# Patient Record
Sex: FEMALE | Race: BLACK OR AFRICAN AMERICAN | NOT HISPANIC OR LATINO | Employment: UNEMPLOYED | URBAN - METROPOLITAN AREA
[De-identification: names, ages, dates, MRNs, and addresses within clinical notes are randomized per-mention and may not be internally consistent; named-entity substitution may affect disease eponyms.]

---

## 2017-03-16 ENCOUNTER — PATIENT MESSAGE (OUTPATIENT)
Dept: OBSTETRICS AND GYNECOLOGY | Facility: CLINIC | Age: 37
End: 2017-03-16

## 2017-04-22 ENCOUNTER — HOSPITAL ENCOUNTER (EMERGENCY)
Facility: HOSPITAL | Age: 37
Discharge: HOME | End: 2017-04-22
Attending: EMERGENCY MEDICINE
Payer: MEDICAID

## 2017-04-22 VITALS
BODY MASS INDEX: 25.66 KG/M2 | RESPIRATION RATE: 20 BRPM | OXYGEN SATURATION: 100 % | TEMPERATURE: 98 F | HEART RATE: 80 BPM | HEIGHT: 65 IN | DIASTOLIC BLOOD PRESSURE: 55 MMHG | WEIGHT: 154 LBS | SYSTOLIC BLOOD PRESSURE: 101 MMHG
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
## 2017-04-22 DIAGNOSIS — S39.012A BACK STRAIN, INITIAL ENCOUNTER: Primary | ICD-10-CM

## 2017-04-22 DIAGNOSIS — S30.0XXA SACRAL CONTUSION, INITIAL ENCOUNTER: ICD-10-CM

## 2017-04-22 LAB
B-HCG UR QL: NEGATIVE
CTP QC/QA: YES

## 2017-04-22 PROCEDURE — 25000003 PHARM REV CODE 250: Performed by: EMERGENCY MEDICINE

## 2017-04-22 PROCEDURE — 99284 EMERGENCY DEPT VISIT MOD MDM: CPT | Mod: 25

## 2017-04-22 PROCEDURE — 81025 URINE PREGNANCY TEST: CPT | Performed by: EMERGENCY MEDICINE

## 2017-04-22 RX ORDER — METHOCARBAMOL 500 MG/1
1000 TABLET, FILM COATED ORAL 4 TIMES DAILY
Status: DISCONTINUED | OUTPATIENT
Start: 2017-04-22 | End: 2017-04-22

## 2017-04-22 RX ORDER — MELOXICAM 7.5 MG/1
7.5 TABLET ORAL DAILY
Qty: 20 TABLET | Refills: 0 | Status: SHIPPED | OUTPATIENT
Start: 2017-04-22 | End: 2018-11-18

## 2017-04-22 RX ORDER — METHOCARBAMOL 500 MG/1
1000 TABLET, FILM COATED ORAL 3 TIMES DAILY
Qty: 30 TABLET | Refills: 0 | Status: SHIPPED | OUTPATIENT
Start: 2017-04-22 | End: 2017-04-27

## 2017-04-22 RX ORDER — METHOCARBAMOL 500 MG/1
1000 TABLET, FILM COATED ORAL
Status: COMPLETED | OUTPATIENT
Start: 2017-04-22 | End: 2017-04-22

## 2017-04-22 RX ADMIN — METHOCARBAMOL 1000 MG: 500 TABLET ORAL at 01:04

## 2017-04-22 NOTE — ED NOTES
Pt. Informed that she would need to provide urine sample and shown the location of the nearest bathroom. Pt. States she does not have to use bathroom at this time.

## 2017-04-22 NOTE — ED TRIAGE NOTES
Pt. Presents with back pain after falling down a flight of stairs this AM. Pt. Reports she is having lumbar back pain. Exacerbated by walking or bending. Pt. Ambulatory to room in hunched over manner. Denies trauma to the head or neck.

## 2017-04-22 NOTE — ED PROVIDER NOTES
Encounter Date: 4/22/2017    SCRIBE #1 NOTE: I, Quang Man MD, am scribing for, and in the presence of,  WaltHandyJonna Novak . I have scribed the following portions of the note - Other sections scribed: HPI/ROS .       History     Chief Complaint   Patient presents with    Back Pain     States she fell down a set of stairs hurting her lower back      Review of patient's allergies indicates:  No Known Allergies  HPI Comments: CC: Back Pain     HPI: This 37 y.o. female presents to the ED c/o acute-onset, constant, severe (10/10) back pain secondary to a mechanical trip and fall down a flight of stairs at home just PTA. No reported head trauma or LOC. No prior attempted pain tx. Pt denies urinary/bowel incontinence, neck pain, abdominal pain, CP, N/V, numbness, weakness, and HA.       The history is provided by the patient. No  was used.     History reviewed. No pertinent past medical history.  History reviewed. No pertinent surgical history.  History reviewed. No pertinent family history.  Social History   Substance Use Topics    Smoking status: Never Smoker    Smokeless tobacco: Never Used    Alcohol use No     Review of Systems   Constitutional: Negative for chills and fever.   HENT: Negative for congestion.    Eyes: Negative for visual disturbance.   Respiratory: Negative for cough and shortness of breath.    Cardiovascular: Negative for chest pain.   Gastrointestinal: Negative for abdominal pain, diarrhea, nausea and vomiting.        (-) bowel incontinence    Genitourinary: Negative for dysuria.        (-) urinary incontinence    Musculoskeletal: Positive for back pain (lower ). Negative for neck pain.   Skin: Negative for rash and wound.   Neurological: Negative for seizures, syncope, weakness, numbness and headaches.       Physical Exam   Initial Vitals   BP Pulse Resp Temp SpO2   04/22/17 1108 04/22/17 1108 04/22/17 1108 04/22/17 1108 04/22/17 1108   102/59 84 18 98 °F (36.7 °C) 98 %      Physical Exam    Nursing note and vitals reviewed.  HENT:   Head: Atraumatic.   Eyes: EOM are normal.   Neck: Normal range of motion.   Cardiovascular: Exam reveals no gallop and no friction rub.    No murmur heard.  Pulmonary/Chest: Breath sounds normal. No respiratory distress. She has no wheezes. She has no rhonchi. She has no rales.   Abdominal: Soft. Bowel sounds are normal. She exhibits no distension. There is no tenderness.   Musculoskeletal: Normal range of motion.   Tenderness to lumbar spine and sacrum w/o deformity   Neurological: She is alert and oriented to person, place, and time.   Psychiatric: She has a normal mood and affect.         ED Course   Procedures  Labs Reviewed   POCT URINE PREGNANCY             Medical Decision Making:   Initial Assessment:   37-year-old female status post fall complaining of low back and sacral pain.  Tenderness noted.  No deformities.  Neuro intact.  X-rays reviewed and interpreted myself showed no fracture.  Plan for symptomatic treatment.  Primary care follow-up.  Return instructions given.  Patient verbalized understanding and agreement with the plan.            Scribe Attestation:   Scribe #1: I performed the above scribed service and the documentation accurately describes the services I performed. I attest to the accuracy of the note.    Attending Attestation:           Physician Attestation for Scribe:  Physician Attestation Statement for Scribe #1: I, Quang Man MD , reviewed documentation, as scribed by Tamara Novak  in my presence, and it is both accurate and complete.                 ED Course     Clinical Impression:   The primary encounter diagnosis was Back strain, initial encounter. Diagnoses of Fall and Sacral contusion, initial encounter were also pertinent to this visit.          Quang Man MD  04/22/17 8165

## 2017-07-15 ENCOUNTER — HOSPITAL ENCOUNTER (EMERGENCY)
Facility: OTHER | Age: 37
Discharge: HOME | End: 2017-07-15
Attending: INTERNAL MEDICINE
Payer: MEDICAID

## 2017-07-15 VITALS
RESPIRATION RATE: 18 BRPM | HEIGHT: 65 IN | TEMPERATURE: 99 F | HEART RATE: 93 BPM | OXYGEN SATURATION: 100 % | BODY MASS INDEX: 25.66 KG/M2 | WEIGHT: 154 LBS | DIASTOLIC BLOOD PRESSURE: 70 MMHG | SYSTOLIC BLOOD PRESSURE: 121 MMHG

## 2017-07-15 DIAGNOSIS — R51.9 ACUTE NONINTRACTABLE HEADACHE, UNSPECIFIED HEADACHE TYPE: Primary | ICD-10-CM

## 2017-07-15 DIAGNOSIS — R42 DIZZINESS: ICD-10-CM

## 2017-07-15 LAB
B-HCG UR QL: NEGATIVE
CTP QC/QA: YES

## 2017-07-15 PROCEDURE — 81025 URINE PREGNANCY TEST: CPT | Performed by: INTERNAL MEDICINE

## 2017-07-15 PROCEDURE — 93010 ELECTROCARDIOGRAM REPORT: CPT | Mod: ,,, | Performed by: INTERNAL MEDICINE

## 2017-07-15 PROCEDURE — 99284 EMERGENCY DEPT VISIT MOD MDM: CPT | Mod: 25

## 2017-07-15 PROCEDURE — 93005 ELECTROCARDIOGRAM TRACING: CPT

## 2017-07-15 RX ORDER — IBUPROFEN 800 MG/1
800 TABLET ORAL EVERY 8 HOURS PRN
Qty: 20 TABLET | Refills: 0 | Status: SHIPPED | OUTPATIENT
Start: 2017-07-15 | End: 2018-11-18

## 2017-07-16 NOTE — ED TRIAGE NOTES
Pt presents to ER with c/o dizziness and frontal headache for past 2 days.  Denies any fever or other symptoms.

## 2017-07-16 NOTE — ED PROVIDER NOTES
Encounter Date: 7/15/2017       History     Chief Complaint   Patient presents with    Headache     onset two days     Dizziness     37-year-old female presents to the emergency department complaining of headache ×2 days and dizziness yesterday.  She states dizziness has resolved but headache remains.  She has taken Tylenol without improvement of headache.  She denies fever/chills, nausea/vomiting      The history is provided by the patient. No  was used.   Headache    This is a new problem. The current episode started yesterday. The problem occurs constantly. The problem has been unchanged. The pain is located in the bilateral region. The pain does not radiate. The pain quality is similar to prior headaches. The quality of the pain is described as aching. The pain is at a severity of 4/10. Pertinent negatives include no abdominal pain, anorexia, back pain, dizziness, fever, hearing loss, loss of balance, nausea, neck pain, numbness, photophobia, seizures, sore throat, vomiting or weakness. Nothing aggravates the symptoms. She has tried acetaminophen for the symptoms. The treatment provided no relief.     Review of patient's allergies indicates:  No Known Allergies  History reviewed. No pertinent past medical history.  History reviewed. No pertinent surgical history.  History reviewed. No pertinent family history.  Social History   Substance Use Topics    Smoking status: Never Smoker    Smokeless tobacco: Never Used    Alcohol use No     Review of Systems   Constitutional: Negative for fever.   HENT: Negative for hearing loss and sore throat.    Eyes: Negative for photophobia.   Gastrointestinal: Negative for abdominal pain, anorexia, nausea and vomiting.   Musculoskeletal: Negative for back pain and neck pain.   Neurological: Positive for headaches. Negative for dizziness, seizures, facial asymmetry, speech difficulty, weakness, light-headedness, numbness and loss of balance.   All other  systems reviewed and are negative.      Physical Exam     Initial Vitals [07/15/17 1837]   BP Pulse Resp Temp SpO2   (!) 101/51 85 18 98.7 °F (37.1 °C) 100 %      MAP       67.67         Physical Exam    Nursing note and vitals reviewed.  Constitutional: She appears well-developed and well-nourished.   HENT:   Head: Normocephalic and atraumatic.   Right Ear: External ear normal.   Left Ear: External ear normal.   Nose: Nose normal.   Mouth/Throat: Oropharynx is clear and moist.   Eyes: Conjunctivae and EOM are normal. Pupils are equal, round, and reactive to light.   Neck: Normal range of motion. Neck supple.   Cardiovascular: Normal rate and regular rhythm.   Pulmonary/Chest: Breath sounds normal. No respiratory distress.   Abdominal: She exhibits no distension.   Musculoskeletal: Normal range of motion.   Neurological: She is alert and oriented to person, place, and time. She has normal strength. No cranial nerve deficit.   Skin: Skin is warm and dry.   Psychiatric: She has a normal mood and affect.         ED Course   Procedures  Labs Reviewed   POCT URINE PREGNANCY     EKG Readings: (Independently Interpreted)   Initial Reading: No STEMI. Rhythm: Normal Sinus Rhythm. Ectopy: No Ectopy. Conduction: Normal. ST Segments: Normal ST Segments. T Waves: Normal. Clinical Impression: Normal Sinus Rhythm          Medical Decision Making:   Initial Assessment:   37-year-old female presents to the emergency department complaining of headache ×2 days and dizziness yesterday.  She states dizziness has resolved but headache remains.  She has taken Tylenol without improvement of headache.  She denies fever/chills, nausea/vomiting    Differential Diagnosis:   Cluster headache  Migraine headache  Tension headache  Clinical Tests:   Lab Tests: Ordered and Reviewed       <> Summary of Lab: UPT was negative  Medical Tests: Ordered and Reviewed  ED Management:  Patient was given instructions for tension headache and a prescription  for ibuprofen.  EKG was normal.  Patient was advised to follow with her primary care physician in 2 days for reevaluation                   ED Course     Clinical Impression:   The primary diagnosis tension headache  Disposition:   Disposition: Discharged  Condition: Stable                        Anil Angela MD  07/15/17 4171

## 2018-11-18 ENCOUNTER — HOSPITAL ENCOUNTER (EMERGENCY)
Facility: HOSPITAL | Age: 38
Discharge: HOME | End: 2018-11-18
Attending: EMERGENCY MEDICINE
Payer: MEDICAID

## 2018-11-18 VITALS
HEIGHT: 63 IN | RESPIRATION RATE: 16 BRPM | HEART RATE: 79 BPM | WEIGHT: 150 LBS | TEMPERATURE: 98 F | OXYGEN SATURATION: 99 % | SYSTOLIC BLOOD PRESSURE: 110 MMHG | BODY MASS INDEX: 26.58 KG/M2 | DIASTOLIC BLOOD PRESSURE: 52 MMHG

## 2018-11-18 DIAGNOSIS — Z34.91 FIRST TRIMESTER PREGNANCY: ICD-10-CM

## 2018-11-18 DIAGNOSIS — O21.9 NAUSEA AND VOMITING IN PREGNANCY: Primary | ICD-10-CM

## 2018-11-18 LAB
ABO + RH BLD: NORMAL
ALBUMIN SERPL BCP-MCNC: 3.8 G/DL
ALP SERPL-CCNC: 73 U/L
ALT SERPL W/O P-5'-P-CCNC: 14 U/L
ANION GAP SERPL CALC-SCNC: 9 MMOL/L
AST SERPL-CCNC: 14 U/L
B-HCG UR QL: POSITIVE
BACTERIA #/AREA URNS HPF: ABNORMAL /HPF
BASOPHILS # BLD AUTO: 0.03 K/UL
BASOPHILS NFR BLD: 0.5 %
BILIRUB SERPL-MCNC: 0.6 MG/DL
BILIRUB UR QL STRIP: NEGATIVE
BUN SERPL-MCNC: 9 MG/DL
CALCIUM SERPL-MCNC: 9.6 MG/DL
CHLORIDE SERPL-SCNC: 105 MMOL/L
CLARITY UR: ABNORMAL
CO2 SERPL-SCNC: 20 MMOL/L
COLOR UR: YELLOW
CREAT SERPL-MCNC: 0.7 MG/DL
CTP QC/QA: YES
DIFFERENTIAL METHOD: ABNORMAL
EOSINOPHIL # BLD AUTO: 0 K/UL
EOSINOPHIL NFR BLD: 0.5 %
ERYTHROCYTE [DISTWIDTH] IN BLOOD BY AUTOMATED COUNT: 12.9 %
EST. GFR  (AFRICAN AMERICAN): >60 ML/MIN/1.73 M^2
EST. GFR  (NON AFRICAN AMERICAN): >60 ML/MIN/1.73 M^2
GLUCOSE SERPL-MCNC: 85 MG/DL
GLUCOSE UR QL STRIP: NEGATIVE
HCG INTACT+B SERPL-ACNC: NORMAL MIU/ML
HCT VFR BLD AUTO: 36.4 %
HGB BLD-MCNC: 12.3 G/DL
HGB UR QL STRIP: NEGATIVE
HYALINE CASTS #/AREA URNS LPF: 0 /LPF
KETONES UR QL STRIP: ABNORMAL
LEUKOCYTE ESTERASE UR QL STRIP: ABNORMAL
LYMPHOCYTES # BLD AUTO: 1.6 K/UL
LYMPHOCYTES NFR BLD: 26.8 %
MCH RBC QN AUTO: 30.4 PG
MCHC RBC AUTO-ENTMCNC: 33.8 G/DL
MCV RBC AUTO: 90 FL
MICROSCOPIC COMMENT: ABNORMAL
MONOCYTES # BLD AUTO: 0.5 K/UL
MONOCYTES NFR BLD: 8.2 %
NEUTROPHILS # BLD AUTO: 3.8 K/UL
NEUTROPHILS NFR BLD: 64 %
NITRITE UR QL STRIP: NEGATIVE
PH UR STRIP: 7 [PH] (ref 5–8)
PLATELET # BLD AUTO: 306 K/UL
PMV BLD AUTO: 10.4 FL
POTASSIUM SERPL-SCNC: 3.5 MMOL/L
PROT SERPL-MCNC: 8.3 G/DL
PROT UR QL STRIP: ABNORMAL
RBC # BLD AUTO: 4.04 M/UL
RBC #/AREA URNS HPF: 5 /HPF (ref 0–4)
SODIUM SERPL-SCNC: 134 MMOL/L
SP GR UR STRIP: 1.02 (ref 1–1.03)
SQUAMOUS #/AREA URNS HPF: 5 /HPF
URN SPEC COLLECT METH UR: ABNORMAL
UROBILINOGEN UR STRIP-ACNC: ABNORMAL EU/DL
WBC # BLD AUTO: 5.94 K/UL
WBC #/AREA URNS HPF: 2 /HPF (ref 0–5)

## 2018-11-18 PROCEDURE — 80053 COMPREHEN METABOLIC PANEL: CPT

## 2018-11-18 PROCEDURE — 85025 COMPLETE CBC W/AUTO DIFF WBC: CPT

## 2018-11-18 PROCEDURE — 63600175 PHARM REV CODE 636 W HCPCS: Performed by: NURSE PRACTITIONER

## 2018-11-18 PROCEDURE — 81000 URINALYSIS NONAUTO W/SCOPE: CPT

## 2018-11-18 PROCEDURE — 96361 HYDRATE IV INFUSION ADD-ON: CPT

## 2018-11-18 PROCEDURE — 96374 THER/PROPH/DIAG INJ IV PUSH: CPT

## 2018-11-18 PROCEDURE — 99284 EMERGENCY DEPT VISIT MOD MDM: CPT | Mod: 25

## 2018-11-18 PROCEDURE — 84702 CHORIONIC GONADOTROPIN TEST: CPT

## 2018-11-18 PROCEDURE — 81025 URINE PREGNANCY TEST: CPT | Performed by: NURSE PRACTITIONER

## 2018-11-18 PROCEDURE — 25000003 PHARM REV CODE 250: Performed by: NURSE PRACTITIONER

## 2018-11-18 PROCEDURE — 86901 BLOOD TYPING SEROLOGIC RH(D): CPT

## 2018-11-18 RX ORDER — ONDANSETRON 2 MG/ML
8 INJECTION INTRAMUSCULAR; INTRAVENOUS
Status: COMPLETED | OUTPATIENT
Start: 2018-11-18 | End: 2018-11-18

## 2018-11-18 RX ORDER — SODIUM CHLORIDE 9 MG/ML
1000 INJECTION, SOLUTION INTRAVENOUS
Status: COMPLETED | OUTPATIENT
Start: 2018-11-18 | End: 2018-11-18

## 2018-11-18 RX ORDER — ONDANSETRON 4 MG/1
4 TABLET, FILM COATED ORAL EVERY 6 HOURS PRN
Qty: 12 TABLET | Refills: 0 | Status: SHIPPED | OUTPATIENT
Start: 2018-11-18 | End: 2019-01-09

## 2018-11-18 RX ORDER — ONDANSETRON 4 MG/1
4 TABLET, FILM COATED ORAL EVERY 6 HOURS PRN
Qty: 12 TABLET | Refills: 0 | Status: SHIPPED | OUTPATIENT
Start: 2018-11-18 | End: 2018-11-18 | Stop reason: SDUPTHER

## 2018-11-18 RX ADMIN — SODIUM CHLORIDE 1000 ML: 0.9 INJECTION, SOLUTION INTRAVENOUS at 11:11

## 2018-11-18 RX ADMIN — ONDANSETRON HYDROCHLORIDE 8 MG: 2 INJECTION INTRAMUSCULAR; INTRAVENOUS at 11:11

## 2018-11-18 NOTE — ED TRIAGE NOTES
Pt. Reports emesis for the last 2 days. Pt.reports she has been up all night vomiting. Pt. Reports emesis is yellowish/greeen. Pt. Denies any abd pain, loose stool or body aches. Pt. States she cannot keep anything down.

## 2018-11-18 NOTE — DISCHARGE INSTRUCTIONS
Please return to the Emergency Department for any new or worsening symptoms including: abdominal pain/cramping, dark\black\bloody bowel movements, vomiting blood, vaginal bleeding, fever, chest pain, shortness of breath, loss of consciousness or any other concerns.     Please follow up with your OBGYN within in the week. If you do not have a OBGYN, you may contact the one listed on your discharge paperwork or you may also call the [facility] Appointment Desk at [phone] to schedule an appointment with a OBGYN.     May take over the counter Prenatal Vitamins    Transvaginal Ultrasound showed Single living intrauterine gestation, crown-rump length correlates to an estimated gestational age of 9 weeks 3 days, cardiac activity documented at 165 beats per minute.  2. Small uterine fibroid.  Estimated due date of 06/20/2019

## 2018-11-18 NOTE — ED PROVIDER NOTES
Encounter Date: 11/18/2018  This is a SORT/MSE of a 38 y.o. female presenting to the ED with c/o N/V x 2 days. She denies any pain, fever, chills, diarrhea. Care will be transferred to an alternate provider when patient is roomed for a full evaluation and final disposition. Patient is aware that he/she is awaiting a room in the emergency department, where another provider will review results, evaluate and treat as needed. FUNMILAYO Boyd DNP     History   No chief complaint on file.    HPI  Review of patient's allergies indicates:  No Known Allergies  History reviewed. No pertinent past medical history.  History reviewed. No pertinent surgical history.  History reviewed. No pertinent family history.  Social History     Tobacco Use    Smoking status: Never Smoker    Smokeless tobacco: Never Used   Substance Use Topics    Alcohol use: No    Drug use: No     Review of Systems   Constitutional: Negative for chills, diaphoresis, fatigue and fever.   Eyes: Negative for pain and redness.   Respiratory: Negative for cough, chest tightness and shortness of breath.    Cardiovascular: Negative for chest pain.   Gastrointestinal: Positive for nausea and vomiting. Negative for abdominal pain, blood in stool, constipation and diarrhea.   Genitourinary: Negative for decreased urine volume, dysuria, flank pain, frequency, hematuria, pelvic pain, urgency, vaginal bleeding, vaginal discharge and vaginal pain.   Musculoskeletal: Negative for arthralgias, back pain, gait problem, joint swelling, myalgias, neck pain and neck stiffness.   Skin: Negative for rash.   Neurological: Negative for dizziness, seizures, syncope, speech difficulty, weakness, light-headedness, numbness and headaches.   Hematological: Does not bruise/bleed easily.   Psychiatric/Behavioral: Negative for agitation, behavioral problems, confusion and decreased concentration.       Physical Exam     Initial Vitals   BP Pulse Resp Temp SpO2   -- -- -- -- --      MAP        --         Physical Exam    Nursing note and vitals reviewed.  Constitutional: She appears well-developed and well-nourished. She is not diaphoretic. She is active and cooperative.  Non-toxic appearance. She does not have a sickly appearance. She does not appear ill. No distress.   HENT:   Head: Normocephalic and atraumatic.   Right Ear: Hearing normal.   Left Ear: Hearing normal.   Nose: Nose normal.   Mouth/Throat: Oropharynx is clear and moist.   Eyes: Conjunctivae and EOM are normal. Pupils are equal, round, and reactive to light.   Neck: Trachea normal, normal range of motion and full passive range of motion without pain. Neck supple. No JVD present.   Cardiovascular: Normal rate, regular rhythm, normal heart sounds and intact distal pulses. Exam reveals no decreased pulses.    Pulses:       Carotid pulses are 2+ on the right side, and 2+ on the left side.       Radial pulses are 2+ on the right side, and 2+ on the left side.        Dorsalis pedis pulses are 2+ on the right side, and 2+ on the left side.        Posterior tibial pulses are 2+ on the right side, and 2+ on the left side.   Pulmonary/Chest: Effort normal and breath sounds normal. She has no decreased breath sounds. She has no wheezes. She has no rhonchi. She has no rales.   Abdominal: Soft. Normal appearance and bowel sounds are normal. There is no tenderness. There is no rigidity, no rebound, no guarding, no CVA tenderness, no tenderness at McBurney's point and negative Luther's sign.   Musculoskeletal: Normal range of motion.   Neurological: She is alert and oriented to person, place, and time. She has normal strength. Gait normal.   Skin: Skin is warm. Capillary refill takes less than 2 seconds. No rash noted.   Psychiatric: She has a normal mood and affect. Her speech is normal and behavior is normal. Judgment and thought content normal. Cognition and memory are normal.         ED Course   Procedures  Labs Reviewed   POCT URINE PREGNANCY           Imaging Results    None                APC / Resident Notes:   This is an emergent evaluation of a 38 y.o. female presenting to the ED for nausea and vomiting x 1 day. Denies any abdominal pain, dysuria, vaginal bleeding or discharge. Vitals the reassuring. Patient is non-toxic appearing and in no acute distress. UPT positive. Patient was unaware of pregnancy.    Transvaginal ultrasound showed Single living intrauterine gestation, crown-rump length correlates to an estimated gestational age of 9 weeks 3 days, cardiac activity documented at 165 beats per minute.Small uterine fibroid. NOLAN: 06/20/19.    HCG [id number]  Rh A POS  CBC unremarkable  CMP showed no significant electrolyte imbalance  UA showed no UTI    DDX: ectopic pregnancy, pregnancy, miscarriage, hyperemesis gravidarum, amongst others    Patient given NS bolus and Zofran. Symptoms completely resolved. Discharged home with Zofran. Instructed to follow up with OBGYN for reevaluation and management of symptoms. Advised to start prenatal vitamins.     I discussed with the patient the diagnosis, treatment plan, indications for return to the emergency department, and for expected follow-up. The patient verbalized an understanding. The patient is asked if there are any questions or concerns. We discuss the case, until all issues are addressed to the patients satisfaction. Patient understands and is agreeable to the plan.     I discussed this patient with  who is in agreement with my assessment and plan.         Marcie Cadena NP                    Clinical Impression:   The primary encounter diagnosis was Nausea and vomiting in pregnancy. A diagnosis of First trimester pregnancy was also pertinent to this visit.      Disposition:   Disposition: Discharged  Condition: Stable                        Marcie Cadena NP  11/18/18 5605

## 2019-01-09 ENCOUNTER — INITIAL PRENATAL (OUTPATIENT)
Dept: OBSTETRICS AND GYNECOLOGY | Facility: CLINIC | Age: 39
End: 2019-01-09
Payer: MEDICAID

## 2019-01-09 ENCOUNTER — LAB VISIT (OUTPATIENT)
Dept: LAB | Facility: HOSPITAL | Age: 39
End: 2019-01-09
Attending: OBSTETRICS & GYNECOLOGY
Payer: MEDICAID

## 2019-01-09 VITALS — DIASTOLIC BLOOD PRESSURE: 68 MMHG | BODY MASS INDEX: 33.74 KG/M2 | SYSTOLIC BLOOD PRESSURE: 122 MMHG | WEIGHT: 190.5 LBS

## 2019-01-09 DIAGNOSIS — Z34.92 SECOND TRIMESTER PREGNANCY: ICD-10-CM

## 2019-01-09 DIAGNOSIS — O09.892 HISTORY OF PRETERM DELIVERY, CURRENTLY PREGNANT IN SECOND TRIMESTER: ICD-10-CM

## 2019-01-09 DIAGNOSIS — Z3A.16 16 WEEKS GESTATION OF PREGNANCY: ICD-10-CM

## 2019-01-09 DIAGNOSIS — O09.521 ADVANCED MATERNAL AGE IN MULTIGRAVIDA, FIRST TRIMESTER: ICD-10-CM

## 2019-01-09 DIAGNOSIS — Z3A.16 16 WEEKS GESTATION OF PREGNANCY: Primary | ICD-10-CM

## 2019-01-09 LAB
ABO + RH BLD: NORMAL
BASOPHILS # BLD AUTO: 0.03 K/UL
BASOPHILS NFR BLD: 0.4 %
BLD GP AB SCN CELLS X3 SERPL QL: NORMAL
DIFFERENTIAL METHOD: ABNORMAL
EOSINOPHIL # BLD AUTO: 0.1 K/UL
EOSINOPHIL NFR BLD: 1.3 %
ERYTHROCYTE [DISTWIDTH] IN BLOOD BY AUTOMATED COUNT: 13.7 %
HCT VFR BLD AUTO: 34.6 %
HGB BLD-MCNC: 11.7 G/DL
LYMPHOCYTES # BLD AUTO: 1.5 K/UL
LYMPHOCYTES NFR BLD: 21.5 %
MCH RBC QN AUTO: 31 PG
MCHC RBC AUTO-ENTMCNC: 33.8 G/DL
MCV RBC AUTO: 92 FL
MONOCYTES # BLD AUTO: 0.5 K/UL
MONOCYTES NFR BLD: 7.5 %
NEUTROPHILS # BLD AUTO: 5 K/UL
NEUTROPHILS NFR BLD: 69.3 %
PLATELET # BLD AUTO: 256 K/UL
PMV BLD AUTO: 10.2 FL
RBC # BLD AUTO: 3.77 M/UL
WBC # BLD AUTO: 7.16 K/UL

## 2019-01-09 PROCEDURE — 85025 COMPLETE CBC W/AUTO DIFF WBC: CPT

## 2019-01-09 PROCEDURE — 81511 FTL CGEN ABNOR FOUR ANAL: CPT

## 2019-01-09 PROCEDURE — 99999 PR PBB SHADOW E&M-EST. PATIENT-LVL III: CPT | Mod: PBBFAC,,, | Performed by: OBSTETRICS & GYNECOLOGY

## 2019-01-09 PROCEDURE — 87086 URINE CULTURE/COLONY COUNT: CPT

## 2019-01-09 PROCEDURE — 99204 PR OFFICE/OUTPT VISIT, NEW, LEVL IV, 45-59 MIN: ICD-10-PCS | Mod: TH,S$PBB,, | Performed by: OBSTETRICS & GYNECOLOGY

## 2019-01-09 PROCEDURE — 86803 HEPATITIS C AB TEST: CPT

## 2019-01-09 PROCEDURE — 87491 CHLMYD TRACH DNA AMP PROBE: CPT

## 2019-01-09 PROCEDURE — 99999 PR PBB SHADOW E&M-EST. PATIENT-LVL III: ICD-10-PCS | Mod: PBBFAC,,, | Performed by: OBSTETRICS & GYNECOLOGY

## 2019-01-09 PROCEDURE — 86592 SYPHILIS TEST NON-TREP QUAL: CPT

## 2019-01-09 PROCEDURE — 83020 HEMOGLOBIN ELECTROPHORESIS: CPT

## 2019-01-09 PROCEDURE — 36415 COLL VENOUS BLD VENIPUNCTURE: CPT

## 2019-01-09 PROCEDURE — 83036 HEMOGLOBIN GLYCOSYLATED A1C: CPT

## 2019-01-09 PROCEDURE — 99204 OFFICE O/P NEW MOD 45 MIN: CPT | Mod: TH,S$PBB,, | Performed by: OBSTETRICS & GYNECOLOGY

## 2019-01-09 PROCEDURE — 86703 HIV-1/HIV-2 1 RESULT ANTBDY: CPT

## 2019-01-09 PROCEDURE — 86901 BLOOD TYPING SEROLOGIC RH(D): CPT

## 2019-01-09 PROCEDURE — 99213 OFFICE O/P EST LOW 20 MIN: CPT | Mod: PBBFAC,TH,25 | Performed by: OBSTETRICS & GYNECOLOGY

## 2019-01-09 PROCEDURE — 87340 HEPATITIS B SURFACE AG IA: CPT

## 2019-01-09 PROCEDURE — 86762 RUBELLA ANTIBODY: CPT

## 2019-01-09 RX ORDER — PROMETHAZINE HYDROCHLORIDE 25 MG/1
25 SUPPOSITORY RECTAL EVERY 6 HOURS PRN
Qty: 12 SUPPOSITORY | Refills: 1 | Status: SHIPPED | OUTPATIENT
Start: 2019-01-09 | End: 2019-02-15

## 2019-01-09 RX ORDER — PANTOPRAZOLE SODIUM 20 MG/1
20 TABLET, DELAYED RELEASE ORAL DAILY
Qty: 30 TABLET | Refills: 1 | Status: SHIPPED | OUTPATIENT
Start: 2019-01-09 | End: 2019-03-05 | Stop reason: SDUPTHER

## 2019-01-09 NOTE — PROGRESS NOTES
NOB was seen in ER on 11/18/18.  An ultrasound was performed on 11/18/18 given an NOLAN: 6/20/19 and EGA: 9w 3d.  Pt was not sure if she would like to keep baby that's why it took her so long to come in for prenatal care.  Pt is concerned about the fibroid that was noted from her last ER visit. laureen

## 2019-01-09 NOTE — PROGRESS NOTES
"Patient comes in today to begin prenatal care  No complaint.  Would like Phenergan for nausea and "something for heartburn".    History reviewed. No pertinent past medical history.    History reviewed. No pertinent surgical history.    History reviewed. No pertinent family history.    Social History     Socioeconomic History    Marital status: Single     Spouse name: None    Number of children: None    Years of education: None    Highest education level: None   Social Needs    Financial resource strain: None    Food insecurity - worry: None    Food insecurity - inability: None    Transportation needs - medical: None    Transportation needs - non-medical: None   Occupational History    None   Tobacco Use    Smoking status: Never Smoker    Smokeless tobacco: Never Used   Substance and Sexual Activity    Alcohol use: No    Drug use: No    Sexual activity: Yes     Partners: Male   Other Topics Concern    None   Social History Narrative    Together for a long time    Getting  8/5/2016    He owns his own business    She is doing home health       No current outpatient medications on file.     No current facility-administered medications for this visit.        Review of patient's allergies indicates:  No Known Allergies    Review of Systems   Constitutional: Positive for fatigue. Negative for fever, chills, appetite change and unexpected weight change.   HENT: Positive for congestion. Negative for hearing loss, ear pain, sore throat, rhinorrhea, sneezing, mouth sores, neck pain, neck stiffness, voice change and postnasal drip.   Eyes: Negative for photophobia, itching and visual disturbance.   Respiratory: Negative for cough, chest tightness, shortness of breath and wheezing.   Cardiovascular: Negative for chest pain, palpitations and leg swelling.   Gastrointestinal: Positive for nausea, vomiting, abdominal pain and constipation. Negative for diarrhea, blood in stool and abdominal distention. "   Genitourinary: Positive for vaginal discharge and pelvic pain. Negative for dysuria, urgency, frequency, hematuria, flank pain, decreased urine volume, vaginal bleeding, difficulty urinating, genital sores, vaginal pain, menstrual problem and dyspareunia.   Musculoskeletal: Positive for back pain. Negative for myalgias and joint swelling.   Skin: Negative for rash and wound.   Neurological: Positive for headaches. Negative for dizziness, tremors, syncope, speech difficulty, weakness, light-headedness and numbness.   Hematological: Negative for adenopathy. Does not bruise/bleed easily.   Psychiatric/Behavioral: Negative for suicidal ideas, hallucinations, confusion, sleep disturbance, dysphoric mood, decreased concentration and agitation. The patient is not nervous/anxious and is not hyperactive.     Exam normal    1.  IUP at 16w6d.  Prenatal profile.  GC/Chlam.  Prenatal vitamins. Quad screen.  2.  Advanced maternal age.  Would need [facility]'s referral.  Does not want amniocentesis.  Needs a baby aspirin now.  3.  History of  birth.  Needs Rosette weekly  4.  Nausea in pregnancy.  Phenergan per request  5.  GERD.  Protonix    Back in 4 weeks

## 2019-01-10 LAB
C TRACH DNA SPEC QL NAA+PROBE: DETECTED
ESTIMATED AVG GLUCOSE: 103 MG/DL
HBA1C MFR BLD HPLC: 5.2 %
HBV SURFACE AG SERPL QL IA: NEGATIVE
HCV AB SERPL QL IA: NEGATIVE
HIV 1+2 AB+HIV1 P24 AG SERPL QL IA: NEGATIVE
N GONORRHOEA DNA SPEC QL NAA+PROBE: NOT DETECTED

## 2019-01-11 ENCOUNTER — PATIENT MESSAGE (OUTPATIENT)
Dept: OBSTETRICS AND GYNECOLOGY | Facility: CLINIC | Age: 39
End: 2019-01-11

## 2019-01-11 ENCOUNTER — TELEPHONE (OUTPATIENT)
Dept: OBSTETRICS AND GYNECOLOGY | Facility: CLINIC | Age: 39
End: 2019-01-11

## 2019-01-11 DIAGNOSIS — A56.09 CHLAMYDIAL CERVICITIS: Primary | ICD-10-CM

## 2019-01-11 LAB
# FETUSES US: NORMAL
2ND TRIMESTER 4 SCREEN PNL SERPL: NEGATIVE
2ND TRIMESTER 4 SCREEN SERPL-IMP: NORMAL
AFP MOM SERPL: 1.16
AFP SERPL-MCNC: 41.9 NG/ML
AGE AT DELIVERY: 39
B-HCG MOM SERPL: 0.72
B-HCG SERPL-ACNC: 20.5 IU/ML
BACTERIA UR CULT: NORMAL
FET TS 21 RISK FROM MAT AGE: NORMAL
GA (DAYS): 6 D
GA (WEEKS): 16 WK
GA METHOD: NORMAL
HGB A MFR BLD ELPH: 95 % (ref 95.8–98)
HGB A2 MFR BLD: 2.9 % (ref 2–3.3)
HGB F MFR BLD: 2.1 % (ref 0–0.9)
HGB FRACT BLD ELPH-IMP: ABNORMAL
HGB OTHER MFR BLD ELPH: 0 %
IDDM PATIENT QL: NORMAL
INHIBIN A MOM SERPL: 0.43
INHIBIN A SERPL-MCNC: 62.6 PG/ML
RPR SER QL: NORMAL
RUBV IGG SER-ACNC: 183 IU/ML
RUBV IGG SER-IMP: REACTIVE
SMOKING STATUS FTND: NO
THEVP VARIANT 2: ABNORMAL
THEVP VARIANT 3: ABNORMAL
TS 18 RISK FETUS: NORMAL
TS 21 RISK FETUS: NORMAL
U ESTRIOL MOM SERPL: 0.75
U ESTRIOL SERPL-MCNC: 0.7 NG/ML

## 2019-01-11 RX ORDER — AZITHROMYCIN 500 MG/1
1000 TABLET, FILM COATED ORAL ONCE
Qty: 2 TABLET | Refills: 0 | Status: SHIPPED | OUTPATIENT
Start: 2019-01-11 | End: 2019-01-11

## 2019-01-12 ENCOUNTER — PATIENT MESSAGE (OUTPATIENT)
Dept: OBSTETRICS AND GYNECOLOGY | Facility: CLINIC | Age: 39
End: 2019-01-12

## 2019-01-15 DIAGNOSIS — Z3A.17 17 WEEKS GESTATION OF PREGNANCY: Primary | ICD-10-CM

## 2019-01-18 ENCOUNTER — INITIAL CONSULT (OUTPATIENT)
Dept: MATERNAL FETAL MEDICINE | Facility: CLINIC | Age: 39
End: 2019-01-18
Attending: OBSTETRICS & GYNECOLOGY
Payer: MEDICAID

## 2019-01-18 ENCOUNTER — TELEPHONE (OUTPATIENT)
Dept: OBSTETRICS AND GYNECOLOGY | Facility: CLINIC | Age: 39
End: 2019-01-18

## 2019-01-18 ENCOUNTER — PROCEDURE VISIT (OUTPATIENT)
Dept: MATERNAL FETAL MEDICINE | Facility: CLINIC | Age: 39
End: 2019-01-18
Payer: MEDICAID

## 2019-01-18 VITALS — BODY MASS INDEX: 34.37 KG/M2 | DIASTOLIC BLOOD PRESSURE: 82 MMHG | SYSTOLIC BLOOD PRESSURE: 120 MMHG | WEIGHT: 194 LBS

## 2019-01-18 DIAGNOSIS — O09.522 ELDERLY MULTIGRAVIDA IN SECOND TRIMESTER: ICD-10-CM

## 2019-01-18 DIAGNOSIS — Z3A.16 16 WEEKS GESTATION OF PREGNANCY: ICD-10-CM

## 2019-01-18 DIAGNOSIS — O09.521 ADVANCED MATERNAL AGE IN MULTIGRAVIDA, FIRST TRIMESTER: ICD-10-CM

## 2019-01-18 DIAGNOSIS — Z87.51 HISTORY OF PRETERM DELIVERY: ICD-10-CM

## 2019-01-18 DIAGNOSIS — O09.892 HISTORY OF PRETERM DELIVERY, CURRENTLY PREGNANT IN SECOND TRIMESTER: ICD-10-CM

## 2019-01-18 PROCEDURE — 76811 OB US DETAILED SNGL FETUS: CPT | Mod: 26,S$PBB,, | Performed by: OBSTETRICS & GYNECOLOGY

## 2019-01-18 PROCEDURE — 99203 OFFICE O/P NEW LOW 30 MIN: CPT | Mod: S$PBB,TH,25, | Performed by: OBSTETRICS & GYNECOLOGY

## 2019-01-18 PROCEDURE — 99999 PR PBB SHADOW E&M-EST. PATIENT-LVL III: CPT | Mod: PBBFAC,,, | Performed by: OBSTETRICS & GYNECOLOGY

## 2019-01-18 PROCEDURE — 99203 PR OFFICE/OUTPT VISIT, NEW, LEVL III, 30-44 MIN: ICD-10-PCS | Mod: S$PBB,TH,25, | Performed by: OBSTETRICS & GYNECOLOGY

## 2019-01-18 PROCEDURE — 99213 OFFICE O/P EST LOW 20 MIN: CPT | Mod: PBBFAC,25 | Performed by: OBSTETRICS & GYNECOLOGY

## 2019-01-18 PROCEDURE — 76811 OB US DETAILED SNGL FETUS: CPT | Mod: PBBFAC | Performed by: OBSTETRICS & GYNECOLOGY

## 2019-01-18 PROCEDURE — 76811 PR US, OB FETAL EVAL & EXAM, TRANSABDOM,FIRST GESTATION: ICD-10-PCS | Mod: 26,S$PBB,, | Performed by: OBSTETRICS & GYNECOLOGY

## 2019-01-18 PROCEDURE — 99999 PR PBB SHADOW E&M-EST. PATIENT-LVL III: ICD-10-PCS | Mod: PBBFAC,,, | Performed by: OBSTETRICS & GYNECOLOGY

## 2019-01-18 NOTE — PROGRESS NOTES
Chief complaint: AMA, PTL    Provider requesting consultation: Dr. Henderson    38 y.o. [id number] 18w1d EGA     PMH:History reviewed. No pertinent past medical history.    PObHx:  OB History    Para Term  AB Living   6 5 4 1   5   SAB TAB Ectopic Multiple Live Births           5      # Outcome Date GA Lbr Zeus/2nd Weight Sex Delivery Anes PTL Lv   6 Current            5 Term 11/24/10   3.24 kg (7 lb 2.3 oz) F   N CHRIS   4  07 33w0d  2.807 kg (6 lb 3 oz) F Vag-Spont  Y CHRIS   3 Term 05   3.997 kg (8 lb 13 oz) M Vag-Spont  N CHRIS   2 Term 01   4.99 kg (11 lb) M Vag-Spont  N CHRIS   1 Term 99   3.459 kg (7 lb 10 oz) M Vag-Spont  N CHRIS          PSH:History reviewed. No pertinent surgical history.    Family history:family history is not on file.    Social history: reports that  has never smoked. she has never used smokeless tobacco. She reports that she does not drink alcohol or use drugs.    A detailed fetal anatomical ultrasound was completed today.  See details in imaging section of EPIC.    Age based risk for Down syndrome at this gestational age is approximately 1 in 110  Aneuploidy screening this pregnancy estimates the risk of Down syndrome to be 1 in 8600        Today the patient was counseled on the relationship between maternal age and genetic aneuploidy.  The patient was counseled on the risks and benefits of screening tests versus definitive genetic testing (amniocentesis). Patient was counseled about her specific age related risk of Down Syndrome. We discussed the limitations of ultrasound in the definitive diagnosis of Down Syndrome and we discussed amniocentesis as providing definitive diagnosis.  I quoted the patient a 1 in 1000 procedure related risk of fetal loss with genetic amniocentesis. After today's consultation she  did not want to pursue genetic amniocentesis.    -We also reviewed that AMA is associated with an increased risk of adverse pregnancy outcomes such  as miscarriage, ectopic pregnancy, diabetes, hypertension, and other adverse outcomes. There is also an increased risk of stillibirth, particularly in women age 40 and above.    -MFM at [facility] recommends the following for women 35 and older at their NOLAN:   -Detailed fetal anatomic survey at 19 to 20 weeks gestation   -Ultrasound for fetal growth at 32 to 34 weeks gestation     Prior spontaneous  birth counseling and recommendations:    Today I discussed with the patient her prior history of  delivery.  I reviewed with her the increased risk of recurrence of  delivery in women who have a history of a prior spontaneous  birth.  I discussed current ACOG guidelines that recommend in patients with a prior history of spontaneous  birth they be offered progesterone therapy for the prevention of recurrent  birth.  This is usually accomplished via weekly 250mg intramuscular injections of 17-alpha-hydroxyprogesterone caproate beginning at 16-20 weeks estimated gestational age and continuing until 37 weeks.  (I anticipate this can be arranged by her primary obstetrical provider.)      I also discussed with the patient that in women at risk for recurrent  birth, surveillance for cervical length shortening with periodic cervical length measurements may help identify those in need for tocolytic therapy and administration of corticosteroids.  Here at [facility] our M group recommends to measure the cervix every 2-3 weeks transvaginally starting at 16-18 weeks gestation up until 28 weeks gestation.     Recommendations from our M group at [facility]:    -Weekly progesterone injection therapy be initiated by her primary obstetrician at 16-20 weeks gestation until 37 weeks gestation    -Cervical length surveillance every 2-3 weeks starting at 16-18 weeks gestation up until 28 weeks gestation    With your permission we have taken the liberty today to schedule a follow up appointment for  cervical length surveillance.       The patient was given an opportunity to ask questions about management and the diease process.  She expressed an understanding of and agreement to the above impression and plan. All questions were answered to her satisfaction.  She was given contact information to the [facility] clinic to address further concerns.      The approximate physician face-to-face time was 30 minutes. The majority of the time (>50%) was spent on counseling of the patient or coordination of care.

## 2019-01-18 NOTE — TELEPHONE ENCOUNTER
1/18/19 @ 1249 ([facility])  CALL ATTEMPT TO PT UNSUCCESSFUL , MESSAGE LEFT FOR PT TO RETURN CALL TO OFFICE CONCERNING RESULTS FOR STD, UNABLE TO RELAY DR SEO MESSAGE TO HER :  MD Santiago Cowan Staff 7 days ago      Zithromax 1 gm orally once given for Chlamydia cervicitis.  Her partner would also need to be treated.  Patient should know that this is a sexually-transmitted disease.    Routing Comment

## 2019-01-18 NOTE — LETTER
January 18, 2019      Abimael Henderson MD  [address] [facility] [address]  [address]  [place] [address]           [facility] - Maternal Fetal Med  [address] [facility] [address]  Phone: [phone]          Patient: Gilberto Bueno   MR Number: [id number]   YOB: 1980   Date of Visit: 1/18/2019       Dear Dr. Abimael Henderson:    Thank you for referring Gilberto Bueno to me for evaluation. Attached you will find relevant portions of my assessment and plan of care.    If you have questions, please do not hesitate to call me. I look forward to following Gilberto Bueno along with you.    Sincerely,    Jim Sinclair MD    Enclosure  CC:  No Recipients    If you would like to receive this communication electronically, please contact [email or web address] or [phone] to request more information on [employer] access.    For providers and/or their staff who would like to refer a patient to [facility], please contact us through our one-stop-shop provider referral line, [facility], at [phone].    If you feel you have received this communication in error or would no longer like to receive these types of communications, please e-mail [email or web address]

## 2019-01-23 ENCOUNTER — PATIENT MESSAGE (OUTPATIENT)
Dept: OBSTETRICS AND GYNECOLOGY | Facility: CLINIC | Age: 39
End: 2019-01-23

## 2019-01-24 ENCOUNTER — PATIENT MESSAGE (OUTPATIENT)
Dept: OBSTETRICS AND GYNECOLOGY | Facility: CLINIC | Age: 39
End: 2019-01-24

## 2019-02-08 ENCOUNTER — CLINICAL SUPPORT (OUTPATIENT)
Dept: OBSTETRICS AND GYNECOLOGY | Facility: CLINIC | Age: 39
End: 2019-02-08
Payer: MEDICAID

## 2019-02-08 ENCOUNTER — ROUTINE PRENATAL (OUTPATIENT)
Dept: OBSTETRICS AND GYNECOLOGY | Facility: CLINIC | Age: 39
End: 2019-02-08
Payer: MEDICAID

## 2019-02-08 VITALS
DIASTOLIC BLOOD PRESSURE: 68 MMHG | BODY MASS INDEX: 35.42 KG/M2 | SYSTOLIC BLOOD PRESSURE: 116 MMHG | WEIGHT: 199.94 LBS

## 2019-02-08 VITALS
WEIGHT: 199.94 LBS | DIASTOLIC BLOOD PRESSURE: 68 MMHG | BODY MASS INDEX: 35.42 KG/M2 | SYSTOLIC BLOOD PRESSURE: 116 MMHG

## 2019-02-08 DIAGNOSIS — O09.521 ADVANCED MATERNAL AGE IN MULTIGRAVIDA, FIRST TRIMESTER: ICD-10-CM

## 2019-02-08 DIAGNOSIS — Z3A.21 21 WEEKS GESTATION OF PREGNANCY: Primary | ICD-10-CM

## 2019-02-08 DIAGNOSIS — O09.892 HISTORY OF PRETERM DELIVERY, CURRENTLY PREGNANT IN SECOND TRIMESTER: ICD-10-CM

## 2019-02-08 DIAGNOSIS — Z87.51 HX OF PRE-TERM LABOR: Primary | ICD-10-CM

## 2019-02-08 PROCEDURE — 99213 OFFICE O/P EST LOW 20 MIN: CPT | Mod: PBBFAC,27,25

## 2019-02-08 PROCEDURE — 96372 THER/PROPH/DIAG INJ SC/IM: CPT | Mod: PBBFAC

## 2019-02-08 PROCEDURE — 99212 OFFICE O/P EST SF 10 MIN: CPT | Mod: PBBFAC,TH | Performed by: OBSTETRICS & GYNECOLOGY

## 2019-02-08 PROCEDURE — 99213 PR OFFICE/OUTPT VISIT, EST, LEVL III, 20-29 MIN: ICD-10-PCS | Mod: TH,S$PBB,, | Performed by: OBSTETRICS & GYNECOLOGY

## 2019-02-08 PROCEDURE — 99999 PR PBB SHADOW E&M-EST. PATIENT-LVL III: CPT | Mod: PBBFAC,,,

## 2019-02-08 PROCEDURE — 99999 PR PBB SHADOW E&M-EST. PATIENT-LVL III: ICD-10-PCS | Mod: PBBFAC,,,

## 2019-02-08 PROCEDURE — 99999 PR PBB SHADOW E&M-EST. PATIENT-LVL II: CPT | Mod: PBBFAC,,, | Performed by: OBSTETRICS & GYNECOLOGY

## 2019-02-08 PROCEDURE — 99999 PR PBB SHADOW E&M-EST. PATIENT-LVL II: ICD-10-PCS | Mod: PBBFAC,,, | Performed by: OBSTETRICS & GYNECOLOGY

## 2019-02-08 PROCEDURE — 99213 OFFICE O/P EST LOW 20 MIN: CPT | Mod: TH,S$PBB,, | Performed by: OBSTETRICS & GYNECOLOGY

## 2019-02-08 RX ORDER — B-COMPLEX WITH VITAMIN C
TABLET ORAL
Refills: 6 | COMMUNITY
Start: 2019-01-11 | End: 2019-02-15 | Stop reason: CLARIF

## 2019-02-08 RX ORDER — HYDROXYPROGESTERONE CAPROATE 250 MG/ML
250 INJECTION INTRAMUSCULAR
Status: DISCONTINUED | OUTPATIENT
Start: 2019-02-08 | End: 2019-02-08 | Stop reason: CLARIF

## 2019-02-08 RX ORDER — HYDROXYPROGESTERONE CAPROATE 1250 MG/5ML
250 INJECTION INTRAMUSCULAR
Qty: 5 ML | Refills: 5 | Status: SHIPPED | OUTPATIENT
Start: 2019-02-08 | End: 2019-02-08 | Stop reason: CLARIF

## 2019-02-08 RX ORDER — HYDROXYPROGESTERONE CAPROATE 250 MG/ML
250 INJECTION INTRAMUSCULAR
Status: DISCONTINUED | OUTPATIENT
Start: 2019-02-08 | End: 2019-02-22 | Stop reason: CLARIF

## 2019-02-08 RX ADMIN — HYDROXYPROGESTERONE CAPROATE 250 MG: 250 INJECTION INTRAMUSCULAR at 11:02

## 2019-02-08 NOTE — PROGRESS NOTES
PT ARRIVED @  2125  , PT'S ORIGINAL APPT WAS @   0609 , PT WAS ROOMED @ 7789  (PT IS AN ADD ON)    PT ARRIVED FOR HER ARIAN INJECTION,  INFORMATION HANDOUT GIVEN TO PT ABOUT PRE TERM LABOR & ARIAN INJECTION , INJECTION GIVEN VIA    L GLUTEAL  W/O DIFFICULTY, NO S/S OF ADVERSE REACTION

## 2019-02-08 NOTE — PROGRESS NOTES
No new complaint  Still has troubles getting Rosette  Seen by MFM.    Taking baby aspirin and prenatal vitamins  Requesting induction at 39 weeks, 6/13/2019.  Needs to take son to college the following week.    Back next week for Rosette.

## 2019-02-14 ENCOUNTER — CLINICAL SUPPORT (OUTPATIENT)
Dept: OBSTETRICS AND GYNECOLOGY | Facility: CLINIC | Age: 39
End: 2019-02-14
Payer: MEDICAID

## 2019-02-14 ENCOUNTER — PATIENT MESSAGE (OUTPATIENT)
Dept: OBSTETRICS AND GYNECOLOGY | Facility: CLINIC | Age: 39
End: 2019-02-14

## 2019-02-14 VITALS — BODY MASS INDEX: 35.43 KG/M2 | WEIGHT: 200 LBS

## 2019-02-14 DIAGNOSIS — O09.892 HISTORY OF PRETERM DELIVERY, CURRENTLY PREGNANT IN SECOND TRIMESTER: Primary | ICD-10-CM

## 2019-02-14 PROCEDURE — 99212 OFFICE O/P EST SF 10 MIN: CPT | Mod: PBBFAC,TH,25

## 2019-02-14 PROCEDURE — 99999 PR PBB SHADOW E&M-EST. PATIENT-LVL II: CPT | Mod: PBBFAC,,,

## 2019-02-14 PROCEDURE — 99999 PR PBB SHADOW E&M-EST. PATIENT-LVL II: ICD-10-PCS | Mod: PBBFAC,,,

## 2019-02-14 PROCEDURE — 96372 THER/PROPH/DIAG INJ SC/IM: CPT | Mod: PBBFAC

## 2019-02-14 RX ADMIN — HYDROXYPROGESTERONE CAPROATE 250 MG: 250 INJECTION INTRAMUSCULAR at 02:02

## 2019-02-14 NOTE — PROGRESS NOTES
PT ARRIVED @  1413  , PT'S ORIGINAL APPT WAS @ 1500, PT WAS ROOMED @  1422    PT ARRIVED FOR HER ARIAN INJECTION,  INFORMATION HANDOUT GIVEN TO PT ABOUT PRE TERM LABOR & ARIAN INJECTION , INJECTION GIVEN VIA  R GLUTEAL  W/O DIFFICULTY, NO S/S OF ADVERSE REACTION

## 2019-02-15 ENCOUNTER — ROUTINE PRENATAL (OUTPATIENT)
Dept: OBSTETRICS AND GYNECOLOGY | Facility: CLINIC | Age: 39
End: 2019-02-15
Payer: MEDICAID

## 2019-02-15 ENCOUNTER — PROCEDURE VISIT (OUTPATIENT)
Dept: MATERNAL FETAL MEDICINE | Facility: CLINIC | Age: 39
End: 2019-02-15
Payer: MEDICAID

## 2019-02-15 VITALS — SYSTOLIC BLOOD PRESSURE: 124 MMHG | BODY MASS INDEX: 35.5 KG/M2 | DIASTOLIC BLOOD PRESSURE: 68 MMHG | WEIGHT: 200.38 LBS

## 2019-02-15 DIAGNOSIS — O21.9 NAUSEA AND VOMITING IN PREGNANCY: Primary | ICD-10-CM

## 2019-02-15 DIAGNOSIS — Z36.89 ENCOUNTER FOR ULTRASOUND TO ASSESS FETAL GROWTH: ICD-10-CM

## 2019-02-15 DIAGNOSIS — J30.89 ENVIRONMENTAL AND SEASONAL ALLERGIES: ICD-10-CM

## 2019-02-15 PROCEDURE — 99499 UNLISTED E&M SERVICE: CPT | Mod: S$PBB,,, | Performed by: OBSTETRICS & GYNECOLOGY

## 2019-02-15 PROCEDURE — 99213 OFFICE O/P EST LOW 20 MIN: CPT | Mod: TH,S$PBB,, | Performed by: OBSTETRICS & GYNECOLOGY

## 2019-02-15 PROCEDURE — 99213 PR OFFICE/OUTPT VISIT, EST, LEVL III, 20-29 MIN: ICD-10-PCS | Mod: TH,S$PBB,, | Performed by: OBSTETRICS & GYNECOLOGY

## 2019-02-15 PROCEDURE — 99212 OFFICE O/P EST SF 10 MIN: CPT | Mod: PBBFAC,TH,25 | Performed by: OBSTETRICS & GYNECOLOGY

## 2019-02-15 PROCEDURE — 76816 OB US FOLLOW-UP PER FETUS: CPT | Mod: PBBFAC | Performed by: OBSTETRICS & GYNECOLOGY

## 2019-02-15 PROCEDURE — 99999 PR PBB SHADOW E&M-EST. PATIENT-LVL II: ICD-10-PCS | Mod: PBBFAC,,, | Performed by: OBSTETRICS & GYNECOLOGY

## 2019-02-15 PROCEDURE — 76816 OB US FOLLOW-UP PER FETUS: CPT | Mod: 26,S$PBB,, | Performed by: OBSTETRICS & GYNECOLOGY

## 2019-02-15 PROCEDURE — 99499 NO LOS: ICD-10-PCS | Mod: S$PBB,,, | Performed by: OBSTETRICS & GYNECOLOGY

## 2019-02-15 PROCEDURE — 99999 PR PBB SHADOW E&M-EST. PATIENT-LVL II: CPT | Mod: PBBFAC,,, | Performed by: OBSTETRICS & GYNECOLOGY

## 2019-02-15 PROCEDURE — 76816 PR  US,PREGNANT UTERUS,F/U,TRANSABD APP: ICD-10-PCS | Mod: 26,S$PBB,, | Performed by: OBSTETRICS & GYNECOLOGY

## 2019-02-15 RX ORDER — PROMETHAZINE HYDROCHLORIDE 25 MG/1
25 TABLET ORAL EVERY 6 HOURS PRN
Qty: 30 TABLET | Refills: 1 | Status: SHIPPED | OUTPATIENT
Start: 2019-02-15 | End: 2019-06-03 | Stop reason: SDUPTHER

## 2019-02-15 RX ORDER — PNV NO.95/FERROUS FUM/FOLIC AC 28MG-0.8MG
TABLET ORAL
Refills: 2 | COMMUNITY
Start: 2019-02-08 | End: 2023-06-26

## 2019-02-15 RX ORDER — LORATADINE 10 MG/1
10 TABLET ORAL DAILY
Qty: 30 TABLET | Refills: 2 | Status: SHIPPED | OUTPATIENT
Start: 2019-02-15 | End: 2019-02-22 | Stop reason: SDUPTHER

## 2019-02-15 NOTE — PROGRESS NOTES
Having frontal headache.  No scotomata.   ?runny nose    Will try loratadine  Refills for phenergan oral    Back as previously scheduled.

## 2019-02-21 ENCOUNTER — TELEPHONE (OUTPATIENT)
Dept: OBSTETRICS AND GYNECOLOGY | Facility: CLINIC | Age: 39
End: 2019-02-21

## 2019-02-21 ENCOUNTER — PATIENT MESSAGE (OUTPATIENT)
Dept: OBSTETRICS AND GYNECOLOGY | Facility: CLINIC | Age: 39
End: 2019-02-21

## 2019-02-21 NOTE — TELEPHONE ENCOUNTER
2/21/19 @ 1334  CALL ATTEMPT TO PT UNSUCCESSFUL , MESSAGE LEFT FOR PT TO RETURN CALL TO OFFICE       ----- Message from Rubi Valerio sent at 2/21/2019  1:12 PM CST -----  Contact: Gilberto [phone]  The patient is requesting a call back from the staff. She reports that it is very urgent. Please call at your earliest convenience.

## 2019-02-22 ENCOUNTER — CLINICAL SUPPORT (OUTPATIENT)
Dept: OBSTETRICS AND GYNECOLOGY | Facility: CLINIC | Age: 39
End: 2019-02-22
Payer: MEDICAID

## 2019-02-22 VITALS — BODY MASS INDEX: 35.38 KG/M2 | WEIGHT: 199.75 LBS

## 2019-02-22 DIAGNOSIS — Z87.51 HISTORY OF PRE-TERM LABOR: Primary | ICD-10-CM

## 2019-02-22 PROCEDURE — 99212 OFFICE O/P EST SF 10 MIN: CPT | Mod: PBBFAC

## 2019-02-22 PROCEDURE — 99999 PR PBB SHADOW E&M-EST. PATIENT-LVL II: ICD-10-PCS | Mod: PBBFAC,,,

## 2019-02-22 PROCEDURE — 96372 THER/PROPH/DIAG INJ SC/IM: CPT | Mod: PBBFAC

## 2019-02-22 PROCEDURE — 99999 PR PBB SHADOW E&M-EST. PATIENT-LVL II: CPT | Mod: PBBFAC,,,

## 2019-02-22 RX ORDER — HYDROXYPROGESTERONE CAPROATE 250 MG/ML
1 INJECTION INTRAMUSCULAR WEEKLY
Status: DISCONTINUED | OUTPATIENT
Start: 2019-02-22 | End: 2019-02-28 | Stop reason: CLARIF

## 2019-02-22 RX ORDER — HYDROXYPROGESTERONE CAPROATE 250 MG/ML
250 INJECTION INTRAMUSCULAR
Status: DISCONTINUED | OUTPATIENT
Start: 2019-02-22 | End: 2019-02-22 | Stop reason: CLARIF

## 2019-02-22 RX ORDER — HYDROXYPROGESTERONE CAPROATE 250 MG/ML
250 INJECTION INTRAMUSCULAR
Status: DISCONTINUED | OUTPATIENT
Start: 2019-02-23 | End: 2019-02-22 | Stop reason: CLARIF

## 2019-02-22 RX ADMIN — HYDROXYPROGESTERONE CAPROATE 250 MG: 250 INJECTION INTRAMUSCULAR at 03:02

## 2019-02-22 NOTE — PROGRESS NOTES
PT ARRIVED @ 1406   , PT'S ORIGINAL APPT WAS @ 1420  , PT WAS ROOMED @  1420    PT ARRIVED FOR HER ARIAN INJECTION, INJECTION GIVEN VIA L GLUTEAL  W/O DIFFICULTY, NO S/S OF ADVERSE REACTION

## 2019-02-28 ENCOUNTER — CLINICAL SUPPORT (OUTPATIENT)
Dept: OBSTETRICS AND GYNECOLOGY | Facility: CLINIC | Age: 39
End: 2019-02-28
Payer: MEDICAID

## 2019-02-28 VITALS — WEIGHT: 199.75 LBS | BODY MASS INDEX: 35.38 KG/M2

## 2019-02-28 DIAGNOSIS — Z87.51 HISTORY OF PRE-TERM LABOR: Primary | ICD-10-CM

## 2019-02-28 PROCEDURE — 99999 PR PBB SHADOW E&M-EST. PATIENT-LVL II: CPT | Mod: PBBFAC,,,

## 2019-02-28 PROCEDURE — 99999 PR PBB SHADOW E&M-EST. PATIENT-LVL II: ICD-10-PCS | Mod: PBBFAC,,,

## 2019-02-28 PROCEDURE — 96372 THER/PROPH/DIAG INJ SC/IM: CPT | Mod: PBBFAC

## 2019-02-28 PROCEDURE — 99212 OFFICE O/P EST SF 10 MIN: CPT | Mod: PBBFAC,25

## 2019-02-28 RX ORDER — HYDROXYPROGESTERONE CAPROATE 250 MG/ML
250 INJECTION INTRAMUSCULAR
Status: SHIPPED | OUTPATIENT
Start: 2019-03-01 | End: 2019-05-10

## 2019-02-28 RX ORDER — HYDROXYPROGESTERONE CAPROATE 250 MG/ML
250 INJECTION INTRAMUSCULAR
Status: DISCONTINUED | OUTPATIENT
Start: 2019-03-01 | End: 2019-02-28 | Stop reason: CLARIF

## 2019-02-28 RX ORDER — HYDROXYPROGESTERONE CAPROATE 250 MG/ML
INJECTION INTRAMUSCULAR
Status: DISCONTINUED | OUTPATIENT
Start: 2019-03-01 | End: 2019-02-28 | Stop reason: CLARIF

## 2019-02-28 RX ADMIN — HYDROXYPROGESTERONE CAPROATE 250 MG: 250 INJECTION INTRAMUSCULAR at 02:02

## 2019-02-28 NOTE — PROGRESS NOTES
PT ARRIVED @  1401  , PT'S ORIGINAL APPT WAS @ 1420  , PT WAS ROOMED @  1422    PT ARRIVED FOR HER ARIAN INJECTION,  INFORMATION HANDOUT GIVEN TO PT ABOUT PRE TERM LABOR & ARIAN INJECTION , INJECTION GIVEN VIA   R GLUTEAL  W/O DIFFICULTY, NO S/S OF ADVERSE REACTION

## 2019-03-05 DIAGNOSIS — Z3A.16 16 WEEKS GESTATION OF PREGNANCY: ICD-10-CM

## 2019-03-05 DIAGNOSIS — O09.521 ADVANCED MATERNAL AGE IN MULTIGRAVIDA, FIRST TRIMESTER: ICD-10-CM

## 2019-03-05 DIAGNOSIS — O09.892 HISTORY OF PRETERM DELIVERY, CURRENTLY PREGNANT IN SECOND TRIMESTER: ICD-10-CM

## 2019-03-06 RX ORDER — PANTOPRAZOLE SODIUM 20 MG/1
TABLET, DELAYED RELEASE ORAL
Qty: 30 TABLET | Refills: 2 | Status: SHIPPED | OUTPATIENT
Start: 2019-03-06 | End: 2023-06-26

## 2019-03-08 ENCOUNTER — ROUTINE PRENATAL (OUTPATIENT)
Dept: OBSTETRICS AND GYNECOLOGY | Facility: CLINIC | Age: 39
End: 2019-03-08
Payer: MEDICAID

## 2019-03-08 ENCOUNTER — CLINICAL SUPPORT (OUTPATIENT)
Dept: OBSTETRICS AND GYNECOLOGY | Facility: CLINIC | Age: 39
End: 2019-03-08
Payer: MEDICAID

## 2019-03-08 VITALS
WEIGHT: 206.56 LBS | SYSTOLIC BLOOD PRESSURE: 120 MMHG | BODY MASS INDEX: 36.59 KG/M2 | DIASTOLIC BLOOD PRESSURE: 68 MMHG

## 2019-03-08 DIAGNOSIS — Z87.51 HISTORY OF PRE-TERM LABOR: Primary | ICD-10-CM

## 2019-03-08 DIAGNOSIS — Z3A.25 25 WEEKS GESTATION OF PREGNANCY: Primary | ICD-10-CM

## 2019-03-08 DIAGNOSIS — O09.892 HISTORY OF PRETERM DELIVERY, CURRENTLY PREGNANT IN SECOND TRIMESTER: ICD-10-CM

## 2019-03-08 DIAGNOSIS — O09.522 ADVANCED MATERNAL AGE IN MULTIGRAVIDA, SECOND TRIMESTER: ICD-10-CM

## 2019-03-08 PROCEDURE — 99999 PR PBB SHADOW E&M-EST. PATIENT-LVL II: CPT | Mod: PBBFAC,,,

## 2019-03-08 PROCEDURE — 99999 PR PBB SHADOW E&M-EST. PATIENT-LVL II: CPT | Mod: PBBFAC,,, | Performed by: OBSTETRICS & GYNECOLOGY

## 2019-03-08 PROCEDURE — 99213 PR OFFICE/OUTPT VISIT, EST, LEVL III, 20-29 MIN: ICD-10-PCS | Mod: TH,S$PBB,, | Performed by: OBSTETRICS & GYNECOLOGY

## 2019-03-08 PROCEDURE — 99999 PR PBB SHADOW E&M-EST. PATIENT-LVL II: ICD-10-PCS | Mod: PBBFAC,,,

## 2019-03-08 PROCEDURE — 99212 OFFICE O/P EST SF 10 MIN: CPT | Mod: PBBFAC,25

## 2019-03-08 PROCEDURE — 99999 PR PBB SHADOW E&M-EST. PATIENT-LVL II: ICD-10-PCS | Mod: PBBFAC,,, | Performed by: OBSTETRICS & GYNECOLOGY

## 2019-03-08 PROCEDURE — 99212 OFFICE O/P EST SF 10 MIN: CPT | Mod: PBBFAC,27,TH,25 | Performed by: OBSTETRICS & GYNECOLOGY

## 2019-03-08 PROCEDURE — 99213 OFFICE O/P EST LOW 20 MIN: CPT | Mod: TH,S$PBB,, | Performed by: OBSTETRICS & GYNECOLOGY

## 2019-03-08 PROCEDURE — 96372 THER/PROPH/DIAG INJ SC/IM: CPT | Mod: PBBFAC

## 2019-03-08 RX ADMIN — HYDROXYPROGESTERONE CAPROATE 250 MG: 250 INJECTION INTRAMUSCULAR at 09:03

## 2019-03-08 NOTE — PROGRESS NOTES
No new complaint  Taking prenatal vitamins and weekly [place]  Seeing MFM.    Would like to have labor induction at 39 weeks  Risks and benefits discussed    [place] and complete blood count    Back in 2 weeks

## 2019-03-08 NOTE — PROGRESS NOTES
PT ARRIVED @ 0831    , PT'S ORIGINAL APPT WAS @  0900 , PT WAS ROOMED @ 0857    PT ARRIVED FOR HER ARIAN INJECTION,  INFORMATION HANDOUT GIVEN TO PT ABOUT PRE TERM LABOR & ARIAN INJECTION , INJECTION GIVEN VIA  l gluteal  W/O DIFFICULTY, NO S/S OF ADVERSE REACTION

## 2019-03-09 ENCOUNTER — APPOINTMENT (OUTPATIENT)
Dept: LAB | Facility: HOSPITAL | Age: 39
End: 2019-03-09
Attending: OBSTETRICS & GYNECOLOGY
Payer: MEDICAID

## 2019-03-10 ENCOUNTER — TELEPHONE (OUTPATIENT)
Dept: OBSTETRICS AND GYNECOLOGY | Facility: CLINIC | Age: 39
End: 2019-03-10

## 2019-03-10 DIAGNOSIS — O99.810 GLUCOSE INTOLERANCE OF PREGNANCY: Primary | ICD-10-CM

## 2019-03-14 ENCOUNTER — CLINICAL SUPPORT (OUTPATIENT)
Dept: OBSTETRICS AND GYNECOLOGY | Facility: CLINIC | Age: 39
End: 2019-03-14
Payer: MEDICAID

## 2019-03-14 VITALS — WEIGHT: 216.06 LBS | BODY MASS INDEX: 38.27 KG/M2

## 2019-03-14 DIAGNOSIS — Z87.51 HISTORY OF PRE-TERM LABOR: Primary | ICD-10-CM

## 2019-03-14 PROCEDURE — 96372 THER/PROPH/DIAG INJ SC/IM: CPT | Mod: PBBFAC

## 2019-03-14 PROCEDURE — 99999 PR PBB SHADOW E&M-EST. PATIENT-LVL II: CPT | Mod: PBBFAC,,,

## 2019-03-14 PROCEDURE — 99212 OFFICE O/P EST SF 10 MIN: CPT | Mod: PBBFAC

## 2019-03-14 PROCEDURE — 99999 PR PBB SHADOW E&M-EST. PATIENT-LVL II: ICD-10-PCS | Mod: PBBFAC,,,

## 2019-03-14 RX ADMIN — HYDROXYPROGESTERONE CAPROATE 250 MG: 250 INJECTION INTRAMUSCULAR at 09:03

## 2019-03-14 NOTE — PROGRESS NOTES
PT ARRIVED @  0906 , PT'S ORIGINAL APPT WAS @  9720,  PT WAS ROOMED @  0921    PT ARRIVED FOR HER ARIAN INJECTION,  INFORMATION HANDOUT GIVEN TO PT ABOUT PRE TERM LABOR & ARIAN INJECTION , INJECTION GIVEN VIA  R GLUTEAL  W/O DIFFICULTY, NO S/S OF ADVERSE REACTION

## 2019-03-21 ENCOUNTER — CLINICAL SUPPORT (OUTPATIENT)
Dept: OBSTETRICS AND GYNECOLOGY | Facility: CLINIC | Age: 39
End: 2019-03-21
Payer: MEDICAID

## 2019-03-21 VITALS — BODY MASS INDEX: 36.31 KG/M2 | DIASTOLIC BLOOD PRESSURE: 68 MMHG | WEIGHT: 205 LBS | SYSTOLIC BLOOD PRESSURE: 126 MMHG

## 2019-03-21 DIAGNOSIS — Z87.51 HISTORY OF PRE-TERM LABOR: Primary | ICD-10-CM

## 2019-03-21 PROCEDURE — 99213 OFFICE O/P EST LOW 20 MIN: CPT | Mod: PBBFAC

## 2019-03-21 PROCEDURE — 99999 PR PBB SHADOW E&M-EST. PATIENT-LVL III: ICD-10-PCS | Mod: PBBFAC,,,

## 2019-03-21 PROCEDURE — 99999 PR PBB SHADOW E&M-EST. PATIENT-LVL III: CPT | Mod: PBBFAC,,,

## 2019-03-21 PROCEDURE — 96372 THER/PROPH/DIAG INJ SC/IM: CPT | Mod: PBBFAC

## 2019-03-21 RX ADMIN — HYDROXYPROGESTERONE CAPROATE 250 MG: 250 INJECTION INTRAMUSCULAR at 09:03

## 2019-03-21 NOTE — PROGRESS NOTES
PT ARRIVED @ 0917   , PT'S ORIGINAL APPT WAS @ 5520   , PT WAS ROOMED @ 0990    PT ARRIVED FOR HER ARIAN INJECTION,  INFORMATION HANDOUT GIVEN TO PT ABOUT PRE TERM LABOR & ARIAN INJECTION , INJECTION GIVEN VIA L GLUTEAL   W/O DIFFICULTY, NO S/S OF ADVERSE REACTION

## 2019-03-29 ENCOUNTER — ROUTINE PRENATAL (OUTPATIENT)
Dept: OBSTETRICS AND GYNECOLOGY | Facility: CLINIC | Age: 39
End: 2019-03-29
Payer: MEDICAID

## 2019-03-29 ENCOUNTER — CLINICAL SUPPORT (OUTPATIENT)
Dept: OBSTETRICS AND GYNECOLOGY | Facility: CLINIC | Age: 39
End: 2019-03-29
Payer: MEDICAID

## 2019-03-29 VITALS
WEIGHT: 213.88 LBS | SYSTOLIC BLOOD PRESSURE: 120 MMHG | BODY MASS INDEX: 37.88 KG/M2 | DIASTOLIC BLOOD PRESSURE: 70 MMHG

## 2019-03-29 DIAGNOSIS — Z87.51 HISTORY OF PRE-TERM LABOR: ICD-10-CM

## 2019-03-29 DIAGNOSIS — Z3A.28 28 WEEKS GESTATION OF PREGNANCY: Primary | ICD-10-CM

## 2019-03-29 DIAGNOSIS — Z34.93 THIRD TRIMESTER PREGNANCY: ICD-10-CM

## 2019-03-29 DIAGNOSIS — O99.810 GLUCOSE INTOLERANCE OF PREGNANCY: ICD-10-CM

## 2019-03-29 DIAGNOSIS — Z23 NEED FOR TDAP VACCINATION: Primary | ICD-10-CM

## 2019-03-29 PROCEDURE — 99212 OFFICE O/P EST SF 10 MIN: CPT | Mod: PBBFAC,27

## 2019-03-29 PROCEDURE — 99999 PR PBB SHADOW E&M-EST. PATIENT-LVL II: ICD-10-PCS | Mod: PBBFAC,,,

## 2019-03-29 PROCEDURE — 99213 PR OFFICE/OUTPT VISIT, EST, LEVL III, 20-29 MIN: ICD-10-PCS | Mod: TH,S$PBB,, | Performed by: OBSTETRICS & GYNECOLOGY

## 2019-03-29 PROCEDURE — 99999 PR PBB SHADOW E&M-EST. PATIENT-LVL II: CPT | Mod: PBBFAC,,,

## 2019-03-29 PROCEDURE — 99999 PR PBB SHADOW E&M-EST. PATIENT-LVL II: ICD-10-PCS | Mod: PBBFAC,,, | Performed by: OBSTETRICS & GYNECOLOGY

## 2019-03-29 PROCEDURE — 99213 OFFICE O/P EST LOW 20 MIN: CPT | Mod: TH,S$PBB,, | Performed by: OBSTETRICS & GYNECOLOGY

## 2019-03-29 PROCEDURE — 99999 PR PBB SHADOW E&M-EST. PATIENT-LVL II: CPT | Mod: PBBFAC,,, | Performed by: OBSTETRICS & GYNECOLOGY

## 2019-03-29 PROCEDURE — 90471 IMMUNIZATION ADMIN: CPT | Mod: PBBFAC,VFC

## 2019-03-29 PROCEDURE — 99212 OFFICE O/P EST SF 10 MIN: CPT | Mod: PBBFAC,TH,25 | Performed by: OBSTETRICS & GYNECOLOGY

## 2019-03-29 NOTE — PROGRESS NOTES
OB here for routine exam.  Pt will get her Tdap vaccine on her next visit for her Rosette injection. laureen

## 2019-03-29 NOTE — PROGRESS NOTES
PT ARRIVED @  1005  , PT'S ORIGINAL APPT WAS @ 4932  , PT WAS ROOMED @    Pt here for T- DAP injection, explained what the medication is for, TDAP handout given to pt,  reviewed documentation of medication with pt, injection given via L Deltoid w/o difficulty. Pt stated her understanding of all instructions. Instructed pt to wait in the waiting room for 10-15 mins in case of an immediate adverse reaction    Unable to give ileana inj due to shipment on back order

## 2019-03-29 NOTE — PROGRESS NOTES
No new complaint    Discussed glucose tolerance test  Needs three-hour glucose tolerance test  Back in 2 weeks

## 2019-03-30 ENCOUNTER — LAB VISIT (OUTPATIENT)
Dept: LAB | Facility: HOSPITAL | Age: 39
End: 2019-03-30
Attending: OBSTETRICS & GYNECOLOGY
Payer: MEDICAID

## 2019-03-30 DIAGNOSIS — O99.810 GLUCOSE INTOLERANCE OF PREGNANCY: ICD-10-CM

## 2019-03-30 LAB
GLUCOSE SERPL-MCNC: 137 MG/DL
GLUCOSE SERPL-MCNC: 172 MG/DL
GLUCOSE SERPL-MCNC: 186 MG/DL
GLUCOSE SERPL-MCNC: 96 MG/DL (ref 70–110)

## 2019-03-30 PROCEDURE — 82951 GLUCOSE TOLERANCE TEST (GTT): CPT

## 2019-03-30 PROCEDURE — 36415 COLL VENOUS BLD VENIPUNCTURE: CPT

## 2019-03-30 PROCEDURE — 82952 GTT-ADDED SAMPLES: CPT

## 2019-03-31 ENCOUNTER — PATIENT MESSAGE (OUTPATIENT)
Dept: OBSTETRICS AND GYNECOLOGY | Facility: CLINIC | Age: 39
End: 2019-03-31

## 2019-04-01 ENCOUNTER — PATIENT MESSAGE (OUTPATIENT)
Dept: OBSTETRICS AND GYNECOLOGY | Facility: CLINIC | Age: 39
End: 2019-04-01

## 2019-04-01 ENCOUNTER — TELEPHONE (OUTPATIENT)
Dept: OBSTETRICS AND GYNECOLOGY | Facility: CLINIC | Age: 39
End: 2019-04-01

## 2019-04-01 DIAGNOSIS — O24.419 GESTATIONAL DIABETES MELLITUS (GDM) AFFECTING SIXTH PREGNANCY: Primary | ICD-10-CM

## 2019-04-01 DIAGNOSIS — O09.40 GESTATIONAL DIABETES MELLITUS (GDM) AFFECTING SIXTH PREGNANCY: Primary | ICD-10-CM

## 2019-04-01 NOTE — TELEPHONE ENCOUNTER
Me   to Gilberto Bueno            9:19 AM   Good Morning,   DR KAY will discuss your results with you on your next office visit. But in the meantime I am going to send him a message to contact you about the results so you can have peace of mind about your results   Thank you            One-hr OGTT at 156   3hr OGTT at 96/186/172/137.   This is most consistent with gestational diabetes.   Will send to diabetic teaching.

## 2019-04-01 NOTE — TELEPHONE ENCOUNTER
One-hr OGTT at 156  3hr OGTT at 96/186/172/137.  This is most consistent with gestational diabetes.  Will send to diabetic teaching.

## 2019-04-03 ENCOUNTER — TELEPHONE (OUTPATIENT)
Dept: OBSTETRICS AND GYNECOLOGY | Facility: CLINIC | Age: 39
End: 2019-04-03

## 2019-04-03 NOTE — TELEPHONE ENCOUNTER
4/3/19  @ 0496  CALL ATTEMPT TO PT UNSUCCESSFUL , MESSAGE LEFT FOR PT TO RETURN CALL TO OFFICE CONCERNING RECENT LAB RESULTS

## 2019-04-04 ENCOUNTER — TELEPHONE (OUTPATIENT)
Dept: OBSTETRICS AND GYNECOLOGY | Facility: CLINIC | Age: 39
End: 2019-04-04

## 2019-04-04 ENCOUNTER — PATIENT MESSAGE (OUTPATIENT)
Dept: OBSTETRICS AND GYNECOLOGY | Facility: CLINIC | Age: 39
End: 2019-04-04

## 2019-04-04 ENCOUNTER — CLINICAL SUPPORT (OUTPATIENT)
Dept: OBSTETRICS AND GYNECOLOGY | Facility: CLINIC | Age: 39
End: 2019-04-04
Payer: MEDICAID

## 2019-04-04 VITALS — WEIGHT: 209.44 LBS | BODY MASS INDEX: 37.1 KG/M2

## 2019-04-04 DIAGNOSIS — Z87.51 HISTORY OF PRE-TERM LABOR: Primary | ICD-10-CM

## 2019-04-04 PROCEDURE — 99999 PR PBB SHADOW E&M-EST. PATIENT-LVL II: CPT | Mod: PBBFAC,,,

## 2019-04-04 PROCEDURE — 99212 OFFICE O/P EST SF 10 MIN: CPT | Mod: PBBFAC

## 2019-04-04 PROCEDURE — 96372 THER/PROPH/DIAG INJ SC/IM: CPT | Mod: PBBFAC

## 2019-04-04 PROCEDURE — 99999 PR PBB SHADOW E&M-EST. PATIENT-LVL II: ICD-10-PCS | Mod: PBBFAC,,,

## 2019-04-04 RX ADMIN — HYDROXYPROGESTERONE CAPROATE 250 MG: 250 INJECTION INTRAMUSCULAR at 03:04

## 2019-04-04 NOTE — TELEPHONE ENCOUNTER
Spoke with patient in regards to her Glucose levels. Patient has been advised of her appt with natalia hawthorne, diabetes educator. Patient has also been advised that a prescription has been sent to [facility] pharmacy for her diabetic meter and supplies. Explained to patient the importance in regards to gestational diabetes and checking her levels as directed. Patient responed 'WHATEVER, I GUESS and hung up

## 2019-04-04 NOTE — PROGRESS NOTES
PT ARRIVED @  1442  , PT'S ORIGINAL APPT WAS @ 1440  , PT WAS ROOMED @  1453    PT ARRIVED FOR HER ARIAN INJECTION,  INFORMATION HANDOUT GIVEN TO PT ABOUT PRE TERM LABOR & ARIAN INJECTION , INJECTION GIVEN VIA   r gluteal  W/O DIFFICULTY, NO S/S OF ADVERSE REACTION

## 2019-04-05 ENCOUNTER — HOSPITAL ENCOUNTER (OUTPATIENT)
Facility: HOSPITAL | Age: 39
Discharge: HOME | End: 2019-04-05
Attending: OBSTETRICS & GYNECOLOGY | Admitting: OBSTETRICS & GYNECOLOGY
Payer: MEDICAID

## 2019-04-05 VITALS
SYSTOLIC BLOOD PRESSURE: 90 MMHG | HEART RATE: 83 BPM | DIASTOLIC BLOOD PRESSURE: 55 MMHG | RESPIRATION RATE: 20 BRPM | TEMPERATURE: 98 F

## 2019-04-05 DIAGNOSIS — O36.8190 DECREASED FETAL MOVEMENT: ICD-10-CM

## 2019-04-05 PROCEDURE — 99211 OFF/OP EST MAY X REQ PHY/QHP: CPT

## 2019-04-06 NOTE — NURSING
Pt sitting up in bed. Appears  anxious. Pt states had not felt fetal movement x 5 hours. Denied pain. Abdomen soft w/o tenderness to palpation. Active fetal movement audible. Pt reassured. Will po hydrate and continue to monitor. S.o at bedside

## 2019-04-06 NOTE — DISCHARGE INSTRUCTIONS
Understanding Preeclampsia  Preeclampsia is pregnancy-related hypertension that develops after 20 weeks' gestation. It can lead to health risks for you and your baby. No one knows what causes preeclampsia. But it is known that the only cure is delivery.     Your blood pressure will be monitored regularly throughout your pregnancy to help check for preeclampsia.   Signs and symptoms  A common sign of preeclampsia is high blood pressure. Other signs and symptoms may include:  · Rapid weight gain  · Protein in your urine  · Headache  · Abdominal pain on your right side  · Vision problems (flashes or spots)  · Edema (swelling) in your face or hands (this also commonly happens near the end of normal pregnancies, even without preeclampsia)  Tests you may have  Your healthcare provider will want to check your blood pressure throughout your pregnancy. If your blood pressure is high, you may have the following tests:  · Urine tests to look for protein  · Blood tests to confirm preeclampsia  · Fetal monitoring to ensure that your baby is healthy  Treating preeclampsia  A daily low dose of aspirin may be prescribed to those at risk for preeclampsia. Preeclampsia almost always ends soon after you give birth. Until then, your healthcare provider can help manage your condition. If your symptoms are mild, you may need bed rest at home. If your symptoms are severe, you will be hospitalized. Hospital treatment includes:  · Complete bed rest to help control blood pressure  · Magnesium IV (intravenous) drip during labor to prevent seizures  · Induced labor or surgical delivery by  section  When to call your healthcare provider  Call your healthcare provider if swelling, weight gain, or other symptoms come on quickly or are severe. Some cases of preeclampsia are more severe than others. Your signs and symptoms also may change or worsen as you get closer to your due date.  Whos at risk?  Preeclampsia can happen in any  pregnant woman. Factors that increase the risk include:  · Previous pregnancies. Preeclampsia, intrauterine growth retardation (IUGR),  birth, placental abruption, or fetal death  · Medical history of mother. Diabetes, high blood pressure, obesity, kidney disease, autoimmune disease (for example lupus), or family history of preeclampsia  · Current pregnancy. First pregnancy, multiple fetuses, over the age of 40 years, or in vitro fertilization  Dangers of preeclampsia  If not treated, preeclampsia can cause problems for you and your baby. The placenta (organ that nourishes your baby) may tear away from the uterine wall. This can lead to fetal distress (the baby is at risk for health problems) and premature delivery. Preeclampsia can also cause these health problems:  · Kidney failure or other organ damage  · Seizures  · Stroke  Once you give birth  In most cases, preeclampsia goes away on its own soon after you give birth. Within days of delivery, your blood pressure, swelling, and other signs should decrease.  Date Last Reviewed: 2016  © 0653-8341 [employer]. [address] [place] [address]. All rights reserved. This information is not intended as a substitute for professional medical care. Always follow your healthcare professional's instructions.      Home Undelivered Discharge Instructions    After Discharge Orders:    Future Appointments   Date Time Provider Department Center   2019  9:00 AM INJECTION,  [facility]   2019  1:00 PM Lucy Weber RD [facility]   2019  8:45 AM Abimael Henderson MD [facility]   2019  9:00 AM INJECTION,  [facility]   2019  9:20 AM [facility]   2019  8:00 AM ROOM 2, [facility]   2019  9:00 AM INJECTION,  [facility]   2019  9:00 AM [facility]   2019  9:00 AM INJECTION, WB OB-GYN [facility]   2019  9:00 AM INJECTION, WB OB-GY[facility]   2019  9:00 AM INJECTION, WB OB-GYN [facility] [place]       Call physician's office for further questions    Current Discharge Medication List      CONTINUE these medications which have NOT CHANGED    Details   pantoprazole (PROTONIX) 20 MG tablet TAKE 1 TABLET BY MOUTH ONCE DAILY  Qty: 30 tablet, Refills: 2    Associated Diagnoses: 16 weeks gestation of pregnancy; Advanced maternal age in multigravida, first trimester; History of  delivery, currently pregnant in second trimester      PRENATAL 28 mg iron- 800 mcg Tab TK 1 T PO QD  Refills: 2      promethazine (PHENERGAN) 25 MG tablet Take 1 tablet (25 mg total) by mouth every 6 (six) hours as needed for Nausea.  Qty: 30 tablet, Refills: 1    Associated Diagnoses: Nausea and vomiting in pregnancy                     · Diet:  normal diet as tolerated    · Rest: normal activity as tolerated    Other instructions: Do kick counts once a day on your baby. Choose the time of day your baby is most active. Get in a comfortable lying or sitting position and time how long it takes to feel 10 kicks, twists, turns, swishes, or rolls. Call your physician or midwife if there have not been 10 kicks in 1 hours    Call physician or midwife, return to Labor and Delivery, call 911, or go to the nearest Emergency Room if: increased leakage or fluid, contractions more than  10 per  1 hour, decreased fetal movement, persistent low back pain or cramping, bleeding from vaginal area, difficulty urinating, pain with urination, difficulty breathing, new calf pain, persistent headache or vision change

## 2019-04-06 NOTE — NURSING
Pt on unit via WC to room 222 with complaint of decreased fetal movement. Monitors applied . Pt instructed on current plan of care. Bed  in low position ,locked , and call bell in reach.

## 2019-04-06 NOTE — NURSING
fht reactive.pt calm. No longer anxious.  No complaints of pain. Dr. Redmond informed of pt's complaints, reactive fht. Orders for discharge given

## 2019-04-11 ENCOUNTER — CLINICAL SUPPORT (OUTPATIENT)
Dept: OBSTETRICS AND GYNECOLOGY | Facility: CLINIC | Age: 39
End: 2019-04-11
Payer: MEDICAID

## 2019-04-11 VITALS — BODY MASS INDEX: 38.27 KG/M2 | WEIGHT: 216.06 LBS

## 2019-04-11 DIAGNOSIS — Z87.51 HISTORY OF PRE-TERM LABOR: Primary | ICD-10-CM

## 2019-04-11 PROCEDURE — 99999 PR PBB SHADOW E&M-EST. PATIENT-LVL II: CPT | Mod: PBBFAC,,,

## 2019-04-11 PROCEDURE — 99212 OFFICE O/P EST SF 10 MIN: CPT | Mod: PBBFAC

## 2019-04-11 PROCEDURE — 96372 THER/PROPH/DIAG INJ SC/IM: CPT | Mod: PBBFAC

## 2019-04-11 PROCEDURE — 99999 PR PBB SHADOW E&M-EST. PATIENT-LVL II: ICD-10-PCS | Mod: PBBFAC,,,

## 2019-04-11 RX ADMIN — HYDROXYPROGESTERONE CAPROATE 250 MG: 250 INJECTION INTRAMUSCULAR at 08:04

## 2019-04-11 NOTE — PROGRESS NOTES
PT ARRIVED @ 0842   , PT'S ORIGINAL APPT WAS @ 0900  , PT WAS ROOMED @  0853    PT ARRIVED FOR HER ARIAN INJECTION,  INFORMATION HANDOUT GIVEN TO PT ABOUT PRE TERM LABOR & ARIAN INJECTION , INJECTION GIVEN VIA  L GLUTEAL  W/O DIFFICULTY, NO S/S OF ADVERSE REACTION

## 2019-04-12 ENCOUNTER — ROUTINE PRENATAL (OUTPATIENT)
Dept: OBSTETRICS AND GYNECOLOGY | Facility: CLINIC | Age: 39
End: 2019-04-12
Payer: MEDICAID

## 2019-04-12 VITALS
BODY MASS INDEX: 38.12 KG/M2 | DIASTOLIC BLOOD PRESSURE: 74 MMHG | SYSTOLIC BLOOD PRESSURE: 122 MMHG | WEIGHT: 215.19 LBS

## 2019-04-12 DIAGNOSIS — Z87.51 HISTORY OF PRE-TERM LABOR: ICD-10-CM

## 2019-04-12 DIAGNOSIS — Z3A.30 30 WEEKS GESTATION OF PREGNANCY: Primary | ICD-10-CM

## 2019-04-12 DIAGNOSIS — Z34.93 THIRD TRIMESTER PREGNANCY: ICD-10-CM

## 2019-04-12 DIAGNOSIS — O24.419 GESTATIONAL DIABETES MELLITUS (GDM) AFFECTING SIXTH PREGNANCY: ICD-10-CM

## 2019-04-12 DIAGNOSIS — O09.40 GESTATIONAL DIABETES MELLITUS (GDM) AFFECTING SIXTH PREGNANCY: ICD-10-CM

## 2019-04-12 PROCEDURE — 99212 OFFICE O/P EST SF 10 MIN: CPT | Mod: PBBFAC,TH | Performed by: OBSTETRICS & GYNECOLOGY

## 2019-04-12 PROCEDURE — 99213 PR OFFICE/OUTPT VISIT, EST, LEVL III, 20-29 MIN: ICD-10-PCS | Mod: TH,S$PBB,, | Performed by: OBSTETRICS & GYNECOLOGY

## 2019-04-12 PROCEDURE — 99213 OFFICE O/P EST LOW 20 MIN: CPT | Mod: TH,S$PBB,, | Performed by: OBSTETRICS & GYNECOLOGY

## 2019-04-12 PROCEDURE — 99999 PR PBB SHADOW E&M-EST. PATIENT-LVL II: CPT | Mod: PBBFAC,,, | Performed by: OBSTETRICS & GYNECOLOGY

## 2019-04-12 PROCEDURE — 99999 PR PBB SHADOW E&M-EST. PATIENT-LVL II: ICD-10-PCS | Mod: PBBFAC,,, | Performed by: OBSTETRICS & GYNECOLOGY

## 2019-04-12 NOTE — PROGRESS NOTES
OB here for routine exam and discuss failed 3 hr GTT.  Pt is refusing to go to diabetic teaching class or monitor her sugar intake.  She believes the lab made a mistake with her blood. laureen

## 2019-04-18 ENCOUNTER — ROUTINE PRENATAL (OUTPATIENT)
Dept: OBSTETRICS AND GYNECOLOGY | Facility: CLINIC | Age: 39
End: 2019-04-18
Payer: MEDICAID

## 2019-04-18 ENCOUNTER — CLINICAL SUPPORT (OUTPATIENT)
Dept: OBSTETRICS AND GYNECOLOGY | Facility: CLINIC | Age: 39
End: 2019-04-18
Payer: MEDICAID

## 2019-04-18 VITALS
WEIGHT: 216.5 LBS | BODY MASS INDEX: 38.35 KG/M2 | DIASTOLIC BLOOD PRESSURE: 70 MMHG | BODY MASS INDEX: 38.35 KG/M2 | SYSTOLIC BLOOD PRESSURE: 98 MMHG | WEIGHT: 216.5 LBS | SYSTOLIC BLOOD PRESSURE: 98 MMHG | DIASTOLIC BLOOD PRESSURE: 70 MMHG

## 2019-04-18 DIAGNOSIS — Z87.51 HISTORY OF PRE-TERM LABOR: ICD-10-CM

## 2019-04-18 DIAGNOSIS — Z3A.31 31 WEEKS GESTATION OF PREGNANCY: Primary | ICD-10-CM

## 2019-04-18 DIAGNOSIS — O09.40 GESTATIONAL DIABETES MELLITUS (GDM) AFFECTING SIXTH PREGNANCY: ICD-10-CM

## 2019-04-18 DIAGNOSIS — O24.419 GESTATIONAL DIABETES MELLITUS (GDM) AFFECTING SIXTH PREGNANCY: ICD-10-CM

## 2019-04-18 DIAGNOSIS — Z87.51 HISTORY OF PRE-TERM LABOR: Primary | ICD-10-CM

## 2019-04-18 PROCEDURE — 99999 PR PBB SHADOW E&M-EST. PATIENT-LVL II: ICD-10-PCS | Mod: PBBFAC,,, | Performed by: OBSTETRICS & GYNECOLOGY

## 2019-04-18 PROCEDURE — 96372 THER/PROPH/DIAG INJ SC/IM: CPT | Mod: PBBFAC

## 2019-04-18 PROCEDURE — 99999 PR PBB SHADOW E&M-EST. PATIENT-LVL III: ICD-10-PCS | Mod: PBBFAC,,,

## 2019-04-18 PROCEDURE — 99999 PR PBB SHADOW E&M-EST. PATIENT-LVL II: CPT | Mod: PBBFAC,,, | Performed by: OBSTETRICS & GYNECOLOGY

## 2019-04-18 PROCEDURE — 99213 OFFICE O/P EST LOW 20 MIN: CPT | Mod: TH,S$PBB,, | Performed by: OBSTETRICS & GYNECOLOGY

## 2019-04-18 PROCEDURE — 99213 PR OFFICE/OUTPT VISIT, EST, LEVL III, 20-29 MIN: ICD-10-PCS | Mod: TH,S$PBB,, | Performed by: OBSTETRICS & GYNECOLOGY

## 2019-04-18 PROCEDURE — 99213 OFFICE O/P EST LOW 20 MIN: CPT | Mod: PBBFAC

## 2019-04-18 PROCEDURE — 99212 OFFICE O/P EST SF 10 MIN: CPT | Mod: PBBFAC,27,TH | Performed by: OBSTETRICS & GYNECOLOGY

## 2019-04-18 PROCEDURE — 99999 PR PBB SHADOW E&M-EST. PATIENT-LVL III: CPT | Mod: PBBFAC,,,

## 2019-04-18 RX ORDER — CALCIUM CITRATE/VITAMIN D3 200MG-6.25
TABLET ORAL
Refills: 4 | COMMUNITY
Start: 2019-04-04 | End: 2023-06-26

## 2019-04-18 RX ORDER — LANCING DEVICE
EACH MISCELLANEOUS
Refills: 0 | COMMUNITY
Start: 2019-04-04 | End: 2023-06-26

## 2019-04-18 RX ORDER — BLOOD-GLUCOSE METER
EACH MISCELLANEOUS
Refills: 0 | COMMUNITY
Start: 2019-04-04 | End: 2023-06-26

## 2019-04-18 RX ORDER — LANCETS 33 GAUGE
EACH MISCELLANEOUS
Refills: 4 | COMMUNITY
Start: 2019-04-04 | End: 2023-06-26

## 2019-04-18 RX ADMIN — HYDROXYPROGESTERONE CAPROATE 250 MG: 250 INJECTION INTRAMUSCULAR at 09:04

## 2019-04-18 NOTE — PROGRESS NOTES
PT ARRIVED @ 0811   , PT'S ORIGINAL APPT WAS @  0900  , PT WAS ROOMED @  0906    PT ARRIVED FOR HER ARIAN INJECTION,  INFORMATION HANDOUT GIVEN TO PT ABOUT PRE TERM LABOR & ARIAN INJECTION , INJECTION GIVEN VIA   R GLUTEAL   W/O DIFFICULTY, NO S/S OF ADVERSE REACTION

## 2019-04-18 NOTE — PROGRESS NOTES
No new complaint  Fasting at 72-92 with 2hr postprandial at     Discussed diet and increased physical activities for glucose control    Continue with [place] weekly  Continue with AccuChek 4 times a day  Continue with baby aspirin    Back in next week  Consider NST weekly if glucose NOT in better control

## 2019-04-25 ENCOUNTER — CLINICAL SUPPORT (OUTPATIENT)
Dept: OBSTETRICS AND GYNECOLOGY | Facility: CLINIC | Age: 39
End: 2019-04-25
Payer: MEDICAID

## 2019-04-25 VITALS — WEIGHT: 216.19 LBS | BODY MASS INDEX: 38.3 KG/M2

## 2019-04-25 DIAGNOSIS — Z87.51 HISTORY OF PRE-TERM LABOR: Primary | ICD-10-CM

## 2019-04-25 PROCEDURE — 99999 PR PBB SHADOW E&M-EST. PATIENT-LVL III: ICD-10-PCS | Mod: PBBFAC,,,

## 2019-04-25 PROCEDURE — 96372 THER/PROPH/DIAG INJ SC/IM: CPT | Mod: PBBFAC

## 2019-04-25 PROCEDURE — 99999 PR PBB SHADOW E&M-EST. PATIENT-LVL III: CPT | Mod: PBBFAC,,,

## 2019-04-25 PROCEDURE — 99213 OFFICE O/P EST LOW 20 MIN: CPT | Mod: PBBFAC,25

## 2019-04-25 RX ADMIN — HYDROXYPROGESTERONE CAPROATE 250 MG: 250 INJECTION INTRAMUSCULAR at 09:04

## 2019-04-25 NOTE — PROGRESS NOTES
PT ARRIVED @ 0810   , PT'S ORIGINAL APPT WAS @ 9820  , PT WAS ROOMED @  0924    PT ARRIVED FOR HER ARIAN INJECTION,  INFORMATION HANDOUT GIVEN TO PT ABOUT PRE TERM LABOR & ARIAN INJECTION , INJECTION GIVEN VIA  L GLUTEAL  W/O DIFFICULTY, NO S/S OF ADVERSE REACTION

## 2019-04-26 ENCOUNTER — TELEPHONE (OUTPATIENT)
Dept: MATERNAL FETAL MEDICINE | Facility: CLINIC | Age: 39
End: 2019-04-26

## 2019-04-26 ENCOUNTER — PROCEDURE VISIT (OUTPATIENT)
Dept: MATERNAL FETAL MEDICINE | Facility: CLINIC | Age: 39
End: 2019-04-26
Payer: MEDICAID

## 2019-04-26 DIAGNOSIS — Z36.89 ENCOUNTER FOR ULTRASOUND TO CHECK FETAL GROWTH: Primary | ICD-10-CM

## 2019-04-26 DIAGNOSIS — Z36.89 ENCOUNTER FOR ULTRASOUND TO ASSESS FETAL GROWTH: ICD-10-CM

## 2019-04-26 DIAGNOSIS — O09.523 ELDERLY MULTIGRAVIDA IN THIRD TRIMESTER: ICD-10-CM

## 2019-04-26 DIAGNOSIS — O24.419 GESTATIONAL DIABETES MELLITUS (GDM) IN THIRD TRIMESTER, GESTATIONAL DIABETES METHOD OF CONTROL UNSPECIFIED: ICD-10-CM

## 2019-04-26 PROCEDURE — 76819 FETAL BIOPHYS PROFIL W/O NST: CPT | Mod: PBBFAC | Performed by: OBSTETRICS & GYNECOLOGY

## 2019-04-26 PROCEDURE — 76819 PR US, OB, FETAL BIOPHYSICAL, W/O NST: ICD-10-PCS | Mod: 26,S$PBB,, | Performed by: OBSTETRICS & GYNECOLOGY

## 2019-04-26 PROCEDURE — 76816 OB US FOLLOW-UP PER FETUS: CPT | Mod: PBBFAC | Performed by: OBSTETRICS & GYNECOLOGY

## 2019-04-26 PROCEDURE — 76819 FETAL BIOPHYS PROFIL W/O NST: CPT | Mod: 26,S$PBB,, | Performed by: OBSTETRICS & GYNECOLOGY

## 2019-04-26 PROCEDURE — 76816 OB US FOLLOW-UP PER FETUS: CPT | Mod: 26,S$PBB,, | Performed by: OBSTETRICS & GYNECOLOGY

## 2019-04-26 PROCEDURE — 76816 PR  US,PREGNANT UTERUS,F/U,TRANSABD APP: ICD-10-PCS | Mod: 26,S$PBB,, | Performed by: OBSTETRICS & GYNECOLOGY

## 2019-04-26 NOTE — TELEPHONE ENCOUNTER
Message left for patient to call [facility] clinic at [phone] to schedule 5 week Growth scan for GDM (May 31-June 6).     Order entered.     ----- Message from Shanti Gallo MD sent at 4/26/2019  8:45 AM CDT -----  Needs 5 week growth follow up due to gdm

## 2019-04-29 ENCOUNTER — CLINICAL SUPPORT (OUTPATIENT)
Dept: OBSTETRICS AND GYNECOLOGY | Facility: CLINIC | Age: 39
End: 2019-04-29
Payer: MEDICAID

## 2019-04-29 ENCOUNTER — ROUTINE PRENATAL (OUTPATIENT)
Dept: OBSTETRICS AND GYNECOLOGY | Facility: CLINIC | Age: 39
End: 2019-04-29
Payer: MEDICAID

## 2019-04-29 VITALS
WEIGHT: 222.25 LBS | DIASTOLIC BLOOD PRESSURE: 70 MMHG | BODY MASS INDEX: 39.37 KG/M2 | WEIGHT: 222.69 LBS | SYSTOLIC BLOOD PRESSURE: 110 MMHG | BODY MASS INDEX: 39.44 KG/M2

## 2019-04-29 DIAGNOSIS — Z87.51 HISTORY OF PRE-TERM LABOR: Primary | ICD-10-CM

## 2019-04-29 DIAGNOSIS — O24.419 GESTATIONAL DIABETES MELLITUS (GDM) AFFECTING SIXTH PREGNANCY: ICD-10-CM

## 2019-04-29 DIAGNOSIS — Z34.93 THIRD TRIMESTER PREGNANCY: ICD-10-CM

## 2019-04-29 DIAGNOSIS — Z87.51 HISTORY OF PRE-TERM LABOR: ICD-10-CM

## 2019-04-29 DIAGNOSIS — Z3A.32 32 WEEKS GESTATION OF PREGNANCY: Primary | ICD-10-CM

## 2019-04-29 DIAGNOSIS — O09.40 GESTATIONAL DIABETES MELLITUS (GDM) AFFECTING SIXTH PREGNANCY: ICD-10-CM

## 2019-04-29 PROCEDURE — 99212 OFFICE O/P EST SF 10 MIN: CPT | Mod: PBBFAC,25

## 2019-04-29 PROCEDURE — 99213 OFFICE O/P EST LOW 20 MIN: CPT | Mod: TH,S$PBB,, | Performed by: OBSTETRICS & GYNECOLOGY

## 2019-04-29 PROCEDURE — 99999 PR PBB SHADOW E&M-EST. PATIENT-LVL II: CPT | Mod: PBBFAC,,, | Performed by: OBSTETRICS & GYNECOLOGY

## 2019-04-29 PROCEDURE — 99999 PR PBB SHADOW E&M-EST. PATIENT-LVL II: ICD-10-PCS | Mod: PBBFAC,,,

## 2019-04-29 PROCEDURE — 99213 PR OFFICE/OUTPT VISIT, EST, LEVL III, 20-29 MIN: ICD-10-PCS | Mod: TH,S$PBB,, | Performed by: OBSTETRICS & GYNECOLOGY

## 2019-04-29 PROCEDURE — 96372 THER/PROPH/DIAG INJ SC/IM: CPT | Mod: PBBFAC

## 2019-04-29 PROCEDURE — 99999 PR PBB SHADOW E&M-EST. PATIENT-LVL II: ICD-10-PCS | Mod: PBBFAC,,, | Performed by: OBSTETRICS & GYNECOLOGY

## 2019-04-29 PROCEDURE — 99212 OFFICE O/P EST SF 10 MIN: CPT | Mod: PBBFAC,25,27,TH | Performed by: OBSTETRICS & GYNECOLOGY

## 2019-04-29 PROCEDURE — 99999 PR PBB SHADOW E&M-EST. PATIENT-LVL II: CPT | Mod: PBBFAC,,,

## 2019-04-29 RX ADMIN — HYDROXYPROGESTERONE CAPROATE 250 MG: 250 INJECTION INTRAMUSCULAR at 08:04

## 2019-04-29 NOTE — PROGRESS NOTES
PT ARRIVED @ 0812   , PT'S ORIGINAL APPT WAS @  1300  , PT WAS ROOMED @  0826    PT ARRIVED FOR HER ARIAN INJECTION,  INFORMATION HANDOUT GIVEN TO PT ABOUT PRE TERM LABOR & ARIAN INJECTION , INJECTION GIVEN VIA  R GLUTEAL  W/O DIFFICULTY, NO S/S OF ADVERSE REACTION

## 2019-04-29 NOTE — PROGRESS NOTES
No new complaint  Got another Rosette though she got one last Thursday, or 4 days ago.    Fasting this morning is 76.  No record for review.  Normal per report.     Discussed weight gain with diet.  She has gained significant weight in the past 4 days.    Discussed compliance with diet and AccuChek  Back next Monday, 5/6/2019

## 2019-05-01 ENCOUNTER — TELEPHONE (OUTPATIENT)
Dept: OBSTETRICS AND GYNECOLOGY | Facility: CLINIC | Age: 39
End: 2019-05-01

## 2019-05-01 NOTE — TELEPHONE ENCOUNTER
Called and spoke with pt regarding her [facility] paperwork.  Pt states that she has stopped working since 03/01/19 due to workplace closing the building here in the city and if she works then she would have to commute to [place].  She spoke both to her Manager and Corporate about her high-risk pregnancy so they are aware of her not working at this time during the pregnancy.  Paperwork has been filled out and awaiting Dr. Henderson's signature.  Pt will be contacted once [facility] paperwork is signed and faxed. laureen

## 2019-05-06 ENCOUNTER — CLINICAL SUPPORT (OUTPATIENT)
Dept: OBSTETRICS AND GYNECOLOGY | Facility: CLINIC | Age: 39
End: 2019-05-06
Payer: MEDICAID

## 2019-05-06 ENCOUNTER — ROUTINE PRENATAL (OUTPATIENT)
Dept: OBSTETRICS AND GYNECOLOGY | Facility: CLINIC | Age: 39
End: 2019-05-06
Payer: MEDICAID

## 2019-05-06 VITALS
BODY MASS INDEX: 39.79 KG/M2 | SYSTOLIC BLOOD PRESSURE: 110 MMHG | BODY MASS INDEX: 39.79 KG/M2 | WEIGHT: 224.63 LBS | DIASTOLIC BLOOD PRESSURE: 60 MMHG | SYSTOLIC BLOOD PRESSURE: 110 MMHG | WEIGHT: 224.63 LBS | DIASTOLIC BLOOD PRESSURE: 60 MMHG

## 2019-05-06 DIAGNOSIS — Z87.51 HISTORY OF PRE-TERM LABOR: Primary | ICD-10-CM

## 2019-05-06 DIAGNOSIS — O09.523 ELDERLY MULTIGRAVIDA IN THIRD TRIMESTER: ICD-10-CM

## 2019-05-06 DIAGNOSIS — O24.410 DIET CONTROLLED GESTATIONAL DIABETES MELLITUS (GDM) IN THIRD TRIMESTER: ICD-10-CM

## 2019-05-06 DIAGNOSIS — B37.31 CANDIDA VAGINITIS: ICD-10-CM

## 2019-05-06 DIAGNOSIS — Z3A.33 33 WEEKS GESTATION OF PREGNANCY: Primary | ICD-10-CM

## 2019-05-06 PROCEDURE — 99999 PR PBB SHADOW E&M-EST. PATIENT-LVL III: CPT | Mod: PBBFAC,,,

## 2019-05-06 PROCEDURE — 76805 PR US, OB 14+WKS, TRANSABD, SINGLE GESTATION: ICD-10-PCS | Mod: 26,S$PBB,, | Performed by: OBSTETRICS & GYNECOLOGY

## 2019-05-06 PROCEDURE — 96372 THER/PROPH/DIAG INJ SC/IM: CPT | Mod: PBBFAC

## 2019-05-06 PROCEDURE — 99999 PR PBB SHADOW E&M-EST. PATIENT-LVL II: ICD-10-PCS | Mod: PBBFAC,,, | Performed by: OBSTETRICS & GYNECOLOGY

## 2019-05-06 PROCEDURE — 99213 OFFICE O/P EST LOW 20 MIN: CPT | Mod: PBBFAC

## 2019-05-06 PROCEDURE — 99212 OFFICE O/P EST SF 10 MIN: CPT | Mod: PBBFAC,27,TH | Performed by: OBSTETRICS & GYNECOLOGY

## 2019-05-06 PROCEDURE — 99213 OFFICE O/P EST LOW 20 MIN: CPT | Mod: TH,S$PBB,25, | Performed by: OBSTETRICS & GYNECOLOGY

## 2019-05-06 PROCEDURE — 99999 PR PBB SHADOW E&M-EST. PATIENT-LVL II: CPT | Mod: PBBFAC,,, | Performed by: OBSTETRICS & GYNECOLOGY

## 2019-05-06 PROCEDURE — 76805 OB US >/= 14 WKS SNGL FETUS: CPT | Mod: PBBFAC | Performed by: OBSTETRICS & GYNECOLOGY

## 2019-05-06 PROCEDURE — 76805 OB US >/= 14 WKS SNGL FETUS: CPT | Mod: 26,S$PBB,, | Performed by: OBSTETRICS & GYNECOLOGY

## 2019-05-06 PROCEDURE — 99999 PR PBB SHADOW E&M-EST. PATIENT-LVL III: ICD-10-PCS | Mod: PBBFAC,,,

## 2019-05-06 PROCEDURE — 99213 PR OFFICE/OUTPT VISIT, EST, LEVL III, 20-29 MIN: ICD-10-PCS | Mod: TH,S$PBB,25, | Performed by: OBSTETRICS & GYNECOLOGY

## 2019-05-06 RX ORDER — TERCONAZOLE 4 MG/G
1 CREAM VAGINAL NIGHTLY
Qty: 45 G | Refills: 0 | Status: SHIPPED | OUTPATIENT
Start: 2019-05-06 | End: 2019-05-13

## 2019-05-06 RX ADMIN — HYDROXYPROGESTERONE CAPROATE 250 MG: 250 INJECTION INTRAMUSCULAR at 08:05

## 2019-05-06 NOTE — PROGRESS NOTES
No new complaint  Now with gestational diabetes.  Fasting at 85-92 with 2hr postprandial more than 50% 120 or over.    Discussed with patient plan    Stress compliance  Needs to be back to see MFM.  Supposedly patient saw MFM on 4/26/2019.  However, there was no notes for review.    Would need repeat ultrasound at 32-34 weeks for size/growth and NST weekly.  Patient does NOT want to stay with her diet or getting NST    Will attempt ultrasound today if patient compliant.    Ultrasound performed.  BPP 8/8.  EFW  5#13.  Vtx    Terazol cream for yeast

## 2019-05-06 NOTE — PROGRESS NOTES
PT ARRIVED @ 0803   , PT'S ORIGINAL APPT WAS @ 4440  , PT WAS ROOMED @  0839    PT ARRIVED FOR HER ARIAN INJECTION,  INFORMATION HANDOUT GIVEN TO PT ABOUT PRE TERM LABOR & ARIAN INJECTION , INJECTION GIVEN VIA   L GLUTEAL   W/O DIFFICULTY, NO S/S OF ADVERSE REACTION

## 2019-05-13 ENCOUNTER — PATIENT MESSAGE (OUTPATIENT)
Dept: OBSTETRICS AND GYNECOLOGY | Facility: CLINIC | Age: 39
End: 2019-05-13

## 2019-05-13 ENCOUNTER — ROUTINE PRENATAL (OUTPATIENT)
Dept: OBSTETRICS AND GYNECOLOGY | Facility: CLINIC | Age: 39
End: 2019-05-13
Payer: MEDICAID

## 2019-05-13 ENCOUNTER — CLINICAL SUPPORT (OUTPATIENT)
Dept: OBSTETRICS AND GYNECOLOGY | Facility: CLINIC | Age: 39
End: 2019-05-13
Payer: MEDICAID

## 2019-05-13 VITALS — DIASTOLIC BLOOD PRESSURE: 80 MMHG | SYSTOLIC BLOOD PRESSURE: 110 MMHG | BODY MASS INDEX: 39.33 KG/M2 | WEIGHT: 222 LBS

## 2019-05-13 VITALS — WEIGHT: 222 LBS | SYSTOLIC BLOOD PRESSURE: 110 MMHG | DIASTOLIC BLOOD PRESSURE: 80 MMHG | BODY MASS INDEX: 39.33 KG/M2

## 2019-05-13 DIAGNOSIS — O09.523 ELDERLY MULTIGRAVIDA IN THIRD TRIMESTER: ICD-10-CM

## 2019-05-13 DIAGNOSIS — Z87.51 HISTORY OF PRE-TERM LABOR: Primary | ICD-10-CM

## 2019-05-13 DIAGNOSIS — O24.410 DIET CONTROLLED GESTATIONAL DIABETES MELLITUS (GDM) IN THIRD TRIMESTER: ICD-10-CM

## 2019-05-13 DIAGNOSIS — Z87.51 HISTORY OF PRE-TERM LABOR: ICD-10-CM

## 2019-05-13 DIAGNOSIS — Z3A.34 34 WEEKS GESTATION OF PREGNANCY: Primary | ICD-10-CM

## 2019-05-13 PROCEDURE — 99999 PR PBB SHADOW E&M-EST. PATIENT-LVL II: CPT | Mod: PBBFAC,,, | Performed by: OBSTETRICS & GYNECOLOGY

## 2019-05-13 PROCEDURE — 99999 PR PBB SHADOW E&M-EST. PATIENT-LVL III: CPT | Mod: PBBFAC,,,

## 2019-05-13 PROCEDURE — 99212 OFFICE O/P EST SF 10 MIN: CPT | Mod: PBBFAC,TH,25 | Performed by: OBSTETRICS & GYNECOLOGY

## 2019-05-13 PROCEDURE — 99213 OFFICE O/P EST LOW 20 MIN: CPT | Mod: PBBFAC,27,25

## 2019-05-13 PROCEDURE — 99213 OFFICE O/P EST LOW 20 MIN: CPT | Mod: TH,S$PBB,, | Performed by: OBSTETRICS & GYNECOLOGY

## 2019-05-13 PROCEDURE — 96372 THER/PROPH/DIAG INJ SC/IM: CPT | Mod: PBBFAC

## 2019-05-13 PROCEDURE — 99213 PR OFFICE/OUTPT VISIT, EST, LEVL III, 20-29 MIN: ICD-10-PCS | Mod: TH,S$PBB,, | Performed by: OBSTETRICS & GYNECOLOGY

## 2019-05-13 PROCEDURE — 99999 PR PBB SHADOW E&M-EST. PATIENT-LVL II: ICD-10-PCS | Mod: PBBFAC,,, | Performed by: OBSTETRICS & GYNECOLOGY

## 2019-05-13 PROCEDURE — 99999 PR PBB SHADOW E&M-EST. PATIENT-LVL III: ICD-10-PCS | Mod: PBBFAC,,,

## 2019-05-13 RX ORDER — HYDROXYPROGESTERONE CAPROATE 250 MG/ML
250 INJECTION INTRAMUSCULAR
Status: COMPLETED | OUTPATIENT
Start: 2019-05-14 | End: 2019-05-27

## 2019-05-13 RX ORDER — HYDROXYPROGESTERONE CAPROATE 1250 MG/5ML
250 INJECTION INTRAMUSCULAR
Status: DISCONTINUED | OUTPATIENT
Start: 2019-05-13 | End: 2019-05-13 | Stop reason: SDUPTHER

## 2019-05-13 RX ADMIN — HYDROXYPROGESTERONE CAPROATE 250 MG: 250 INJECTION INTRAMUSCULAR at 02:05

## 2019-05-13 NOTE — PROGRESS NOTES
PT ARRIVED @ 1345   , PT'S ORIGINAL APPT WAS @  6300 , PT WAS ROOMED @  1322    PT ARRIVED FOR HER ARIAN INJECTION,  INFORMATION HANDOUT GIVEN TO PT ABOUT PRE TERM LABOR & ARIAN INJECTION , INJECTION GIVEN VIA L GLUTEAL   W/O DIFFICULTY, NO S/S OF ADVERSE REACTION

## 2019-05-13 NOTE — PROGRESS NOTES
No new complaint.  Doing well on [place].  Wants to be induced on 6/13/2019    Fasting 60-90 with 2hr postprandial     Continue with baby aspirin  Continue with diet and exercise  Discussed NST with GDM.  Patient did not want NST.    Back in 2 weeks

## 2019-05-19 DIAGNOSIS — J30.89 ENVIRONMENTAL AND SEASONAL ALLERGIES: ICD-10-CM

## 2019-05-20 ENCOUNTER — LAB VISIT (OUTPATIENT)
Dept: LAB | Facility: HOSPITAL | Age: 39
End: 2019-05-20
Attending: OBSTETRICS & GYNECOLOGY
Payer: MEDICAID

## 2019-05-20 ENCOUNTER — ROUTINE PRENATAL (OUTPATIENT)
Dept: OBSTETRICS AND GYNECOLOGY | Facility: CLINIC | Age: 39
End: 2019-05-20
Payer: MEDICAID

## 2019-05-20 ENCOUNTER — CLINICAL SUPPORT (OUTPATIENT)
Dept: OBSTETRICS AND GYNECOLOGY | Facility: CLINIC | Age: 39
End: 2019-05-20
Payer: MEDICAID

## 2019-05-20 VITALS — BODY MASS INDEX: 39.89 KG/M2 | WEIGHT: 225.19 LBS

## 2019-05-20 VITALS — SYSTOLIC BLOOD PRESSURE: 110 MMHG | BODY MASS INDEX: 39.95 KG/M2 | DIASTOLIC BLOOD PRESSURE: 64 MMHG | WEIGHT: 225.5 LBS

## 2019-05-20 DIAGNOSIS — Z3A.35 35 WEEKS GESTATION OF PREGNANCY: ICD-10-CM

## 2019-05-20 DIAGNOSIS — O24.410 DIET CONTROLLED GESTATIONAL DIABETES MELLITUS (GDM) IN THIRD TRIMESTER: ICD-10-CM

## 2019-05-20 DIAGNOSIS — Z87.51 HISTORY OF PRE-TERM LABOR: ICD-10-CM

## 2019-05-20 DIAGNOSIS — Z87.51 HISTORY OF PRE-TERM LABOR: Primary | ICD-10-CM

## 2019-05-20 DIAGNOSIS — Z3A.35 35 WEEKS GESTATION OF PREGNANCY: Primary | ICD-10-CM

## 2019-05-20 LAB
ESTIMATED AVG GLUCOSE: 120 MG/DL (ref 68–131)
HBA1C MFR BLD HPLC: 5.8 % (ref 4–5.6)

## 2019-05-20 PROCEDURE — 86703 HIV-1/HIV-2 1 RESULT ANTBDY: CPT

## 2019-05-20 PROCEDURE — 99999 PR PBB SHADOW E&M-EST. PATIENT-LVL III: CPT | Mod: PBBFAC,,,

## 2019-05-20 PROCEDURE — 99999 PR PBB SHADOW E&M-EST. PATIENT-LVL II: ICD-10-PCS | Mod: PBBFAC,,, | Performed by: OBSTETRICS & GYNECOLOGY

## 2019-05-20 PROCEDURE — 99213 OFFICE O/P EST LOW 20 MIN: CPT | Mod: TH,S$PBB,, | Performed by: OBSTETRICS & GYNECOLOGY

## 2019-05-20 PROCEDURE — 96372 THER/PROPH/DIAG INJ SC/IM: CPT | Mod: PBBFAC

## 2019-05-20 PROCEDURE — 99212 OFFICE O/P EST SF 10 MIN: CPT | Mod: PBBFAC,27,TH,25 | Performed by: OBSTETRICS & GYNECOLOGY

## 2019-05-20 PROCEDURE — 99213 PR OFFICE/OUTPT VISIT, EST, LEVL III, 20-29 MIN: ICD-10-PCS | Mod: TH,S$PBB,, | Performed by: OBSTETRICS & GYNECOLOGY

## 2019-05-20 PROCEDURE — 87081 CULTURE SCREEN ONLY: CPT

## 2019-05-20 PROCEDURE — 99213 OFFICE O/P EST LOW 20 MIN: CPT | Mod: PBBFAC

## 2019-05-20 PROCEDURE — 99999 PR PBB SHADOW E&M-EST. PATIENT-LVL III: ICD-10-PCS | Mod: PBBFAC,,,

## 2019-05-20 PROCEDURE — 36415 COLL VENOUS BLD VENIPUNCTURE: CPT

## 2019-05-20 PROCEDURE — 99999 PR PBB SHADOW E&M-EST. PATIENT-LVL II: CPT | Mod: PBBFAC,,, | Performed by: OBSTETRICS & GYNECOLOGY

## 2019-05-20 PROCEDURE — 83036 HEMOGLOBIN GLYCOSYLATED A1C: CPT

## 2019-05-20 RX ORDER — LORATADINE 10 MG/1
TABLET ORAL
Qty: 30 TABLET | Refills: 0 | Status: SHIPPED | OUTPATIENT
Start: 2019-05-20 | End: 2023-04-11 | Stop reason: CLARIF

## 2019-05-20 RX ADMIN — HYDROXYPROGESTERONE CAPROATE 250 MG: 250 INJECTION INTRAMUSCULAR at 10:05

## 2019-05-20 NOTE — PROGRESS NOTES
PT ARRIVED @   0925 , PT'S ORIGINAL APPT WAS @ 6813  , PT WAS ROOMED @  4322    PT ARRIVED FOR HER ARIAN INJECTION,  INFORMATION HANDOUT GIVEN TO PT ABOUT PRE TERM LABOR & ARIAN INJECTION , INJECTION GIVEN VIA  L GLUTEAL  W/O DIFFICULTY, NO S/S OF ADVERSE REACTION

## 2019-05-20 NOTE — PROGRESS NOTES
35w4d    No new complaint  Occasional contractions  No vaginal bleeding or rupture of membranes  No discharge  Good fetal movements    No AccuCheck records for review.  Per patient's report, fasting was 83 this morning.  Exam with cervix at closed    GBS, HIV and consents    Labor precautions  Fetal kick count instructed and encouraged  Back next week

## 2019-05-21 LAB — HIV 1+2 AB+HIV1 P24 AG SERPL QL IA: NEGATIVE

## 2019-05-22 LAB — BACTERIA SPEC AEROBE CULT: NORMAL

## 2019-05-27 ENCOUNTER — CLINICAL SUPPORT (OUTPATIENT)
Dept: OBSTETRICS AND GYNECOLOGY | Facility: CLINIC | Age: 39
End: 2019-05-27
Payer: MEDICAID

## 2019-05-27 VITALS — WEIGHT: 239.19 LBS | BODY MASS INDEX: 42.37 KG/M2

## 2019-05-27 DIAGNOSIS — Z87.51 HISTORY OF PRE-TERM LABOR: Primary | ICD-10-CM

## 2019-05-27 PROCEDURE — 99213 OFFICE O/P EST LOW 20 MIN: CPT | Mod: PBBFAC

## 2019-05-27 PROCEDURE — 96372 THER/PROPH/DIAG INJ SC/IM: CPT | Mod: PBBFAC

## 2019-05-27 PROCEDURE — 99999 PR PBB SHADOW E&M-EST. PATIENT-LVL III: ICD-10-PCS | Mod: PBBFAC,,,

## 2019-05-27 PROCEDURE — 99999 PR PBB SHADOW E&M-EST. PATIENT-LVL III: CPT | Mod: PBBFAC,,,

## 2019-05-27 RX ADMIN — HYDROXYPROGESTERONE CAPROATE 250 MG: 250 INJECTION INTRAMUSCULAR at 01:05

## 2019-05-27 NOTE — PROGRESS NOTES
PT ARRIVED FOR HER ARIAN INJECTION,  INFORMATION HANDOUT GIVEN TO PT ABOUT PRE TERM LABOR & ARIAN INJECTION , INJECTION GIVEN VIA  RIGHT GLUTEAL  W/O DIFFICULTY, NO S/S OF ADVERSE REACTION. PT TOLERATED WELL.

## 2019-05-30 ENCOUNTER — ROUTINE PRENATAL (OUTPATIENT)
Dept: OBSTETRICS AND GYNECOLOGY | Facility: CLINIC | Age: 39
End: 2019-05-30
Payer: MEDICAID

## 2019-05-30 VITALS — WEIGHT: 227.06 LBS | BODY MASS INDEX: 40.22 KG/M2

## 2019-05-30 DIAGNOSIS — Z87.51 HISTORY OF PRE-TERM LABOR: ICD-10-CM

## 2019-05-30 DIAGNOSIS — Z86.19 HISTORY OF CHLAMYDIA INFECTION: ICD-10-CM

## 2019-05-30 DIAGNOSIS — O24.410 DIET CONTROLLED GESTATIONAL DIABETES MELLITUS (GDM) IN THIRD TRIMESTER: ICD-10-CM

## 2019-05-30 DIAGNOSIS — Z3A.37 37 WEEKS GESTATION OF PREGNANCY: Primary | ICD-10-CM

## 2019-05-30 DIAGNOSIS — O09.523 ELDERLY MULTIGRAVIDA IN THIRD TRIMESTER: ICD-10-CM

## 2019-05-30 PROCEDURE — 99212 OFFICE O/P EST SF 10 MIN: CPT | Mod: PBBFAC,TH | Performed by: OBSTETRICS & GYNECOLOGY

## 2019-05-30 PROCEDURE — 99213 PR OFFICE/OUTPT VISIT, EST, LEVL III, 20-29 MIN: ICD-10-PCS | Mod: TH,S$PBB,, | Performed by: OBSTETRICS & GYNECOLOGY

## 2019-05-30 PROCEDURE — 99213 OFFICE O/P EST LOW 20 MIN: CPT | Mod: TH,S$PBB,, | Performed by: OBSTETRICS & GYNECOLOGY

## 2019-05-30 PROCEDURE — 99999 PR PBB SHADOW E&M-EST. PATIENT-LVL II: CPT | Mod: PBBFAC,,, | Performed by: OBSTETRICS & GYNECOLOGY

## 2019-05-30 PROCEDURE — 99999 PR PBB SHADOW E&M-EST. PATIENT-LVL II: ICD-10-PCS | Mod: PBBFAC,,, | Performed by: OBSTETRICS & GYNECOLOGY

## 2019-05-30 NOTE — PROGRESS NOTES
37 weeks  No new complaint  Occasional contractions  No vaginal bleeding or rupture of membranes  No discharge  Good fetal movements    Fasting 65-85 with 2hr postprandial     Exam with cervix at 1 cm    GC/CT  Labor precautions  Fetal kick count instructed and encouraged  Back next week

## 2019-05-30 NOTE — PROGRESS NOTES
No more Rosette.  Continue with baby aspirin  Requesting labor induction at 39 weeks, or 6/13/2019

## 2019-06-03 DIAGNOSIS — O21.9 NAUSEA AND VOMITING IN PREGNANCY: ICD-10-CM

## 2019-06-03 RX ORDER — PROMETHAZINE HYDROCHLORIDE 25 MG/1
25 TABLET ORAL EVERY 6 HOURS PRN
Qty: 30 TABLET | Refills: 1 | Status: SHIPPED | OUTPATIENT
Start: 2019-06-03 | End: 2023-06-26

## 2019-06-04 ENCOUNTER — HOSPITAL ENCOUNTER (OUTPATIENT)
Dept: PERINATAL CARE | Facility: HOSPITAL | Age: 39
Discharge: HOME | End: 2019-06-04
Attending: OBSTETRICS & GYNECOLOGY
Payer: MEDICAID

## 2019-06-04 DIAGNOSIS — Z36.89 ENCOUNTER FOR ULTRASOUND TO CHECK FETAL GROWTH: ICD-10-CM

## 2019-06-04 PROCEDURE — 76816 OB US FOLLOW-UP PER FETUS: CPT

## 2019-06-04 PROCEDURE — 76819 FETAL BIOPHYS PROFIL W/O NST: CPT | Mod: 26,,, | Performed by: OBSTETRICS & GYNECOLOGY

## 2019-06-04 PROCEDURE — 76816 PR  US,PREGNANT UTERUS,F/U,TRANSABD APP: ICD-10-PCS | Mod: 26,,, | Performed by: OBSTETRICS & GYNECOLOGY

## 2019-06-04 PROCEDURE — 76816 OB US FOLLOW-UP PER FETUS: CPT | Mod: 26,,, | Performed by: OBSTETRICS & GYNECOLOGY

## 2019-06-04 PROCEDURE — 76819 PR US, OB, FETAL BIOPHYSICAL, W/O NST: ICD-10-PCS | Mod: 26,,, | Performed by: OBSTETRICS & GYNECOLOGY

## 2019-06-04 PROCEDURE — 76819 FETAL BIOPHYS PROFIL W/O NST: CPT

## 2019-06-06 ENCOUNTER — ROUTINE PRENATAL (OUTPATIENT)
Dept: OBSTETRICS AND GYNECOLOGY | Facility: CLINIC | Age: 39
End: 2019-06-06
Payer: MEDICAID

## 2019-06-06 VITALS — DIASTOLIC BLOOD PRESSURE: 78 MMHG | WEIGHT: 227.5 LBS | BODY MASS INDEX: 40.3 KG/M2 | SYSTOLIC BLOOD PRESSURE: 130 MMHG

## 2019-06-06 DIAGNOSIS — Z87.51 HISTORY OF PRE-TERM LABOR: ICD-10-CM

## 2019-06-06 DIAGNOSIS — O24.410 DIET CONTROLLED GESTATIONAL DIABETES MELLITUS (GDM) IN THIRD TRIMESTER: ICD-10-CM

## 2019-06-06 DIAGNOSIS — O09.523 ELDERLY MULTIGRAVIDA IN THIRD TRIMESTER: ICD-10-CM

## 2019-06-06 DIAGNOSIS — Z3A.38 38 WEEKS GESTATION OF PREGNANCY: Primary | ICD-10-CM

## 2019-06-06 PROCEDURE — 99213 PR OFFICE/OUTPT VISIT, EST, LEVL III, 20-29 MIN: ICD-10-PCS | Mod: S$PBB,TH,, | Performed by: OBSTETRICS & GYNECOLOGY

## 2019-06-06 PROCEDURE — 99212 OFFICE O/P EST SF 10 MIN: CPT | Mod: PBBFAC,TH | Performed by: OBSTETRICS & GYNECOLOGY

## 2019-06-06 PROCEDURE — 99213 OFFICE O/P EST LOW 20 MIN: CPT | Mod: S$PBB,TH,, | Performed by: OBSTETRICS & GYNECOLOGY

## 2019-06-06 PROCEDURE — 99999 PR PBB SHADOW E&M-EST. PATIENT-LVL II: ICD-10-PCS | Mod: PBBFAC,,, | Performed by: OBSTETRICS & GYNECOLOGY

## 2019-06-06 PROCEDURE — 99999 PR PBB SHADOW E&M-EST. PATIENT-LVL II: CPT | Mod: PBBFAC,,, | Performed by: OBSTETRICS & GYNECOLOGY

## 2019-06-06 NOTE — PROGRESS NOTES
38 weeks  No new complaint  Occasional contractions  No vaginal bleeding or rupture of membranes  No discharge  Good fetal movements    Still taking baby aspirin.  No longer taking Rosette.  AccuCheck normal per report.   Fasting 65-86 with 2hr postprandial     Exam with cervix at 1 cm    Labor precautions  Fetal kick count instructed and encouraged  Back next week  Will schedule labor induction for Thursday, 6/13/2019

## 2019-06-12 ENCOUNTER — ROUTINE PRENATAL (OUTPATIENT)
Dept: OBSTETRICS AND GYNECOLOGY | Facility: CLINIC | Age: 39
End: 2019-06-12
Payer: MEDICAID

## 2019-06-12 VITALS — BODY MASS INDEX: 40.5 KG/M2 | WEIGHT: 228.63 LBS | SYSTOLIC BLOOD PRESSURE: 124 MMHG | DIASTOLIC BLOOD PRESSURE: 70 MMHG

## 2019-06-12 DIAGNOSIS — O09.523 ELDERLY MULTIGRAVIDA IN THIRD TRIMESTER: ICD-10-CM

## 2019-06-12 DIAGNOSIS — Z3A.38 38 WEEKS GESTATION OF PREGNANCY: Primary | ICD-10-CM

## 2019-06-12 DIAGNOSIS — Z87.51 HISTORY OF PRE-TERM LABOR: ICD-10-CM

## 2019-06-12 DIAGNOSIS — O24.410 DIET CONTROLLED GESTATIONAL DIABETES MELLITUS (GDM) IN THIRD TRIMESTER: ICD-10-CM

## 2019-06-12 PROCEDURE — 99999 PR PBB SHADOW E&M-EST. PATIENT-LVL II: CPT | Mod: PBBFAC,,, | Performed by: OBSTETRICS & GYNECOLOGY

## 2019-06-12 PROCEDURE — 99999 PR PBB SHADOW E&M-EST. PATIENT-LVL II: ICD-10-PCS | Mod: PBBFAC,,, | Performed by: OBSTETRICS & GYNECOLOGY

## 2019-06-12 PROCEDURE — 99212 OFFICE O/P EST SF 10 MIN: CPT | Mod: PBBFAC,TH | Performed by: OBSTETRICS & GYNECOLOGY

## 2019-06-12 PROCEDURE — 99213 OFFICE O/P EST LOW 20 MIN: CPT | Mod: TH,S$PBB,, | Performed by: OBSTETRICS & GYNECOLOGY

## 2019-06-12 PROCEDURE — 99213 PR OFFICE/OUTPT VISIT, EST, LEVL III, 20-29 MIN: ICD-10-PCS | Mod: TH,S$PBB,, | Performed by: OBSTETRICS & GYNECOLOGY

## 2019-06-12 RX ORDER — BUTORPHANOL TARTRATE 1 MG/ML
2 INJECTION INTRAMUSCULAR; INTRAVENOUS
Status: CANCELLED | OUTPATIENT
Start: 2019-06-12

## 2019-06-12 RX ORDER — MISOPROSTOL 100 UG/1
600 TABLET ORAL
Status: CANCELLED | OUTPATIENT
Start: 2019-06-12

## 2019-06-12 RX ORDER — SODIUM CHLORIDE, SODIUM LACTATE, POTASSIUM CHLORIDE, CALCIUM CHLORIDE 600; 310; 30; 20 MG/100ML; MG/100ML; MG/100ML; MG/100ML
INJECTION, SOLUTION INTRAVENOUS CONTINUOUS
Status: CANCELLED | OUTPATIENT
Start: 2019-06-12

## 2019-06-12 RX ORDER — ONDANSETRON 4 MG/1
8 TABLET, ORALLY DISINTEGRATING ORAL EVERY 8 HOURS PRN
Status: CANCELLED | OUTPATIENT
Start: 2019-06-12

## 2019-06-12 RX ORDER — IBUPROFEN 200 MG
800 TABLET ORAL EVERY 8 HOURS PRN
Status: CANCELLED | OUTPATIENT
Start: 2019-06-12

## 2019-06-12 NOTE — PROGRESS NOTES
38w6d    No new complaint  Occasional contractions  No vaginal bleeding or rupture of membranes  No discharge  Good fetal movements    Fasting at 62-86 with 2hr postprandial at .    Exam with cervix at 1 cm    Labor precautions    Patient requests induction of labor. Risks and benefits of induction versus awaiting spontaneous labor discussed.  Procedure of Cervidil induction explained and discussed.  Questions answered.  Consents signed.  Orders written.  Patient will go over to the hospital for preop now.  She will be admitted for induction early tomorrow morning, 6/13/2019 at 0100.

## 2019-06-13 ENCOUNTER — ANESTHESIA (OUTPATIENT)
Dept: OBSTETRICS AND GYNECOLOGY | Facility: HOSPITAL | Age: 39
End: 2019-06-13
Payer: MEDICAID

## 2019-06-13 ENCOUNTER — HOSPITAL ENCOUNTER (INPATIENT)
Facility: HOSPITAL | Age: 39
LOS: 2 days | Discharge: HOME | End: 2019-06-15
Attending: OBSTETRICS & GYNECOLOGY | Admitting: OBSTETRICS & GYNECOLOGY
Payer: MEDICAID

## 2019-06-13 ENCOUNTER — ANESTHESIA EVENT (OUTPATIENT)
Dept: OBSTETRICS AND GYNECOLOGY | Facility: HOSPITAL | Age: 39
End: 2019-06-13
Payer: MEDICAID

## 2019-06-13 DIAGNOSIS — Z87.59 STATUS POST VACUUM-ASSISTED VAGINAL DELIVERY: Primary | ICD-10-CM

## 2019-06-13 DIAGNOSIS — O09.523 ELDERLY MULTIGRAVIDA IN THIRD TRIMESTER: ICD-10-CM

## 2019-06-13 DIAGNOSIS — O24.410 DIET CONTROLLED GESTATIONAL DIABETES MELLITUS (GDM) IN THIRD TRIMESTER: ICD-10-CM

## 2019-06-13 DIAGNOSIS — Z3A.38 38 WEEKS GESTATION OF PREGNANCY: ICD-10-CM

## 2019-06-13 PROBLEM — R51.9 ACUTE NONINTRACTABLE HEADACHE: Status: RESOLVED | Noted: 2017-07-15 | Resolved: 2019-06-13

## 2019-06-13 PROBLEM — O36.8190 DECREASED FETAL MOVEMENT: Status: RESOLVED | Noted: 2019-04-05 | Resolved: 2019-06-13

## 2019-06-13 PROBLEM — Z87.51 HISTORY OF PRETERM DELIVERY: Status: RESOLVED | Noted: 2019-01-18 | Resolved: 2019-06-13

## 2019-06-13 PROBLEM — Z3A.39 39 WEEKS GESTATION OF PREGNANCY: Status: RESOLVED | Noted: 2019-06-13 | Resolved: 2019-06-13

## 2019-06-13 PROBLEM — Z3A.39 39 WEEKS GESTATION OF PREGNANCY: Status: ACTIVE | Noted: 2019-06-13

## 2019-06-13 LAB
ABO + RH BLD: NORMAL
BASOPHILS # BLD AUTO: 0.02 K/UL (ref 0–0.2)
BASOPHILS NFR BLD: 0.3 % (ref 0–1.9)
BILIRUB UR QL STRIP: NEGATIVE
BLD GP AB SCN CELLS X3 SERPL QL: NORMAL
CLARITY UR: CLEAR
COLOR UR: ABNORMAL
DIFFERENTIAL METHOD: ABNORMAL
EOSINOPHIL # BLD AUTO: 0.1 K/UL (ref 0–0.5)
EOSINOPHIL NFR BLD: 1.2 % (ref 0–8)
ERYTHROCYTE [DISTWIDTH] IN BLOOD BY AUTOMATED COUNT: 14.3 % (ref 11.5–14.5)
GLUCOSE UR QL STRIP: NEGATIVE
HCT VFR BLD AUTO: 36 % (ref 37–48.5)
HGB BLD-MCNC: 11.8 G/DL (ref 12–16)
HGB UR QL STRIP: ABNORMAL
KETONES UR QL STRIP: NEGATIVE
LEUKOCYTE ESTERASE UR QL STRIP: NEGATIVE
LYMPHOCYTES # BLD AUTO: 1.4 K/UL (ref 1–4.8)
LYMPHOCYTES NFR BLD: 19.4 % (ref 18–48)
MCH RBC QN AUTO: 30.3 PG (ref 27–31)
MCHC RBC AUTO-ENTMCNC: 32.8 G/DL (ref 32–36)
MCV RBC AUTO: 93 FL (ref 82–98)
MICROSCOPIC COMMENT: NORMAL
MONOCYTES # BLD AUTO: 0.9 K/UL (ref 0.3–1)
MONOCYTES NFR BLD: 12.3 % (ref 4–15)
NEUTROPHILS # BLD AUTO: 4.9 K/UL (ref 1.8–7.7)
NEUTROPHILS NFR BLD: 66.8 % (ref 38–73)
NITRITE UR QL STRIP: NEGATIVE
PH UR STRIP: 7 [PH] (ref 5–8)
PLATELET # BLD AUTO: 186 K/UL (ref 150–350)
PMV BLD AUTO: 11.8 FL (ref 9.2–12.9)
PROT UR QL STRIP: NEGATIVE
RBC # BLD AUTO: 3.89 M/UL (ref 4–5.4)
RBC #/AREA URNS HPF: 0 /HPF (ref 0–4)
SP GR UR STRIP: 1 (ref 1–1.03)
SQUAMOUS #/AREA URNS HPF: NORMAL /HPF
URN SPEC COLLECT METH UR: ABNORMAL
UROBILINOGEN UR STRIP-ACNC: NEGATIVE EU/DL
WBC # BLD AUTO: 7.32 K/UL (ref 3.9–12.7)
WBC #/AREA URNS HPF: 1 /HPF (ref 0–5)

## 2019-06-13 PROCEDURE — 63600175 PHARM REV CODE 636 W HCPCS: Performed by: ANESTHESIOLOGY

## 2019-06-13 PROCEDURE — 59409 PR OBSTETRICAL CARE,VAG DELIV ONLY: ICD-10-PCS | Mod: GB,,, | Performed by: OBSTETRICS & GYNECOLOGY

## 2019-06-13 PROCEDURE — 85025 COMPLETE CBC W/AUTO DIFF WBC: CPT

## 2019-06-13 PROCEDURE — 63600175 PHARM REV CODE 636 W HCPCS: Performed by: OBSTETRICS & GYNECOLOGY

## 2019-06-13 PROCEDURE — 27800517 HC TRAY,EPIDURAL-CONTINUOUS: Performed by: ANESTHESIOLOGY

## 2019-06-13 PROCEDURE — 86850 RBC ANTIBODY SCREEN: CPT

## 2019-06-13 PROCEDURE — 11000001 HC ACUTE MED/SURG PRIVATE ROOM

## 2019-06-13 PROCEDURE — 25000003 PHARM REV CODE 250: Performed by: OBSTETRICS & GYNECOLOGY

## 2019-06-13 PROCEDURE — 62326 NJX INTERLAMINAR LMBR/SAC: CPT | Performed by: ANESTHESIOLOGY

## 2019-06-13 PROCEDURE — 81000 URINALYSIS NONAUTO W/SCOPE: CPT

## 2019-06-13 PROCEDURE — 59409 OBSTETRICAL CARE: CPT | Mod: GB,,, | Performed by: OBSTETRICS & GYNECOLOGY

## 2019-06-13 PROCEDURE — 59409 PRA ETRICAL CARE,VAG DELIV ONLY: ICD-10-PCS | Mod: AA,,, | Performed by: ANESTHESIOLOGY

## 2019-06-13 PROCEDURE — 59409 OBSTETRICAL CARE: CPT | Mod: AA,,, | Performed by: ANESTHESIOLOGY

## 2019-06-13 PROCEDURE — 27200710 HC EPIDURAL INFUSION PUMP SET: Performed by: ANESTHESIOLOGY

## 2019-06-13 PROCEDURE — 86592 SYPHILIS TEST NON-TREP QUAL: CPT

## 2019-06-13 PROCEDURE — 25000003 PHARM REV CODE 250: Performed by: ANESTHESIOLOGY

## 2019-06-13 PROCEDURE — 36415 COLL VENOUS BLD VENIPUNCTURE: CPT

## 2019-06-13 PROCEDURE — 72200006 HC VAGINAL DELIVERY LEVEL III: Performed by: ANESTHESIOLOGY

## 2019-06-13 RX ORDER — AMOXICILLIN 250 MG
1 CAPSULE ORAL NIGHTLY
Status: DISCONTINUED | OUTPATIENT
Start: 2019-06-13 | End: 2019-06-15 | Stop reason: HOSPADM

## 2019-06-13 RX ORDER — ONDANSETRON 8 MG/1
8 TABLET, ORALLY DISINTEGRATING ORAL EVERY 8 HOURS PRN
Status: DISCONTINUED | OUTPATIENT
Start: 2019-06-13 | End: 2019-06-13

## 2019-06-13 RX ORDER — BUTORPHANOL TARTRATE 2 MG/ML
2 INJECTION INTRAMUSCULAR; INTRAVENOUS
Status: DISCONTINUED | OUTPATIENT
Start: 2019-06-13 | End: 2019-06-13

## 2019-06-13 RX ORDER — IBUPROFEN 600 MG/1
600 TABLET ORAL EVERY 6 HOURS
Status: DISCONTINUED | OUTPATIENT
Start: 2019-06-13 | End: 2019-06-15 | Stop reason: HOSPADM

## 2019-06-13 RX ORDER — MISOPROSTOL 200 UG/1
600 TABLET ORAL
Status: DISCONTINUED | OUTPATIENT
Start: 2019-06-13 | End: 2019-06-13

## 2019-06-13 RX ORDER — BUPIVACAINE HYDROCHLORIDE 2.5 MG/ML
INJECTION, SOLUTION EPIDURAL; INFILTRATION; INTRACAUDAL
Status: DISCONTINUED | OUTPATIENT
Start: 2019-06-13 | End: 2019-06-13

## 2019-06-13 RX ORDER — MISOPROSTOL 200 UG/1
200 TABLET ORAL ONCE
Status: DISCONTINUED | OUTPATIENT
Start: 2019-06-13 | End: 2019-06-15 | Stop reason: HOSPADM

## 2019-06-13 RX ORDER — OXYTOCIN/RINGER'S LACTATE 20/1000 ML
2 PLASTIC BAG, INJECTION (ML) INTRAVENOUS CONTINUOUS
Status: DISCONTINUED | OUTPATIENT
Start: 2019-06-13 | End: 2019-06-13

## 2019-06-13 RX ORDER — OXYTOCIN/RINGER'S LACTATE 20/1000 ML
41.7 PLASTIC BAG, INJECTION (ML) INTRAVENOUS CONTINUOUS
Status: DISCONTINUED | OUTPATIENT
Start: 2019-06-13 | End: 2019-06-13

## 2019-06-13 RX ORDER — OXYCODONE AND ACETAMINOPHEN 5; 325 MG/1; MG/1
1 TABLET ORAL EVERY 4 HOURS PRN
Status: DISCONTINUED | OUTPATIENT
Start: 2019-06-13 | End: 2019-06-15 | Stop reason: HOSPADM

## 2019-06-13 RX ORDER — FENTANYL CITRATE 50 UG/ML
INJECTION, SOLUTION INTRAMUSCULAR; INTRAVENOUS
Status: DISCONTINUED | OUTPATIENT
Start: 2019-06-13 | End: 2019-06-13

## 2019-06-13 RX ORDER — SODIUM CHLORIDE, SODIUM LACTATE, POTASSIUM CHLORIDE, CALCIUM CHLORIDE 600; 310; 30; 20 MG/100ML; MG/100ML; MG/100ML; MG/100ML
INJECTION, SOLUTION INTRAVENOUS CONTINUOUS
Status: DISCONTINUED | OUTPATIENT
Start: 2019-06-13 | End: 2019-06-13

## 2019-06-13 RX ORDER — FENTANYL/BUPIVACAINE/NS/PF 2MCG/ML-.1
PLASTIC BAG, INJECTION (ML) INJECTION CONTINUOUS PRN
Status: DISCONTINUED | OUTPATIENT
Start: 2019-06-13 | End: 2019-06-13

## 2019-06-13 RX ORDER — OXYTOCIN/RINGER'S LACTATE 20/1000 ML
333 PLASTIC BAG, INJECTION (ML) INTRAVENOUS CONTINUOUS
Status: DISCONTINUED | OUTPATIENT
Start: 2019-06-13 | End: 2019-06-13

## 2019-06-13 RX ORDER — IBUPROFEN 400 MG/1
800 TABLET ORAL EVERY 8 HOURS PRN
Status: DISCONTINUED | OUTPATIENT
Start: 2019-06-13 | End: 2019-06-13

## 2019-06-13 RX ORDER — ONDANSETRON 8 MG/1
8 TABLET, ORALLY DISINTEGRATING ORAL EVERY 8 HOURS PRN
Status: DISCONTINUED | OUTPATIENT
Start: 2019-06-13 | End: 2019-06-15 | Stop reason: HOSPADM

## 2019-06-13 RX ORDER — OXYTOCIN/RINGER'S LACTATE 20/1000 ML
41.65 PLASTIC BAG, INJECTION (ML) INTRAVENOUS CONTINUOUS
Status: ACTIVE | OUTPATIENT
Start: 2019-06-13 | End: 2019-06-14

## 2019-06-13 RX ORDER — ACETAMINOPHEN 325 MG/1
650 TABLET ORAL EVERY 6 HOURS PRN
Status: DISCONTINUED | OUTPATIENT
Start: 2019-06-13 | End: 2019-06-15 | Stop reason: HOSPADM

## 2019-06-13 RX ORDER — SIMETHICONE 80 MG
1 TABLET,CHEWABLE ORAL EVERY 6 HOURS PRN
Status: DISCONTINUED | OUTPATIENT
Start: 2019-06-13 | End: 2019-06-15 | Stop reason: HOSPADM

## 2019-06-13 RX ORDER — DIPHENHYDRAMINE HCL 25 MG
25 CAPSULE ORAL EVERY 4 HOURS PRN
Status: DISCONTINUED | OUTPATIENT
Start: 2019-06-13 | End: 2019-06-15 | Stop reason: HOSPADM

## 2019-06-13 RX ADMIN — IBUPROFEN 600 MG: 600 TABLET ORAL at 06:06

## 2019-06-13 RX ADMIN — OXYCODONE HYDROCHLORIDE AND ACETAMINOPHEN 1 TABLET: 5; 325 TABLET ORAL at 11:06

## 2019-06-13 RX ADMIN — BUTORPHANOL TARTRATE 2 MG: 2 INJECTION, SOLUTION INTRAMUSCULAR; INTRAVENOUS at 08:06

## 2019-06-13 RX ADMIN — DINOPROSTONE 10 MG: 10 INSERT VAGINAL at 02:06

## 2019-06-13 RX ADMIN — BUTORPHANOL TARTRATE 2 MG: 2 INJECTION, SOLUTION INTRAMUSCULAR; INTRAVENOUS at 02:06

## 2019-06-13 RX ADMIN — PROMETHAZINE HYDROCHLORIDE 12 MG: 25 INJECTION INTRAMUSCULAR; INTRAVENOUS at 08:06

## 2019-06-13 RX ADMIN — DOCUSATE SODIUM AND SENNOSIDES 1 TABLET: 8.6; 5 TABLET, FILM COATED ORAL at 08:06

## 2019-06-13 RX ADMIN — IBUPROFEN 600 MG: 600 TABLET ORAL at 11:06

## 2019-06-13 RX ADMIN — PROMETHAZINE HYDROCHLORIDE 12.5 MG: 25 INJECTION INTRAMUSCULAR; INTRAVENOUS at 02:06

## 2019-06-13 RX ADMIN — SODIUM CHLORIDE, SODIUM LACTATE, POTASSIUM CHLORIDE, AND CALCIUM CHLORIDE: .6; .31; .03; .02 INJECTION, SOLUTION INTRAVENOUS at 10:06

## 2019-06-13 RX ADMIN — Medication 10 ML/HR: at 09:06

## 2019-06-13 RX ADMIN — FENTANYL CITRATE 100 MCG: 50 INJECTION, SOLUTION INTRAMUSCULAR; INTRAVENOUS at 09:06

## 2019-06-13 RX ADMIN — BUPIVACAINE HYDROCHLORIDE 6 ML: 2.5 INJECTION, SOLUTION EPIDURAL; INFILTRATION; INTRACAUDAL; PERINEURAL at 09:06

## 2019-06-13 NOTE — ANESTHESIA PREPROCEDURE EVALUATION
06/13/2019  Gilberto Bueno is a 39 y.o., female.    Anesthesia Evaluation         Review of Systems  Anesthesia Hx:  No previous Anesthesia   Social:  Non-Smoker    Hematology/Oncology:  Hematology Normal   Oncology Normal     EENT/Dental:EENT/Dental Normal   Cardiovascular:  Cardiovascular Normal     Pulmonary:  Pulmonary Normal    Renal/:  Renal/ Normal     Hepatic/GI:  Hepatic/GI Normal    Musculoskeletal:  Musculoskeletal Normal    Neurological:  Neurology Normal    Endocrine:  Endocrine Normal    Dermatological:  Skin Normal    Psych:  Psychiatric Normal           Physical Exam  General:  Well nourished    Airway/Jaw/Neck:  Airway Findings: Mallampati: II TM Distance: < 4 cm      Dental:  Dental Findings:   Chest/Lungs:  Chest/Lungs Clear    Heart/Vascular:  Heart Findings: Normal       Mental Status:  Mental Status Findings:  Cooperative, Alert and Oriented         Anesthesia Plan  Type of Anesthesia, risks & benefits discussed:  Anesthesia Type:  epidural  Patient's Preference:   Intra-op Monitoring Plan: standard ASA monitors  Intra-op Monitoring Plan Comments:   Post Op Pain Control Plan: multimodal analgesia, IV/PO Opioids PRN and per primary service following discharge from PACU  Post Op Pain Control Plan Comments:   Induction:    Beta Blocker:  Patient is not currently on a Beta-Blocker (No further documentation required).       Informed Consent: Patient understands risks and agrees with Anesthesia plan.  Questions answered. Anesthesia consent signed with patient.  ASA Score: 2     Day of Surgery Review of History & Physical:    H&P update referred to the provider.  H&P completed by Anesthesiologist.   Anesthesia Plan Notes: npo        Ready For Surgery From Anesthesia Perspective.

## 2019-06-13 NOTE — HOSPITAL COURSE
Cervidil at 0215 2019  AROM at 0710 2019.  Clear with cervix at 3 cm  Complete at 1100 2019.  Pushing ineffectively.  Exam by me, vertex presentation, ZAK at +2 to +3 station.    Vacuum placed over right occiput.  Vertex was a little asynclitic.  Traction over intact perineum  Viable male infant at 1130 2019.  Small caput.  No obvious vac marks.  Spontaneous movements of all extremities.  Spontaneous cry.  Weight: 3.485 kg (7 lb 10.9 oz)  Apgar: 9/9  Placenta: spontaneous and intact with three vessels.  No complication  No specimen  EBL: 250 cc  Perineal abrasions.  No repair.  No laceration.  Patient and  tolerated the procedure well.      Postpartum course was benign.  Requesting medication for anxiety and depression postpartum  She is breast-feeding well.  Exam was benign with patient afebrile, vitals stable, and minimal bleeding.  Normal activities.  Patient discharged home on postpartum day #2, 6/15/2019   Discharge medications include Motrin, prenatal vitamins, and iron supplement with Lexapro 10 mg daily.  Follow-up with me in 6 weeks.    Circumcision of male infant done on 2019    Abimael Henderson MD.

## 2019-06-13 NOTE — PROGRESS NOTES
Patient up to bathroom, voiding, michelle care done, patient transferred via Rosy Bustos, hand off given to Briana Betancourt RN.

## 2019-06-13 NOTE — H&P
[facility]  Obstetrics  History & Physical    Patient Name: Gilberto Bueno  MRN: [id number]  Admission Date: 2019  Primary Care Provider: Liss Wise MD    Subjective:     Principal Problem:39 weeks gestation of pregnancy    History of Present Illness:  40 yo  at 39 weeks  Admitted for labor induction  No contraction.  No vaginal bleeding.  No rupture of membranes  Good fetal movements    Obstetric HPI:  Patient reports No contractions, active fetal movement, No vaginal bleeding , No loss of fluid     This pregnancy has been complicated by AMA, Diet-control GDM.      OB History    Para Term  AB Living   6 5 4 1 0 5   SAB TAB Ectopic Multiple Live Births   0 0 0 0 5      # Outcome Date GA Lbr Zeus/2nd Weight Sex Delivery Anes PTL Lv   6 Current            5 Term 11/24/10   3.24 kg (7 lb 2.3 oz) F   N CHRIS   4  07 33w0d  2.807 kg (6 lb 3 oz) F Vag-Spont  Y CHRIS   3 Term 05   3.997 kg (8 lb 13 oz) M Vag-Spont  N CHRIS   2 Term 01   4.99 kg (11 lb) M Vag-Spont  N CHRIS   1 Term 99   3.459 kg (7 lb 10 oz) M Vag-Spont  N CHRIS     History reviewed. No pertinent past medical history.  History reviewed. No pertinent surgical history.    PTA Medications   Medication Sig    loratadine (CLARITIN) 10 mg tablet TAKE 1 TABLET BY MOUTH ONCE DAILY    pantoprazole (PROTONIX) 20 MG tablet TAKE 1 TABLET BY MOUTH ONCE DAILY    PRENATAL 28 mg iron- 800 mcg Tab TK 1 T PO QD    promethazine (PHENERGAN) 25 MG tablet Take 1 tablet (25 mg total) by mouth every 6 (six) hours as needed for Nausea.    TRUE METRIX GLUCOSE METER Misc use TO test blood glucose every day    TRUE METRIX GLUCOSE TEST STRIP Strp use TO test blood glucose FOUR TIMES DAILY    TRUEDRAW LANCING DEVICE Misc use AS DIRECTED TO test blood glucose    TRUEPLUS LANCETS 33 gauge Misc use TO test blood glucose FOUR TIMES DAILY       Review of patient's allergies indicates:  No Known Allergies      Family History     None        Tobacco Use    Smoking status: Never Smoker    Smokeless tobacco: Never Used   Substance and Sexual Activity    Alcohol use: No    Drug use: No    Sexual activity: Yes     Partners: Male     Review of Systems   Constitutional: Positive for fatigue. Negative for activity change, appetite change, fever and unexpected weight change.   Respiratory: Negative for cough, shortness of breath and wheezing.    Cardiovascular: Negative for chest pain and palpitations.   Gastrointestinal: Positive for abdominal pain. Negative for nausea and vomiting.   Endocrine: Negative for hot flashes.   Genitourinary: Positive for frequency, pelvic pain and vaginal discharge. Negative for dysmenorrhea, dyspareunia, urgency, vaginal bleeding and postcoital bleeding.   Musculoskeletal: Positive for back pain. Negative for myalgias.   Integumentary:  Negative for rash, breast mass and nipple discharge.   Neurological: Positive for headaches. Negative for seizures.   Psychiatric/Behavioral: Negative for depression and sleep disturbance. The patient is not nervous/anxious.    Breast: Negative for mass, mastodynia and nipple discharge     Objective:     Vital Signs (Most Recent):  Temp: 98.4 °F (36.9 °C) (06/13/19 0119)  Pulse: 101 (06/13/19 0221)  Resp: 18 (06/13/19 0119)  BP: 102/73 (06/13/19 0221)  SpO2: 99 % (06/13/19 0221) Vital Signs (24h Range):  Temp:  [98.4 °F (36.9 °C)] 98.4 °F (36.9 °C)  Pulse:  [] 101  Resp:  [18] 18  SpO2:  [98 %-99 %] 99 %  BP: (102-124)/(65-74) 102/73     Weight: 103.7 kg (228 lb 9.9 oz)  Body mass index is 40.5 kg/m².    FHT: 150 Cat 1 (reassuring)  TOCO:  Q 2 minutes    Physical Exam:   Constitutional: She appears well-developed and well-nourished. No distress.    HENT:   Head: Normocephalic and atraumatic.    Eyes: EOM are normal.    Neck: Normal range of motion.     Pulmonary/Chest: Effort normal. No respiratory distress.   Breasts: Non-tender, no engorgement, no  masses, no retraction, no discharge. Negative for lymphadenopathy.         Abdominal: Soft. She exhibits no distension. There is no tenderness. There is no rebound and no guarding.   Gravid             Musculoskeletal: Normal range of motion.       Neurological: She is alert.    Skin: Skin is warm and dry.    Psychiatric: She has a normal mood and affect.       Cervix:  Dilation:  1  Effacement:  50%  Station: -3  Presentation: Vertex     Significant Labs:  Lab Results   Component Value Date    GROUPTRH A POS 2019    HEPBSAG Negative 2019    STREPBCULT No Group B Streptococcus isolated 2019    AFP 80.3 ng/mL 2010       CBC:   Recent Labs   Lab 19  0146   WBC 7.32   RBC 3.89*   HGB 11.8*   HCT 36.0*      MCV 93   MCH 30.3   MCHC 32.8     I have personallly reviewed all pertinent lab results from the last 24 hours.    Assessment/Plan:     39 y.o. female  at 39w0d for:    * 39 weeks gestation of pregnancy  Cervidil  Pitocin  Pain control  Expect  later today        Abimael Henderson MD  Obstetrics  [facility]

## 2019-06-13 NOTE — PROGRESS NOTES
To room, intermittent tracing, patient given 2 mg stadol and 12.5 phenergan, patient states ctx coming back to back will adjust efm, some confusion regarding cervidil, Spoke with Dr. Henderson, wants cervidil to remain in a for at least 8 hours. Will continue to monitor...

## 2019-06-13 NOTE — ANESTHESIA PROCEDURE NOTES
Epidural    Patient location during procedure: OB   Reason for block: primary anesthetic   Diagnosis: IUP   Start time: 6/13/2019 9:25 AM  Timeout: 6/13/2019 9:25 AM  End time: 6/13/2019 9:35 AM  Staffing  Anesthesiologist: Jim Saravia MD  Performed: anesthesiologist   Preanesthetic Checklist  Completed: patient identified, site marked, pre-op evaluation, timeout performed, IV checked, risks and benefits discussed, monitors and equipment checked, anesthesia consent given, hand hygiene performed and patient being monitored  Preparation  Patient position: sitting  Prep: ChloraPrep  Patient monitoring: ECG, Pulse Ox and Blood Pressure  Epidural  Skin Anesthetic: lidocaine 1%  Skin Wheal: 5 mL  Administration type: continuous  Approach: midline  Interspace: L3-4    Injection technique: VICKY air  Needle and Epidural Catheter  Needle type: Tuohy   Needle gauge: 17  Needle length: 3.5 inches  Needle insertion depth: 5 cm  Catheter type: springwound and multi-orifice  Catheter size: 19 G  Catheter at skin depth: 10 cm  Test dose: 3 mL of lidocaine 1.5% with Epi 1-to-200,000  Additional Documentation: incremental injection, no paresthesia on injection, no significant pain on injection, negative aspiration for heme and CSF, no signs/symptoms of IV or SA injection and no significant complaints from patient  Needle localization: anatomical landmarks  Assessment  Upper dermatomal levels - Left: T6  Right: T6   Dermatomal levels determined by alcohol wipe  Ease of block: easy  Patient's tolerance of the procedure: comfortable throughout block and no complaintsNo inadvertent dural puncture with Tuohy.

## 2019-06-13 NOTE — SUBJECTIVE & OBJECTIVE
Obstetric HPI:  Patient reports No contractions, active fetal movement, No vaginal bleeding , No loss of fluid     This pregnancy has been complicated by AMA, Diet-control GDM.      OB History    Para Term  AB Living   6 5 4 1 0 5   SAB TAB Ectopic Multiple Live Births   0 0 0 0 5      # Outcome Date GA Lbr Zeus/2nd Weight Sex Delivery Anes PTL Lv   6 Current            5 Term 11/24/10   3.24 kg (7 lb 2.3 oz) F   N CHRIS   4  07 33w0d  2.807 kg (6 lb 3 oz) F Vag-Spont  Y CHRIS   3 Term 05   3.997 kg (8 lb 13 oz) M Vag-Spont  N CHRIS   2 Term 01   4.99 kg (11 lb) M Vag-Spont  N CHRIS   1 Term 99   3.459 kg (7 lb 10 oz) M Vag-Spont  N CHRIS     History reviewed. No pertinent past medical history.  History reviewed. No pertinent surgical history.    PTA Medications   Medication Sig    loratadine (CLARITIN) 10 mg tablet TAKE 1 TABLET BY MOUTH ONCE DAILY    pantoprazole (PROTONIX) 20 MG tablet TAKE 1 TABLET BY MOUTH ONCE DAILY    PRENATAL 28 mg iron- 800 mcg Tab TK 1 T PO QD    promethazine (PHENERGAN) 25 MG tablet Take 1 tablet (25 mg total) by mouth every 6 (six) hours as needed for Nausea.    TRUE METRIX GLUCOSE METER Misc use TO test blood glucose every day    TRUE METRIX GLUCOSE TEST STRIP Strp use TO test blood glucose FOUR TIMES DAILY    TRUEDRAW LANCING DEVICE Misc use AS DIRECTED TO test blood glucose    TRUEPLUS LANCETS 33 gauge Misc use TO test blood glucose FOUR TIMES DAILY       Review of patient's allergies indicates:  No Known Allergies     Family History     None        Tobacco Use    Smoking status: Never Smoker    Smokeless tobacco: Never Used   Substance and Sexual Activity    Alcohol use: No    Drug use: No    Sexual activity: Yes     Partners: Male     Review of Systems   Constitutional: Positive for fatigue. Negative for activity change, appetite change, fever and unexpected weight change.   Respiratory: Negative for cough, shortness of breath and  wheezing.    Cardiovascular: Negative for chest pain and palpitations.   Gastrointestinal: Positive for abdominal pain. Negative for nausea and vomiting.   Endocrine: Negative for hot flashes.   Genitourinary: Positive for frequency, pelvic pain and vaginal discharge. Negative for dysmenorrhea, dyspareunia, urgency, vaginal bleeding and postcoital bleeding.   Musculoskeletal: Positive for back pain. Negative for myalgias.   Integumentary:  Negative for rash, breast mass and nipple discharge.   Neurological: Positive for headaches. Negative for seizures.   Psychiatric/Behavioral: Negative for depression and sleep disturbance. The patient is not nervous/anxious.    Breast: Negative for mass, mastodynia and nipple discharge     Objective:     Vital Signs (Most Recent):  Temp: 98.4 °F (36.9 °C) (06/13/19 0119)  Pulse: 101 (06/13/19 0221)  Resp: 18 (06/13/19 0119)  BP: 102/73 (06/13/19 0221)  SpO2: 99 % (06/13/19 0221) Vital Signs (24h Range):  Temp:  [98.4 °F (36.9 °C)] 98.4 °F (36.9 °C)  Pulse:  [] 101  Resp:  [18] 18  SpO2:  [98 %-99 %] 99 %  BP: (102-124)/(65-74) 102/73     Weight: 103.7 kg (228 lb 9.9 oz)  Body mass index is 40.5 kg/m².    FHT: 150 Cat 1 (reassuring)  TOCO:  Q 2 minutes    Physical Exam:   Constitutional: She appears well-developed and well-nourished. No distress.    HENT:   Head: Normocephalic and atraumatic.    Eyes: EOM are normal.    Neck: Normal range of motion.     Pulmonary/Chest: Effort normal. No respiratory distress.   Breasts: Non-tender, no engorgement, no masses, no retraction, no discharge. Negative for lymphadenopathy.         Abdominal: Soft. She exhibits no distension. There is no tenderness. There is no rebound and no guarding.   Gravid             Musculoskeletal: Normal range of motion.       Neurological: She is alert.    Skin: Skin is warm and dry.    Psychiatric: She has a normal mood and affect.       Cervix:  Dilation:  1  Effacement:   50%  Station: -3  Presentation: Vertex     Significant Labs:  Lab Results   Component Value Date    GROUPTRH A POS 06/13/2019    HEPBSAG Negative 01/09/2019    STREPBCULT No Group B Streptococcus isolated 05/20/2019    AFP 80.3 ng/mL 08/11/2010       CBC:   Recent Labs   Lab 06/13/19  0146   WBC 7.32   RBC 3.89*   HGB 11.8*   HCT 36.0*      MCV 93   MCH 30.3   MCHC 32.8     I have personallly reviewed all pertinent lab results from the last 24 hours.

## 2019-06-13 NOTE — LACTATION NOTE
"   06/13/19 1215   Pain/Comfort/Sleep   Pain Body Location - Side Bilateral   Pain Body Location breast   Pain Rating (0-10): Activity 0   Lochia 1-3 Days WDL   Lochia 1-3 Days WDL WDL   Lochia Color brownish-red   Lochia Amount moderate (less than 15 cm on pad/hr)   Lochia Odor none   Perineum WDL   Perineum WDL WDL   Perineum Appearance no hematoma;no redness;no swelling   Perineum Laceration/Abrasion Type abrasion   Breasts WDL   Breast WDL WDL   Maternal Feeding Assessment   Maternal Emotional State relaxed;assist needed   Signs of Milk Transfer audible swallow;infant jaw motion present   Infant Positioning cradle   Latch Assistance yes   Reproductive Interventions   Breastfeeding Assistance assisted with positioning;infant latch-on verified;infant suck/swallow verified   Breastfeeding Support encouragement provided;lactation counseling provided     Minimal assist with position and latch to left breast; audible swallows noted.   Basic breastfeeding instructions given and Mother's Breastfeeding Guide reviewed.  Encouraged to call for assist prn.  States "understand" and verbalized appropriate recall.    "

## 2019-06-13 NOTE — HPI
40 yo  at 39 weeks  Admitted for labor induction  No contraction.  No vaginal bleeding.  No rupture of membranes  Good fetal movements

## 2019-06-14 LAB
BASOPHILS # BLD AUTO: 0.02 K/UL (ref 0–0.2)
BASOPHILS NFR BLD: 0.2 % (ref 0–1.9)
DIFFERENTIAL METHOD: ABNORMAL
EOSINOPHIL # BLD AUTO: 0.2 K/UL (ref 0–0.5)
EOSINOPHIL NFR BLD: 1.7 % (ref 0–8)
ERYTHROCYTE [DISTWIDTH] IN BLOOD BY AUTOMATED COUNT: 14.5 % (ref 11.5–14.5)
HCT VFR BLD AUTO: 34.9 % (ref 37–48.5)
HGB BLD-MCNC: 11.1 G/DL (ref 12–16)
LYMPHOCYTES # BLD AUTO: 1.5 K/UL (ref 1–4.8)
LYMPHOCYTES NFR BLD: 16.4 % (ref 18–48)
MCH RBC QN AUTO: 30.3 PG (ref 27–31)
MCHC RBC AUTO-ENTMCNC: 31.8 G/DL (ref 32–36)
MCV RBC AUTO: 95 FL (ref 82–98)
MONOCYTES # BLD AUTO: 0.8 K/UL (ref 0.3–1)
MONOCYTES NFR BLD: 8.9 % (ref 4–15)
NEUTROPHILS # BLD AUTO: 6.5 K/UL (ref 1.8–7.7)
NEUTROPHILS NFR BLD: 72.8 % (ref 38–73)
PLATELET # BLD AUTO: 156 K/UL (ref 150–350)
PMV BLD AUTO: 11.6 FL (ref 9.2–12.9)
RBC # BLD AUTO: 3.66 M/UL (ref 4–5.4)
RPR SER QL: NORMAL
WBC # BLD AUTO: 8.88 K/UL (ref 3.9–12.7)

## 2019-06-14 PROCEDURE — 25000003 PHARM REV CODE 250: Performed by: OBSTETRICS & GYNECOLOGY

## 2019-06-14 PROCEDURE — 36415 COLL VENOUS BLD VENIPUNCTURE: CPT

## 2019-06-14 PROCEDURE — 11000001 HC ACUTE MED/SURG PRIVATE ROOM

## 2019-06-14 PROCEDURE — 85025 COMPLETE CBC W/AUTO DIFF WBC: CPT

## 2019-06-14 PROCEDURE — 99231 SBSQ HOSP IP/OBS SF/LOW 25: CPT | Mod: ,,, | Performed by: OBSTETRICS & GYNECOLOGY

## 2019-06-14 PROCEDURE — 99231 PR SUBSEQUENT HOSPITAL CARE,LEVL I: ICD-10-PCS | Mod: ,,, | Performed by: OBSTETRICS & GYNECOLOGY

## 2019-06-14 RX ORDER — IBUPROFEN 600 MG/1
600 TABLET ORAL EVERY 6 HOURS
Qty: 40 TABLET | Refills: 1 | Status: SHIPPED | OUTPATIENT
Start: 2019-06-14 | End: 2023-06-21

## 2019-06-14 RX ORDER — ESCITALOPRAM OXALATE 10 MG/1
10 TABLET ORAL DAILY
Qty: 30 TABLET | Refills: 2 | Status: SHIPPED | OUTPATIENT
Start: 2019-06-14 | End: 2023-04-11 | Stop reason: CLARIF

## 2019-06-14 RX ORDER — ESCITALOPRAM OXALATE 10 MG/1
10 TABLET ORAL DAILY
Qty: 30 TABLET | Refills: 11 | Status: SHIPPED | OUTPATIENT
Start: 2019-06-14 | End: 2020-06-13

## 2019-06-14 RX ORDER — ESCITALOPRAM OXALATE 10 MG/1
10 TABLET ORAL DAILY
Status: DISCONTINUED | OUTPATIENT
Start: 2019-06-14 | End: 2019-06-15 | Stop reason: HOSPADM

## 2019-06-14 RX ADMIN — IBUPROFEN 600 MG: 600 TABLET ORAL at 12:06

## 2019-06-14 RX ADMIN — IBUPROFEN 600 MG: 600 TABLET ORAL at 05:06

## 2019-06-14 RX ADMIN — OXYCODONE HYDROCHLORIDE AND ACETAMINOPHEN 1 TABLET: 5; 325 TABLET ORAL at 10:06

## 2019-06-14 RX ADMIN — ESCITALOPRAM OXALATE 10 MG: 10 TABLET ORAL at 06:06

## 2019-06-14 RX ADMIN — IBUPROFEN 600 MG: 600 TABLET ORAL at 06:06

## 2019-06-14 RX ADMIN — OXYCODONE HYDROCHLORIDE AND ACETAMINOPHEN 1 TABLET: 5; 325 TABLET ORAL at 05:06

## 2019-06-14 RX ADMIN — OXYCODONE HYDROCHLORIDE AND ACETAMINOPHEN 1 TABLET: 5; 325 TABLET ORAL at 06:06

## 2019-06-14 RX ADMIN — DOCUSATE SODIUM AND SENNOSIDES 1 TABLET: 8.6; 5 TABLET, FILM COATED ORAL at 08:06

## 2019-06-14 NOTE — DISCHARGE SUMMARY
[facility]  Obstetrics  Discharge Summary      Patient Name: Gilberto Bueno  MRN: [id number]  Admission Date: 2019  Hospital Length of Stay: 1 days  Discharge Date and Time: 2019  Attending Physician: Abimael Henderson MD   Discharging Provider: Abimael Henderson MD   Primary Care Provider: Liss Wise MD    HPI: 40 yo  at 39 weeks  Admitted for labor induction  No contraction.  No vaginal bleeding.  No rupture of membranes  Good fetal movements        * No surgery found *     Hospital Course:   Cervidil at 0215 2019  AROM at 0710 2019.  Clear with cervix at 3 cm  Complete at 1100 2019.  Pushing ineffectively.  Exam by me, vertex presentation, ZAK at +2 to +3 station.    Vacuum placed over right occiput.  Vertex was a little asynclitic.  Traction over intact perineum  Viable male infant at 1130 2019.  Small caput.  No obvious vac marks.  Spontaneous movements of all extremities.  Spontaneous cry.  Weight: 3.485 kg (7 lb 10.9 oz)  Apgar: 9/9  Placenta: spontaneous and intact with three vessels.  No complication  No specimen  EBL: 250 cc  Perineal abrasions.  No repair.  No laceration.  Patient and  tolerated the procedure well.      Postpartum course was benign.  She is breast-feeding well.  Exam was benign with patient afebrile, vitals stable, and minimal bleeding.  Normal activities.  Patient discharged home on postpartum day #2, 6/15/2019   Discharge medications include Motrin, prenatal vitamins, and iron supplement.  Follow-up with me in 6 weeks.    Circumcision of male infant done on 2019    Abimael Henderson MD.         Final Active Diagnoses:    Diagnosis Date Noted POA    PRINCIPAL PROBLEM:  Status post vacuum-assisted vaginal delivery [Z87.42] 2019 Not Applicable      Problems Resolved During this Admission:    Diagnosis Date Noted Date Resolved POA    39 weeks gestation of pregnancy [Z3A.39] 2019 Not Applicable    Status  post normal delivery in completely normal case [O80] 06/13/2019 06/14/2019 Not Applicable        Labs:   CBC   Recent Labs   Lab 06/13/19  0146 06/14/19  0626   WBC 7.32 8.88   HGB 11.8* 11.1*   HCT 36.0* 34.9*    156    and All labs within the past 24 hours have been reviewed    Feeding Method: breast    Immunizations     Date Immunization Status Dose Route/Site Given by    06/13/19 1632 MMR Incomplete 0.5 mL Subcutaneous/Left deltoid     06/13/19 1632 Tdap Incomplete 0.5 mL Intramuscular/Left deltoid           Delivery:    Episiotomy: None   Lacerations:     Repair suture: None   Repair # of packets:     Blood loss (ml):       Birth information:  YOB: 2019   Time of birth: 11:30 AM   Sex: male   Delivery type: Vaginal, Vacuum (Extractor)   Gestational Age: 39w0d    Delivery Clinician:      Other providers:       Additional  information:  Forceps:    Vacuum:    Breech:    Observed anomalies      Living?:           APGARS  One minute Five minutes Ten minutes   Skin color:         Heart rate:         Grimace:         Muscle tone:         Breathing:         Totals: 9  9        Placenta: Delivered:       appearance    Pending Diagnostic Studies:     Procedure Component Value Units Date/Time    RPR [[id number]] Collected:  06/13/19 0146    Order Status:  Sent Lab Status:  In process Updated:  06/13/19 0802    Specimen:  Blood           Discharged Condition: good    Disposition: Home or Self Care    Follow Up:  Follow-up Information     Abimael Henderson MD In 6 weeks.    Specialties:  Obstetrics and Gynecology, Obstetrics and Gynecology  Contact information:  [address]  [address] [place], [place] [id number] [phone]                 Patient Instructions:      Call MD for:  temperature >100.4     Call MD for:  persistent nausea and vomiting or diarrhea     Call MD for:  severe uncontrolled pain     Call MD for:  redness, tenderness, or signs of infection (pain, swelling, redness, odor or green/yellow  discharge around incision site)     Call MD for:  difficulty breathing or increased cough     Call MD for:  severe persistent headache     Call MD for:  worsening rash     Call MD for:  persistent dizziness, light-headedness, or visual disturbances     Call MD for:  increased confusion or weakness     No dressing needed     Medications:  Current Discharge Medication List      START taking these medications    Details   ibuprofen (ADVIL,MOTRIN) 600 MG tablet Take 1 tablet (600 mg total) by mouth every 6 (six) hours.  Qty: 40 tablet, Refills: 1         CONTINUE these medications which have NOT CHANGED    Details   loratadine (CLARITIN) 10 mg tablet TAKE 1 TABLET BY MOUTH ONCE DAILY  Qty: 30 tablet, Refills: 0    Associated Diagnoses: Environmental and seasonal allergies      pantoprazole (PROTONIX) 20 MG tablet TAKE 1 TABLET BY MOUTH ONCE DAILY  Qty: 30 tablet, Refills: 2    Associated Diagnoses: 16 weeks gestation of pregnancy; Advanced maternal age in multigravida, first trimester; History of  delivery, currently pregnant in second trimester      PRENATAL 28 mg iron- 800 mcg Tab TK 1 T PO QD  Refills: 2      promethazine (PHENERGAN) 25 MG tablet Take 1 tablet (25 mg total) by mouth every 6 (six) hours as needed for Nausea.  Qty: 30 tablet, Refills: 1    Associated Diagnoses: Nausea and vomiting in pregnancy      TRUE METRIX GLUCOSE METER Misc use TO test blood glucose every day  Refills: 0      TRUE METRIX GLUCOSE TEST STRIP Strp use TO test blood glucose FOUR TIMES DAILY  Refills: 4      TRUEDRAW LANCING DEVICE Misc use AS DIRECTED TO test blood glucose  Refills: 0      TRUEPLUS LANCETS 33 gauge Misc use TO test blood glucose FOUR TIMES DAILY  Refills: 4             Abimael Henderson MD  Obstetrics  [facility]

## 2019-06-14 NOTE — LACTATION NOTE
06/14/19 0850   Maternal Assessment   Breast Density Bilateral:;soft   Areola Bilateral:;elastic   Nipples Bilateral:;everted   Maternal Infant Feeding   Maternal Emotional State independent;relaxed   Latch Assistance no   mother breastfeeding baby independently -states strong sucking and hears swallows -denies any discomfort with feeding -offering formula via syringe per her request -encouraged breastfeeding over formula and aware of risks of formula -encouraged call for any assistance

## 2019-06-14 NOTE — SUBJECTIVE & OBJECTIVE
Hospital course: Cervidil at 0215 2019  AROM at 0710 2019.  Clear with cervix at 3 cm  Complete at 1100 2019.  Pushing ineffectively.  Exam by me, vertex presentation, ZAK at +2 to +3 station.    Vacuum placed over right occiput.  Vertex was a little asynclitic.  Traction over intact perineum  Viable male infant at 1130 2019.  Small caput.  No obvious vac marks.  Spontaneous movements of all extremities.  Spontaneous cry.  Weight: 3.485 kg (7 lb 10.9 oz)  Apgar: 9/9  Placenta: spontaneous and intact with three vessels.  No complication  No specimen  EBL: 250 cc  Perineal abrasions.  No repair.  No laceration.  Patient and  tolerated the procedure well.      Abimael Henderson MD    Interval History:   Status post VAVD 2019    She is doing well this morning. She is tolerating a regular diet without nausea or vomiting. She is voiding spontaneously. She is ambulating. She has passed flatus, and has not a BM. Vaginal bleeding is mild. She denies fever or chills. Abdominal pain is mild and controlled with oral medications. She is breastfeeding. She desires circumcision for her male baby: yes.    Objective:     Vital Signs (Most Recent):  Temp: 97.9 °F (36.6 °C) (19)  Pulse: 94 (19)  Resp: 20 (19)  BP: (!) 109/58 (19)  SpO2: 99 % (19) Vital Signs (24h Range):  Temp:  [97.9 °F (36.6 °C)-98.8 °F (37.1 °C)] 97.9 °F (36.6 °C)  Pulse:  [] 94  Resp:  [20] 20  SpO2:  [91 %-100 %] 99 %  BP: ()/(45-86) 109/58     Weight: 103.7 kg (228 lb 9.9 oz)  Body mass index is 40.5 kg/m².      Intake/Output Summary (Last 24 hours) at 2019 0714  Last data filed at 2019 0600  Gross per 24 hour   Intake 1560 ml   Output 2500 ml   Net -940 ml       Significant Labs:  Lab Results   Component Value Date    GROUPTRH A POS 2019    HEPBSAG Negative 2019    STREPBCULT No Group B Streptococcus isolated 2019    AFP 80.3 ng/mL 2010      Recent Labs   Lab 06/13/19  0146   HGB 11.8*   HCT 36.0*       CBC:   Recent Labs   Lab 06/13/19  0146   WBC 7.32   RBC 3.89*   HGB 11.8*   HCT 36.0*      MCV 93   MCH 30.3   MCHC 32.8     I have personallly reviewed all pertinent lab results from the last 24 hours.    Physical Exam:   Constitutional: She appears well-developed and well-nourished. No distress.    HENT:   Head: Normocephalic and atraumatic.    Eyes: EOM are normal.    Neck: Normal range of motion.    Cardiovascular: Normal rate.     Pulmonary/Chest: Effort normal. No respiratory distress.        Abdominal: Soft. She exhibits no distension. There is no tenderness. There is no rebound and no guarding.   Fundus firm at umb             Musculoskeletal: Normal range of motion.       Neurological: She is alert.    Skin: Skin is warm and dry.    Psychiatric: She has a normal mood and affect.

## 2019-06-14 NOTE — PLAN OF CARE
Problem: Bleeding (Postpartum Vaginal Delivery)  Goal: Hemostasis  Outcome: Ongoing (interventions implemented as appropriate)  Scant bleeding

## 2019-06-14 NOTE — LACTATION NOTE
Instructed on the risks of formula feeding including:   Lacks the nutrients found in colostrums to help prevent infection, mature the gut, aid in digestion and resist allergies   Contains artificial additives and preservatives which increases incidence of contamination   Increase spitting up due to slower digestion   Increased cost and requires preparation, including bottle sanitation and formula refrigeration   Increased incidence of NEC for the  baby   Increased risk of diabetes with family history, SIDS and ear infections   Skipped feedings for the breastfeeding mother increases chance of engorgement, mastitis and plugged ducts   Decreases breastfeeding babys appetite resulting in poor feeding session, decreased breast stimulation and poor milk supply   Exposes the breastfeeding baby to the possibility of allergic reactions and colic  Pt states understanding and verbalized appropriate recall.  Instructed on safe formula feeding, preparation and transporting of pre-mixed feedings.  Including:   Use of thoroughly cleaned and sterilized BPA free bottles   Formula & water preference to be determined by the advice of the pediatrician   Proper hand washing   Follow all s guidelines for preparing formula   Check expiration dates   Clean all can tops with soap and water prior to opening; also use a clean can opener   Mixed formula can be stored in the refrigerator for up to 24 hours according to the World Health Organization   Never microwave bottles   Correct position of baby, nipple in the mouth and bottle position   Infant led feeding   Formula expires 1 hour after in initiation of the feeding   All mixed formula should be refrigerated until immediately prior to transport   Transport in a cool insulated bag with ice packs and use within 2 hours or re-refrigerate at arrival destination   Re-warm feeding at the destination for no longer than 15 minutes  Formula feeding guide  given and reviewed.  Pt verbalized understanding and provided appropriate recall.

## 2019-06-14 NOTE — ANESTHESIA POSTPROCEDURE EVALUATION
Anesthesia Post Evaluation    Patient: Gilberto Bueno    Procedure(s) Performed: * No procedures listed *    Final Anesthesia Type: epidural  Patient location during evaluation: PACU  Patient participation: Yes- Able to Participate  Level of consciousness: awake and alert, oriented and awake  Post-procedure vital signs: reviewed and stable  Pain management: adequate  Airway patency: patent  PONV status at discharge: No PONV  Anesthetic complications: no      Cardiovascular status: blood pressure returned to baseline, hemodynamically stable and stable  Respiratory status: unassisted and spontaneous ventilation  Hydration status: euvolemic  Follow-up not needed.          Vitals Value Taken Time   /50 6/14/2019  7:43 AM   Temp 36.1 °C (97 °F) 6/14/2019  7:43 AM   Pulse 84 6/14/2019  7:43 AM   Resp 18 6/14/2019  7:43 AM   SpO2 99 % 6/13/2019  8:20 PM         No case tracking events are documented in the log.      Pain/Stanford Score: Pain Rating Prior to Med Admin: 10 (6/14/2019 10:28 AM)  Pain Rating Post Med Admin: 0 (6/14/2019 12:29 AM)

## 2019-06-14 NOTE — PLAN OF CARE
Problem: Pain (Postpartum Vaginal Delivery)  Goal: Acceptable Pain Control    Intervention: Prevent or Manage Pain  Adequate pain control with prescribed medication.

## 2019-06-14 NOTE — ASSESSMENT & PLAN NOTE
Routine postpartum care  Watch vaginal bleeding  Pain control  Lactation assistance  Discharge home tomorrow, 6/15/2019    Circumcision of male infant will be done later today.

## 2019-06-14 NOTE — L&D DELIVERY NOTE
[facility]  Vaginal Delivery   Operative Note    SUMMARY     Vacuum assisted vaginal delivery of live infant, was placed on mothers abdomen for skin to skin and bulb suctioning performed.  Infant delivered position ZAK over intact perineum.  Nuchal cord: No.    Spontaneous delivery of placenta and IV pitocin given noting good uterine tone.  No lacerations noted.  Patient tolerated delivery well. Sponge needle and lap counted correctly x2.    Indications: Status post vacuum-assisted vaginal delivery  Pregnancy complicated by:   Patient Active Problem List   Diagnosis    Status post vacuum-assisted vaginal delivery    Status post normal delivery in completely normal case     Admitting GA: 39w0d    Delivery Information for  rAiel Bueno    Birth information:  YOB: 2019   Time of birth: 11:30 AM   Sex: male   Head Delivery Date/Time: 6/13/2019 11:30 AM   Delivery type: Vaginal, Vacuum (Extractor)   Gestational Age: 39w0d    Delivery Providers    Delivering clinician:  Vickey Henderson MD   Provider Role    Fifi Hargrove V RN Registered Nurse    Doris Ma Surgical Tech    Juhi Rene RN Registered Nurse            Measurements    Weight:  3485 g  Length:           Apgars    Living status:    Apgars:   1 min.:   5 min.:   10 min.:   15 min.:   20 min.:     Skin color:   1  1       Heart rate:   2  2       Reflex irritability:   2  2       Muscle tone:   2  2       Respiratory effort:   2  2       Total:   9  9       Apgars assigned by:  JUHI PATEL         Operative Delivery    Forceps attempted?:  No  Vacuum extractor attempted?:  Yes  Vacuum indications:  Maternal Fatigue  Vacuum type:  Kiwi  First attempt time vacuum applied:  6/13/2019 11:29:00  First attempt time vacuum removed:  6/13/2019 11:30:00  Number of pop offs:  0  Number of pulls with vacuum:  1  Total vacuum application time:  40 seconds  Vacuum applied by:  VICKEY HENDERSON MD  Failed vacuum delivery?:   No         Shoulder Dystocia    Shoulder dystocia present?:  No           Presentation    Presentation:  Vertex  Position:  Middle           Interventions/Resuscitation    Method:  None, Bulb Suctioning, Tactile Stimulation       Cord    Vessels:  3 vessels  Complications:  None, Nuchal  Delayed Cord Clamping?:  Yes  Cord Clamped Date/Time:  2019 11:31 AM  Cord Blood Disposition:  Sent with Baby, Lab  Gases Sent?:  No  Stem Cell Collection (by MD):  No       Placenta    Placenta delivery date/time:  2019 1133  Placenta removal:  Spontaneous  Placenta appearance:  Intact  Placenta disposition:  discarded           Labor Events:       labor: No     Labor Onset Date/Time:         Dilation Complete Date/Time: 2019 11:00     Start Pushing Date/Time: 2019 11:15     Rupture Date/Time: 19  0715         Rupture type: artificial rupture of membranes         Fluid Amount:        Fluid Color:        Fluid Odor:        Membrane Status (PeriCalm): ARM (Artificial Rupture)      Rupture Date/Time (PeriCalm): 2019 07:30:00      Fluid Amount (PeriCalm): Moderate      Fluid Color (PeriCalm): Clear       steroids: None     Antibiotics given for GBS: No     Induction: dinoprostone insert     Indications for induction:  Elective     Augmentation:       Indications for augmentation:       Labor complications: None     Additional complications:          Cervical ripenin2019 2:03 AM      Cervidil          Delivery:      Episiotomy: None     Indication for Episiotomy:       Perineal Lacerations:   Repaired:      Periurethral Laceration:   Repaired:     Labial Laceration:   Repaired:     Sulcus Laceration:   Repaired:     Vaginal Laceration: Yes Repaired:     Cervical Laceration:   Repaired:     Repair suture: None     Repair # of packets:       Last Value - EBL - Nursing (mL):       Sum - EBL - Nursing (mL): 0     Last Value - EBL - Anesthesia (mL):      Calculated QBL (mL):         Vaginal Sweep Performed: Yes     Surgicount Correct: Yes       Other providers:       Anesthesia    Method:  Epidural          Details (if applicable):  Trial of Labor      Categorization:      Priority:     Indications for :     Incision Type:       Additional  information:  Forceps:    Vacuum:    Breech:    Observed anomalies    Other (Comments):

## 2019-06-14 NOTE — PROGRESS NOTES
[facility]  Obstetrics  Postpartum Progress Note    Patient Name: Gilberto Bueno  MRN: [id number]  Admission Date: 2019  Hospital Length of Stay: 1 days  Attending Physician: Abimael Henderson MD  Primary Care Provider: Liss Wise MD    Subjective:     Principal Problem:Status post vacuum-assisted vaginal delivery    Hospital course: Cervidil at 0215 2019  AROM at 0710 2019.  Clear with cervix at 3 cm  Complete at 1100 2019.  Pushing ineffectively.  Exam by me, vertex presentation, ZAK at +2 to +3 station.    Vacuum placed over right occiput.  Vertex was a little asynclitic.  Traction over intact perineum  Viable male infant at 1130 2019.  Small caput.  No obvious vac marks.  Spontaneous movements of all extremities.  Spontaneous cry.  Weight: 3.485 kg (7 lb 10.9 oz)  Apgar: 9/9  Placenta: spontaneous and intact with three vessels.  No complication  No specimen  EBL: 250 cc  Perineal abrasions.  No repair.  No laceration.  Patient and  tolerated the procedure well.      Abimael Henderson MD    Interval History:   Status post VAVD 2019    She is doing well this morning. She is tolerating a regular diet without nausea or vomiting. She is voiding spontaneously. She is ambulating. She has passed flatus, and has not a BM. Vaginal bleeding is mild. She denies fever or chills. Abdominal pain is mild and controlled with oral medications. She is breastfeeding. She desires circumcision for her male baby: yes.    Objective:     Vital Signs (Most Recent):  Temp: 97.9 °F (36.6 °C) (19)  Pulse: 94 (19)  Resp: 20 (19)  BP: (!) 109/58 (19)  SpO2: 99 % (19) Vital Signs (24h Range):  Temp:  [97.9 °F (36.6 °C)-98.8 °F (37.1 °C)] 97.9 °F (36.6 °C)  Pulse:  [] 94  Resp:  [20] 20  SpO2:  [91 %-100 %] 99 %  BP: ()/(45-86) 109/58     Weight: 103.7 kg (228 lb 9.9 oz)  Body mass index is 40.5 kg/m².      Intake/Output Summary  (Last 24 hours) at 2019 0714  Last data filed at 2019 0600  Gross per 24 hour   Intake 1560 ml   Output 2500 ml   Net -940 ml       Significant Labs:  Lab Results   Component Value Date    GROUPTRH A POS 2019    HEPBSAG Negative 2019    STREPBCULT No Group B Streptococcus isolated 2019    AFP 80.3 ng/mL 2010     Recent Labs   Lab 19  0146   HGB 11.8*   HCT 36.0*       CBC:   Recent Labs   Lab 19  0146   WBC 7.32   RBC 3.89*   HGB 11.8*   HCT 36.0*      MCV 93   MCH 30.3   MCHC 32.8     I have personallly reviewed all pertinent lab results from the last 24 hours.    Physical Exam:   Constitutional: She appears well-developed and well-nourished. No distress.    HENT:   Head: Normocephalic and atraumatic.    Eyes: EOM are normal.    Neck: Normal range of motion.    Cardiovascular: Normal rate.     Pulmonary/Chest: Effort normal. No respiratory distress.        Abdominal: Soft. She exhibits no distension. There is no tenderness. There is no rebound and no guarding.   Fundus firm at umb             Musculoskeletal: Normal range of motion.       Neurological: She is alert.    Skin: Skin is warm and dry.    Psychiatric: She has a normal mood and affect.       Assessment/Plan:     39 y.o. female  for:    * Status post vacuum-assisted vaginal delivery  Routine postpartum care  Watch vaginal bleeding  Pain control  Lactation assistance  Discharge home tomorrow, 6/15/2019    Circumcision of male infant will be done later today.          Disposition: As patient meets milestones, will plan to discharge home.    Abimael Henderson MD  Obstetrics  [facility]

## 2019-06-14 NOTE — PLAN OF CARE
Problem: Urinary Retention (Postpartum Vaginal Delivery)  Goal: Effective Urinary Elimination    Intervention: Promote Effective Urinary Elimination  Voiding adequately

## 2019-06-14 NOTE — ASSESSMENT & PLAN NOTE
Patient discharged home on postpartum day #2, 6/15/2019   Discharge medications include Motrin, prenatal vitamins, and iron supplement.  Follow-up with me in 6 weeks.    Circumcision of male infant done on 6/14/2019

## 2019-06-14 NOTE — PLAN OF CARE
Problem: Adjustment to Role Transition (Postpartum Vaginal Delivery)  Goal: Successful Maternal Role Transition  Outcome: Ongoing (interventions implemented as appropriate)  Bonding appropriately with baby

## 2019-06-15 VITALS
SYSTOLIC BLOOD PRESSURE: 111 MMHG | HEIGHT: 62 IN | DIASTOLIC BLOOD PRESSURE: 70 MMHG | BODY MASS INDEX: 42.03 KG/M2 | HEART RATE: 75 BPM | WEIGHT: 228.38 LBS | RESPIRATION RATE: 17 BRPM | OXYGEN SATURATION: 99 % | TEMPERATURE: 98 F

## 2019-06-15 PROCEDURE — 25000003 PHARM REV CODE 250: Performed by: OBSTETRICS & GYNECOLOGY

## 2019-06-15 PROCEDURE — 99238 PR HOSPITAL DISCHARGE DAY,<30 MIN: ICD-10-PCS | Mod: ,,, | Performed by: OBSTETRICS & GYNECOLOGY

## 2019-06-15 PROCEDURE — 99238 HOSP IP/OBS DSCHRG MGMT 30/<: CPT | Mod: ,,, | Performed by: OBSTETRICS & GYNECOLOGY

## 2019-06-15 RX ADMIN — IBUPROFEN 600 MG: 600 TABLET ORAL at 05:06

## 2019-06-15 RX ADMIN — IBUPROFEN 600 MG: 600 TABLET ORAL at 12:06

## 2019-06-15 RX ADMIN — OXYCODONE HYDROCHLORIDE AND ACETAMINOPHEN 1 TABLET: 5; 325 TABLET ORAL at 09:06

## 2019-06-15 RX ADMIN — OXYCODONE HYDROCHLORIDE AND ACETAMINOPHEN 1 TABLET: 5; 325 TABLET ORAL at 05:06

## 2019-06-15 RX ADMIN — OXYCODONE HYDROCHLORIDE AND ACETAMINOPHEN 1 TABLET: 5; 325 TABLET ORAL at 12:06

## 2019-06-15 RX ADMIN — ESCITALOPRAM OXALATE 10 MG: 10 TABLET ORAL at 09:06

## 2019-06-15 NOTE — PLAN OF CARE
Problem: Adult Inpatient Plan of Care  Goal: Plan of Care Review  Pt progressing well. NAD noted. VSS. Pain well controlled. Pt breastfeeding without assistance. POC discussed with pt. Understanding verbalized.

## 2019-06-15 NOTE — DISCHARGE INSTRUCTIONS
 Breastfeeding Discharge Instructions      AAP recommendation of exclusive breastfeeding for the first 6 months of life and continued breastfeeding with the introduction of supplemental foods beyond the first year of life and recommends to delay all bottle and pacifier use until after 4 weeks of age and breastfeeding is well established.  Discussed the benefits of exclusive breastfeeding for both mother and baby.  Discussed the risks of supplementation/pacifier use on the exclusivity of breastfeeding in the first 6 months. Feed the baby at the earliest sign of hunger or comfort  o Hands to mouth, sucking motions  o Rooting or searching for something to suck on  o Dont wait for crying - it is a not a late sign of hunger; it is a sign of distress     The feedings may be 8-12 times per 24hrs and will not follow a schedule   Alternate the breast you start the feeding with, or start with the breast that feels the fullest   Switch breasts when the baby takes himself off the breast or falls asleep   Keep offering breasts until the baby looks full, no longer gives hunger signs, and stays asleep when placed on his back in the crib   If the baby is sleepy and wont wake for a feeding, put the baby skin-to-skin dressed in a diaper against the mothers bare chest   Sleep near your baby   The baby should be positioned and latched on to the breast correctly  o Chest-to-chest, chin in the breast  o Babys lips are flipped outward  o Babys mouth is stretched open wide like a shout  o Babys sucking should feel like tugging to the mother  - The baby should be drinking at the breast:  o You should hear swallowing or gulping throughout the feeding  o You should see milk on the babys lips when he comes off the breast  o Your breasts should be softer when the baby is finished feeding  o The baby should look relaxed at the end of feedings  o After the 4th day and your milk is in:  o The babys poop should turn bright  yellow and be loose, watery, and seedy  o The baby should have at least 3-4 poops the size of the palm of your hand per day  o The baby should have at least 6-8 wet diapers per day  o The urine should be light yellow in color  You should drink when you are thirsty and eat a healthy diet when you are    hungry.     Take naps to get the rest you need.   Take medications and/or drink alcohol only with permission of your obstetrician    or the babys pediatrician.  You can also call the [facility],   ([phone]), Monday-Friday, 8am-5pm Central time, to get the most   up-to-date evidence-based information on the use of medications during   pregnancy and breastfeeding.      The baby should be examined by a pediatrician at 3-5 days of age; unless ordered sooner by the pediatrician.  Once your milk comes in, the baby should be back to birth weight no later than 10-14 days of age.    Primary Engorgement    If the milk is flowing, use wet or dry heat applied to the breasts for approximately 10min prior to each feeding as a comfort measure to facilitate the milk ejection reflex    Follow heat treatment with breast massage to soften hard/lumpy areas of the breast    Use unrestricted, frequent, effective feedings.      Wake baby to feed if necessary    Avoid pacifier and bottle feedings    Hand express or pump breasts to the point of comfort prn    Use cold treatments in the form of ice packs/gel packs/ frozen vegetables wrapped in a soft thin cloth and applied to the breasts for approximately 20min after each feeding until engorgement is resolved    Wear comfortable, supportive bra    Take pain medicine prn    Use anti-inflammatory medications if prescribed by physician    Other:    [place] Pumping Instructions :    Preparation and Hygiene:    1. Shower daily.  2. Wear a clean bra each day and wash daily in warm soapy water.  3. Change wet or moist breast pads frequently.  Moist pads can promote growth of  germs.  4. Actively wash your hands, paying close attention to the area around and under your fingernails, thoroughly with soap and water for 15 seconds before pumping or handling your milk.  Re-wash your hands if you touch anything (scratching your nose, answering the phone, etc) while pumping or handling your milk.   5. Before pumping your breasts, assemble the pump collection kit and have ready the sterile container and labels.  Place these items on a clean surface next to the breastpump.  6. Each time after you have finished pumping, take apart all of the parts of the breastpump collection kit and place them in a separate cleaning container (do not place them in the sink).  Be sure to remove the yellow valve from the breastshield and separate the white membrane from the yellow valve.  Rinse all of these parts with cool water.  Then use a new sponge and/or bottle brush and dishwashing detergent to clean the parts.  Rinse off the soapy water with cool water and air dry on a clean towel covered with a clean cloth.  All parts may also be washed after each use in the top rack of a .  7. Once each day, sterilize all of the parts of the breastpump collection kit.  This can be done by boiling the kit parts for 10 minutes or by using a Quick Clean Micro-Steam Bag made by [employer]  8. If condensation appears in the tubing, continue to run the pump with the tubing attached for 1-2 minutes or until the tubing is dry.   9. Notify your babys nurse or doctor if you become ill or need to take any medication, even over-the-counter medicines.        Collection and Storage of Expressed Breastmilk:         1. Pump your breasts at least 8-10 times every 24 hours.  Double pump (both breasts at  the same time) for at least 15-20 minutes using the most suction that is comfortable.    2. Write the date and time of pumping and the name of any medications you are takingon the babys pre-printed hospital identification  label.   3.    Do not touch the inside of the storage containers or lids.      4.        Tightly screw the lid onto the container and place immediately into the                                refrigerator for daily use.  Bottle may remain at room temperature if the next                    feeding is within 4 hours.  5.    Expressed breastmilk should be refrigerated or frozen within 4 hours of                pumping.  6.        Do not store expressed breastmilk on the door of your refrigerator or freeze             where the temperature is warmer.   7.        Refrigerated milk may be stored in for up to 7 days.  At this point it can be              moved to the freezer for 6 -12 months.  8.        Thaw frozen breast milk in overnight in the refrigerator.  Once milk is thawed it              must be used within 24 hours.  9.        Refrigerated breast milk needs to be warmed to room temperature.  Warm by leaving unrefrigerated until it reached room temperature, or place sealed bottle into a cup of warm (not boiling) water or use a bottle warmer.               Never warm breast milk in a microwave or boiling water.    For any questions or concerns call:  Lactation Department at [phone]

## 2019-06-15 NOTE — LACTATION NOTE
"   06/15/19 0730   Pain/Comfort/Sleep   Pain Body Location - Side Bilateral   Pain Body Location breast   Pain Rating (0-10): Activity 0   Breasts WDL   Breast WDL WDL   Maternal Feeding Assessment   Maternal Emotional State relaxed;independent   Signs of Milk Transfer audible swallow;infant jaw motion present   Infant Positioning clutch/football   Latch Assistance no   Reproductive Interventions   Breastfeeding Assistance infant latch-on verified;infant suck/swallow verified   Breastfeeding Support encouragement provided;lactation counseling provided     Independently breastfeeding well on right breast in football hold; audible swallows noted.  Denies c/o or concerns.  Breastfeeding discharge instructions given with review of Mother's Breastfeeding Guide and Resource List.  Encouraged to call hotline # prn.  States "understand" and verbalized appropriate recall.    "

## 2019-06-17 ENCOUNTER — TELEPHONE (OUTPATIENT)
Dept: OBSTETRICS AND GYNECOLOGY | Facility: HOSPITAL | Age: 39
End: 2019-06-17

## 2019-06-17 NOTE — TELEPHONE ENCOUNTER
"Spoke to pt who states baby breastfeeding okay -still has some trouble latching sometimes -discussed hand expression prior to latch to soften areola now that milk is coming in  and breasts get "tight"-asking about a nipple shield -states she has seen those -reinforced try hand expressing first but if still having difficulty many want to use a shield -baby having many wet and dirty diapers and stools are yellow and seedy -has a WIC appt today and will call us if she has any more concerns   "

## 2019-06-20 ENCOUNTER — TELEPHONE (OUTPATIENT)
Dept: OBSTETRICS AND GYNECOLOGY | Facility: HOSPITAL | Age: 39
End: 2019-06-20

## 2019-06-20 NOTE — TELEPHONE ENCOUNTER
Spoke to pt who states baby latching better -saw pediatrician today and up to 7#11 oz an ounce over birthweight -having wet and dirty diapers a day -baby was slightly jaundiced and was told to put baby in indirect sunlight -pumping for extra stimulation and to be able to have milk put away in freezer -has no concerns for us

## 2019-06-28 ENCOUNTER — PATIENT MESSAGE (OUTPATIENT)
Dept: OBSTETRICS AND GYNECOLOGY | Facility: CLINIC | Age: 39
End: 2019-06-28

## 2019-06-28 DIAGNOSIS — Z91.89 BREASTFEEDING PROBLEM: Primary | ICD-10-CM

## 2019-06-28 RX ORDER — METOCLOPRAMIDE 10 MG/1
10 TABLET ORAL
Qty: 32 TABLET | Refills: 0 | Status: SHIPPED | OUTPATIENT
Start: 2019-06-28 | End: 2023-04-11 | Stop reason: CLARIF

## 2019-07-15 ENCOUNTER — POSTPARTUM VISIT (OUTPATIENT)
Dept: OBSTETRICS AND GYNECOLOGY | Facility: CLINIC | Age: 39
End: 2019-07-15
Payer: MEDICAID

## 2019-07-15 VITALS
HEIGHT: 60 IN | SYSTOLIC BLOOD PRESSURE: 114 MMHG | WEIGHT: 218.06 LBS | BODY MASS INDEX: 42.81 KG/M2 | DIASTOLIC BLOOD PRESSURE: 90 MMHG

## 2019-07-15 DIAGNOSIS — Z86.32 HISTORY OF GESTATIONAL DIABETES: ICD-10-CM

## 2019-07-15 DIAGNOSIS — Z30.09 FAMILY PLANNING: ICD-10-CM

## 2019-07-15 PROCEDURE — 99999 PR PBB SHADOW E&M-EST. PATIENT-LVL III: ICD-10-PCS | Mod: PBBFAC,,, | Performed by: OBSTETRICS & GYNECOLOGY

## 2019-07-15 PROCEDURE — 99213 OFFICE O/P EST LOW 20 MIN: CPT | Mod: PBBFAC | Performed by: OBSTETRICS & GYNECOLOGY

## 2019-07-15 PROCEDURE — 59430 PR CARE AFTER DELIVERY ONLY: ICD-10-PCS | Mod: ,,, | Performed by: OBSTETRICS & GYNECOLOGY

## 2019-07-15 PROCEDURE — 99999 PR PBB SHADOW E&M-EST. PATIENT-LVL III: CPT | Mod: PBBFAC,,, | Performed by: OBSTETRICS & GYNECOLOGY

## 2019-07-15 NOTE — PROGRESS NOTES
Subjective:       Patient ID: Gilberto Bueno is a 39 y.o. female.    Chief Complaint:  Postpartum Care      History of Present Illness  HPI  Ms Spears is a 39 years old, status post vacuum-assisted vaginal delivery on 2019.  She comes in today for an exam and followup.  Patient has no current complaints.  No fever or chills.  No nausea or vomiting.  No diarrhea or constipation.  No abdominal or pelvic pain.    She has NOT resumed normal intercourse.  Patient has begun some walking for exercise. She is having signs and symptoms of significant depression.  On Lexapro.  History of gestational diabetes, diet-controlled.  She is breast-feeding well.  Her last Pap smear was performed on 2016.    GYN & OB History  No LMP recorded.   Date of Last Pap: 2016    OB History    Para Term  AB Living   6 6 5 1   5   SAB TAB Ectopic Multiple Live Births           5      # Outcome Date GA Lbr Zeus/2nd Weight Sex Delivery Anes PTL Lv   6 Term 19 39w0d / 00:30 3.485 kg (7 lb 10.9 oz) M Vag-Vacuum EPI N    5 Term 11/24/10   3.24 kg (7 lb 2.3 oz) F   N CHRIS   4  07 33w0d  2.807 kg (6 lb 3 oz) F Vag-Spont  Y CHRIS   3 Term 05   3.997 kg (8 lb 13 oz) M Vag-Spont  N CHRIS   2 Term 01   4.99 kg (11 lb) M Vag-Spont  N CHRIS   1 Term 99   3.459 kg (7 lb 10 oz) M Vag-Spont  N CHRIS     History reviewed. No pertinent past medical history.    History reviewed. No pertinent surgical history.    History reviewed. No pertinent family history.    Social History     Socioeconomic History    Marital status: Single     Spouse name: Not on file    Number of children: Not on file    Years of education: Not on file    Highest education level: Not on file   Occupational History    Not on file   Social Needs    Financial resource strain: Not on file    Food insecurity:     Worry: Not on file     Inability: Not on file    Transportation needs:     Medical: Not on file     Non-medical: Not  on file   Tobacco Use    Smoking status: Never Smoker    Smokeless tobacco: Never Used   Substance and Sexual Activity    Alcohol use: No    Drug use: No    Sexual activity: Yes     Partners: Male   Lifestyle    Physical activity:     Days per week: Not on file     Minutes per session: Not on file    Stress: Not on file   Relationships    Social connections:     Talks on phone: Not on file     Gets together: Not on file     Attends Bahai service: Not on file     Active member of club or organization: Not on file     Attends meetings of clubs or organizations: Not on file     Relationship status: Not on file   Other Topics Concern    Not on file   Social History Narrative    Together for a long time    Getting  8/5/2016    He owns his own business    She is doing home health       Current Outpatient Medications   Medication Sig Dispense Refill    escitalopram oxalate (LEXAPRO) 10 MG tablet Take 1 tablet (10 mg total) by mouth once daily. 30 tablet 2    escitalopram oxalate (LEXAPRO) 10 MG tablet Take 1 tablet (10 mg total) by mouth once daily. 30 tablet 11    ibuprofen (ADVIL,MOTRIN) 600 MG tablet Take 1 tablet (600 mg total) by mouth every 6 (six) hours. 40 tablet 1    loratadine (CLARITIN) 10 mg tablet TAKE 1 TABLET BY MOUTH ONCE DAILY 30 tablet 0    metoclopramide HCl (REGLAN) 10 MG tablet Take 1 tablet (10 mg total) by mouth 3 (three) times daily with meals. 32 tablet 0    pantoprazole (PROTONIX) 20 MG tablet TAKE 1 TABLET BY MOUTH ONCE DAILY 30 tablet 2    PRENATAL 28 mg iron- 800 mcg Tab TK 1 T PO QD  2    promethazine (PHENERGAN) 25 MG tablet Take 1 tablet (25 mg total) by mouth every 6 (six) hours as needed for Nausea. 30 tablet 1    TRUE METRIX GLUCOSE METER Misc use TO test blood glucose every day  0    TRUE METRIX GLUCOSE TEST STRIP Strp use TO test blood glucose FOUR TIMES DAILY  4    TRUEDRAW LANCING DEVICE Misc use AS DIRECTED TO test blood glucose  0    TRUEPLUS LANCETS  33 gauge Misc use TO test blood glucose FOUR TIMES DAILY  4     No current facility-administered medications for this visit.        Review of patient's allergies indicates:  No Known Allergies    Review of Systems  Review of Systems   Constitutional: Negative for activity change, appetite change, chills, fatigue, fever and unexpected weight change.   HENT: Negative for mouth sores.    Respiratory: Negative for cough, shortness of breath and wheezing.    Cardiovascular: Negative for chest pain and palpitations.   Gastrointestinal: Negative for abdominal pain, bloating, blood in stool, constipation, nausea and vomiting.   Endocrine: Negative for diabetes and hot flashes.   Genitourinary: Negative for dysmenorrhea, dyspareunia, dysuria, frequency, hematuria, menorrhagia, menstrual problem, pelvic pain, urgency, vaginal bleeding, vaginal discharge, vaginal pain, urinary incontinence, postcoital bleeding and vaginal odor.   Musculoskeletal: Negative for back pain and myalgias.   Integumentary:  Negative for rash, breast mass and nipple discharge.   Neurological: Negative for seizures and headaches.   Psychiatric/Behavioral: Positive for depression and sleep disturbance. The patient is nervous/anxious.    Breast: Negative for mass, mastodynia and nipple discharge          Objective:    Physical Exam:   Constitutional: She appears well-developed and well-nourished. No distress.    HENT:   Head: Normocephalic and atraumatic.    Eyes: EOM are normal.    Neck: Normal range of motion.     Pulmonary/Chest: Effort normal. No respiratory distress.        Abdominal: Soft. She exhibits no distension. There is no tenderness. There is no rebound and no guarding.     Genitourinary: Vagina normal and uterus normal. No vaginal discharge found.   Genitourinary Comments: Vulva without any obvious lesions.  Vaginal vault with good support.  Minimal white discharge noted.  No obvious lesion.  Cervix is without any cervical motion  tenderness.  No obvious lesion.  Uterus is small, non-tender, normal contour.  Adnexa is without any masses or tenderness.           Musculoskeletal: Normal range of motion.       Neurological: She is alert.    Skin: Skin is warm and dry.    Psychiatric: She has a normal mood and affect.          Assessment:        1. Postpartum care and examination immediately after delivery    2. Postpartum depression, postpartum condition    3. Breast feeding status of mother    4. Family planning    5. History of gestational diabetes              Plan:       I have discussed with the patient regarding her condition  She has recovered well physically from her delivery.  Her postpartum depression is stable on Lexapro; she will continue.    We discussed her history of gestational diabetes.  Will order 2hr OGTT at about 8 weeks postpartum.    I have also discussed with the patient regarding her contraceptive options.  Risks and benefits of all discussed including oral contraceptives, Depo-Provera, OrthoEvra, NuvaRing, Mirena/ParaGard, Implanon, sterilization. After extensive dicussion, the patient wishes to have the Mirena.  Risks and benefits again discussed.  All of her questions were answered appropriately to her satisfaction.       We will order the Mirena.   She will be back for Pap and placement    She will continue with breast-feeding.

## 2019-07-25 ENCOUNTER — TELEPHONE (OUTPATIENT)
Dept: PHARMACY | Facility: CLINIC | Age: 39
End: 2019-07-25

## 2019-08-02 ENCOUNTER — TELEPHONE (OUTPATIENT)
Dept: OBSTETRICS AND GYNECOLOGY | Facility: CLINIC | Age: 39
End: 2019-08-02

## 2021-07-06 ENCOUNTER — HOSPITAL ENCOUNTER (EMERGENCY)
Facility: HOSPITAL | Age: 41
Discharge: HOME | End: 2021-07-06
Attending: EMERGENCY MEDICINE
Payer: MEDICAID

## 2021-07-06 VITALS
RESPIRATION RATE: 20 BRPM | HEIGHT: 66 IN | BODY MASS INDEX: 35.36 KG/M2 | DIASTOLIC BLOOD PRESSURE: 58 MMHG | WEIGHT: 220 LBS | HEART RATE: 101 BPM | SYSTOLIC BLOOD PRESSURE: 106 MMHG | OXYGEN SATURATION: 100 % | TEMPERATURE: 99 F

## 2021-07-06 DIAGNOSIS — K44.9 HIATAL HERNIA: ICD-10-CM

## 2021-07-06 DIAGNOSIS — R07.9 CHEST PAIN: ICD-10-CM

## 2021-07-06 DIAGNOSIS — J12.82 PNEUMONIA DUE TO COVID-19 VIRUS: Primary | ICD-10-CM

## 2021-07-06 DIAGNOSIS — U07.1 PNEUMONIA DUE TO COVID-19 VIRUS: Primary | ICD-10-CM

## 2021-07-06 DIAGNOSIS — R05.9 COUGH: ICD-10-CM

## 2021-07-06 LAB
ALBUMIN SERPL BCP-MCNC: 3.9 G/DL (ref 3.5–5.2)
ALP SERPL-CCNC: 111 U/L (ref 55–135)
ALT SERPL W/O P-5'-P-CCNC: 59 U/L (ref 10–44)
ANION GAP SERPL CALC-SCNC: 12 MMOL/L (ref 8–16)
AST SERPL-CCNC: 80 U/L (ref 10–40)
B-HCG UR QL: NEGATIVE
BACTERIA #/AREA URNS HPF: ABNORMAL /HPF
BASOPHILS # BLD AUTO: 0.01 K/UL (ref 0–0.2)
BASOPHILS NFR BLD: 0.3 % (ref 0–1.9)
BILIRUB SERPL-MCNC: 0.7 MG/DL (ref 0.1–1)
BILIRUB UR QL STRIP: ABNORMAL
BNP SERPL-MCNC: <10 PG/ML (ref 0–99)
BUN SERPL-MCNC: 8 MG/DL (ref 6–20)
CALCIUM SERPL-MCNC: 9.2 MG/DL (ref 8.7–10.5)
CHLORIDE SERPL-SCNC: 105 MMOL/L (ref 95–110)
CLARITY UR: ABNORMAL
CO2 SERPL-SCNC: 21 MMOL/L (ref 23–29)
COLOR UR: YELLOW
CREAT SERPL-MCNC: 0.8 MG/DL (ref 0.5–1.4)
CTP QC/QA: YES
D DIMER PPP IA.FEU-MCNC: 0.77 MG/L FEU
DIFFERENTIAL METHOD: ABNORMAL
EOSINOPHIL # BLD AUTO: 0 K/UL (ref 0–0.5)
EOSINOPHIL NFR BLD: 0.3 % (ref 0–8)
ERYTHROCYTE [DISTWIDTH] IN BLOOD BY AUTOMATED COUNT: 14.6 % (ref 11.5–14.5)
EST. GFR  (AFRICAN AMERICAN): >60 ML/MIN/1.73 M^2
EST. GFR  (NON AFRICAN AMERICAN): >60 ML/MIN/1.73 M^2
GLUCOSE SERPL-MCNC: 119 MG/DL (ref 70–110)
GLUCOSE UR QL STRIP: NEGATIVE
HCT VFR BLD AUTO: 43.5 % (ref 37–48.5)
HGB BLD-MCNC: 13.8 G/DL (ref 12–16)
HGB UR QL STRIP: NEGATIVE
HYALINE CASTS #/AREA URNS LPF: 0 /LPF
IMM GRANULOCYTES # BLD AUTO: 0.01 K/UL (ref 0–0.04)
IMM GRANULOCYTES NFR BLD AUTO: 0.3 % (ref 0–0.5)
KETONES UR QL STRIP: ABNORMAL
LEUKOCYTE ESTERASE UR QL STRIP: ABNORMAL
LYMPHOCYTES # BLD AUTO: 1.9 K/UL (ref 1–4.8)
LYMPHOCYTES NFR BLD: 51.7 % (ref 18–48)
MCH RBC QN AUTO: 28 PG (ref 27–31)
MCHC RBC AUTO-ENTMCNC: 31.7 G/DL (ref 32–36)
MCV RBC AUTO: 88 FL (ref 82–98)
MICROSCOPIC COMMENT: ABNORMAL
MONOCYTES # BLD AUTO: 0.3 K/UL (ref 0.3–1)
MONOCYTES NFR BLD: 9.2 % (ref 4–15)
NEUTROPHILS # BLD AUTO: 1.4 K/UL (ref 1.8–7.7)
NEUTROPHILS NFR BLD: 38.2 % (ref 38–73)
NITRITE UR QL STRIP: NEGATIVE
NON-SQ EPI CELLS #/AREA URNS HPF: 1 /HPF
NRBC BLD-RTO: 0 /100 WBC
PH UR STRIP: 7 [PH] (ref 5–8)
PLATELET # BLD AUTO: 253 K/UL (ref 150–450)
PMV BLD AUTO: 10.8 FL (ref 9.2–12.9)
POC MOLECULAR INFLUENZA A AGN: NEGATIVE
POC MOLECULAR INFLUENZA B AGN: NEGATIVE
POTASSIUM SERPL-SCNC: 3.7 MMOL/L (ref 3.5–5.1)
PROT SERPL-MCNC: 9.4 G/DL (ref 6–8.4)
PROT UR QL STRIP: ABNORMAL
RBC # BLD AUTO: 4.92 M/UL (ref 4–5.4)
RBC #/AREA URNS HPF: 3 /HPF (ref 0–4)
SARS-COV-2 RDRP RESP QL NAA+PROBE: POSITIVE
SODIUM SERPL-SCNC: 138 MMOL/L (ref 136–145)
SP GR UR STRIP: >1.03 (ref 1–1.03)
SQUAMOUS #/AREA URNS HPF: 14 /HPF
TROPONIN I SERPL DL<=0.01 NG/ML-MCNC: <0.006 NG/ML (ref 0–0.03)
URN SPEC COLLECT METH UR: ABNORMAL
UROBILINOGEN UR STRIP-ACNC: ABNORMAL EU/DL
WBC # BLD AUTO: 3.58 K/UL (ref 3.9–12.7)
WBC #/AREA URNS HPF: 5 /HPF (ref 0–5)

## 2021-07-06 PROCEDURE — 96361 HYDRATE IV INFUSION ADD-ON: CPT | Mod: 59

## 2021-07-06 PROCEDURE — 99285 EMERGENCY DEPT VISIT HI MDM: CPT | Mod: 25

## 2021-07-06 PROCEDURE — 93010 EKG 12-LEAD: ICD-10-PCS | Mod: ,,, | Performed by: INTERNAL MEDICINE

## 2021-07-06 PROCEDURE — 25000003 PHARM REV CODE 250: Performed by: NURSE PRACTITIONER

## 2021-07-06 PROCEDURE — 93005 ELECTROCARDIOGRAM TRACING: CPT

## 2021-07-06 PROCEDURE — 93010 ELECTROCARDIOGRAM REPORT: CPT | Mod: ,,, | Performed by: INTERNAL MEDICINE

## 2021-07-06 PROCEDURE — 87502 INFLUENZA DNA AMP PROBE: CPT

## 2021-07-06 PROCEDURE — 85379 FIBRIN DEGRADATION QUANT: CPT | Performed by: NURSE PRACTITIONER

## 2021-07-06 PROCEDURE — 80053 COMPREHEN METABOLIC PANEL: CPT | Performed by: NURSE PRACTITIONER

## 2021-07-06 PROCEDURE — 96374 THER/PROPH/DIAG INJ IV PUSH: CPT | Mod: 59

## 2021-07-06 PROCEDURE — 81025 URINE PREGNANCY TEST: CPT | Performed by: EMERGENCY MEDICINE

## 2021-07-06 PROCEDURE — U0002 COVID-19 LAB TEST NON-CDC: HCPCS | Performed by: NURSE PRACTITIONER

## 2021-07-06 PROCEDURE — 83880 ASSAY OF NATRIURETIC PEPTIDE: CPT | Performed by: NURSE PRACTITIONER

## 2021-07-06 PROCEDURE — 85025 COMPLETE CBC W/AUTO DIFF WBC: CPT | Performed by: NURSE PRACTITIONER

## 2021-07-06 PROCEDURE — 25500020 PHARM REV CODE 255: Performed by: EMERGENCY MEDICINE

## 2021-07-06 PROCEDURE — 81000 URINALYSIS NONAUTO W/SCOPE: CPT | Performed by: EMERGENCY MEDICINE

## 2021-07-06 PROCEDURE — 84484 ASSAY OF TROPONIN QUANT: CPT | Performed by: NURSE PRACTITIONER

## 2021-07-06 PROCEDURE — 63600175 PHARM REV CODE 636 W HCPCS: Performed by: NURSE PRACTITIONER

## 2021-07-06 RX ORDER — ONDANSETRON 4 MG/1
4 TABLET, FILM COATED ORAL EVERY 6 HOURS PRN
Qty: 12 TABLET | Refills: 0 | Status: SHIPPED | OUTPATIENT
Start: 2021-07-06 | End: 2023-06-26

## 2021-07-06 RX ORDER — ACETAMINOPHEN 500 MG
500 TABLET ORAL EVERY 6 HOURS PRN
Qty: 20 TABLET | Refills: 0 | OUTPATIENT
Start: 2021-07-06 | End: 2022-03-08

## 2021-07-06 RX ORDER — ALBUTEROL SULFATE 90 UG/1
1-2 AEROSOL, METERED RESPIRATORY (INHALATION) EVERY 6 HOURS PRN
Qty: 6.7 G | Refills: 0 | Status: SHIPPED | OUTPATIENT
Start: 2021-07-06 | End: 2023-06-26

## 2021-07-06 RX ORDER — AZITHROMYCIN 250 MG/1
250 TABLET, FILM COATED ORAL DAILY
Qty: 6 TABLET | Refills: 0 | Status: SHIPPED | OUTPATIENT
Start: 2021-07-06 | End: 2023-04-11 | Stop reason: CLARIF

## 2021-07-06 RX ORDER — ONDANSETRON 2 MG/ML
4 INJECTION INTRAMUSCULAR; INTRAVENOUS
Status: COMPLETED | OUTPATIENT
Start: 2021-07-06 | End: 2021-07-06

## 2021-07-06 RX ORDER — ONDANSETRON 4 MG/1
4 TABLET, FILM COATED ORAL EVERY 6 HOURS PRN
Qty: 12 TABLET | Refills: 0 | Status: SHIPPED | OUTPATIENT
Start: 2021-07-06 | End: 2021-07-06 | Stop reason: SDUPTHER

## 2021-07-06 RX ADMIN — ONDANSETRON 4 MG: 2 INJECTION INTRAMUSCULAR; INTRAVENOUS at 03:07

## 2021-07-06 RX ADMIN — SODIUM CHLORIDE 1000 ML: 0.9 INJECTION, SOLUTION INTRAVENOUS at 03:07

## 2021-07-06 RX ADMIN — IOHEXOL 75 ML: 350 INJECTION, SOLUTION INTRAVENOUS at 06:07

## 2021-08-25 ENCOUNTER — LAB VISIT (OUTPATIENT)
Dept: PRIMARY CARE CLINIC | Facility: OTHER | Age: 41
End: 2021-08-25
Attending: INTERNAL MEDICINE
Payer: MEDICAID

## 2021-08-25 DIAGNOSIS — Z20.822 ENCOUNTER FOR LABORATORY TESTING FOR COVID-19 VIRUS: ICD-10-CM

## 2021-08-25 PROCEDURE — U0003 INFECTIOUS AGENT DETECTION BY NUCLEIC ACID (DNA OR RNA); SEVERE ACUTE RESPIRATORY SYNDROME CORONAVIRUS 2 (SARS-COV-2) (CORONAVIRUS DISEASE [COVID-19]), AMPLIFIED PROBE TECHNIQUE, MAKING USE OF HIGH THROUGHPUT TECHNOLOGIES AS DESCRIBED BY CMS-2020-01-R: HCPCS | Performed by: INTERNAL MEDICINE

## 2021-08-26 LAB
SARS-COV-2 RNA RESP QL NAA+PROBE: NOT DETECTED
SARS-COV-2- CYCLE NUMBER: NORMAL

## 2021-11-23 ENCOUNTER — HOSPITAL ENCOUNTER (EMERGENCY)
Facility: HOSPITAL | Age: 41
Discharge: HOME | End: 2021-11-23
Attending: EMERGENCY MEDICINE
Payer: MEDICAID

## 2021-11-23 VITALS
RESPIRATION RATE: 19 BRPM | HEART RATE: 103 BPM | SYSTOLIC BLOOD PRESSURE: 133 MMHG | DIASTOLIC BLOOD PRESSURE: 86 MMHG | TEMPERATURE: 98 F | OXYGEN SATURATION: 100 % | BODY MASS INDEX: 35.36 KG/M2 | HEIGHT: 66 IN | WEIGHT: 220 LBS

## 2021-11-23 DIAGNOSIS — S39.012A BACK STRAIN, INITIAL ENCOUNTER: ICD-10-CM

## 2021-11-23 DIAGNOSIS — T14.90XA TRAUMA: ICD-10-CM
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
## 2021-11-23 DIAGNOSIS — S00.81XA ABRASION OF FACE, INITIAL ENCOUNTER: ICD-10-CM

## 2021-11-23 LAB
B-HCG UR QL: NEGATIVE
CTP QC/QA: YES

## 2021-11-23 PROCEDURE — 99284 EMERGENCY DEPT VISIT MOD MDM: CPT | Mod: 25

## 2021-11-23 PROCEDURE — 81025 URINE PREGNANCY TEST: CPT | Performed by: PHYSICIAN ASSISTANT

## 2021-11-23 RX ORDER — CYCLOBENZAPRINE HCL 10 MG
10 TABLET ORAL 3 TIMES DAILY PRN
Qty: 15 TABLET | Refills: 0 | Status: SHIPPED | OUTPATIENT
Start: 2021-11-23 | End: 2021-11-28

## 2021-11-23 RX ORDER — MUPIROCIN 20 MG/G
1 OINTMENT TOPICAL
Status: DISCONTINUED | OUTPATIENT
Start: 2021-11-23 | End: 2021-11-23 | Stop reason: HOSPADM

## 2022-03-08 ENCOUNTER — HOSPITAL ENCOUNTER (EMERGENCY)
Facility: HOSPITAL | Age: 42
Discharge: HOME | End: 2022-03-08
Attending: EMERGENCY MEDICINE
Payer: MEDICAID

## 2022-03-08 VITALS
HEART RATE: 90 BPM | WEIGHT: 200 LBS | BODY MASS INDEX: 32.14 KG/M2 | DIASTOLIC BLOOD PRESSURE: 78 MMHG | TEMPERATURE: 98 F | SYSTOLIC BLOOD PRESSURE: 132 MMHG | RESPIRATION RATE: 18 BRPM | HEIGHT: 66 IN | OXYGEN SATURATION: 100 %

## 2022-03-08 DIAGNOSIS — O46.90 VAGINAL BLEEDING IN PREGNANCY: Primary | ICD-10-CM

## 2022-03-08 LAB
ALBUMIN SERPL BCP-MCNC: 3.8 G/DL (ref 3.5–5.2)
ALP SERPL-CCNC: 93 U/L (ref 55–135)
ALT SERPL W/O P-5'-P-CCNC: 15 U/L (ref 10–44)
ANION GAP SERPL CALC-SCNC: 8 MMOL/L (ref 8–16)
AST SERPL-CCNC: 16 U/L (ref 10–40)
B-HCG UR QL: POSITIVE
BACTERIA #/AREA URNS HPF: NORMAL /HPF
BASOPHILS # BLD AUTO: 0.08 K/UL (ref 0–0.2)
BASOPHILS NFR BLD: 1.6 % (ref 0–1.9)
BILIRUB SERPL-MCNC: 0.5 MG/DL (ref 0.1–1)
BILIRUB UR QL STRIP: NEGATIVE
BUN SERPL-MCNC: 10 MG/DL (ref 6–20)
CALCIUM SERPL-MCNC: 9.2 MG/DL (ref 8.7–10.5)
CHLORIDE SERPL-SCNC: 108 MMOL/L (ref 95–110)
CLARITY UR: CLEAR
CO2 SERPL-SCNC: 23 MMOL/L (ref 23–29)
COLOR UR: COLORLESS
CREAT SERPL-MCNC: 0.8 MG/DL (ref 0.5–1.4)
CTP QC/QA: YES
DIFFERENTIAL METHOD: ABNORMAL
EOSINOPHIL # BLD AUTO: 0.1 K/UL (ref 0–0.5)
EOSINOPHIL NFR BLD: 1.8 % (ref 0–8)
ERYTHROCYTE [DISTWIDTH] IN BLOOD BY AUTOMATED COUNT: 14.3 % (ref 11.5–14.5)
EST. GFR  (AFRICAN AMERICAN): >60 ML/MIN/1.73 M^2
EST. GFR  (NON AFRICAN AMERICAN): >60 ML/MIN/1.73 M^2
GLUCOSE SERPL-MCNC: 145 MG/DL (ref 70–110)
GLUCOSE UR QL STRIP: NEGATIVE
HCG INTACT+B SERPL-ACNC: <1.2 MIU/ML
HCT VFR BLD AUTO: 35.2 % (ref 37–48.5)
HGB BLD-MCNC: 11.2 G/DL (ref 12–16)
HGB UR QL STRIP: ABNORMAL
IMM GRANULOCYTES # BLD AUTO: 0.01 K/UL (ref 0–0.04)
IMM GRANULOCYTES NFR BLD AUTO: 0.2 % (ref 0–0.5)
KETONES UR QL STRIP: NEGATIVE
LEUKOCYTE ESTERASE UR QL STRIP: NEGATIVE
LYMPHOCYTES # BLD AUTO: 1.3 K/UL (ref 1–4.8)
LYMPHOCYTES NFR BLD: 24.8 % (ref 18–48)
MCH RBC QN AUTO: 28.2 PG (ref 27–31)
MCHC RBC AUTO-ENTMCNC: 31.8 G/DL (ref 32–36)
MCV RBC AUTO: 89 FL (ref 82–98)
MICROSCOPIC COMMENT: NORMAL
MONOCYTES # BLD AUTO: 0.4 K/UL (ref 0.3–1)
MONOCYTES NFR BLD: 7.4 % (ref 4–15)
NEUTROPHILS # BLD AUTO: 3.3 K/UL (ref 1.8–7.7)
NEUTROPHILS NFR BLD: 64.2 % (ref 38–73)
NITRITE UR QL STRIP: NEGATIVE
NRBC BLD-RTO: 0 /100 WBC
PH UR STRIP: 7 [PH] (ref 5–8)
PLATELET # BLD AUTO: 324 K/UL (ref 150–450)
PMV BLD AUTO: 10.8 FL (ref 9.2–12.9)
POTASSIUM SERPL-SCNC: 3.1 MMOL/L (ref 3.5–5.1)
PROT SERPL-MCNC: 8.6 G/DL (ref 6–8.4)
PROT UR QL STRIP: NEGATIVE
RBC # BLD AUTO: 3.97 M/UL (ref 4–5.4)
RBC #/AREA URNS HPF: 3 /HPF (ref 0–4)
SODIUM SERPL-SCNC: 139 MMOL/L (ref 136–145)
SP GR UR STRIP: <1.005 (ref 1–1.03)
URN SPEC COLLECT METH UR: ABNORMAL
UROBILINOGEN UR STRIP-ACNC: NEGATIVE EU/DL
WBC # BLD AUTO: 5.13 K/UL (ref 3.9–12.7)

## 2022-03-08 PROCEDURE — 85025 COMPLETE CBC W/AUTO DIFF WBC: CPT | Performed by: PHYSICIAN ASSISTANT

## 2022-03-08 PROCEDURE — 99284 EMERGENCY DEPT VISIT MOD MDM: CPT | Mod: 25

## 2022-03-08 PROCEDURE — 25000003 PHARM REV CODE 250: Performed by: PHYSICIAN ASSISTANT

## 2022-03-08 PROCEDURE — 81000 URINALYSIS NONAUTO W/SCOPE: CPT | Performed by: EMERGENCY MEDICINE

## 2022-03-08 PROCEDURE — 81025 URINE PREGNANCY TEST: CPT | Performed by: EMERGENCY MEDICINE

## 2022-03-08 PROCEDURE — 84702 CHORIONIC GONADOTROPIN TEST: CPT | Performed by: PHYSICIAN ASSISTANT

## 2022-03-08 PROCEDURE — 96360 HYDRATION IV INFUSION INIT: CPT

## 2022-03-08 PROCEDURE — 80053 COMPREHEN METABOLIC PANEL: CPT | Performed by: PHYSICIAN ASSISTANT

## 2022-03-08 RX ORDER — ACETAMINOPHEN 500 MG
500 TABLET ORAL
Status: COMPLETED | OUTPATIENT
Start: 2022-03-08 | End: 2022-03-08

## 2022-03-08 RX ORDER — ACETAMINOPHEN 500 MG
500 TABLET ORAL EVERY 4 HOURS PRN
Qty: 20 TABLET | Refills: 0 | Status: SHIPPED | OUTPATIENT
Start: 2022-03-08 | End: 2022-03-13

## 2022-03-08 RX ORDER — POTASSIUM CHLORIDE 20 MEQ/1
40 TABLET, EXTENDED RELEASE ORAL
Status: COMPLETED | OUTPATIENT
Start: 2022-03-08 | End: 2022-03-08

## 2022-03-08 RX ADMIN — SODIUM CHLORIDE 1000 ML: 0.9 INJECTION, SOLUTION INTRAVENOUS at 07:03

## 2022-03-08 RX ADMIN — ACETAMINOPHEN 500 MG: 500 TABLET ORAL at 07:03

## 2022-03-08 RX ADMIN — POTASSIUM CHLORIDE 40 MEQ: 1500 TABLET, EXTENDED RELEASE ORAL at 09:03

## 2022-03-08 NOTE — Clinical Note
"Gilberto Starks" Myles was seen and treated in our emergency department on 3/8/2022.  She may return to work on 03/11/2022.       If you have any questions or concerns, please don't hesitate to call.      Lucy Nobles PA-C"

## 2022-03-08 NOTE — ED NOTES
Pt instructed to provide urine specimen. Only able to provide scant amount for UPT. Verbalized understanding of specimen need.

## 2022-03-08 NOTE — DISCHARGE INSTRUCTIONS

## 2022-03-08 NOTE — ED PROVIDER NOTES
"Encounter Date: 3/8/2022    SCRIBE #1 NOTE: IMarva, am scribing for, and in the presence of,  Lucy Nobles PA-C. I have scribed the following portions of the note - Other sections scribed: HPI, ROS.       History     Chief Complaint   Patient presents with    Abdominal Pain     Awakened this morning with bilateral lower abdominal pain described as "contraction like" hard cramping accompanied by vaginal bleeding. Patient states 10 weeks pregnant.     This A0  42 y.o female approximately 12w1d gravid based on LMP 21 with no medical history, presents to the ED c/o acute vaginal bleeding and intermittent, moderate (6/10) bilateral pelvic pain that began at 5:00 am this morning. Pt states that she has had 2 towels placed since the onset bleeding and notes that they are saturated with blood.  She reports her OBGYN as Dr. Henderson and notes last visiting him last month. She has an ultrasound scheduled for next week. Pt is currently taking prenatal vitamins. There are no concerns for STD's. Of note, pt is Rh positive. She denies vaginal discharge, back pain, fever, chills, nausea, vomiting, dysuria, urinary frequency, dizziness, light-headedness, chest pain, or shortness of breath. No other associated symptoms.     The history is provided by the patient.     Review of patient's allergies indicates:  No Known Allergies  No past medical history on file.  No past surgical history on file.  No family history on file.  Social History     Tobacco Use    Smoking status: Never Smoker    Smokeless tobacco: Never Used   Substance Use Topics    Alcohol use: No    Drug use: No     Review of Systems   Constitutional: Negative for chills and fever.   HENT: Negative for congestion, ear pain, nosebleeds, rhinorrhea, sore throat and trouble swallowing.    Eyes: Negative for redness.   Respiratory: Negative for cough, shortness of breath and stridor.    Cardiovascular: Negative for chest pain.   Gastrointestinal: " Negative for abdominal pain, constipation, diarrhea, nausea and vomiting.   Genitourinary: Positive for pelvic pain and vaginal bleeding. Negative for decreased urine volume, dysuria, frequency, hematuria, urgency and vaginal discharge.   Musculoskeletal: Negative for back pain and neck pain.   Skin: Negative for rash and wound.   Neurological: Negative for dizziness, speech difficulty, weakness, light-headedness, numbness and headaches.   Psychiatric/Behavioral: Negative for confusion.       Physical Exam     Initial Vitals [03/08/22 0638]   BP Pulse Resp Temp SpO2   137/88 102 19 98.4 °F (36.9 °C) 100 %      MAP       --         Physical Exam    Nursing note and vitals reviewed.  Constitutional: She appears well-developed and well-nourished. No distress.   HENT:   Head: Normocephalic.   Right Ear: External ear normal.   Left Ear: External ear normal.   Eyes: Conjunctivae are normal. Right eye exhibits no discharge. Left eye exhibits no discharge. No scleral icterus.   Neck: No tracheal deviation present.   Cardiovascular: Tachycardia present.    Pulmonary/Chest: No stridor. No respiratory distress.   Abdominal: Abdomen is flat. There is no abdominal tenderness.   No right CVA tenderness.  No left CVA tenderness. There is no rebound, no guarding, no tenderness at McBurney's point and negative Luther's sign. negative Rovsing's sign  Musculoskeletal:         General: Normal range of motion.     Neurological: She is alert.   Skin: Skin is warm and dry. No rash noted. No erythema.   Psychiatric: Her behavior is normal. Judgment and thought content normal. Her mood appears anxious.         ED Course   Procedures  Labs Reviewed   URINALYSIS, REFLEX TO URINE CULTURE - Abnormal; Notable for the following components:       Result Value    Color, UA Colorless (*)     Specific Gravity, UA <1.005 (*)     Occult Blood UA 3+ (*)     All other components within normal limits    Narrative:     Specimen Source->Urine   CBC W/ AUTO  DIFFERENTIAL - Abnormal; Notable for the following components:    RBC 3.97 (*)     Hemoglobin 11.2 (*)     Hematocrit 35.2 (*)     MCHC 31.8 (*)     All other components within normal limits    Narrative:     Release to patient->Immediate   COMPREHENSIVE METABOLIC PANEL - Abnormal; Notable for the following components:    Potassium 3.1 (*)     Glucose 145 (*)     Total Protein 8.6 (*)     All other components within normal limits    Narrative:     Release to patient->Immediate   POCT URINE PREGNANCY - Abnormal; Notable for the following components:    POC Preg Test, Ur Positive (*)     All other components within normal limits   HCG, QUANTITATIVE    Narrative:     Release to patient->Immediate   URINALYSIS MICROSCOPIC    Narrative:     Specimen Source->Urine          Imaging Results          US OB <14 Wks TransAbd & TransVag, Single Gestation (XPD) (Final result)  Result time 03/08/22 09:44:12    Final result by Emanuel Cabrera DO (03/08/22 09:44:12)                 Impression:      1. No intrauterine gestation visualized which may be secondary to early dates.  Failed early pregnancy or ectopic pregnancy are not entirely excluded.  Recommend close interval follow-up with beta HCG and ultrasound.  2. Small uterine fibroid.      Electronically signed by: Emanuel Cabrera  Date:    03/08/2022  Time:    09:44             Narrative:    EXAMINATION:  US OB <14 WEEKS, TRANSABDOM & TRANSVAG, SINGLE GESTATION (XPD)    CLINICAL HISTORY:  Vag Bleeding;    TECHNIQUE:  Transabdominal sonography of the pelvis was performed, followed by transvaginal sonography to better evaluate the uterus and ovaries.    COMPARISON:  Ob ultrasound from 11/18/2018.    FINDINGS:  Last menstrual period: 12/13/2021.    Uterus: 11.3 x 6.1 x 6.8 cm. There is a uterine fibroid measuring 1.2 x 1.0 x 0.8 cm.  Cervical length: 3.8 cm.    Intrauterine gestation(s): No intrauterine gestation visualized.    Right ovary: 3.8 x 2.7 x 2.5 cm.    Left ovary: 4.2 x  2.0 x 3.7 cm.    Miscellaneous: No Free Fluid.                                 Medications   sodium chloride 0.9% bolus 1,000 mL ( Intravenous Restarted 3/8/22 0838)   acetaminophen tablet 500 mg (500 mg Oral Given 3/8/22 0741)   potassium chloride SA CR tablet 40 mEq (40 mEq Oral Given 3/8/22 0930)     Medical Decision Making:   Initial Assessment:   42-year-old female with no pertinent past medical history approximately 12 weeks 1 day gravid based on last menstrual period presenting for lower abdominal cramping with associated vaginal bleeding that began today.  Differentials:  Miscarriage, ectopic pregnancy, acute surgical abdomen, anemia, UTI threatened AB among others.     Exam abvoe. Mildly tachy. Pt is anxious. Not hypotensive. Mild suprapubic ttp. No peritoneal signs.   Ectopic w/u initiated.   Tylenol given.   Pt reports pain is still 7/10. Declining pain medications at this time.     hcg < 1.2. US with no evidence of IUP. May be failed or ectopic pregnancy however more likely failed pregnancy with positive UPT, bleeding and pain and negative HCG   H/H 11/35. Vitals stable throughout ER visit.   Considered but doubt acute abdomen at this time.   OBGYN follow up in 1-2 days. Strict ER return precautions discussed. Pt tearful. States she has good support system at home. Denies SI/HI.             Scribe Attestation:   Scribe #1: I performed the above scribed service and the documentation accurately describes the services I performed. I attest to the accuracy of the note.                 Clinical Impression:   Final diagnoses:  [O46.90] Vaginal bleeding in pregnancy (Primary)          ED Disposition Condition    Discharge Stable        ED Prescriptions     Medication Sig Dispense Start Date End Date Auth. Provider    acetaminophen (TYLENOL) 500 MG tablet Take 1 tablet (500 mg total) by mouth every 4 (four) hours as needed. 20 tablet 3/8/2022 3/13/2022 Lucy Nobles PA-C        Follow-up Information      Follow up With Specialties Details Why Contact Info    Liss Wise MD Family Medicine Schedule an appointment as soon as possible for a visit in 2 days for follow up [address] [place] [address]  [address]  [place] [address]  [phone]      [facility] Emergency Dept Emergency Medicine Go to  If symptoms worsen, As needed [address] [place] [address] [phone]           I, Lucy Nobles PA-C , personally performed the services described in this documentation. All medical record entries made by the scribe were at my direction and in my presence. I have reviewed the chart and agree that the record reflects my personal performance and is accurate and complete.     Lucy Nobles PA-C  03/08/22 1000

## 2022-07-12 ENCOUNTER — HOSPITAL ENCOUNTER (EMERGENCY)
Facility: HOSPITAL | Age: 42
Discharge: HOME | End: 2022-07-12
Attending: INTERNAL MEDICINE
Payer: MEDICAID

## 2022-07-12 VITALS
OXYGEN SATURATION: 96 % | WEIGHT: 190 LBS | BODY MASS INDEX: 30.53 KG/M2 | RESPIRATION RATE: 18 BRPM | SYSTOLIC BLOOD PRESSURE: 111 MMHG | DIASTOLIC BLOOD PRESSURE: 58 MMHG | HEART RATE: 95 BPM | TEMPERATURE: 99 F | HEIGHT: 66 IN

## 2022-07-12 DIAGNOSIS — S93.402A SPRAIN OF LEFT ANKLE, UNSPECIFIED LIGAMENT, INITIAL ENCOUNTER: Primary | ICD-10-CM

## 2022-07-12 DIAGNOSIS — S99.912A INJURY OF LEFT ANKLE AND FOOT, INITIAL ENCOUNTER: ICD-10-CM

## 2022-07-12 DIAGNOSIS — S99.922A INJURY OF LEFT ANKLE AND FOOT, INITIAL ENCOUNTER: ICD-10-CM

## 2022-07-12 PROCEDURE — 99283 EMERGENCY DEPT VISIT LOW MDM: CPT

## 2022-07-12 RX ORDER — IBUPROFEN 400 MG/1
800 TABLET ORAL
Status: DISCONTINUED | OUTPATIENT
Start: 2022-07-12 | End: 2022-07-13 | Stop reason: HOSPADM

## 2022-07-12 RX ORDER — OXYCODONE AND ACETAMINOPHEN 5; 325 MG/1; MG/1
1 TABLET ORAL EVERY 4 HOURS PRN
Qty: 15 TABLET | Refills: 0 | Status: SHIPPED | OUTPATIENT
Start: 2022-07-12 | End: 2023-06-26

## 2022-07-12 NOTE — FIRST PROVIDER EVALUATION
"Medical screening exam completed.  I have conducted a focused provider triage encounter, findings are as follows:    Brief history of present illness:  Twisted left ankle just PTA; unable to bear weight    Vitals:    07/12/22 1811   BP: 137/79   BP Location: Right arm   Patient Position: Sitting   Pulse: 96   Resp: 18   Temp: 98.5 °F (36.9 °C)   TempSrc: Oral   SpO2: 97%   Weight: 86.2 kg (190 lb)   Height: 5' 6" (1.676 m)       Pertinent physical exam:  NAD, in WC    Brief workup plan:  UPT, xrays, Ice    Preliminary workup initiated; this workup will be continued and followed by the physician or advanced practice provider that is assigned to the patient when roomed.  "

## 2022-07-13 NOTE — ED PROVIDER NOTES
Encounter Date: 7/12/2022       History     Chief Complaint   Patient presents with    Ankle Pain     Pt reports left ankle pain after going down on a incline today. Reports she is unable to put pressure on it.     42-year-old female to the ER for evaluation of isolated left ankle injury.  Injury occurred shortly prior to arrival.  Patient did not take any medication for this.  She reports pain and swelling to the ankle.  She denies any other injuries.  She appears uncomfortable upon initial assessment.        Review of patient's allergies indicates:  No Known Allergies  History reviewed. No pertinent past medical history.  History reviewed. No pertinent surgical history.  No family history on file.  Social History     Tobacco Use    Smoking status: Never Smoker    Smokeless tobacco: Never Used   Substance Use Topics    Alcohol use: Yes     Comment: occ    Drug use: No     Review of Systems   Constitutional: Negative for fever.   HENT: Negative for sore throat.    Respiratory: Negative for shortness of breath.    Cardiovascular: Negative for chest pain.   Gastrointestinal: Negative for nausea.   Genitourinary: Negative for dysuria.   Musculoskeletal: Positive for arthralgias. Negative for back pain.   Skin: Negative for rash.   Neurological: Negative for weakness.   Hematological: Does not bruise/bleed easily.       Physical Exam     Initial Vitals [07/12/22 1811]   BP Pulse Resp Temp SpO2   137/79 96 18 98.5 °F (36.9 °C) 97 %      MAP       --         Physical Exam    Constitutional: Vital signs are normal. She appears well-developed and well-nourished.   HENT:   Head: Normocephalic and atraumatic.   Right Ear: Hearing normal.   Left Ear: Hearing normal.   Eyes: Conjunctivae are normal.   Cardiovascular: Normal rate and regular rhythm.   Abdominal: Abdomen is soft. Bowel sounds are normal.   Musculoskeletal:         General: Normal range of motion.      Comments: Significant swelling around the left ankle.   The tenderness noted to the lateral and the medial malleolus.  Chavis's test is negative.  Good range of motion.  No calf swelling or tenderness.  No knee pain.     Neurological: She is alert and oriented to person, place, and time.   Skin: Skin is warm and intact.   Psychiatric: She has a normal mood and affect. Her speech is normal and behavior is normal. Cognition and memory are normal.         ED Course   Procedures  Labs Reviewed   POCT URINE PREGNANCY          Imaging Results          X-Ray Foot Complete Left (Final result)  Result time 07/12/22 19:03:15    Final result by Eileen Perez MD (07/12/22 19:03:15)                 Impression:      No acute osseous abnormality identified.      Electronically signed by: Eileen Perez MD  Date:    07/12/2022  Time:    19:03             Narrative:    EXAMINATION:  XR ANKLE COMPLETE 3 VIEW LEFT; XR FOOT COMPLETE 3 VIEW LEFT    CLINICAL HISTORY:  Unspecified injury of left ankle, initial encounter    TECHNIQUE:  AP, lateral and oblique views of the left ankle were performed.  Left foot three views.    COMPARISON:  None    FINDINGS:  No evidence of acute displaced fracture, dislocation, or osseous destructive process.  Ankle mortise is maintained.  Soft tissue swelling is seen more pronounced over the lateral malleolus.  Lisfranc articulation appears congruent.                               X-Ray Ankle Complete Left (Final result)  Result time 07/12/22 19:03:15    Final result by Eileen Perez MD (07/12/22 19:03:15)                 Impression:      No acute osseous abnormality identified.      Electronically signed by: Eileen Perez MD  Date:    07/12/2022  Time:    19:03             Narrative:    EXAMINATION:  XR ANKLE COMPLETE 3 VIEW LEFT; XR FOOT COMPLETE 3 VIEW LEFT    CLINICAL HISTORY:  Unspecified injury of left ankle, initial encounter    TECHNIQUE:  AP, lateral and oblique views of the left ankle were performed.  Left foot three  views.    COMPARISON:  None    FINDINGS:  No evidence of acute displaced fracture, dislocation, or osseous destructive process.  Ankle mortise is maintained.  Soft tissue swelling is seen more pronounced over the lateral malleolus.  Lisfranc articulation appears congruent.                                 Medications   ibuprofen tablet 800 mg (has no administration in time range)     Medical Decision Making:   History:   Old Medical Records: I decided to obtain old medical records.  Old Records Summarized: records from clinic visits.  Initial Assessment:   42-year-old female with left ankle injury  Differential Diagnosis:   Fracture, contusion, dislocation, sprain  Independently Interpreted Test(s):   I have ordered and independently interpreted X-rays - see summary below.       <> Summary of X-Ray Reading(s): No fractures  Clinical Tests:   Radiological Study: Ordered and Reviewed  ED Management:  Plan:  X-rays negative for acute osseous process.  Suspect sprain.  Patient placed in an ankle air splint and given crutches.  Advised to follow-up with orthopedics.  Return precautions given.                      Clinical Impression:   Final diagnoses:  [S99.912A, S99.922A] Injury of left ankle and foot, initial encounter  [S93.402A] Sprain of left ankle, unspecified ligament, initial encounter (Primary)          ED Disposition Condition    Discharge Stable        ED Prescriptions     Medication Sig Dispense Start Date End Date Auth. Provider    oxyCODONE-acetaminophen (PERCOCET) 5-325 mg per tablet Take 1 tablet by mouth every 4 (four) hours as needed for Pain. 15 tablet 7/12/2022  Alexandru Cochran PA-C        Follow-up Information     Follow up With Specialties Details Why Contact Info    [facility] OR[facility] Orthopedics Call   [facility] [address] [facility] [id number] [phone]           Alexandru Cochran PA-C  07/12/22 7647

## 2022-07-13 NOTE — ED TRIAGE NOTES
"Pt presents to ED c/o left ankle pain secondary to fall that occurred today.  Denies loc, head injury or any other symptoms.  Pt reports it "hurts to bear weight."  Pt also c/o swelling.    "

## 2022-07-13 NOTE — DISCHARGE INSTRUCTIONS

## 2023-04-11 ENCOUNTER — HOSPITAL ENCOUNTER (EMERGENCY)
Facility: HOSPITAL | Age: 43
Discharge: LEFT BEFORE DISCHARGE | End: 2023-04-11
Attending: EMERGENCY MEDICINE
Payer: MEDICAID

## 2023-04-11 VITALS
OXYGEN SATURATION: 100 % | SYSTOLIC BLOOD PRESSURE: 135 MMHG | HEART RATE: 82 BPM | RESPIRATION RATE: 18 BRPM | WEIGHT: 240 LBS | BODY MASS INDEX: 38.74 KG/M2 | DIASTOLIC BLOOD PRESSURE: 78 MMHG | TEMPERATURE: 98 F

## 2023-04-11 DIAGNOSIS — R10.2 PELVIC CRAMPING: Primary | ICD-10-CM

## 2023-04-11 LAB
ALBUMIN SERPL BCP-MCNC: 3.4 G/DL (ref 3.5–5.2)
ALP SERPL-CCNC: 54 U/L (ref 55–135)
ALT SERPL W/O P-5'-P-CCNC: 13 U/L (ref 10–44)
ANION GAP SERPL CALC-SCNC: 5 MMOL/L (ref 8–16)
AST SERPL-CCNC: 15 U/L (ref 10–40)
BASOPHILS # BLD AUTO: 0.07 K/UL (ref 0–0.2)
BASOPHILS NFR BLD: 1.3 % (ref 0–1.9)
BILIRUB SERPL-MCNC: 0.3 MG/DL (ref 0.1–1)
BUN SERPL-MCNC: 9 MG/DL (ref 6–20)
CALCIUM SERPL-MCNC: 8.8 MG/DL (ref 8.7–10.5)
CHLORIDE SERPL-SCNC: 108 MMOL/L (ref 95–110)
CO2 SERPL-SCNC: 24 MMOL/L (ref 23–29)
CREAT SERPL-MCNC: 0.9 MG/DL (ref 0.5–1.4)
DIFFERENTIAL METHOD: ABNORMAL
EOSINOPHIL # BLD AUTO: 0.2 K/UL (ref 0–0.5)
EOSINOPHIL NFR BLD: 3 % (ref 0–8)
ERYTHROCYTE [DISTWIDTH] IN BLOOD BY AUTOMATED COUNT: 15 % (ref 11.5–14.5)
EST. GFR  (NO RACE VARIABLE): >60 ML/MIN/1.73 M^2
GLUCOSE SERPL-MCNC: 117 MG/DL (ref 70–110)
HCG INTACT+B SERPL-ACNC: <1.2 MIU/ML
HCT VFR BLD AUTO: 34.2 % (ref 37–48.5)
HGB BLD-MCNC: 11.3 G/DL (ref 12–16)
IMM GRANULOCYTES # BLD AUTO: 0.01 K/UL (ref 0–0.04)
IMM GRANULOCYTES NFR BLD AUTO: 0.2 % (ref 0–0.5)
LYMPHOCYTES # BLD AUTO: 2.4 K/UL (ref 1–4.8)
LYMPHOCYTES NFR BLD: 43.7 % (ref 18–48)
MCH RBC QN AUTO: 29.9 PG (ref 27–31)
MCHC RBC AUTO-ENTMCNC: 33 G/DL (ref 32–36)
MCV RBC AUTO: 91 FL (ref 82–98)
MONOCYTES # BLD AUTO: 0.5 K/UL (ref 0.3–1)
MONOCYTES NFR BLD: 9.5 % (ref 4–15)
NEUTROPHILS # BLD AUTO: 2.3 K/UL (ref 1.8–7.7)
NEUTROPHILS NFR BLD: 42.3 % (ref 38–73)
NRBC BLD-RTO: 0 /100 WBC
PLATELET # BLD AUTO: 321 K/UL (ref 150–450)
PMV BLD AUTO: 9.9 FL (ref 9.2–12.9)
POTASSIUM SERPL-SCNC: 3.5 MMOL/L (ref 3.5–5.1)
PROT SERPL-MCNC: 9 G/DL (ref 6–8.4)
RBC # BLD AUTO: 3.78 M/UL (ref 4–5.4)
SODIUM SERPL-SCNC: 137 MMOL/L (ref 136–145)
WBC # BLD AUTO: 5.38 K/UL (ref 3.9–12.7)

## 2023-04-11 PROCEDURE — 85025 COMPLETE CBC W/AUTO DIFF WBC: CPT | Performed by: STUDENT IN AN ORGANIZED HEALTH CARE EDUCATION/TRAINING PROGRAM

## 2023-04-11 PROCEDURE — 99283 EMERGENCY DEPT VISIT LOW MDM: CPT | Mod: 25

## 2023-04-11 PROCEDURE — 84702 CHORIONIC GONADOTROPIN TEST: CPT | Performed by: EMERGENCY MEDICINE

## 2023-04-11 PROCEDURE — 80053 COMPREHEN METABOLIC PANEL: CPT | Performed by: STUDENT IN AN ORGANIZED HEALTH CARE EDUCATION/TRAINING PROGRAM

## 2023-04-11 NOTE — ED NOTES
This RN went into room and pt not in room. PIV on bedside table. Pt not in restroom. Ultrasound techs on unit to get patient, unable to locate patient.

## 2023-04-11 NOTE — ED PROVIDER NOTES
Encounter Date: 4/11/2023    SCRIBE #1 NOTE: I, PARK Santizo, am scribing for, and in the presence of,  Jabari Lazo MD. I have scribed the following portions of the note - Other sections scribed: MAIA UREÑA.     History     Chief Complaint   Patient presents with    Abdominal Cramping     Abd cramping and spotting x 1 day. Pt is 14 weeks pregnant.      Gilberto Bueno is a 43 y.o. female, with no pertinent PMHx, who presents to the ED with suprapubic abdominal pain which started around 1200 today. Pt also complains of a decreased appetite; last meal was over 2 days ago. Pt believes symptoms could be due to dehydration or stress. Patient reports she recently visited gynecologist. Gynecologist stated nothing was of note.  No noted exacerbating or alleviating factors. Patient denies cough, shortness of breath, chest pain, fever, chills, nausea, vomiting, diarrhea, dysuria, headaches, congestion, sore throat, arm or leg trouble, eye pain, ear pain, rash, or other associated symptoms.    The history is provided by the patient. No  was used.   Review of patient's allergies indicates:  No Known Allergies  No past medical history on file.  No past surgical history on file.  No family history on file.  Social History     Tobacco Use    Smoking status: Never    Smokeless tobacco: Never   Substance Use Topics    Alcohol use: Yes     Comment: occ    Drug use: No     Review of Systems   Constitutional:  Positive for appetite change (Decreased). Negative for chills, diaphoresis and fever.   HENT:  Negative for ear pain and sore throat.    Eyes:  Negative for pain.   Respiratory:  Negative for cough and shortness of breath.    Cardiovascular:  Negative for chest pain.   Gastrointestinal:  Positive for abdominal pain. Negative for diarrhea, nausea and vomiting.   Genitourinary:  Negative for dysuria.   Musculoskeletal:  Negative for back pain.        (-) Arm or leg trouble.    Skin:  Negative for rash.    Neurological:  Negative for headaches.   Psychiatric/Behavioral:  Negative for confusion.      Physical Exam     Initial Vitals [04/11/23 1615]   BP Pulse Resp Temp SpO2   135/78 82 18 97.8 °F (36.6 °C) 100 %      MAP       --         Physical Exam  The patient was examined specifically for the following:   General:No significant distress, Good color, Warm and dry. Head and neck:Scalp atraumatic, Neck supple. Neurological:Appropriate conversation, Gross motor deficits. Eyes:Conjugate gaze, Clear corneas. ENT: No epistaxis. Cardiac: Regular rate and rhythm, Grossly normal heart tones. Pulmonary: Wheezing, Rales. Gastrointestinal: Abdominal tenderness, Abdominal distention. Musculoskeletal: Extremity deformity, Apparent pain with range of motion of the joints. Skin: Rash.   The findings on examination were normal except for the following:  The patient has an elevated BMI.  Lungs are clear.  The heart tones are normal.  The abdomen is soft.  There is no pelvic tenderness.    ED Course   Procedures  Labs Reviewed   CBC W/ AUTO DIFFERENTIAL - Abnormal; Notable for the following components:       Result Value    RBC 3.78 (*)     Hemoglobin 11.3 (*)     Hematocrit 34.2 (*)     RDW 15.0 (*)     All other components within normal limits   COMPREHENSIVE METABOLIC PANEL - Abnormal; Notable for the following components:    Glucose 117 (*)     Total Protein 9.0 (*)     Albumin 3.4 (*)     Alkaline Phosphatase 54 (*)     Anion Gap 5 (*)     All other components within normal limits   HCG, QUANTITATIVE    Narrative:     Release to patient->Immediate   URINALYSIS, REFLEX TO URINE CULTURE   POCT URINE PREGNANCY          Imaging Results    None       Medical decision making: Given the above this patient presents to the emergency room complaining of some very minimal bilateral low pelvic cramping.  She reports that she is pregnant.  It is interesting that her last 2 HCGs were negative, serum testing was used.  There is no  significant abdominal tenderness.  The patient eloped from the emergency room before urine pregnancy test could be done.  I am not sure why how this patient believes that she is pregnant.  This may be a functional syndrome.  There is clearly no evidence of peritonitis bowel obstruction dehydration or significant metabolic abnormality.  The patient has a mild anemia.  There is no significant hepatic or renal failure.  Patient's quantitative hCG is less than 1.2.  Patient is not pregnant.  Patient eloped from the emergency room.       Medications - No data to display           Scribe Attestation:   Scribe #1: I performed the above scribed service and the documentation accurately describes the services I performed. I attest to the accuracy of the note.                 I personally performed the services described in this documentation.  All medical record  entries made by the scribe are at my direction and in my presence.  Signed, Dr. Lazo  Clinical Impression:   Final diagnoses:  [R10.2] Pelvic cramping (Primary)        ED Disposition Condition    Eloped                 Jabari Lazo MD  04/11/23 5297       Jabari Lazo MD  04/11/23 1643

## 2023-04-11 NOTE — ED NOTES
Pt ambulatory to room 22 for eval of abd cramping and light spotting since yesterday. Pt reports is approx 14 weeks pregnant- started on progesterone pills 2 weeks ago from OBGYN. Denies n/v or urinary c/o. Pt reports increase in stress lately.   Reminded about need for urine specimen, specimen cup with pt.

## 2023-06-20 ENCOUNTER — HOSPITAL ENCOUNTER (EMERGENCY)
Facility: HOSPITAL | Age: 43
Discharge: HOME | End: 2023-06-20
Attending: EMERGENCY MEDICINE
Payer: MEDICAID

## 2023-06-20 VITALS
WEIGHT: 198 LBS | OXYGEN SATURATION: 100 % | DIASTOLIC BLOOD PRESSURE: 80 MMHG | HEART RATE: 84 BPM | BODY MASS INDEX: 30.01 KG/M2 | SYSTOLIC BLOOD PRESSURE: 125 MMHG | HEIGHT: 68 IN | TEMPERATURE: 98 F | RESPIRATION RATE: 16 BRPM

## 2023-06-20 DIAGNOSIS — Z3A.01 6 WEEKS GESTATION OF PREGNANCY: ICD-10-CM

## 2023-06-20 DIAGNOSIS — O20.0 THREATENED MISCARRIAGE IN EARLY PREGNANCY: ICD-10-CM

## 2023-06-20 DIAGNOSIS — D64.9 NORMOCYTIC ANEMIA: ICD-10-CM

## 2023-06-20 DIAGNOSIS — O20.9 VAGINAL BLEEDING IN PREGNANCY, FIRST TRIMESTER: Primary | ICD-10-CM

## 2023-06-20 LAB
ALBUMIN SERPL-MCNC: 3.4 G/DL (ref 3.3–5.5)
ALP SERPL-CCNC: 60 U/L (ref 42–141)
B-HCG UR QL: POSITIVE
BILIRUB SERPL-MCNC: 0.4 MG/DL (ref 0.2–1.6)
BILIRUBIN, POC UA: NEGATIVE
BLOOD, POC UA: ABNORMAL
BUN SERPL-MCNC: 8 MG/DL (ref 7–22)
CALCIUM SERPL-MCNC: 9.2 MG/DL (ref 8–10.3)
CHLORIDE SERPL-SCNC: 108 MMOL/L (ref 98–108)
CLARITY, POC UA: CLEAR
COLOR, POC UA: ABNORMAL
CREAT SERPL-MCNC: 0.7 MG/DL (ref 0.6–1.2)
CTP QC/QA: YES
GLUCOSE SERPL-MCNC: 105 MG/DL (ref 73–118)
GLUCOSE, POC UA: NEGATIVE
KETONES, POC UA: NEGATIVE
LEUKOCYTE EST, POC UA: ABNORMAL
NITRITE, POC UA: NEGATIVE
PH UR STRIP: 7 [PH]
POC ALT (SGPT): 17 U/L (ref 10–47)
POC AST (SGOT): 24 U/L (ref 11–38)
POC BETA-HCG (QUANT): >2000 IU/L
POC TCO2: 24 MMOL/L (ref 18–33)
POTASSIUM BLD-SCNC: 4.1 MMOL/L (ref 3.6–5.1)
PROTEIN, POC UA: ABNORMAL
PROTEIN, POC: 8.6 G/DL (ref 6.4–8.1)
SAMPLE: NORMAL
SODIUM BLD-SCNC: 139 MMOL/L (ref 128–145)
SPECIFIC GRAVITY, POC UA: 1.01
UROBILINOGEN, POC UA: 0.2 E.U./DL

## 2023-06-20 PROCEDURE — 99284 EMERGENCY DEPT VISIT MOD MDM: CPT | Mod: 25,ER

## 2023-06-20 PROCEDURE — 96360 HYDRATION IV INFUSION INIT: CPT | Mod: ER

## 2023-06-20 PROCEDURE — 80053 COMPREHEN METABOLIC PANEL: CPT | Mod: ER

## 2023-06-20 PROCEDURE — 81003 URINALYSIS AUTO W/O SCOPE: CPT | Mod: ER

## 2023-06-20 PROCEDURE — 87088 URINE BACTERIA CULTURE: CPT | Performed by: NURSE PRACTITIONER

## 2023-06-20 PROCEDURE — 81025 URINE PREGNANCY TEST: CPT | Mod: ER

## 2023-06-20 PROCEDURE — 25000003 PHARM REV CODE 250: Mod: ER | Performed by: NURSE PRACTITIONER

## 2023-06-20 PROCEDURE — 87147 CULTURE TYPE IMMUNOLOGIC: CPT | Performed by: NURSE PRACTITIONER

## 2023-06-20 PROCEDURE — 87086 URINE CULTURE/COLONY COUNT: CPT | Performed by: NURSE PRACTITIONER

## 2023-06-20 PROCEDURE — 85025 COMPLETE CBC W/AUTO DIFF WBC: CPT | Mod: ER

## 2023-06-20 PROCEDURE — 84702 CHORIONIC GONADOTROPIN TEST: CPT | Mod: ER

## 2023-06-20 RX ADMIN — SODIUM CHLORIDE 1000 ML: 9 INJECTION, SOLUTION INTRAVENOUS at 06:06

## 2023-06-20 NOTE — DISCHARGE INSTRUCTIONS
Follow up with your Ob/Gyn. Take prenatal vitamins daily.    Thank you for coming to our Emergency Department today. It is important to remember that some problems are difficult to diagnose and may not be found during your first visit. Be sure to follow up with your primary care doctor.  If you do not have one, you may contact the one listed on your discharge paperwork or you may also call the [facility] Appointment Desk at [phone] to schedule an appointment with one.     Return to the ER with any questions/concerns, new/concerning symptoms, worsening or failure to improve. Do not drive or make any important decisions for 24 hours if you have received any pain medications, sedatives or mood altering drugs during your ER visit.

## 2023-06-20 NOTE — ED PROVIDER NOTES
Encounter Date: 2023    SCRIBE #1 NOTE: I, Alejandrina Rushing, am scribing for, and in the presence of,  Eros Heard NP. I have scribed the following portions of the note - Other sections scribed: HPI,ROS.     History     Chief Complaint   Patient presents with    Vaginal Bleeding     Pt has vaginal spotting, and blood has been noted when she wipes. She has positive home pregnancy test. Unknown due date. blood is noted in her urine sample      Gilberto Bueno is a 43 y.o. female, with no pertinent PMHx who is currently pregnant, A0, who presents to the ED with Vaginal bleeding onset PTA. Patient reports noticing blood when she wipes and states that over time the blood began dripping. Patient states that she went to an urgent care PTA and states that they referred her to the ED. Patient states that her last menstrual cycle was 2023. Patient notes that she has an OB appointment in early July. No other exacerbating or alleviating factors. Denies vaginal pain, nausea, vomiting or other associated symptoms.       The history is provided by the patient. No  was used.   Review of patient's allergies indicates:  No Known Allergies  History reviewed. No pertinent past medical history.  History reviewed. No pertinent surgical history.  History reviewed. No pertinent family history.  Social History     Tobacco Use    Smoking status: Never    Smokeless tobacco: Never   Substance Use Topics    Alcohol use: Yes     Comment: occ    Drug use: No     Review of Systems   Constitutional:  Negative for fever.   HENT:  Negative for congestion and sore throat.    Respiratory:  Negative for shortness of breath.    Cardiovascular:  Negative for chest pain.   Gastrointestinal:  Negative for diarrhea, nausea and vomiting.   Genitourinary:  Positive for vaginal bleeding. Negative for dysuria and vaginal pain.   Skin:  Negative for rash.   Neurological:  Negative for syncope.   Hematological:   Does not bruise/bleed easily.   Psychiatric/Behavioral:  Negative for hallucinations.      Physical Exam     Initial Vitals [06/20/23 1610]   BP Pulse Resp Temp SpO2   125/80 84 16 98.2 °F (36.8 °C) 100 %      MAP       --         Physical Exam    ED Course   Procedures  Labs Reviewed   POCT URINE PREGNANCY - Abnormal; Notable for the following components:       Result Value    POC Preg Test, Ur Positive (*)     All other components within normal limits   POCT URINALYSIS W/O SCOPE - Abnormal; Notable for the following components:    Blood, UA 3+ (*)     Protein, UA 2+ (*)     Leukocytes, UA 1+ (*)     All other components within normal limits   POCT CMP - Abnormal; Notable for the following components:    Protein, POC 8.6 (*)     All other components within normal limits   CULTURE, URINE   POCT CBC   POCT URINALYSIS W/O SCOPE   POCT CMP   POCT B-HCG (QUANT)   POCT B-HCG (QUANT)          Imaging Results              US OB <14 Wks, TransAbd, Single Gestation (Final result)  Result time 06/20/23 18:15:39   Procedure changed from US OB <14 Wks TransAbd & TransVag, Single Gestation (XPD)     Final result by Yuliana Salgado MD (06/20/23 18:15:39)                   Impression:      Gestational sac with mean sac diameter corresponding to a gestational age 6 weeks 2 days.  No fetal pole or yolk sac at this time.  Findings may suggest a very early IUP.  Recommend follow-up beta HCG and ultrasound as needed.    Bilateral complex cystic ovarian lesions.  Question right involuting hemorrhagic cyst and left ovarian crenated or involuting cyst.  Small left ovarian cyst.      Electronically signed by: Yuliana Salgado  Date:    06/20/2023  Time:    18:15               Narrative:    EXAMINATION:  ULTRASOUND OBSTETRICAL ULTRASOUND LESS THAN 14 WEEKS WITH TRANSVAGINAL    CLINICAL HISTORY:  Vag Bleeding;    TECHNIQUE:  Real-time ultrasound obstetrical ultrasound less than 14 weeks was performed transabdominally and   transvaginally.    COMPARISON:  None.    FINDINGS:  The uterus measures 13.7 x 7.7 x 8.8 cm. There is a posterior uterine fibroid measuring 1.5 x 1.3 x 1.6 cm.  An anechoic structure is seen within the uterus in the presence of a positive pregnancy test, which is likely a gestational sac.  The gestational sac measures 1.5 cm, which corresponds to a gestational age of 6 weeks 2 days.  No fetal pole or yolk sac is present.  The cervical length measures 4.4 cm.    The right ovary measures 2.4 x 1.9 x 3.6 cm.  A complex anechoic lesion is seen in the right ovary measuring 1.4 x 1.6 x 1.8 cm.  The left ovary measures 4.6 x 2.7 x 3.3 cm.  There is a complex anechoic area with septation in the left ovary measuring 1.5 x 1.5 x 1.1 cm.  There is an anechoic area within the left ovary as well measuring 2 x 1.2 x 1.5 cm.    No free fluid is seen in the posterior cul-de-sac.                                       Medications   sodium chloride 0.9% bolus 1,000 mL 1,000 mL (1,000 mLs Intravenous New Bag 6/20/23 1809)     Medical Decision Making:   History:   Old Medical Records: I decided to obtain old medical records.  Clinical Tests:   Lab Tests: Ordered and Reviewed  Radiological Study: Ordered and Reviewed  ED Management:  No evidence of ectopic/heterotopic pregnancy.  Urine culture in process.  Follow-up with OBGYN.        Scribe Attestation:   Scribe #1: I performed the above scribed service and the documentation accurately describes the services I performed. I attest to the accuracy of the note.            I, Eros Heard NP, personally performed the services described in this documentation.  All medical record entries made by the scribe were at my direction and in my presence.  I have reviewed the chart and agree that the record reflects my personal performance and is accurate and complete.         Clinical Impression:   Final diagnoses:  [O20.9] Vaginal bleeding in pregnancy, first trimester (Primary)  [Z3A.01] 6 weeks  gestation of pregnancy  [O20.0] Threatened miscarriage in early pregnancy  [D64.9] Normocytic anemia        ED Disposition Condition    Discharge Stable          ED Prescriptions    None       Follow-up Information       Follow up With Specialties Details Why Contact Info    [facility] Emergency Medicine Go to  If symptoms worsen, As needed [id number] [facility] [id number] [phone]             Eros Heard NP  06/20/23 4995

## 2023-06-21 ENCOUNTER — TELEPHONE (OUTPATIENT)
Dept: OTOLARYNGOLOGY | Facility: CLINIC | Age: 43
End: 2023-06-21
Payer: MEDICAID

## 2023-06-21 ENCOUNTER — INITIAL PRENATAL (OUTPATIENT)
Dept: OBSTETRICS AND GYNECOLOGY | Facility: CLINIC | Age: 43
End: 2023-06-21
Payer: MEDICAID

## 2023-06-21 ENCOUNTER — TELEPHONE (OUTPATIENT)
Dept: EMERGENCY MEDICINE | Facility: HOSPITAL | Age: 43
End: 2023-06-21
Payer: MEDICAID

## 2023-06-21 DIAGNOSIS — O20.9 BLEEDING IN EARLY PREGNANCY: ICD-10-CM

## 2023-06-21 DIAGNOSIS — Z11.3 ENCOUNTER FOR SCREENING EXAMINATION FOR SEXUALLY TRANSMITTED DISEASE: ICD-10-CM

## 2023-06-21 DIAGNOSIS — Z11.51 SCREENING FOR HUMAN PAPILLOMAVIRUS (HPV): ICD-10-CM

## 2023-06-21 DIAGNOSIS — Z12.4 ENCOUNTER FOR PAPANICOLAOU SMEAR FOR CERVICAL CANCER SCREENING: ICD-10-CM

## 2023-06-21 DIAGNOSIS — Z3A.01 LESS THAN 8 WEEKS GESTATION OF PREGNANCY: Primary | ICD-10-CM

## 2023-06-21 PROCEDURE — 99999 PR PBB SHADOW E&M-EST. PATIENT-LVL II: CPT | Mod: PBBFAC,,, | Performed by: PHYSICIAN ASSISTANT

## 2023-06-21 PROCEDURE — 81514 NFCT DS BV&VAGINITIS DNA ALG: CPT | Performed by: PHYSICIAN ASSISTANT

## 2023-06-21 PROCEDURE — 99214 PR OFFICE/OUTPT VISIT, EST, LEVL IV, 30-39 MIN: ICD-10-PCS | Mod: S$PBB,TH,, | Performed by: PHYSICIAN ASSISTANT

## 2023-06-21 PROCEDURE — 87591 N.GONORRHOEAE DNA AMP PROB: CPT | Performed by: PHYSICIAN ASSISTANT

## 2023-06-21 PROCEDURE — 88175 CYTOPATH C/V AUTO FLUID REDO: CPT | Performed by: PHYSICIAN ASSISTANT

## 2023-06-21 PROCEDURE — 99999 PR PBB SHADOW E&M-EST. PATIENT-LVL II: ICD-10-PCS | Mod: PBBFAC,,, | Performed by: PHYSICIAN ASSISTANT

## 2023-06-21 PROCEDURE — 99212 OFFICE O/P EST SF 10 MIN: CPT | Mod: PBBFAC | Performed by: PHYSICIAN ASSISTANT

## 2023-06-21 PROCEDURE — 87624 HPV HI-RISK TYP POOLED RSLT: CPT | Performed by: PHYSICIAN ASSISTANT

## 2023-06-21 PROCEDURE — 99214 OFFICE O/P EST MOD 30 MIN: CPT | Mod: S$PBB,TH,, | Performed by: PHYSICIAN ASSISTANT

## 2023-06-21 RX ORDER — CEPHALEXIN 500 MG/1
500 CAPSULE ORAL 4 TIMES DAILY
Qty: 28 CAPSULE | Refills: 0 | Status: SHIPPED | OUTPATIENT
Start: 2023-06-21 | End: 2023-06-28

## 2023-06-21 NOTE — PROGRESS NOTES
CC: Positive Pregnancy Test    HISTORY OF PRESENT ILLNESS:    Gliberto Bueno is a 43 y.o. female, ,  Presents today for a routine exam complaining of SPOTTING IN EARLY PREGNANCY.  Patient's last menstrual period was 04/10/2023 (exact date).  SHE WAS SEEN IN ER YESTERDAY. STATES SHE STILL HAS SPOTTING AND SOME CRAMPING IN HER LOWER BACK. Pregnancy is desired. Sexual Activity: single partner, contraception: none.     Denies PMH, abdominal surgeries, medications other than PNV, genetic family history (SC/CF). No personal or family history of DM. No personal or family history of thyroid disease. No h/o HSV. Does not use alcohol, tobacco, or illicit drugs. Feels safe at home.     OB history:     Prior pregnancies:    - T -    - T-     -T-     - PT -     - T -     - T      ROS:  GENERAL: No weight changes. No swelling. No fatigue. No fever.  CARDIOVASCULAR: No chest pain. No shortness of breath. No leg cramps.   NEUROLOGICAL: No headaches. No vision changes.  BREASTS: No pain. No lumps. No discharge.  ABDOMEN: No pain. No diarrhea. No constipation.  REPRODUCTIVE: No abnormal bleeding.   VULVA: No pain. No lesions. No itching.  VAGINA: No relaxation. No itching. No odor. No discharge. No lesions.  URINARY: No incontinence. No nocturia. No frequency. No dysuria.    MEDICATIONS AND ALLERGIES:  Reviewed    COMPREHENSIVE GYN HISTORY:  PAP History: Denies abnormal Paps.  Infection History: Denies STDs. Denies PID.  Benign History: Denies uterine fibroids. Denies ovarian cysts. Denies endometriosis. Denies other conditions.  Cancer History: Denies cervical cancer. Denies uterine cancer or hyperplasia. Denies ovarian cancer. Denies vulvar cancer or pre-cancer. Denies vaginal cancer or pre-cancer. Denies breast cancer. Denies colon cancer.  Sexual Activity History: Reports currently being sexually active  Menstrual History: None.  Contraception: None    LMP 04/10/2023  (Exact Date)     PE:  AFFECT: Calm, alert and oriented X 3. Interactive during exam  GENERAL: Appears well-nourished, well-developed, in no acute distress.  HEAD: Normocephalic, atruamatic  TEETH: Good dentition.  SKIN: Normal for race, warm, & dry. No lesions or rashes.  LUNGS: Easy and unlabored  ABDOMEN: Soft and nontender without masses or organomegally.  VULVA: No lesions, masses or tenderness.  VAGINA: Moist and well rugated without lesions or discharge.  CERVIX: Moist and pink without lesions, discharge or tenderness.      UTERUS SIZE: 6 week size, nontender and without masses.  ADNEXA: No masses or tenderness.  EXTREMITIES: No cyanosis, clubbing or edema. No calf tenderness.  LYMPH NODES: No axillary or inguinal adenopathy.    PROCEDURES:  UPT Positive  Genprobe  Pap    ASSESSMENT/PLAN:  Nausea and vomiting in pregnancy    -  Education regarding lifestyle and dietary modifications    -  Advised use of B6/Unisom. Pt will notify us if no relief/worsening symptoms, will consider alternative therapies prn    1st TRIMESTER COUNSELING: Discussed all, booklet provided:  Common complaints of pregnancy  HIV and other routine prenatal tests including  genetic screening  Risk factors identified by prenatal history  Oriented to practice - discussed anticipated course of prenatal care & indications for Ultrasound  Childbirth classes/Hospital facilities   Nutrition and weight gain counseling  -- Discussed IOM recommended weight gain of:          Underweight        Less than 18.5            28-40            Normal Weight     18.5-24.9                    25-35            Overweight          25-29.9                       15-25            Obese                  30 and greater             11-20    -- Discussed criteria for delivery at [facility] r/t excessive pre-preg weight or excessive weight gain:          Pre-pregnancy BMI over 40 or excess pregnancy weight gain defined as:          Pre-preg BMI < 18.5; Excess weight  gain = > 60 pound          Pre-preg BMI 18.5-24.9;  Excess weight gain = > 53 pounds          Pre-preg BMI 25-29.9;  Excess weight gain = > 38 pounds          Pre-preg BMI > 30;  Excess weight gain = > 30 pounds  Toxoplasmosis precautions (Cats/Raw Meat)  Sexual activity and exercise  Environmental/Work hazards  Travel  Tobacco (Ask, Advise, Assess, Assist, and Arrange), as well as alcohol and drug use  Use of any medications (Including supplements, Vitamins, Herbs, or OTC Drugs)  Domestic violence  Seat belt use    RETURN TO ER IF YOU DEVELOP BLEEDING THAT FILLS A PAD WITHIN AN HOUR. HCG FOLLOW UP TOMORROW.     TERATOLOGY COUNSELING:   Discussed indications and options for aneuploidy screening - pamphlets given  [facility] US ORDERED  Indications for low-dose aspirin use after 12 weeks for the prevention of pre-eclampsia reviewed.  For this patient, her high risk factors include None. Her moderate risk factors include:  Age >/= 34yo and Sociodemographic characteristics: Black or .  Patient is a candidate for low-dose aspirin and will begin taking it 12W  Anatomy US 19-20 weeks   Routine serum and urine prenatal labs today  FOLLOW-UP in 4 weeks with Dr. RAHUL Hagen PAMEME    OB/GYN

## 2023-06-21 NOTE — TELEPHONE ENCOUNTER
Pt call was returned. Pt is due to come into the office on today to see Stephanie          ----- Message from Hilary Parker sent at 6/21/2023  8:15 AM CDT -----  Regarding: self [phone]  Type:  Sooner Appointment Request    Patient is requesting a sooner appointment.  Patient declined first available appointment listed as well as another facility and provider .  Patient will not accept being placed on the waitlist and is requesting a message be sent to doctor.    Name of Caller: self    When is the first available appointment? 08/02    Symptoms: preg    Would the patient rather a call back or a response via [facility]?     Best Call Back Number: [phone]

## 2023-06-21 NOTE — PATIENT INSTRUCTIONS
.Clinic Location:   [address] [place] [address]  [phone]    Your Pregnancy A to Z   Allergies - Seasonal   Benadryl   Claritin   Zyrtec   If you have a skin allergic reaction, call your doctor    Baby Movement Counts  Baby movement is an indicator of your baby's health and wellbeing. A movement may be a kick, stretch, or turn. You will begin counting baby movements after you reach 28 weeks gestation. Do these counts twice a day (AM and PM). Several things can affect your baby's activity, such as see (20-40 minutes time), your blood sugar levels, smoking, noise level, drugs, gestational age, placental location, decreasing space in the uterus, and time of the day.   Call your doctor if your baby has NOT had 5 movements in 1 hour or 10 movements in 2 hours or there is a significant decrease in your baby's movements.     Backache  Almost all pregnant women have backaches in pregnancy. These are often related to stretching of ligaments that hold the uterus in place. Backaches can also be cause by stretching of the back muscles that support the weight as your baby grows. Here are some comfort measures:   Maternity Belt   Warm heating pad   Warm bath   Regular strength Tylenol   Massage     Breastfeeding  Your doctor should discuss breastfeeding before delivery. It is the best nutrition for your baby. It also protects your baby from illnesses. Studies have shown that  babies get sick less often. When they reach school age,  babies perform better in school. Newborns tolerate breast milk better than formula. It also decreases you bleeding after delivery by shrinking your uterus. It is a natural process, but often takes some time for you and your new infant to get the hang of things. Here are some helpful hints:   Try breastfeeding as soon as baby is born. Starting immediately increases the success of breastfeeding and creates a bond between you and your baby.   Do not get discouraged! Most women  don't produce a significant amount of milk until days 3- after delivery. Continue taking your prenatal vitamins while breastfeeding. Stay well hydrated by drinking 8-10 glasses of water every day.   When breastfeeding, if you have fever, redness of breasts or fullness that isn't relieved by pumping or expressing your milk, contact your doctor. Breast feeding is not recommended for women with certain medical conditions.     Breast Tenderness   This is common in pregnancy, especially in the beginning. The tenderness is related to the hormone changes that occur in the 1st trimester. Your breasts become larger. You may also notice darkening of the nipples. As your pregnancy continues, it's not uncommon to product milk, even before you deliver. To relieve tenderness and heaviness, wear a good support bra.     Colds and Congestion   This can be normal. To relieve congestion, you may use ocean nasal spray, a saline nasal spray, or Sudafed (without PE) sparingly. Do not use antihistamines because they may make your congestion worse. You can also try using a humidifier.   Medications ok in pregnancy: Plain Robitussin, plain Sudafed, Actifed, Benadryl, Claritin, Mucinex, Zyrtec.   DO NOT use any DM medications.     Constipation   This is very common throughout all stages of pregnancy. It can be caused by hormones that relax the muscles in your digestive system. Iron often taken during pregnancy may make this worse. In addition, the growing uterus presses on the lower intestines which may add to the problem. Here are some ways to relieve constipation:   Drink plenty of water (8-10 glasses a day)   Eat foods high in fiber such as raw fruits, vegetables, wholegrain breasts, and cereals.   Eat fruit, especially at night, such as prunes, figs, dates, raisins, peaches, and cherries because of their laxative properties.   Avoid cheese, bananas, and rice as they may slow down your bowel movements.   Use Metamucil or Citrucel as  needed. You can also try Senekot, GoodSense Fiber, Miralax.  Try a stool softener such as Colace.  Minimal use of Milk of Magnesia, Lactulose, or Dulcolax   If you go 5 days without any form of a bowel movement or have significant pain, contact your doctor.     Contractions   Cramping or Callahan Mascorro contractions are common in the 2nd and 3rd trimesters. Make sure you stay well hydrated. You may also find comfort from a warm bath.   If you experience contractions every 5 minuets apart or closer for 2 hours, come to the hospital for evaluation.      Cough   For relief from cough, you can try regular/plain Robitussin, Chloraseptic spray, or any throat lozenges.     Cramping   Women commonly have abdominal cramping throughout their pregnancies. Cramping associated with pregnancy is often described as feeling similar to menstrual cramps. Early in the pregnancy, mild abdominal cramping is due to the growing uterus. Cramping can also be due to round ligament pain. In the late 2nd trimester and 3rd trimester, you may experience cramping due to Marlon Mascorro. These are contractions of the uterus but are not associated with labor. They can be worse with activity or when you are dehydrated. Make sure you drink 8-10 glasses of water every day. If the cramping becomes more intense or you experience the cramping every 5 minutes apart or closer for 2 hours, come to the hospital for evaluation.     Cues  Babies don't feed at regular intervals. Instead, babies use cutes to tell you when they are hungry and full. You can tell when your baby is starting to get hungry when you see him or her stretch or begin to wake up. Ten, your baby may begin to bring hands to mouth, smack lips or stick tongue out hopefully you have started to feed by now. If not, baby may start to cry, which makes feeding very difficult. When mothers respond to feeding cues, baby eats more frequently. More frequent feedings increase moms milk supply. More milk  means that baby will be happier and healthier, and mom will be more confident.     Dental Procedures  Bleeding gums are common during pregnancy. However, if you have painful gums or teeth, speak with your dentist.   Most dental procedures can be done safely in pregnancy with a local anesthetic.   Keep your teeth healthy during pregnancy by brushing twice a day, using dental floss, and having regular dental exams and cleanings.     Diarrhea  Your gastrointestinal tract may be more sensitive during pregnancy. That sensitivity can cause diarrhea after you eat certain foods.   Relieve diarrhea with Imodium or Kaopectate. Stay well hydrated. If you have blood or mucus in your stool, call your doctor.     Diet   Maintain a healthy diet throughout pregnancy by eating grains, fruits, vegetables, dairy products, meats, and beans. Avoid fatty greasy, and fried foods. Eat small, frequent meals throughout the day and NEVER skip breakfast. There are some foods you want to avoid during pregnancy:   Cheese such as brie, Gouda, and feta. These soft cheeses are not completely pasteurized and contain bacteria that can be harmful to your baby.   Shark, swordfish, law mackerel, and tilefish can be high in mercury. Limit canned tuna and salmon to once per week.   Packaged meat such as ham, bologna, and hot dogs. They contain bacteria that can be harmful. Eat them only if they are fully cooked. Raw meat and raw fish.   More information on foods below    Dizziness/Faintness  This is common during pregnancy when standing for long periods of time. You may also experience this when changing positions, such as moving from sitting to standing. This usually occurs in the 2nd trimester. [place] sure you drink plenty of water and avoid standing for longer periods of time. If it does not improve, contact your doctor.     Exclusive Breastfeeding  Mothers milk has everything a baby needs for the first six months of life. It make take time to learn to  breastfeed, but you have the support you need to be successful. All major health organizations recommend excusive breastfeeding for 6 months. This means no water, juice, tea, rice cereal, or solids for baby until after six months.     Exercise and Physical cavity   You can and should exercise during pregnancy but do not start a new rigorous activity. When you are exercising, your heart rate should not exceed 140 eats per minute. Do not exercise for more than 15 minutes in areas that are hot, humid, or not well ventilated. After the 4th month of pregnancy, avoid exercises that require you to lie on your back. Avoid exercises that will cause trauma to your abdomen, such as horseback riding, downhill skiing, wrestling, etc. swimming is permissible, but diving is not. If you experience excessive contractions, bleeding, loss of fluid, or decreased fetal movement, come to the hospital immediately.     Frequent Urination   Hormone changes at the beginning of pregnancy increase the frequency of urination. This is normal. Pressure from the uterus and the baby at the end of pregnancy decreases the capacity of the bladder - also leading to frequent urination. Because of the increased pressure, it's not uncommon to lose urine unexpectedly.     Gas and Bloating  It is common to have gas and bloating during pregnancy. Here are things to do to help:   Recognize the foods that give you gas and avoid eating them  Eat small, frequent meals instead of big, heavy meals   Don't eat fired, fatty, and greasy foods  Avoid constipation     Hair  Often, the hormonal changes during pregnancy cause your hair to break. You may also notice increased shedding during the post-partum period. These changes are normal.   Relaxer, perms, and hair dyes can be applied after the first trimester.     Headaches   There are different causes for headaches during pregnancy:   Tension Headaches: pain usually in the back and sides of the head that becomes worse  with stress. These are best treated by taking regular strength Tylenol, drinking plenty of water, and resting.   Sinus Headaches: Pain under eyes or around the face. Best relieved with regular strength Tylenol, alternating cold and warm compresses, or a humidifier.   Migraine Headaches: Often accompanied with nausea or vomiting. Light and sound make migraine pain worse. Tylenol may help with this pain. If you experience this, consult your doctor.   Tips to help with headaches:   Magnesium oxide 400 mg nightly  Check your Prenatal Vitamin and ensure you it has at least 400 mg of Vitamin B2. If it does not get OTC Vitamin B2 as well.    Riboflavin 400 mg daily  Caffeine (during the day, <200 mg daily)  Benadryl or OTC Melatonin 1-2 mg nightly (before bedtime).   2 extra strength Tylenol or 1-2 tablets (do not exceed 3000 mg tylenol daily)  Gatorade  Migraine frequency and severity should start to level off and improve during the second trimester as estrogen levels normalize.  Please contact me in 1-2 weeks to update me on any changes for better or worse.  Any time you experience a headache associated with blurry vision or a headache that's not relieved with Tylenol, contact your doctor. This may be cause by elevated blood pressure.     Heartburn   Pregnancy hormonal changes slow down your digestive system and the stomach takes longer to empty. This increases the production of gastric juices that can lead to heartburn. Hear are some ways to ease the pain:   Eat small, more frequent meals  Avoid spicy foods  Avoid fried, fatty, spicy food   Avoid irritants such as citrus juices, tomatoes, and sodas  Avoid alcohol, mini, coffee, and strong tea   Remain upright for at least one hour after eating   Medications: Tums, Rolaids, carafate, Mylanta with Simethicone, Gas-X. Pepcid AC, Prilosec OTC 20mg, Prevacid, Protonix.   Take over the counter Pepcid twice a day      Heart Palpitations  During pregnancy, you may feel as  though your heart is racing or skipping a beat. These can be normal but if associated with chest pain, shortness of breath, or fatigue, contact your doctor immediately.     Insomnia  Benadryl 25mg, Tylenol PM, Unisom     Hemorrhoids  As your pregnancy continues and you have more pelvic pressure, you may develop hemorrhoids. These can be painful. Here are tips to help with the pain:   Avoid constipation (see above).   Use hemorrhoid creams such as Annusol or Preparation H.   Use astringents such as Tucks Medicated Pads  If your pain persists or your experience excessive bleeding, contact your doctor.     Latch   When it comes to latching on for breastfeeding, only 2 things matter: comfort and effectiveness. Breastfeeding is part instinct, and part learning. Our staff will help the learning to make sure there is no pain and baby is getting milk     Leg Cramps   Muscle spasms in the calf, especially at night, are common during pregnancy. Try massaging the calves, stretching or applying a warm heating pad. If your leg cramps do not improve or only occur in one leg, go to the hospital.     Miscarriage   Unfortunately, this is the unhappy side of pregnancy and is very common. This is not your fault or your partner's fault. Most of the time, miscarriage is the results of genetic information not coming together in the right way. It will not affect your next pregnancy. However, if you have had 3 or more miscarriages, discuss this with your doctors. Also, you should be aware of the signs of a miscarriage:   Bleeding during pregnancy is a result of increased dilation of blood vessels. However, if you have bleeding that soaks through a sanitary pad, contact your doctor.   Cramping can be a sign of growth. However, if it is associated with bleeding, contact your doctor.   Pregnancy loss is a difficult life event. If you are having difficult coping, speak to your doctor or nurse about support groups or counselors.     Mood  Swings  You may be happy one minute and sad the next. Mood swings are a normal part of pregnancy and caused by hormones. However, if you are extremely sad, cry a lot, cannot sleep, are not eating or feel like hurting yourself or someone else, call your doctor immediately.     Nausea and Vomiting  During the early stages of pregnancy, these symptoms are common. These usually stop in the 2nd trimester. Here are some things you can do to reduce feeling nauseous.   Diet Modifications: Eat before or as soon as you feel hungry to avoid an empty stomach, which can aggravate nausea. Eat crackers or dry toast before sitting up in the morning. A snack before getting out of bed in the morning and snacks during the night may be helpful. Eat meals slowly and in small amounts every 1-2 hours to avoid an overly full stomach. Eliminate coffee and spicy, odorous, high-fat, acidic, or very sweet foods, and instead eat protein-dominant, salty, low-fat, bland, and/or dry meals. These include things like nuts, pretzels, crackers, cereal, toast. Drink fluids 30 minutes before or after food. Try drinking cold, clear, carbonated, or sour fluids in small amounts.    Avoid sudden movements. Get out of bed slowly.   Stay hydrated. If you cannot tolerate water, try Sprite.   Try ginger in any form: ginger ale, ginger snaps, or ginger tablets.   The only category A medication for nausea in pregnancy is doxylamine, but insurance coverage for the brand medication is still poor. It is available over-the-counter in a form of Unisom 25 mg.  The other component of Diclegis is Vitamin B6 (also known as pyridoxine), also available over-the-counter.  You could take 1/2 tablet of the Unisom in the morning and a full tablet in the evening (to minimize sleepiness during the day).  Take 25 mg of the pyridoxine every 6-8 hours. You can also try Emetrol.   If your vomiting persists for more than 24 hours, check with your doctor for further advice.      Nosebleeds  Because of increased dilation of the blood vessels during pregnancy, nosebleeds can occur. This condition does not usually require medical treatment. However, if the bleeding persists, contact your doctor.     Pain  Labor is hard work. When you're making decisions about how to hand birth pain, talk with your nurses and doctors about techniques that will not interfere with breastfeeding. For example, select a support person to be with you during labor, and practice ways of moving and massaging that help you feel comfortable.     Preeclampsia   This is a disorder during pregnancy in which your blood pressure goes up above limits that are normal for you. This can be dangerous if not monitored. Possible complications are seizure, stroke, placental abruption, pulmonary edema, kidney failure, and baby's death. Notify your doctor if you have:   Sudden weight gain of 5 or more pounds in one week   Generalized swelling that appears quickly   Decreased urine   Headaches that don't go away with Tylenol  Chest pain  Shortness of breath   Vision changes   If you have these symptoms with a blood pressure of >140/90 or you do not have these symptoms, but your blood pressure is >160/100 go to labor and delivery if you are >20 weeks.     Prenatal Visits  Prenatal care is essential to having a happy, healthy pregnancy. During your visits, your provider will listen to the baby's heart (after 12 weeks gestation) and make sure your baby is growing appropriately. You will have different laboratory tests performed, including your blood type, checking for anemia, and testing for infections. You will need to see a provider every 4 weeks until you are 28 weeks pregnant. Then you will see your provider every 2 weeks until you are 35 weeks pregnant. After 35 weeks, you will be seen every week until you deliver.      Labor   labor occurs when regular contractions begin to open our cervix before 37 weeks of  pregnancy. A full-term pregnancy should last about 40 weeks. If  labor can't be stopped, your baby will be born early.  The good news is that doctors can do a lot to delay an early delivery. The longer your baby gets to grow inside you, the less likely he or she is to have problems after birth. Here are some risks for  birth:   Pervious  labor and delivery  Abnormally shaped uterus or uterine surgery   Two or more second trimester miscarriages or abortions   Incompetent cervix, cone biopsy, large fibroid   Current pregnancy with twins, triplets, etc.   Severe urinary tract infection or kidney infection   Vaginal bleeding, placenta previa   Too much or too little amniotic fluid     Rooming In   This is when mom and baby are together in the same room throughout their whole stay. The doctors and nurses provide all clinical care in the room, except for some procedures that need to e done in another unit. Babies feel safe and secure when they are near the people who love them. Mother and babies sleep better quality when rooming in. nurses are more available to be with you and your baby in your room because they are not busy taking care of a nursery full of babies. They will help you with feeding, diapering, bathing, etc. This way, when you go home, you will feel confident.     Round Ligament Pain   There are 2 ligaments (a band of tough, flexible, fibrous connective tissue) from the front of the uterus an end in the vagina. These are the round ligaments.as the uterus grows and stretches, these ligaments stretch. Pain is often associated with this stretching. It can be sharp, stabbing pain usually in the lower pelvis or vagina. This pain is worse when you move from sitting to standing or walk for long periods. You can help this pain with the following:   Using a warm (not hot) heating pad  Warm bath   Regular strength Tylenol   Maternity belt   Staying well hydrated.     Salivation and spitting   Some  women experience increased salivation during pregnancy. This is known as ptyalism. Decrease your saliva by using non-mediated throat lozenges, sucking candy such as peppermint or eating crackers. This may stop in 2nd trimester but may continue throughout pregnancy.     Sexual intercourse  In most cases, it is safe to continue sexual intercourse throughout pregnancy. You may find a decreased or increase desire during pregnancy, and this is normal. Avoid sex if you are having bleeding, your water bag is ruptured, or you have been diagnosed with placenta previa or an incompetent cervix.     Shortness of Breath  Toward the end of pregnancy, many women experience shortness of breath. The uterus is getting bigger, and the diaphragm is unable to lower, making it feel like you are unable to catch your breath. However, if you experience wheezing, inability to catch your breath, dizziness or your blood pressure is elevated, call your doctor.     Skin Changes  Hormone changes in pregnancy affect the melanocytes and cause darkening of several areas of the body. Some women's faces darken causing a pregnancy mask. Some have darkening around the areola around the nipple. Often a dark line appears on the abdomen from the belly button to the pubic area. Stretch marks may also form. Most of the skin changes will fade during pregnancy. You may minimize the appearance of stretch marks by using cocoa butter lotion, vitamin E oil and other over the counter products.     Skin to Skin   The first few hours after birth are sacred. As soon as your baby is born, she or he will be dried ad placed on you for the first hug! As long as everyone is healthy, you will get to be skin to skin through your first feeding and for at least an hour. Baby has been growing inside of you and really needs to stay close after birth in order to adjust to life in the outside world. Feeling your warmth, hearing your heartbeat and voice, and receiving your milk  will all ensure a smooth transition. Other loved ones will enjoy holding the baby after this important adjustment period.     Support  We are here for you. With the right support, al mother can breastfeed. Our team of doctors, nurses, and lactation consultants can help you met your goals. Our job is to help you make informed decisions and to make sure you have the support you need to meet your goals.     Swelling  As the uterus gets larger, it lies on the inferior vena cava, diminishing the return of blood flow. This often leads to swelling in your ankles and feet, and sometimes in your hands. Swelling in your hands may lead to a condition known as carpal tunnel syndrome, resulting in pain in your wrists. You can improve swelling with these techniques:   Avoid salty foods  Elevate your feet high than the level of your heart  Avoid standing for long periods  Wear loose clothes  Wear wrist braces, especially at night, for carpal tunnel  If you experience swelling with blood pressure >140/90 or with other signs of preeclampsia, go to the hospital     Tiredness  Fatigue during pregnancy can be normal. It's most pronounced at the start and end of pregnancy. Make sure to get adequate sleep and rest. While most of the time this is normal, it can be a sign of anemia. You are screened for this routinely during your pregnancy.     Travel  Travel during pregnancy is safe. However, you should always check with your doctor first before traveling. During pregnancy, you are at increased risk for blood clots, so you should walk around every 1-2 hours during travel. Always wear a seatbelt when riding in the car. Place the shoulder strap across your chest and place the waist belt underneath your belly. You should avoid flying after 35 weeks.     True Labor vs False Labor  It's important to e able to tell the difference between true and false labor. When labor begins, you have regular contractions every 5 minutes for 2 hours. The  contractions get stronger (average is about 1 minute) in intensity and last longer. The cervix starts to dilate or open. A bloody show or pink discharge may occur. A sudden gush or leaking of watery fluid from the vagina may be because your water bag has broken.   When you are having false labor, the cervix does not dilate. Contractions are not in a regular pattern. They do not get strong in intensity and changing your activity, such as walking or lying down, may make the pain of contractions lessen. Staying hydrated with plenty of water and or taking a dose of Tylenol makes contractions decrease.     Urinary Tract Infection   Urinary tract infections can cause  labor. Here are some methods to help prevent getting a urinary tract infection:   Drink 8-10 glasses of water a day   Drink cranberry juice every day as it lowers the pH or the urinary tract and discourages bacterial growth   Empty your bladder immediately before and after sexual intercourse  Wipe from front to back after using the toilet  Avoid irritating bubble baths and soaps.   Wear cotton underwear.   If you experience burning when you urinate, blood in your urine, fever, chills, or pain associated with urination, tell your doctor.     Vaginal Bleeding and Discharge  Hormonal changes during pregnancy can cause vaginal discharge or varying consistencies. You may have a thick white discharge from your vagina that helps prepare your body for birth. You may have vaginal bleeding for different reasons such as broken blood vessels in your cervix after a vaginal exam. A vaginal infection may cause bleeding. Also, having sex may cause some of the blood vessels to break and result in some spotting. If you have bleeding like a menstrual period, itching, irritation, or foul odor, call your doctor.     Varicose Veins  Varicose veins occur because of dilation of the blood vessels during pregnancy. Varicose veins may occur on the legs or even the vulva. Avoid  standing for long periods of time, elevate your feet at night, and wear support hose during the day.     Weight Gain  While you should not diet during your pregnancy, there is an expected amount of weight you should gain during your pregnancy based on your BMI:   BMI  < 18.5 -  28-40 lbs.  18.5-24.9 -  25-35 lbs.  25-29.9 -  15-25 lbs.  >30 -   11-20 lbs.    Yeast Infections   In pregnancy, we would recommend any of the following (all are over the counter) - so you will not need a prescription for these medications. These products can be used at any point during pregnancy and don't pose a risk of birth defects or other pregnancy complications. For best results, choose a seven-day formula.   Clotrimazole (Mycelex, Lotrimin AF)  Miconazole (Monistat)  Terconazole    WHAT TO EXPECT AT CERTAINS WEEKS FOR VISITS/ULTRASOUNDS:     6-10 WEEKS  Schedule a transvaginal ultrasound to be done at a later date   This will be scheduled as a separate visit by our M department. This is usually scheduled between 8-12 weeks to confirm your dating.   Discuss prenatal vitamins  Order labs such as CBC, Rh type, Syphilis, Hepatitis, HIV, Rubella titers   Gonorrhea and Chlamydia test   Pap smear if due   Get medical history and previous OB history   Monthly visits until 28 week visit     12 weeks   Discuss prenatal lab testing  Check weight, blood pressure, and urine  Listen for fetal heart tones  Sign up for patient portal and connected mom   MaternTI 21 (NIPT) testing - optional     15-18 weeks  Listen for fetal heart tones and check uterine size  Order quad screen/maternal integrated screening if wanted   Check weight, blood pressure, urine  Maternal Quad Screen for down syndrome screen   Anatomy scan at 18-20 weeks     22-24 weeks  Glucose testing     28-30 weeks    Check weight, blood pressure, and urine  Listen for fetal heart tones  Tdap vaccine   Rhogam vaccine if - Rh.   Prenatal visits every 2 weeks until 36 weeks   Nonstress  tests if necessary     32-36 weeks   Check weight, blood pressure, and urine  Listen for fetal heart tones  Group B strep culture of vagina     37-40 weeks   Check weight, blood pressure, and urine  Listen for fetal heart tones  Discuss labor and delivery   Cervical examination     NUTRITION IN PREGNANCY   What is folic acid and how much do I need daily?   Folic acid, also known as folate, is a B vitamin that is important for pregnant women. Before pregnancy and during pregnancy, you need 400 micrograms of folic acid daily to help prevent major birth defects of the fetal brain and spine called neural tube defects. Current dietary guidelines recommend that pregnant women get at least 600 micrograms of folic acid daily from all sources. It may be hard to get the recommended amount of folic acid from food alone. For this reason, all pregnant women and all women who may become pregnant should take a daily vitamin supplement that contains folic acid.    Why is iron important during pregnancy and how much do I need daily?  Iron is used by your body to make a substance in red blood cells that carries oxygen to your organs and tissues. During pregnancy, you need extra iron--about double the amount that a nonpregnant woman needs. This extra iron helps your body make more blood to supply oxygen to your fetus. The daily recommended dose of iron during pregnancy is 27 mg, which is found in most prenatal vitamin supplements. You also can eat iron-rich foods, including lean red meat, poultry, fish, dried beans and peas, iron-fortified cereals, and prune juice. Iron also can be absorbed more easily if iron-rich foods are eaten with vitamin C-rich foods, such as citrus fruits and tomatoes.    Why is calcium important during pregnancy and how much do I need daily?  Calcium is used to build your fetus's bones and teeth. All women, including pregnant women, aged 19 years and older should get 1,000 mg of calcium daily; those aged 14-18  years should get 1,300 mg daily. Milk and other dairy products, such as cheese and yogurt, are the best sources of calcium. If you have trouble digesting milk products, you can get calcium from other sources, such as broccoli; dark, leafy greens; sardines; or a calcium supplement.    Why is vitamin D important during pregnancy and how much do I need daily?  Vitamin D works with calcium to help the fetus's bones and teeth develop. It also is essential for healthy skin and eyesight. All women, including those who are pregnant, need 600 international units of vitamin D a day. Good sources are milk fortified with vitamin D and fatty fish such as salmon. Exposure to sunlight also converts a chemical in the skin to vitamin D.    Can being overweight or obese affect my pregnancy?  Overweight and obese women are at an increased risk of several pregnancy problems. These problems include gestational diabetes, high blood pressure, preeclampsia,  birth, and  delivery. Babies of overweight and obese women also are at greater risk of certain problems, such as birth defects, macrosomia with possible birth injury, and childhood obesity.    Can caffeine in my diet affect my pregnancy?  Although there have been many studies on whether caffeine increases the risk of miscarriage, the results are unclear. Most experts state that consuming fewer than 200 mg of caffeine (one 12-ounce cup of coffee) a day during pregnancy is safe.  Food and Beverages Milligrams of Caffeine (Average)   Coffee (8 oz)    Brewed, drip  137   Instant 76   Tea (8oz)     Brewed 48   Instant 26-36   Caffeinated soft drinks (12 oz)  37   Hot cocoa (12 oz) 8-12   Chocolate milk (8 oz)  5-8   Candy    Dark chocolate (1.45 oz) 30   Milk chocolate (1.55 oz)  11   Semi-sweet chocolate (1/4 cup)  26-28   Chocolate syrup (1 tbsp.) 3   Coffee ice cream or frozen yogurt (1/2 cup) 2     How much water should I drink during pregnancy?   During pregnancy,  adequate fluid intake from consumption of beverages (water and other liquids) is estimated to be approximately 2.3 L/day (76 fluid ounces or approximately 10 cups). Additional water is consumed in foods other than beverages to meet the total adequate intake of 3 L/day.    What are the benefits of including fish and shellfish in my diet during pregnancy?  Omega-3 fatty acids are a type of fat found naturally in many kinds of fish. They may be important factors in your fetus's brain development both before and after birth. To get the most benefits from omega-3 fatty acids, women should eat at least two servings of fish or shellfish (about 8-12 ounces) per week before getting pregnant, while pregnant, and while breastfeeding.    What should I know about eating fish during pregnancy?  Some types of fish have higher levels of a metal called mercury than others. Mercury has been linked to birth defects. To limit your exposure to mercury, follow a few simple guidelines. Choose fish and shellfish such as shrimp, salmon, catfish, and pollock. Do not eat shark, swordfish, law mackerel, nolan, orange roughy, or tilefish. Limit white (albacore) tuna to 6 ounces a week. You also should check advisories about fish caught in local [place].    How can food poisoning affect my pregnancy?  Food poisoning in a pregnant woman can cause serious problems for both her and her fetus. Vomiting and diarrhea can cause your body to lose too much water and can disrupt your body's chemical balance. To prevent food poisoning, follow these general guidelines:    Wash food. Rinse all raw produce thoroughly under running tap water before eating, cutting, or cooking.  Keep your kitchen clean. Wash your hands, knives, countertops, and cutting boards after handling and preparing uncooked foods.  Avoid all raw and undercooked seafood, eggs, and meat. Do not eat sushi made with raw fish (cooked sushi is safe). Food such as beef, pork, or poultry should  be cooked to a safe internal temperature.    What is listeriosis and how can it affect my pregnancy?  Listeriosis is a type of food-borne illness caused by bacteria. Pregnant women are 13 times more likely to get listeriosis than the general population. Listeriosis can cause mild, flu-like symptoms such as fever, muscle aches, and diarrhea, but it also may not cause any symptoms. Listeriosis can lead to miscarriage, stillbirth, and premature delivery. Antibiotics can be given to treat the infection and to protect your fetus. To help prevent listeriosis, avoid eating the following foods during pregnancy:  Unpasteurized milk and foods made with unpasteurized milk  Hot dogs, luncheon meats, and cold cuts unless they are heated until steaming hot just before serving  Refrigerated roberts and meat spreads  Refrigerated smoked seafood  Raw and undercooked seafood, eggs, and meat

## 2023-06-22 ENCOUNTER — LAB VISIT (OUTPATIENT)
Dept: LAB | Facility: HOSPITAL | Age: 43
End: 2023-06-22
Attending: PHYSICIAN ASSISTANT
Payer: MEDICAID

## 2023-06-22 ENCOUNTER — PATIENT MESSAGE (OUTPATIENT)
Dept: OBSTETRICS AND GYNECOLOGY | Facility: CLINIC | Age: 43
End: 2023-06-22
Payer: MEDICAID

## 2023-06-22 DIAGNOSIS — Z3A.01 LESS THAN 8 WEEKS GESTATION OF PREGNANCY: ICD-10-CM

## 2023-06-22 DIAGNOSIS — Z11.3 ENCOUNTER FOR SCREENING EXAMINATION FOR SEXUALLY TRANSMITTED DISEASE: ICD-10-CM

## 2023-06-22 DIAGNOSIS — O20.9 BLEEDING IN EARLY PREGNANCY: Primary | ICD-10-CM

## 2023-06-22 LAB
ABO + RH BLD: NORMAL
BACTERIA UR CULT: ABNORMAL
BASOPHILS # BLD AUTO: 0.07 K/UL (ref 0–0.2)
BASOPHILS NFR BLD: 1.1 % (ref 0–1.9)
BLD GP AB SCN CELLS X3 SERPL QL: NORMAL
DIFFERENTIAL METHOD: ABNORMAL
EOSINOPHIL # BLD AUTO: 0.1 K/UL (ref 0–0.5)
EOSINOPHIL NFR BLD: 1.7 % (ref 0–8)
ERYTHROCYTE [DISTWIDTH] IN BLOOD BY AUTOMATED COUNT: 12.7 % (ref 11.5–14.5)
ESTIMATED AVG GLUCOSE: 108 MG/DL (ref 68–131)
HBA1C MFR BLD: 5.4 % (ref 4–5.6)
HBV SURFACE AG SERPL QL IA: NORMAL
HCG INTACT+B SERPL-ACNC: 2563 MIU/ML
HCT VFR BLD AUTO: 36.7 % (ref 37–48.5)
HCV AB SERPL QL IA: NORMAL
HGB BLD-MCNC: 12 G/DL (ref 12–16)
HIV 1+2 AB+HIV1 P24 AG SERPL QL IA: NORMAL
IMM GRANULOCYTES # BLD AUTO: 0.01 K/UL (ref 0–0.04)
IMM GRANULOCYTES NFR BLD AUTO: 0.2 % (ref 0–0.5)
LYMPHOCYTES # BLD AUTO: 1.8 K/UL (ref 1–4.8)
LYMPHOCYTES NFR BLD: 27.5 % (ref 18–48)
MCH RBC QN AUTO: 31.1 PG (ref 27–31)
MCHC RBC AUTO-ENTMCNC: 32.7 G/DL (ref 32–36)
MCV RBC AUTO: 95 FL (ref 82–98)
MONOCYTES # BLD AUTO: 0.5 K/UL (ref 0.3–1)
MONOCYTES NFR BLD: 7.8 % (ref 4–15)
NEUTROPHILS # BLD AUTO: 4 K/UL (ref 1.8–7.7)
NEUTROPHILS NFR BLD: 61.7 % (ref 38–73)
NRBC BLD-RTO: 0 /100 WBC
PLATELET # BLD AUTO: 271 K/UL (ref 150–450)
PMV BLD AUTO: 9.8 FL (ref 9.2–12.9)
RBC # BLD AUTO: 3.86 M/UL (ref 4–5.4)
SPECIMEN OUTDATE: NORMAL
WBC # BLD AUTO: 6.54 K/UL (ref 3.9–12.7)

## 2023-06-22 PROCEDURE — 87389 HIV-1 AG W/HIV-1&-2 AB AG IA: CPT | Performed by: PHYSICIAN ASSISTANT

## 2023-06-22 PROCEDURE — 86762 RUBELLA ANTIBODY: CPT | Performed by: PHYSICIAN ASSISTANT

## 2023-06-22 PROCEDURE — 86900 BLOOD TYPING SEROLOGIC ABO: CPT | Performed by: PHYSICIAN ASSISTANT

## 2023-06-22 PROCEDURE — 85025 COMPLETE CBC W/AUTO DIFF WBC: CPT | Performed by: PHYSICIAN ASSISTANT

## 2023-06-22 PROCEDURE — 84702 CHORIONIC GONADOTROPIN TEST: CPT | Performed by: PHYSICIAN ASSISTANT

## 2023-06-22 PROCEDURE — 36415 COLL VENOUS BLD VENIPUNCTURE: CPT | Performed by: PHYSICIAN ASSISTANT

## 2023-06-22 PROCEDURE — 86592 SYPHILIS TEST NON-TREP QUAL: CPT | Performed by: PHYSICIAN ASSISTANT

## 2023-06-22 PROCEDURE — 83020 HEMOGLOBIN ELECTROPHORESIS: CPT | Performed by: PHYSICIAN ASSISTANT

## 2023-06-22 PROCEDURE — 83036 HEMOGLOBIN GLYCOSYLATED A1C: CPT | Performed by: PHYSICIAN ASSISTANT

## 2023-06-22 PROCEDURE — 86803 HEPATITIS C AB TEST: CPT | Performed by: PHYSICIAN ASSISTANT

## 2023-06-22 PROCEDURE — 87340 HEPATITIS B SURFACE AG IA: CPT | Performed by: PHYSICIAN ASSISTANT

## 2023-06-23 LAB
BACTERIAL VAGINOSIS DNA: NEGATIVE
C TRACH DNA SPEC QL NAA+PROBE: NOT DETECTED
CANDIDA GLABRATA DNA: NEGATIVE
CANDIDA KRUSEI DNA: NEGATIVE
CANDIDA RRNA VAG QL PROBE: NEGATIVE
N GONORRHOEA DNA SPEC QL NAA+PROBE: NOT DETECTED
RPR SER QL: NORMAL
RUBV IGG SER-ACNC: 259 IU/ML
RUBV IGG SER-IMP: REACTIVE
T VAGINALIS RRNA GENITAL QL PROBE: NEGATIVE

## 2023-06-24 ENCOUNTER — LAB VISIT (OUTPATIENT)
Dept: LAB | Facility: HOSPITAL | Age: 43
End: 2023-06-24
Attending: PHYSICIAN ASSISTANT
Payer: MEDICAID

## 2023-06-24 DIAGNOSIS — Z3A.01 LESS THAN 8 WEEKS GESTATION OF PREGNANCY: ICD-10-CM

## 2023-06-24 DIAGNOSIS — O20.9 BLEEDING IN EARLY PREGNANCY: ICD-10-CM

## 2023-06-24 LAB — HCG INTACT+B SERPL-ACNC: 660 MIU/ML

## 2023-06-24 PROCEDURE — 84702 CHORIONIC GONADOTROPIN TEST: CPT | Performed by: PHYSICIAN ASSISTANT

## 2023-06-24 PROCEDURE — 36415 COLL VENOUS BLD VENIPUNCTURE: CPT | Mod: PO | Performed by: PHYSICIAN ASSISTANT

## 2023-06-26 ENCOUNTER — OFFICE VISIT (OUTPATIENT)
Dept: OBSTETRICS AND GYNECOLOGY | Facility: CLINIC | Age: 43
End: 2023-06-26
Payer: MEDICAID

## 2023-06-26 VITALS
SYSTOLIC BLOOD PRESSURE: 130 MMHG | BODY MASS INDEX: 30.3 KG/M2 | HEIGHT: 68 IN | WEIGHT: 199.94 LBS | DIASTOLIC BLOOD PRESSURE: 72 MMHG

## 2023-06-26 DIAGNOSIS — O03.9 SPONTANEOUS ABORTION: Primary | ICD-10-CM

## 2023-06-26 LAB
HGB A2 MFR BLD HPLC: 2.7 % (ref 2.2–3.2)
HGB FRACT BLD ELPH-IMP: NORMAL
HGB FRACT BLD ELPH-IMP: NORMAL
HPV HR 12 DNA SPEC QL NAA+PROBE: NEGATIVE
HPV16 AG SPEC QL: NEGATIVE
HPV18 DNA SPEC QL NAA+PROBE: NEGATIVE

## 2023-06-26 PROCEDURE — 3008F BODY MASS INDEX DOCD: CPT | Mod: CPTII,,, | Performed by: OBSTETRICS & GYNECOLOGY

## 2023-06-26 PROCEDURE — 1160F PR REVIEW ALL MEDS BY PRESCRIBER/CLIN PHARMACIST DOCUMENTED: ICD-10-PCS | Mod: CPTII,,, | Performed by: OBSTETRICS & GYNECOLOGY

## 2023-06-26 PROCEDURE — 1159F MED LIST DOCD IN RCRD: CPT | Mod: CPTII,,, | Performed by: OBSTETRICS & GYNECOLOGY

## 2023-06-26 PROCEDURE — 1159F PR MEDICATION LIST DOCUMENTED IN MEDICAL RECORD: ICD-10-PCS | Mod: CPTII,,, | Performed by: OBSTETRICS & GYNECOLOGY

## 2023-06-26 PROCEDURE — 3008F PR BODY MASS INDEX (BMI) DOCUMENTED: ICD-10-PCS | Mod: CPTII,,, | Performed by: OBSTETRICS & GYNECOLOGY

## 2023-06-26 PROCEDURE — 1160F RVW MEDS BY RX/DR IN RCRD: CPT | Mod: CPTII,,, | Performed by: OBSTETRICS & GYNECOLOGY

## 2023-06-26 PROCEDURE — 3044F PR MOST RECENT HEMOGLOBIN A1C LEVEL <7.0%: ICD-10-PCS | Mod: CPTII,,, | Performed by: OBSTETRICS & GYNECOLOGY

## 2023-06-26 PROCEDURE — 99203 PR OFFICE/OUTPT VISIT, NEW, LEVL III, 30-44 MIN: ICD-10-PCS | Mod: S$PBB,TH,, | Performed by: OBSTETRICS & GYNECOLOGY

## 2023-06-26 PROCEDURE — 99999 PR PBB SHADOW E&M-EST. PATIENT-LVL III: CPT | Mod: PBBFAC,,, | Performed by: OBSTETRICS & GYNECOLOGY

## 2023-06-26 PROCEDURE — 3075F SYST BP GE 130 - 139MM HG: CPT | Mod: CPTII,,, | Performed by: OBSTETRICS & GYNECOLOGY

## 2023-06-26 PROCEDURE — 99203 OFFICE O/P NEW LOW 30 MIN: CPT | Mod: S$PBB,TH,, | Performed by: OBSTETRICS & GYNECOLOGY

## 2023-06-26 PROCEDURE — 99213 OFFICE O/P EST LOW 20 MIN: CPT | Mod: PBBFAC,TH | Performed by: OBSTETRICS & GYNECOLOGY

## 2023-06-26 PROCEDURE — 3078F DIAST BP <80 MM HG: CPT | Mod: CPTII,,, | Performed by: OBSTETRICS & GYNECOLOGY

## 2023-06-26 PROCEDURE — 3075F PR MOST RECENT SYSTOLIC BLOOD PRESS GE 130-139MM HG: ICD-10-PCS | Mod: CPTII,,, | Performed by: OBSTETRICS & GYNECOLOGY

## 2023-06-26 PROCEDURE — 3078F PR MOST RECENT DIASTOLIC BLOOD PRESSURE < 80 MM HG: ICD-10-PCS | Mod: CPTII,,, | Performed by: OBSTETRICS & GYNECOLOGY

## 2023-06-26 PROCEDURE — 3044F HG A1C LEVEL LT 7.0%: CPT | Mod: CPTII,,, | Performed by: OBSTETRICS & GYNECOLOGY

## 2023-06-26 PROCEDURE — 99999 PR PBB SHADOW E&M-EST. PATIENT-LVL III: ICD-10-PCS | Mod: PBBFAC,,, | Performed by: OBSTETRICS & GYNECOLOGY

## 2023-06-26 NOTE — PROGRESS NOTES
Subjective:      Patient ID: Gilberto Bueno is a 43 y.o. female.    Chief Complaint:  Follow-up (Follow up bleeding in early pregnancy- SAB)      History of Present Illness  HPI  Patient comes in today for follow-up  Seen our PA, Ms. Xiao on 2023 for confirmation  Started bleeding on 2023.  Went to our ED  Beta HCG that day was 2563  Passage of tissue at  home on 2023.  Sent us pictures online  HCG on repeat on 2023 was down to 660    Cramps and bleeding are much better now.  Crying because she is sad.  Denies fever and chills.  No nausea or vomiting.        GYN & OB History  Patient's last menstrual period was 04/10/2023 (exact date).   Date of Last Pap: 2023    OB History    Para Term  AB Living   7 6 5 1   6   SAB IAB Ectopic Multiple Live Births           6      # Outcome Date GA Lbr Zeus/2nd Weight Sex Delivery Anes PTL Lv   7 Current            6 Term 19 39w0d / 00:30 3.485 kg (7 lb 10.9 oz) M Vag-Vacuum EPI N    5 Term 11/24/10   3.24 kg (7 lb 2.3 oz) F Vag-Spont  N CHRIS   4  07 33w0d  2.807 kg (6 lb 3 oz) F Vag-Spont  Y CHRIS   3 Term 05   3.997 kg (8 lb 13 oz) M Vag-Spont  N CHRIS   2 Term 01   4.99 kg (11 lb) M Vag-Spont  N CHRIS   1 Term 99   3.459 kg (7 lb 10 oz) M Vag-Spont  N CHRIS     Past Medical History:   Diagnosis Date    History of chlamydia     History of gonorrhea 2016       History reviewed. No pertinent surgical history.    History reviewed. No pertinent family history.    Social History     Socioeconomic History    Marital status: Single   Tobacco Use    Smoking status: Never    Smokeless tobacco: Never   Substance and Sexual Activity    Alcohol use: Yes     Comment: occ    Drug use: No    Sexual activity: Yes     Partners: Male   Social History Narrative    Together for a long time    Getting  2016    He owns his own business    She is doing home health       Current Outpatient Medications    Medication Sig Dispense Refill    cephALEXin (KEFLEX) 500 MG capsule Take 1 capsule (500 mg total) by mouth 4 (four) times daily. for 7 days 28 capsule 0    PNV,calcium 72-iron-folic acid (PRENATAL VITAMIN PLUS LOW IRON) 27 mg iron- 1 mg Tab Take one tablet daily.  Prescribe prenatal covered by insurance 90 tablet 11     No current facility-administered medications for this visit.       Review of patient's allergies indicates:  No Known Allergies    Review of Systems  Review of Systems   Constitutional:  Positive for fatigue. Negative for activity change, appetite change, chills, fever and unexpected weight change.   HENT:  Negative for mouth sores.    Respiratory:  Negative for cough, shortness of breath and wheezing.    Cardiovascular:  Negative for chest pain and palpitations.   Gastrointestinal:  Negative for abdominal pain, bloating, blood in stool, constipation, nausea and vomiting.   Endocrine: Negative for diabetes and hot flashes.   Genitourinary:  Positive for menorrhagia and vaginal bleeding. Negative for dysmenorrhea, dyspareunia, dysuria, frequency, hematuria, menstrual problem, pelvic pain, urgency, vaginal discharge, vaginal pain, urinary incontinence, postcoital bleeding and vaginal odor.   Musculoskeletal:  Negative for back pain and myalgias.   Integumentary:  Negative for rash, breast mass and nipple discharge.   Neurological:  Negative for seizures and headaches.   Psychiatric/Behavioral:  Negative for depression and sleep disturbance. The patient is not nervous/anxious.    Breast: Negative for mass, mastodynia and nipple discharge       Objective:     Physical Exam:   Constitutional: She appears well-developed and well-nourished. No distress.   BMI of 30.40    HENT:   Head: Normocephalic and atraumatic.    Eyes: EOM are normal.      Pulmonary/Chest: Effort normal. No respiratory distress.   Breasts: Non-tender, no engorgement, no masses, no retraction, no discharge. Negative for  lymphadenopathy.         Abdominal: Soft. She exhibits no distension. There is no abdominal tenderness. There is no rebound and no guarding.     Genitourinary:    Uterus normal.   There is vaginal discharge in the vagina.    Genitourinary Comments: Vulva without any obvious lesions.  Urethral meatus normal size and location without any lesion.  Urethra is non-tender without stricture or discharge.  Bladder is non-tender.  Vaginal vault with good support.  Minimal bleeding noted.  No obvious lesion.  Normal rugation.  Cervix is without any cervical motion tenderness.  No obvious lesion.  Os is closed.  Uterus is small, non-tender, normal contour.  Adnexa is without any masses or tenderness.  Perineum without obvious lesion.               Musculoskeletal: Normal range of motion.       Neurological: She is alert.    Skin: Skin is warm and dry.    Psychiatric: She has a normal mood and affect.       Assessment:     1. Spontaneous              Plan:       I have discussed with the patient regarding her condition.  Discussed spontaneous  and management    HCG in 2 weeks prior to returning.  Continue with prenatal vitamins  Watch for fever and pain

## 2023-06-28 LAB
FINAL PATHOLOGIC DIAGNOSIS: NORMAL
Lab: NORMAL

## 2023-07-05 ENCOUNTER — PATIENT MESSAGE (OUTPATIENT)
Dept: MATERNAL FETAL MEDICINE | Facility: CLINIC | Age: 43
End: 2023-07-05
Payer: MEDICAID

## 2023-07-19 ENCOUNTER — LAB VISIT (OUTPATIENT)
Dept: LAB | Facility: HOSPITAL | Age: 43
End: 2023-07-19
Attending: OBSTETRICS & GYNECOLOGY
Payer: MEDICAID

## 2023-07-19 DIAGNOSIS — O03.9 SPONTANEOUS ABORTION: ICD-10-CM

## 2023-07-19 LAB — HCG INTACT+B SERPL-ACNC: 4.1 MIU/ML

## 2023-07-19 PROCEDURE — 36415 COLL VENOUS BLD VENIPUNCTURE: CPT | Performed by: OBSTETRICS & GYNECOLOGY

## 2023-07-19 PROCEDURE — 84702 CHORIONIC GONADOTROPIN TEST: CPT | Performed by: OBSTETRICS & GYNECOLOGY

## 2023-07-20 ENCOUNTER — TELEPHONE (OUTPATIENT)
Dept: OBSTETRICS AND GYNECOLOGY | Facility: CLINIC | Age: 43
End: 2023-07-20
Payer: MEDICAID

## 2023-07-20 ENCOUNTER — PATIENT MESSAGE (OUTPATIENT)
Dept: OBSTETRICS AND GYNECOLOGY | Facility: CLINIC | Age: 43
End: 2023-07-20
Payer: MEDICAID

## 2023-07-20 NOTE — TELEPHONE ENCOUNTER
Attempted to call pt but was unable to reach her so a patient message was sent to her. laureen    ----- Message from Willis Gonzales sent at 7/18/2023  2:18 PM CDT -----  Type: Patient Call Back    Who called:Self     What is the request in detail: PT asking to move her appt around asking if she can get a call form Nurse     Can the clinic reply by [facility]? No     Would the patient rather a call back or a response via [facility]? Call     Best call back number: [phone] (home)

## 2023-07-23 ENCOUNTER — HOSPITAL ENCOUNTER (EMERGENCY)
Facility: HOSPITAL | Age: 43
Discharge: HOME | End: 2023-07-23
Attending: EMERGENCY MEDICINE
Payer: MEDICAID

## 2023-07-23 VITALS
WEIGHT: 199.31 LBS | BODY MASS INDEX: 30.3 KG/M2 | TEMPERATURE: 96 F | OXYGEN SATURATION: 100 % | HEART RATE: 78 BPM | RESPIRATION RATE: 16 BRPM | SYSTOLIC BLOOD PRESSURE: 114 MMHG | DIASTOLIC BLOOD PRESSURE: 65 MMHG

## 2023-07-23 DIAGNOSIS — R45.851 SUICIDAL IDEATION: Primary | ICD-10-CM

## 2023-07-23 LAB
ALBUMIN SERPL BCP-MCNC: 3.8 G/DL (ref 3.5–5.2)
ALP SERPL-CCNC: 63 U/L (ref 55–135)
ALT SERPL W/O P-5'-P-CCNC: 15 U/L (ref 10–44)
AMPHET+METHAMPHET UR QL: NEGATIVE
ANION GAP SERPL CALC-SCNC: 10 MMOL/L (ref 8–16)
APAP SERPL-MCNC: <3 UG/ML (ref 10–20)
AST SERPL-CCNC: 14 U/L (ref 10–40)
B-HCG UR QL: NEGATIVE
BARBITURATES UR QL SCN>200 NG/ML: NEGATIVE
BASOPHILS # BLD AUTO: 0.05 K/UL (ref 0–0.2)
BASOPHILS NFR BLD: 1.1 % (ref 0–1.9)
BENZODIAZ UR QL SCN>200 NG/ML: NEGATIVE
BILIRUB SERPL-MCNC: 0.4 MG/DL (ref 0.1–1)
BILIRUB UR QL STRIP: NEGATIVE
BUN SERPL-MCNC: 7 MG/DL (ref 6–20)
BZE UR QL SCN: NEGATIVE
CALCIUM SERPL-MCNC: 9.4 MG/DL (ref 8.7–10.5)
CANNABINOIDS UR QL SCN: NEGATIVE
CHLORIDE SERPL-SCNC: 106 MMOL/L (ref 95–110)
CLARITY UR REFRACT.AUTO: CLEAR
CO2 SERPL-SCNC: 21 MMOL/L (ref 23–29)
COLOR UR AUTO: YELLOW
CREAT SERPL-MCNC: 0.7 MG/DL (ref 0.5–1.4)
CREAT UR-MCNC: 54 MG/DL (ref 15–325)
CTP QC/QA: YES
DIFFERENTIAL METHOD: ABNORMAL
EOSINOPHIL # BLD AUTO: 0 K/UL (ref 0–0.5)
EOSINOPHIL NFR BLD: 0.7 % (ref 0–8)
ERYTHROCYTE [DISTWIDTH] IN BLOOD BY AUTOMATED COUNT: 13.5 % (ref 11.5–14.5)
EST. GFR  (NO RACE VARIABLE): >60 ML/MIN/1.73 M^2
ETHANOL SERPL-MCNC: <10 MG/DL
GLUCOSE SERPL-MCNC: 88 MG/DL (ref 70–110)
GLUCOSE UR QL STRIP: NEGATIVE
HCT VFR BLD AUTO: 43.6 % (ref 37–48.5)
HGB BLD-MCNC: 14 G/DL (ref 12–16)
HGB UR QL STRIP: NEGATIVE
IMM GRANULOCYTES # BLD AUTO: 0.02 K/UL (ref 0–0.04)
IMM GRANULOCYTES NFR BLD AUTO: 0.4 % (ref 0–0.5)
KETONES UR QL STRIP: NEGATIVE
LEUKOCYTE ESTERASE UR QL STRIP: NEGATIVE
LYMPHOCYTES # BLD AUTO: 1.6 K/UL (ref 1–4.8)
LYMPHOCYTES NFR BLD: 35.8 % (ref 18–48)
MCH RBC QN AUTO: 31.5 PG (ref 27–31)
MCHC RBC AUTO-ENTMCNC: 32.1 G/DL (ref 32–36)
MCV RBC AUTO: 98 FL (ref 82–98)
METHADONE UR QL SCN>300 NG/ML: NEGATIVE
MONOCYTES # BLD AUTO: 0.3 K/UL (ref 0.3–1)
MONOCYTES NFR BLD: 7.3 % (ref 4–15)
NEUTROPHILS # BLD AUTO: 2.5 K/UL (ref 1.8–7.7)
NEUTROPHILS NFR BLD: 54.7 % (ref 38–73)
NITRITE UR QL STRIP: NEGATIVE
NRBC BLD-RTO: 0 /100 WBC
OPIATES UR QL SCN: NEGATIVE
PCP UR QL SCN>25 NG/ML: NEGATIVE
PH UR STRIP: 6 [PH] (ref 5–8)
PLATELET # BLD AUTO: 274 K/UL (ref 150–450)
PMV BLD AUTO: 10.8 FL (ref 9.2–12.9)
POTASSIUM SERPL-SCNC: 4 MMOL/L (ref 3.5–5.1)
PROT SERPL-MCNC: 8.8 G/DL (ref 6–8.4)
PROT UR QL STRIP: NEGATIVE
RBC # BLD AUTO: 4.45 M/UL (ref 4–5.4)
SODIUM SERPL-SCNC: 137 MMOL/L (ref 136–145)
SP GR UR STRIP: 1.01 (ref 1–1.03)
TOXICOLOGY INFORMATION: NORMAL
TSH SERPL DL<=0.005 MIU/L-ACNC: 0.81 UIU/ML (ref 0.4–4)
URN SPEC COLLECT METH UR: NORMAL
WBC # BLD AUTO: 4.55 K/UL (ref 3.9–12.7)

## 2023-07-23 PROCEDURE — 80307 DRUG TEST PRSMV CHEM ANLYZR: CPT

## 2023-07-23 PROCEDURE — 85025 COMPLETE CBC W/AUTO DIFF WBC: CPT

## 2023-07-23 PROCEDURE — 80053 COMPREHEN METABOLIC PANEL: CPT | Performed by: EMERGENCY MEDICINE

## 2023-07-23 PROCEDURE — 84443 ASSAY THYROID STIM HORMONE: CPT | Performed by: EMERGENCY MEDICINE

## 2023-07-23 PROCEDURE — 81003 URINALYSIS AUTO W/O SCOPE: CPT | Mod: 59

## 2023-07-23 PROCEDURE — 80143 DRUG ASSAY ACETAMINOPHEN: CPT | Performed by: EMERGENCY MEDICINE

## 2023-07-23 PROCEDURE — 99285 EMERGENCY DEPT VISIT HI MDM: CPT

## 2023-07-23 PROCEDURE — 81025 URINE PREGNANCY TEST: CPT

## 2023-07-23 PROCEDURE — 82077 ASSAY SPEC XCP UR&BREATH IA: CPT

## 2023-07-23 NOTE — ED PROVIDER NOTES
"Encounter Date: 7/23/2023       History     Chief Complaint   Patient presents with    Suicidal     Pt. Was fighting with boyfriend. Pt. Told her boyfriend that she was going to take "a bunch of pills and kill myself."      43-year-old female with recent miscarriage (1 month ago) now brought in by EMS with reports of threats of committing suicide.  JPS so report that patient was having a fight with her boyfriend and reported wanting to take "pills to kill herself." Upon arrival to emergency department patient is calm and cooperative and denies making this threat and denies wanting to hurt herself or others.  Patient reports that her and her boyfriend got in a fight and he was angry with her so he called the police to "get back at her." Patient denies taking any medications or drugs and denies trying to harm herself with any other means.  Additionally patient denies having a plan to harm herself or take her own life.    The history is provided by the patient.   Review of patient's allergies indicates:  No Known Allergies  Past Medical History:   Diagnosis Date    History of chlamydia 2019    History of gonorrhea 2016     No past surgical history on file.  No family history on file.  Social History     Tobacco Use    Smoking status: Never    Smokeless tobacco: Never   Substance Use Topics    Alcohol use: Yes     Comment: occ    Drug use: No     Review of Systems  See HPI    Physical Exam     Initial Vitals [07/23/23 1241]   BP Pulse Resp Temp SpO2   127/86 98 15 98.5 °F (36.9 °C) 100 %      MAP       --         Physical Exam    Nursing note and vitals reviewed.    Gen: AxOx3, well nourished, appears stated age, no pallor, no jaundice, appears well hydrated  Eye: EOMI, no scleral icterus, no periorbital edema or ecchymosis  Head: Normocephalic, atraumatic, no lesions, scalp appears normal  ENT: Neck supple, no stridor, no masses, no drooling or voice changes  CVS: All distal pulses intact with normal rate and rhythm, " no JVD, normal S1/S2, no murmur  Pulm: Normal breath sounds, no wheezes, rales or rhonchi, no increased work of breathing  Abd:  Nondistended, soft, nontender, no organomegaly, no CVAT  Ext: No edema, no lesions, rashes, or deformity  Neuro: GCS15, moving all extremities, gait intact, face grossly symmetric  Psych:   Appearance:  Kept  Behavior:  Normal behavior and calm and cooperative  Cognition:  Intact  Speech:  Not pressured or poverty of  Thought:  Linear  Mood/affect:  Not depressed or elevated  Psychomotor:  No agitation  Insight:  Denies making remarks about suicide and difficult to obtain full history concern for possible remorse of remarks  Hallucinations:  Not objectively hallucinating and denies hearing voices or seeing objects  Delusions:  Denies  Suicidality:  Patient denies making remarks of wanting to commit suicide and currently denies any ongoing suicidal thoughts, no desire to hurt herself, and denies wanting to hurt other people      ED Course   Procedures  Labs Reviewed   CBC W/ AUTO DIFFERENTIAL - Abnormal; Notable for the following components:       Result Value    MCH 31.5 (*)     All other components within normal limits   COMPREHENSIVE METABOLIC PANEL - Abnormal; Notable for the following components:    CO2 21 (*)     Total Protein 8.8 (*)     All other components within normal limits   ACETAMINOPHEN LEVEL - Abnormal; Notable for the following components:    Acetaminophen (Tylenol), Serum <3.0 (*)     All other components within normal limits   URINALYSIS, REFLEX TO URINE CULTURE    Narrative:     Specimen Source->Urine   DRUG SCREEN PANEL, URINE EMERGENCY    Narrative:     Specimen Source->Urine   ALCOHOL,MEDICAL (ETHANOL)   TSH   POCT URINE PREGNANCY          Imaging Results    None          Medications - No data to display  Medical Decision Making:   Initial Assessment:   43-year-old female brought to emergency department reported remarks of suicide. Patient is able to vocalise,  breathing spontaneously, hemodynamically stable, oriented, moving all 4 limbs spontaneously.  Patient examines well and denies any suicidal desires.  Differential Diagnosis:   Risk of suicide  Anxiety  Depression  Doubt psychosis  Doubt suicide attempt  Clinical Tests:   Lab Tests: Ordered and Reviewed  ED Management:  Patient was calm and cooperative on presentation with stable vital signs.  Patient denied any desire to take her own life and denies making there are marks.  Given the gap in history decided to obtain psychiatry consult to evaluate for risk of suicide.      Patient signed out to oncoming team pending psychiatry recommendations          Attending Attestation:   Physician Attestation Statement for Resident:  As the supervising MD   Physician Attestation Statement: I have personally seen and examined this patient.   I agree with the above history.  -:   As the supervising MD I agree with the above PE.     As the supervising MD I agree with the above treatment, course, plan, and disposition.   -: Reported SI per significant other, patient denies.  No somatic complaints.  Pending psych consult, dispo per their recs.                                Clinical Impression:   Final diagnoses:  [R45.851] Suicidal ideation (Primary)        ED Disposition Condition    Discharge Stable          ED Prescriptions    None       Follow-up Information       Follow up With Specialties Details Why Contact Info Additional Information    Liss Wise MD Family Medicine In 1 week  [address] [place] [address]  [id number]  [facility] [id number] [phone]       [facility] - Emergency Dept Emergency Medicine  As needed [address] [facility] [address] [phone]     [facility] - Psych [address] Psychiatry  If symptoms worsen [address] [facility] [address] [phone] [facility] 4th Floor, [address] Please park in [facility] and use [facility] Medical Office elevator    [address] - Psych [address] Psychiatry  If symptoms worsen [address]  [facility] [id number] [phone] [facility] 4th Floor, [address] Please park in [facility] and use [facility] Medical Office elevator             Tierra Lewis MD  Resident  07/23/23 2468       Mateusz Macias MD  07/23/23 2730

## 2023-07-23 NOTE — DISCHARGE INSTRUCTIONS
Please follow-up with Psychology and Psychiatry referral.      Please return to the emergency department or call 911 if you are considering committing suicide, having thoughts of hurting yourself, having worsening symptoms.
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
## 2023-07-23 NOTE — PROVIDER PROGRESS NOTES - EMERGENCY DEPT.
Encounter Date: 7/23/2023    ED Physician Progress Notes            ED Resident HAND-OFF NOTE:  Gilberto Bueno is a 43 y.o. female who presented to the ED on 7/23/2023, patient C/O suicidal ideation. I assumed care of patient from off-going ED physician team patient pending labs, psychiatry recommendations.    Briefly, patient had recent miscarriage which is a significant stressor.  Had a fight with her significant other and threatened to commit suicide.  Denies any intentional ingestions.    On my evaluation, Gilberto Bueno appears well, hemodynamically stable and in NAD. Thus far, Gilberto Bueno has received:  Medications - No data to display    /65   Pulse 78   Temp 96.3 °F (35.7 °C) (Oral)   Resp 16   Wt 90.4 kg (199 lb 4.8 oz)   LMP 04/10/2023 (Exact Date)   SpO2 100%   BMI 30.30 kg/m²         Disposition: I anticipate patient will depend on psychiatry recommendations.  ______________________  Stephanie Carrillo MD   Emergency Medicine Resident      UPDATE:     No significant findings on workup so far.  No significant electrolyte abnormalities, UDS negative, no UTI.    Per psychiatry, patient does not meet criteria for PEC or inpatient psychiatric care at this time.  Patient is not gravely disabled.  Psychiatry states that patient is safe to be discharged home at this time.      Provided patient with urgent referrals to Psychiatry and Psychology at time of discharge.  Provided patient resource sheet.  Patient is hemodynamically stable and appropriate for discharge at this time.  Provided return precautions.      :  Suicidal ideation (Primary)  Attending Note:  Agree with above. Appreciate Psychiatry evaluation, recommend rescinding PEC. Advised outpatient f/u with psychiatry.  Patient contracts for safety and promises to return if needed.

## 2023-08-03 ENCOUNTER — OFFICE VISIT (OUTPATIENT)
Dept: OBSTETRICS AND GYNECOLOGY | Facility: CLINIC | Age: 43
End: 2023-08-03
Payer: MEDICAID

## 2023-08-03 VITALS
BODY MASS INDEX: 29.73 KG/M2 | SYSTOLIC BLOOD PRESSURE: 124 MMHG | DIASTOLIC BLOOD PRESSURE: 64 MMHG | HEIGHT: 68 IN | WEIGHT: 196.19 LBS

## 2023-08-03 DIAGNOSIS — Z31.61 PROCREATIVE COUNSELING AND ADVICE USING NATURAL FAMILY PLANNING: Primary | ICD-10-CM

## 2023-08-03 PROCEDURE — 3074F PR MOST RECENT SYSTOLIC BLOOD PRESSURE < 130 MM HG: ICD-10-PCS | Mod: CPTII,,, | Performed by: OBSTETRICS & GYNECOLOGY

## 2023-08-03 PROCEDURE — 3008F PR BODY MASS INDEX (BMI) DOCUMENTED: ICD-10-PCS | Mod: CPTII,,, | Performed by: OBSTETRICS & GYNECOLOGY

## 2023-08-03 PROCEDURE — 3078F PR MOST RECENT DIASTOLIC BLOOD PRESSURE < 80 MM HG: ICD-10-PCS | Mod: CPTII,,, | Performed by: OBSTETRICS & GYNECOLOGY

## 2023-08-03 PROCEDURE — 99213 PR OFFICE/OUTPT VISIT, EST, LEVL III, 20-29 MIN: ICD-10-PCS | Mod: S$PBB,,, | Performed by: OBSTETRICS & GYNECOLOGY

## 2023-08-03 PROCEDURE — 99999 PR PBB SHADOW E&M-EST. PATIENT-LVL III: ICD-10-PCS | Mod: PBBFAC,,, | Performed by: OBSTETRICS & GYNECOLOGY

## 2023-08-03 PROCEDURE — 99213 OFFICE O/P EST LOW 20 MIN: CPT | Mod: PBBFAC | Performed by: OBSTETRICS & GYNECOLOGY

## 2023-08-03 PROCEDURE — 3044F HG A1C LEVEL LT 7.0%: CPT | Mod: CPTII,,, | Performed by: OBSTETRICS & GYNECOLOGY

## 2023-08-03 PROCEDURE — 99213 OFFICE O/P EST LOW 20 MIN: CPT | Mod: S$PBB,,, | Performed by: OBSTETRICS & GYNECOLOGY

## 2023-08-03 PROCEDURE — 3008F BODY MASS INDEX DOCD: CPT | Mod: CPTII,,, | Performed by: OBSTETRICS & GYNECOLOGY

## 2023-08-03 PROCEDURE — 3078F DIAST BP <80 MM HG: CPT | Mod: CPTII,,, | Performed by: OBSTETRICS & GYNECOLOGY

## 2023-08-03 PROCEDURE — 99999 PR PBB SHADOW E&M-EST. PATIENT-LVL III: CPT | Mod: PBBFAC,,, | Performed by: OBSTETRICS & GYNECOLOGY

## 2023-08-03 PROCEDURE — 1159F MED LIST DOCD IN RCRD: CPT | Mod: CPTII,,, | Performed by: OBSTETRICS & GYNECOLOGY

## 2023-08-03 PROCEDURE — 1160F RVW MEDS BY RX/DR IN RCRD: CPT | Mod: CPTII,,, | Performed by: OBSTETRICS & GYNECOLOGY

## 2023-08-03 PROCEDURE — 1160F PR REVIEW ALL MEDS BY PRESCRIBER/CLIN PHARMACIST DOCUMENTED: ICD-10-PCS | Mod: CPTII,,, | Performed by: OBSTETRICS & GYNECOLOGY

## 2023-08-03 PROCEDURE — 1159F PR MEDICATION LIST DOCUMENTED IN MEDICAL RECORD: ICD-10-PCS | Mod: CPTII,,, | Performed by: OBSTETRICS & GYNECOLOGY

## 2023-08-03 PROCEDURE — 3074F SYST BP LT 130 MM HG: CPT | Mod: CPTII,,, | Performed by: OBSTETRICS & GYNECOLOGY

## 2023-08-03 PROCEDURE — 3044F PR MOST RECENT HEMOGLOBIN A1C LEVEL <7.0%: ICD-10-PCS | Mod: CPTII,,, | Performed by: OBSTETRICS & GYNECOLOGY

## 2023-08-03 NOTE — PROGRESS NOTES
Subjective:      Patient ID: Gilberto Bueno is a 43 y.o. female.    Chief Complaint:  Follow-up (Follow up miscarriage- Pt had her period on 23)      History of Present Illness  HPI  Patient comes in today for follow-up and to discuss pregnancy  Status post spontaneous  on 2023  Had her cycle.   Would like to try again    No other complaint    GYN & OB History  Patient's last menstrual period was 2023 (exact date).   Date of Last Pap: 2023    OB History    Para Term  AB Living   7 6 5 1   6   SAB IAB Ectopic Multiple Live Births           6      # Outcome Date GA Lbr Zeus/2nd Weight Sex Delivery Anes PTL Lv   7 SAB 23           6 Term 19 39w0d / 00:30 3.485 kg (7 lb 10.9 oz) M Vag-Vacuum EPI N    5 Term 11/24/10   3.24 kg (7 lb 2.3 oz) F Vag-Spont  N CHRIS   4  07 33w0d  2.807 kg (6 lb 3 oz) F Vag-Spont  Y CHRIS   3 Term 05   3.997 kg (8 lb 13 oz) M Vag-Spont  N CHRIS   2 Term 01   4.99 kg (11 lb) M Vag-Spont  N CHRIS   1 Term 99   3.459 kg (7 lb 10 oz) M Vag-Spont  N CHRIS     Past Medical History:   Diagnosis Date    History of chlamydia     History of gonorrhea        History reviewed. No pertinent surgical history.    History reviewed. No pertinent family history.    Social History     Socioeconomic History    Marital status: Single   Tobacco Use    Smoking status: Never    Smokeless tobacco: Never   Substance and Sexual Activity    Alcohol use: Yes     Comment: occ    Drug use: No    Sexual activity: Yes     Partners: Male   Social History Narrative    Together for a long time    Getting  2016    He owns his own business    She is doing home health       Current Outpatient Medications   Medication Sig Dispense Refill    PNV,calcium 72-iron-folic acid (PRENATAL VITAMIN PLUS LOW IRON) 27 mg iron- 1 mg Tab Take one tablet daily.  Prescribe prenatal covered by insurance 90 tablet 11     No current  facility-administered medications for this visit.       Review of patient's allergies indicates:  No Known Allergies      Review of Systems  Review of Systems   Constitutional:  Negative for activity change, appetite change, chills, fatigue, fever and unexpected weight change.   HENT:  Negative for mouth sores.    Respiratory:  Negative for cough, shortness of breath and wheezing.    Cardiovascular:  Negative for chest pain and palpitations.   Gastrointestinal:  Negative for abdominal pain, bloating, blood in stool, constipation, nausea and vomiting.   Endocrine: Negative for diabetes and hot flashes.   Genitourinary:  Negative for dysmenorrhea, dyspareunia, dysuria, frequency, hematuria, menorrhagia, menstrual problem, pelvic pain, urgency, vaginal bleeding, vaginal discharge, vaginal pain, urinary incontinence, postcoital bleeding and vaginal odor.   Musculoskeletal:  Negative for back pain and myalgias.   Integumentary:  Negative for rash, breast mass and nipple discharge.   Neurological:  Negative for seizures and headaches.   Psychiatric/Behavioral:  Negative for depression and sleep disturbance. The patient is not nervous/anxious.    Breast: Negative for mass, mastodynia and nipple discharge         Objective:     Physical Exam:   Constitutional: She appears well-developed and well-nourished. No distress.    HENT:   Head: Normocephalic and atraumatic.    Eyes: EOM are normal.     Cardiovascular:  Normal rate.             Pulmonary/Chest: Effort normal. No respiratory distress.                  Musculoskeletal: Normal range of motion.       Neurological: She is alert.    Skin: Skin is warm and dry.    Psychiatric: She has a normal mood and affect.         Assessment:     1. Procreative counseling and advice using natural family planning    2.  Status post spontaneous         Plan:     I have discussed with the patient regarding her condition.  We discussed fecundity in the 40s  Timed  intercourse  Cervical mucus monitoring  Continue with prenatal vitamins  Back with a positive pregnancy test or in about 3-4 months

## 2023-08-28 ENCOUNTER — PATIENT MESSAGE (OUTPATIENT)
Dept: OBSTETRICS AND GYNECOLOGY | Facility: CLINIC | Age: 43
End: 2023-08-28
Payer: MEDICAID

## 2023-09-14 ENCOUNTER — OFFICE VISIT (OUTPATIENT)
Dept: OBSTETRICS AND GYNECOLOGY | Facility: CLINIC | Age: 43
End: 2023-09-14
Payer: MEDICAID

## 2023-09-14 VITALS
HEIGHT: 68 IN | DIASTOLIC BLOOD PRESSURE: 72 MMHG | BODY MASS INDEX: 28.4 KG/M2 | WEIGHT: 187.38 LBS | SYSTOLIC BLOOD PRESSURE: 124 MMHG

## 2023-09-14 DIAGNOSIS — O09.521 ADVANCED MATERNAL AGE IN MULTIGRAVIDA, FIRST TRIMESTER: ICD-10-CM

## 2023-09-14 DIAGNOSIS — O09.291 HISTORY OF SPONTANEOUS ABORTION, CURRENTLY PREGNANT, FIRST TRIMESTER: ICD-10-CM
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
## 2023-09-14 DIAGNOSIS — O21.9 NAUSEA AND VOMITING IN PREGNANCY: ICD-10-CM

## 2023-09-14 DIAGNOSIS — Z34.90 EARLY STAGE OF PREGNANCY: Primary | ICD-10-CM

## 2023-09-14 PROCEDURE — 1159F MED LIST DOCD IN RCRD: CPT | Mod: CPTII,,, | Performed by: OBSTETRICS & GYNECOLOGY

## 2023-09-14 PROCEDURE — 99999 PR PBB SHADOW E&M-EST. PATIENT-LVL III: CPT | Mod: PBBFAC,,, | Performed by: OBSTETRICS & GYNECOLOGY

## 2023-09-14 PROCEDURE — 3074F SYST BP LT 130 MM HG: CPT | Mod: CPTII,,, | Performed by: OBSTETRICS & GYNECOLOGY

## 2023-09-14 PROCEDURE — 1160F PR REVIEW ALL MEDS BY PRESCRIBER/CLIN PHARMACIST DOCUMENTED: ICD-10-PCS | Mod: CPTII,,, | Performed by: OBSTETRICS & GYNECOLOGY

## 2023-09-14 PROCEDURE — 99213 OFFICE O/P EST LOW 20 MIN: CPT | Mod: PBBFAC,TH | Performed by: OBSTETRICS & GYNECOLOGY

## 2023-09-14 PROCEDURE — 1159F PR MEDICATION LIST DOCUMENTED IN MEDICAL RECORD: ICD-10-PCS | Mod: CPTII,,, | Performed by: OBSTETRICS & GYNECOLOGY

## 2023-09-14 PROCEDURE — 3074F PR MOST RECENT SYSTOLIC BLOOD PRESSURE < 130 MM HG: ICD-10-PCS | Mod: CPTII,,, | Performed by: OBSTETRICS & GYNECOLOGY

## 2023-09-14 PROCEDURE — 99204 OFFICE O/P NEW MOD 45 MIN: CPT | Mod: TH,S$PBB,, | Performed by: OBSTETRICS & GYNECOLOGY

## 2023-09-14 PROCEDURE — 3078F PR MOST RECENT DIASTOLIC BLOOD PRESSURE < 80 MM HG: ICD-10-PCS | Mod: CPTII,,, | Performed by: OBSTETRICS & GYNECOLOGY

## 2023-09-14 PROCEDURE — 3044F PR MOST RECENT HEMOGLOBIN A1C LEVEL <7.0%: ICD-10-PCS | Mod: CPTII,,, | Performed by: OBSTETRICS & GYNECOLOGY

## 2023-09-14 PROCEDURE — 3008F PR BODY MASS INDEX (BMI) DOCUMENTED: ICD-10-PCS | Mod: CPTII,,, | Performed by: OBSTETRICS & GYNECOLOGY

## 2023-09-14 PROCEDURE — 3044F HG A1C LEVEL LT 7.0%: CPT | Mod: CPTII,,, | Performed by: OBSTETRICS & GYNECOLOGY

## 2023-09-14 PROCEDURE — 3078F DIAST BP <80 MM HG: CPT | Mod: CPTII,,, | Performed by: OBSTETRICS & GYNECOLOGY

## 2023-09-14 PROCEDURE — 99999 PR PBB SHADOW E&M-EST. PATIENT-LVL III: ICD-10-PCS | Mod: PBBFAC,,, | Performed by: OBSTETRICS & GYNECOLOGY

## 2023-09-14 PROCEDURE — 99204 PR OFFICE/OUTPT VISIT, NEW, LEVL IV, 45-59 MIN: ICD-10-PCS | Mod: TH,S$PBB,, | Performed by: OBSTETRICS & GYNECOLOGY

## 2023-09-14 PROCEDURE — 1160F RVW MEDS BY RX/DR IN RCRD: CPT | Mod: CPTII,,, | Performed by: OBSTETRICS & GYNECOLOGY

## 2023-09-14 PROCEDURE — 3008F BODY MASS INDEX DOCD: CPT | Mod: CPTII,,, | Performed by: OBSTETRICS & GYNECOLOGY

## 2023-09-14 RX ORDER — HYDROCODONE BITARTRATE AND ACETAMINOPHEN 5; 325 MG/1; MG/1
1 TABLET ORAL EVERY 6 HOURS PRN
COMMUNITY
Start: 2023-09-05 | End: 2023-11-06 | Stop reason: ALTCHOICE

## 2023-09-14 RX ORDER — ONDANSETRON 4 MG/1
4 TABLET, FILM COATED ORAL EVERY 8 HOURS PRN
Qty: 30 TABLET | Refills: 1 | Status: SHIPPED | OUTPATIENT
Start: 2023-09-14 | End: 2023-09-14 | Stop reason: SDUPTHER

## 2023-09-14 RX ORDER — CEPHALEXIN 500 MG/1
500 CAPSULE ORAL EVERY 12 HOURS
COMMUNITY
Start: 2023-09-05 | End: 2023-10-26 | Stop reason: ALTCHOICE

## 2023-09-14 RX ORDER — ONDANSETRON 4 MG/1
4 TABLET, FILM COATED ORAL EVERY 8 HOURS PRN
Qty: 30 TABLET | Refills: 1 | Status: ON HOLD | OUTPATIENT
Start: 2023-09-14 | End: 2023-12-29 | Stop reason: HOSPADM
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
## 2023-09-28 ENCOUNTER — INITIAL PRENATAL (OUTPATIENT)
Dept: OBSTETRICS AND GYNECOLOGY | Facility: CLINIC | Age: 43
End: 2023-09-28
Payer: MEDICAID

## 2023-09-28 ENCOUNTER — TELEPHONE (OUTPATIENT)
Dept: OBSTETRICS AND GYNECOLOGY | Facility: CLINIC | Age: 43
End: 2023-09-28
Payer: MEDICAID

## 2023-09-28 VITALS
BODY MASS INDEX: 29.06 KG/M2 | SYSTOLIC BLOOD PRESSURE: 132 MMHG | DIASTOLIC BLOOD PRESSURE: 70 MMHG | WEIGHT: 191.13 LBS

## 2023-09-28 DIAGNOSIS — O26.891 VAGINAL DISCHARGE DURING PREGNANCY IN FIRST TRIMESTER: ICD-10-CM

## 2023-09-28 DIAGNOSIS — O09.291 HISTORY OF SPONTANEOUS ABORTION, CURRENTLY PREGNANT, FIRST TRIMESTER: ICD-10-CM

## 2023-09-28 DIAGNOSIS — O21.9 NAUSEA AND VOMITING IN PREGNANCY: ICD-10-CM

## 2023-09-28 DIAGNOSIS — N89.8 VAGINAL DISCHARGE DURING PREGNANCY IN FIRST TRIMESTER: ICD-10-CM

## 2023-09-28 DIAGNOSIS — O09.521 ADVANCED MATERNAL AGE IN MULTIGRAVIDA, FIRST TRIMESTER: ICD-10-CM

## 2023-09-28 DIAGNOSIS — Z34.90 EARLY STAGE OF PREGNANCY: Primary | ICD-10-CM

## 2023-09-28 LAB
CREAT UR-MCNC: 190.9 MG/DL (ref 15–325)
PROT UR-MCNC: 12 MG/DL
PROT/CREAT UR: 0.06 MG/G{CREAT} (ref 0–0.2)

## 2023-09-28 PROCEDURE — 81514 NFCT DS BV&VAGINITIS DNA ALG: CPT | Performed by: OBSTETRICS & GYNECOLOGY

## 2023-09-28 PROCEDURE — 99213 OFFICE O/P EST LOW 20 MIN: CPT | Mod: PBBFAC,TH | Performed by: OBSTETRICS & GYNECOLOGY

## 2023-09-28 PROCEDURE — 99999 PR PBB SHADOW E&M-EST. PATIENT-LVL III: ICD-10-PCS | Mod: PBBFAC,,, | Performed by: OBSTETRICS & GYNECOLOGY

## 2023-09-28 PROCEDURE — 87088 URINE BACTERIA CULTURE: CPT | Performed by: OBSTETRICS & GYNECOLOGY

## 2023-09-28 PROCEDURE — 84156 ASSAY OF PROTEIN URINE: CPT | Performed by: OBSTETRICS & GYNECOLOGY

## 2023-09-28 PROCEDURE — 99214 OFFICE O/P EST MOD 30 MIN: CPT | Mod: TH,S$PBB,, | Performed by: OBSTETRICS & GYNECOLOGY

## 2023-09-28 PROCEDURE — 99214 PR OFFICE/OUTPT VISIT, EST, LEVL IV, 30-39 MIN: ICD-10-PCS | Mod: TH,S$PBB,, | Performed by: OBSTETRICS & GYNECOLOGY

## 2023-09-28 PROCEDURE — 87086 URINE CULTURE/COLONY COUNT: CPT | Performed by: OBSTETRICS & GYNECOLOGY

## 2023-09-28 PROCEDURE — 99999 PR PBB SHADOW E&M-EST. PATIENT-LVL III: CPT | Mod: PBBFAC,,, | Performed by: OBSTETRICS & GYNECOLOGY

## 2023-09-28 PROCEDURE — 87147 CULTURE TYPE IMMUNOLOGIC: CPT | Performed by: OBSTETRICS & GYNECOLOGY

## 2023-09-28 NOTE — TELEPHONE ENCOUNTER
Pt call was returned. Pt stated that she was coming back at 1pm. Pt was advised to disregard the call. Pt verbalized understanding.

## 2023-09-28 NOTE — TELEPHONE ENCOUNTER
----- Message from Kelsey Sanders sent at 9/28/2023  8:43 AM CDT -----  Regarding: patient call back  Type: Patient Call Back    Who called: Self     What is the request in detail: Asked for a call because she said that she is running late today. Said she should get there in 20 mins     Can the clinic reply by [facility]? No     Would the patient rather a call back or a response via [facility]? Call     Best call back number: .[phone]

## 2023-09-28 NOTE — PROGRESS NOTES
9w0  Here for initial prenatal care  Still with some facial pain and numbness    Past Medical History:   Diagnosis Date    History of chlamydia 2019    History of gonorrhea 2016       History reviewed. No pertinent surgical history.    History reviewed. No pertinent family history.    Social History     Socioeconomic History    Marital status: Single   Tobacco Use    Smoking status: Never    Smokeless tobacco: Never   Substance and Sexual Activity    Alcohol use: Yes     Comment: occ    Drug use: No    Sexual activity: Yes     Partners: Male   Social History Narrative    Together for a long time    Getting  8/5/2016    He owns his own business    She is doing home health       Current Outpatient Medications   Medication Sig Dispense Refill    cephALEXin (KEFLEX) 500 MG capsule Take 500 mg by mouth every 12 (twelve) hours.      HYDROcodone-acetaminophen (NORCO) 5-325 mg per tablet Take 1 tablet by mouth every 6 (six) hours as needed.      ondansetron (ZOFRAN) 4 MG tablet Take 1 tablet (4 mg total) by mouth every 8 (eight) hours as needed for Nausea. 30 tablet 1    PNV,calcium 72-iron-folic acid (PRENATAL VITAMIN PLUS LOW IRON) 27 mg iron- 1 mg Tab Take one tablet daily.  Prescribe prenatal covered by insurance 90 tablet 11     No current facility-administered medications for this visit.       Review of patient's allergies indicates:  No Known Allergies    Review of Systems   Constitutional: Positive for fatigue. Negative for fever, chills, appetite change and unexpected weight change.   HENT: Positive for congestion. Negative for hearing loss, ear pain, sore throat, rhinorrhea, sneezing, mouth sores, neck pain, neck stiffness, voice change and postnasal drip.   Eyes: Negative for photophobia, itching and visual disturbance.   Respiratory: Negative for cough, chest tightness, shortness of breath and wheezing.   Cardiovascular: Negative for chest pain, palpitations and leg swelling.    Gastrointestinal: Positive for nausea, vomiting, abdominal pain and constipation. Negative for diarrhea, blood in stool and abdominal distention.   Genitourinary: Positive for vaginal discharge and pelvic pain. Negative for dysuria, urgency, frequency, hematuria, flank pain, decreased urine volume, vaginal bleeding, difficulty urinating, genital sores, vaginal pain, menstrual problem and dyspareunia.   Musculoskeletal: Positive for back pain. Negative for myalgias and joint swelling.   Skin: Negative for rash and wound.   Neurological: Positive for headaches. Negative for dizziness, tremors, syncope, speech difficulty, weakness, light-headedness and numbness.   Hematological: Negative for adenopathy. Does not bruise/bleed easily.   Psychiatric/Behavioral: Negative for suicidal ideas, hallucinations, confusion, sleep disturbance, dysphoric mood, decreased concentration and agitation. The patient is not nervous/anxious and is not hyperactive.       Exam normal    A quick transabdominal ultrasound performed showing normal fetus with CRL at 2.33 cm or 9w0d  EDC at 5/2/2024.  FHT at 170    Prenatal profile  Prenatal vitamins    We discussed management plan for Advanced Maternal Age.  1.  MFM evaluation   2.  Genetic screening, [id number] vs Quad screen.  [id number] ordered today  3.  Baby aspirin beginning at around 12-14 weeks  4.  Ultrasounds at next visit, at 20 weeks, then again at 32 weeks for growth and fluid.  5.  If patient is over 41 yo, she would need NST twice a week with MVP weekly vs NST once a week and BPP weekly.  6.  Deliver by 39-40 weeks.      Vitals signs, FHTs, urine dip, and PE findings documented, reviewed and available in OB flow chart.   I spent a total of 20 minutes on the day of the visit. This includes face to face time and non-face to face time preparing to see the patient (eg, review of tests and charts), Obtaining and/or reviewing separately obtained history, Documenting clinical  information in the electronic or other health record, Independently interpreting results and communicating results to the patient/family/caregiver, or Care coordination.

## 2023-09-29 ENCOUNTER — PATIENT MESSAGE (OUTPATIENT)
Dept: OBSTETRICS AND GYNECOLOGY | Facility: CLINIC | Age: 43
End: 2023-09-29
Payer: MEDICAID

## 2023-09-29 DIAGNOSIS — R82.71 GBS BACTERIURIA: Primary | ICD-10-CM

## 2023-09-29 RX ORDER — AMPICILLIN 500 MG/1
500 CAPSULE ORAL 3 TIMES DAILY
Qty: 21 CAPSULE | Refills: 0 | Status: SHIPPED | OUTPATIENT
Start: 2023-09-29 | End: 2023-10-06

## 2023-09-30 LAB — BACTERIA UR CULT: ABNORMAL

## 2023-10-01 LAB
BACTERIAL VAGINOSIS DNA: POSITIVE
CANDIDA GLABRATA DNA: NEGATIVE
CANDIDA KRUSEI DNA: NEGATIVE
CANDIDA RRNA VAG QL PROBE: POSITIVE
T VAGINALIS RRNA GENITAL QL PROBE: NEGATIVE

## 2023-10-02 ENCOUNTER — PROCEDURE VISIT (OUTPATIENT)
Dept: MATERNAL FETAL MEDICINE | Facility: CLINIC | Age: 43
End: 2023-10-02
Payer: MEDICAID

## 2023-10-02 ENCOUNTER — LAB VISIT (OUTPATIENT)
Dept: LAB | Facility: HOSPITAL | Age: 43
End: 2023-10-02
Attending: OBSTETRICS & GYNECOLOGY
Payer: MEDICAID

## 2023-10-02 ENCOUNTER — PATIENT MESSAGE (OUTPATIENT)
Dept: OBSTETRICS AND GYNECOLOGY | Facility: CLINIC | Age: 43
End: 2023-10-02
Payer: MEDICAID

## 2023-10-02 DIAGNOSIS — Z34.90 EARLY STAGE OF PREGNANCY: ICD-10-CM

## 2023-10-02 DIAGNOSIS — B96.89 BACTERIAL VAGINOSIS: Primary | ICD-10-CM

## 2023-10-02 DIAGNOSIS — O09.521 ADVANCED MATERNAL AGE IN MULTIGRAVIDA, FIRST TRIMESTER: ICD-10-CM

## 2023-10-02 DIAGNOSIS — B37.31 CANDIDA VAGINITIS: ICD-10-CM

## 2023-10-02 DIAGNOSIS — N76.0 BACTERIAL VAGINOSIS: Primary | ICD-10-CM

## 2023-10-02 DIAGNOSIS — O21.9 NAUSEA AND VOMITING IN PREGNANCY: ICD-10-CM

## 2023-10-02 DIAGNOSIS — O09.291 HISTORY OF SPONTANEOUS ABORTION, CURRENTLY PREGNANT, FIRST TRIMESTER: ICD-10-CM

## 2023-10-02 DIAGNOSIS — Z36.89 ENCOUNTER FOR FETAL ANATOMIC SURVEY: Primary | ICD-10-CM

## 2023-10-02 LAB
ABO + RH BLD: NORMAL
ALBUMIN SERPL BCP-MCNC: 3.6 G/DL (ref 3.5–5.2)
ALP SERPL-CCNC: 59 U/L (ref 55–135)
ALT SERPL W/O P-5'-P-CCNC: 19 U/L (ref 10–44)
ANION GAP SERPL CALC-SCNC: 7 MMOL/L (ref 8–16)
AST SERPL-CCNC: 14 U/L (ref 10–40)
BASOPHILS # BLD AUTO: 0.04 K/UL (ref 0–0.2)
BASOPHILS NFR BLD: 0.5 % (ref 0–1.9)
BILIRUB SERPL-MCNC: 0.4 MG/DL (ref 0.1–1)
BLD GP AB SCN CELLS X3 SERPL QL: NORMAL
BUN SERPL-MCNC: 5 MG/DL (ref 6–20)
CALCIUM SERPL-MCNC: 9.4 MG/DL (ref 8.7–10.5)
CHLORIDE SERPL-SCNC: 107 MMOL/L (ref 95–110)
CO2 SERPL-SCNC: 24 MMOL/L (ref 23–29)
CREAT SERPL-MCNC: 0.7 MG/DL (ref 0.5–1.4)
DIFFERENTIAL METHOD: ABNORMAL
EOSINOPHIL # BLD AUTO: 0 K/UL (ref 0–0.5)
EOSINOPHIL NFR BLD: 0.2 % (ref 0–8)
ERYTHROCYTE [DISTWIDTH] IN BLOOD BY AUTOMATED COUNT: 13.8 % (ref 11.5–14.5)
EST. GFR  (NO RACE VARIABLE): >60 ML/MIN/1.73 M^2
ESTIMATED AVG GLUCOSE: 108 MG/DL (ref 68–131)
GLUCOSE SERPL-MCNC: 107 MG/DL (ref 70–110)
HBA1C MFR BLD: 5.4 % (ref 4–5.6)
HBV SURFACE AG SERPL QL IA: NORMAL
HCT VFR BLD AUTO: 34.1 % (ref 37–48.5)
HCV AB SERPL QL IA: NORMAL
HGB BLD-MCNC: 11.1 G/DL (ref 12–16)
HIV 1+2 AB+HIV1 P24 AG SERPL QL IA: NORMAL
IMM GRANULOCYTES # BLD AUTO: 0.02 K/UL (ref 0–0.04)
IMM GRANULOCYTES NFR BLD AUTO: 0.2 % (ref 0–0.5)
LYMPHOCYTES # BLD AUTO: 1.6 K/UL (ref 1–4.8)
LYMPHOCYTES NFR BLD: 19.9 % (ref 18–48)
MCH RBC QN AUTO: 29.9 PG (ref 27–31)
MCHC RBC AUTO-ENTMCNC: 32.6 G/DL (ref 32–36)
MCV RBC AUTO: 92 FL (ref 82–98)
MONOCYTES # BLD AUTO: 0.5 K/UL (ref 0.3–1)
MONOCYTES NFR BLD: 5.9 % (ref 4–15)
NEUTROPHILS # BLD AUTO: 6 K/UL (ref 1.8–7.7)
NEUTROPHILS NFR BLD: 73.3 % (ref 38–73)
NRBC BLD-RTO: 0 /100 WBC
PLATELET # BLD AUTO: 292 K/UL (ref 150–450)
PMV BLD AUTO: 9.4 FL (ref 9.2–12.9)
POTASSIUM SERPL-SCNC: 3.7 MMOL/L (ref 3.5–5.1)
PROT SERPL-MCNC: 8.2 G/DL (ref 6–8.4)
RBC # BLD AUTO: 3.71 M/UL (ref 4–5.4)
SODIUM SERPL-SCNC: 138 MMOL/L (ref 136–145)
SPECIMEN OUTDATE: NORMAL
WBC # BLD AUTO: 8.16 K/UL (ref 3.9–12.7)

## 2023-10-02 PROCEDURE — 83020 HEMOGLOBIN ELECTROPHORESIS: CPT | Performed by: OBSTETRICS & GYNECOLOGY

## 2023-10-02 PROCEDURE — 85025 COMPLETE CBC W/AUTO DIFF WBC: CPT | Performed by: OBSTETRICS & GYNECOLOGY

## 2023-10-02 PROCEDURE — 86762 RUBELLA ANTIBODY: CPT | Performed by: OBSTETRICS & GYNECOLOGY

## 2023-10-02 PROCEDURE — 86901 BLOOD TYPING SEROLOGIC RH(D): CPT | Performed by: OBSTETRICS & GYNECOLOGY

## 2023-10-02 PROCEDURE — 86803 HEPATITIS C AB TEST: CPT | Performed by: OBSTETRICS & GYNECOLOGY

## 2023-10-02 PROCEDURE — 83036 HEMOGLOBIN GLYCOSYLATED A1C: CPT | Performed by: OBSTETRICS & GYNECOLOGY

## 2023-10-02 PROCEDURE — 80053 COMPREHEN METABOLIC PANEL: CPT | Performed by: OBSTETRICS & GYNECOLOGY

## 2023-10-02 PROCEDURE — 87340 HEPATITIS B SURFACE AG IA: CPT | Performed by: OBSTETRICS & GYNECOLOGY

## 2023-10-02 PROCEDURE — 76801 OB US < 14 WKS SINGLE FETUS: CPT | Mod: PBBFAC,PO | Performed by: OBSTETRICS & GYNECOLOGY

## 2023-10-02 PROCEDURE — 76801 US OB/GYN EXTENDED PROCEDURE (VIEWPOINT): ICD-10-PCS | Mod: 26,S$PBB,, | Performed by: OBSTETRICS & GYNECOLOGY

## 2023-10-02 PROCEDURE — 86592 SYPHILIS TEST NON-TREP QUAL: CPT | Performed by: OBSTETRICS & GYNECOLOGY

## 2023-10-02 PROCEDURE — 87389 HIV-1 AG W/HIV-1&-2 AB AG IA: CPT | Performed by: OBSTETRICS & GYNECOLOGY

## 2023-10-02 RX ORDER — TERCONAZOLE 4 MG/G
1 CREAM VAGINAL NIGHTLY
Qty: 45 G | Refills: 0 | Status: SHIPPED | OUTPATIENT
Start: 2023-10-02 | End: 2023-10-09

## 2023-10-02 RX ORDER — METRONIDAZOLE 500 MG/1
500 TABLET ORAL EVERY 12 HOURS
Qty: 14 TABLET | Refills: 0 | Status: SHIPPED | OUTPATIENT
Start: 2023-10-02 | End: 2023-10-26 | Stop reason: ALTCHOICE

## 2023-10-02 NOTE — TELEPHONE ENCOUNTER
Metronidazole prescribed for bacterial vaginosis   Terconazole cream prescribed for Candida vaginitis

## 2023-10-03 LAB
RPR SER QL: NORMAL
RUBV IGG SER-ACNC: 209 IU/ML
RUBV IGG SER-IMP: REACTIVE

## 2023-10-04 LAB
HB ELECTROPHORESIS INTERP CANCEL: ABNORMAL
HB ELECTROPHORESIS INTERPRETATION: ABNORMAL
HGB A MFR BLD ELPH: 95.6 % (ref 95.8–98)
HGB A2 MFR BLD: 2.7 % (ref 2–3.3)
HGB A2+XXX MFR BLD ELPH: ABNORMAL %
HGB F MFR BLD: 1.7 % (ref 0–0.9)
HGB XXX MFR BLD ELPH: ABNORMAL %
HPLC HB VARIANT: ABNORMAL

## 2023-10-15 ENCOUNTER — PATIENT MESSAGE (OUTPATIENT)
Dept: OBSTETRICS AND GYNECOLOGY | Facility: CLINIC | Age: 43
End: 2023-10-15
Payer: MEDICAID

## 2023-10-15 DIAGNOSIS — K21.9 GASTROESOPHAGEAL REFLUX DISEASE, UNSPECIFIED WHETHER ESOPHAGITIS PRESENT: Primary | ICD-10-CM

## 2023-10-16 RX ORDER — HYDROGEN PEROXIDE 3 %
20 SOLUTION, NON-ORAL MISCELLANEOUS
Qty: 30 CAPSULE | Refills: 3 | Status: SHIPPED | OUTPATIENT
Start: 2023-10-16 | End: 2023-11-06

## 2023-10-20 ENCOUNTER — PATIENT MESSAGE (OUTPATIENT)
Dept: OBSTETRICS AND GYNECOLOGY | Facility: CLINIC | Age: 43
End: 2023-10-20
Payer: MEDICAID

## 2023-10-20 ENCOUNTER — TELEPHONE (OUTPATIENT)
Dept: OBSTETRICS AND GYNECOLOGY | Facility: CLINIC | Age: 43
End: 2023-10-20
Payer: MEDICAID

## 2023-10-20 DIAGNOSIS — B37.31 CANDIDA VAGINITIS: Primary | ICD-10-CM

## 2023-10-20 RX ORDER — FLUCONAZOLE 150 MG/1
150 TABLET ORAL DAILY
Qty: 1 TABLET | Refills: 0 | Status: SHIPPED | OUTPATIENT
Start: 2023-10-20 | End: 2023-10-21

## 2023-10-20 NOTE — TELEPHONE ENCOUNTER
Called pt and told her that the Rx for her yeast treatment is sent to the pharmacy.  She is to try Pepcid AC and to take it early in the morning at least 1 hour before eating to see if it would work better for her.  Pt voiced understanding. jtn    ----- Message from Arti Hernandez MA sent at 10/20/2023  3:10 PM CDT -----  Regarding: FW: Patient call back    ----- Message -----  From: Sisi Atkinson  Sent: 10/20/2023  12:23 PM CDT  To: [facility] Staff  Subject: Patient call back                                .Type: Patient Call Back    Who called:self     What is the request in detail:would like a call from nurse Brito-no details given     Can the clinic reply by [facility]?no     Would the patient rather a call back or a response via [facility]? Call     Best call back number:.[phone]      Additional Information:

## 2023-10-22 ENCOUNTER — NURSE TRIAGE (OUTPATIENT)
Dept: ADMINISTRATIVE | Facility: CLINIC | Age: 43
End: 2023-10-22
Payer: MEDICAID

## 2023-10-22 ENCOUNTER — ON-DEMAND VIRTUAL (OUTPATIENT)
Dept: URGENT CARE | Facility: CLINIC | Age: 43
End: 2023-10-22
Payer: MEDICAID

## 2023-10-22 DIAGNOSIS — O26.859 SPOTTING DURING PREGNANCY: Primary | ICD-10-CM

## 2023-10-22 PROCEDURE — 99203 PR OFFICE/OUTPT VISIT, NEW, LEVL III, 30-44 MIN: ICD-10-PCS | Mod: 95,,, | Performed by: NURSE PRACTITIONER

## 2023-10-22 PROCEDURE — 99203 OFFICE O/P NEW LOW 30 MIN: CPT | Mod: 95,,, | Performed by: NURSE PRACTITIONER

## 2023-10-22 NOTE — TELEPHONE ENCOUNTER
"Reason for Disposition   SPOTTING (single or brief episode)   Back pain    Additional Information   Negative: Shock suspected (e.g., cold/pale/clammy skin, too weak to stand, low BP, rapid pulse)   Negative: Difficult to awaken or acting confused (e.g., disoriented, slurred speech)   Negative: Passed out (i.e., lost consciousness, collapsed and was not responding)   Negative: Sounds like a life-threatening emergency to the triager   Negative: SEVERE abdominal pain   Negative: SEVERE vaginal bleeding (e.g., soaking 2 pads per hour or large blood clots and present 2 or more hours)   Negative: SEVERE dizziness (e.g., unable to stand, requires support to walk, feels like passing out)   Negative: [1] MODERATE vaginal bleeding (e.g., soaking 1 pad per hour; clots) AND [2] present > 6 hours   Negative: [1] MODERATE vaginal bleeding (e.g., soaking 1 pad per hour; clots) AND [2] pregnant > 12 weeks   Negative: Passed tissue (e.g., gray-white)   Negative: Shoulder pain   Negative: Pale skin (pallor) of new-onset or worsening   Negative: Patient sounds very sick or weak to the triager   Negative: [1] Constant abdominal pain AND [2] present > 2 hours   Negative: Fever 100.4 F (38.0 C) or higher   Negative: [1] Intermittent lower abdominal pain (e.g., cramping) AND [2] present > 24 hours   Negative: Prior history of "ectopic pregnancy" or previous tubal surgery (e.g., tubal ligation)   Negative: Pain or burning with passing urine (urination)   Negative: MODERATE vaginal bleeding (e.g., soaking 1 pad per hour; clots)   Negative: Has IUD   Negative: MILD vaginal bleeding (i.e., less than 1 pad / hour; less than patient's usual menstrual bleeding; not just spotting)   Negative: SPOTTING lasts > 48 hours or spotting happens more than once in a week   Negative: Unusual vaginal discharge (e.g., bad smelling, yellow, green, or foamy-white)   Negative: Not feeling pregnant any longer (e.g., breast tenderness or nausea has disappeared)   " "Negative: SPOTTING after sexual intercourse (single or brief episode)   Negative: SPOTTING after a pelvic examination (single or brief episode)   Negative: Shock suspected (e.g., cold/pale/clammy skin, too weak to stand, low BP, rapid pulse)   Negative: SEVERE abdominal pain   Negative: Sounds like a life-threatening emergency to the triager   Negative: Major injury to the back (e.g., MVA, fall > 10 feet or 3 meters, penetrating injury, etc.)   Negative: Followed a fall   Negative: Followed a tailbone injury   Negative: [1] Having contractions or other symptoms of labor AND [2] 37 or more weeks pregnant (i.e., term pregnancy)   Negative: [1] Unable to urinate (or only a few drops) > 4 hours AND [2] bladder feels very full (e.g., palpable bladder or strong urge to urinate)   Negative: Numbness in groin or rectal area (i.e., loss of sensation)   Negative: Leakage of fluid from vagina   Negative: [1] Pregnant 23 or more weeks AND [2] baby is moving less today (e.g., kick count < 5 in 1 hour or < 10 in 2 hours)   Negative: [1] Fever 100.4 F (38.0 C) or higher AND [2] flank pain (i.e., in side, below ribs and above hip)   Negative: Weakness of a leg or foot (e.g., unable to bear weight, dragging foot)   Negative: Unable to walk   Negative: Patient sounds very sick or weak to the triager   Negative: [1] SEVERE back pain (e.g., excruciating, unable to do any normal activities) AND [2] not improved 2 hours after pain medicine   Negative: [1] Pain radiates into the thigh or further down the leg AND [2] both legs   Negative: Pain or burning with passing urine (urination)   Negative: Numbness in a leg or foot (i.e., loss of sensation)   Negative: High-risk adult (e.g., history of cancer, HIV, or IV drug use)   Negative: Flank pain  (Exception: Pain that is only present with movement.)   Negative: Rash in same area as pain (may be described as "small blisters")   Negative: Blood in urine (red, pink, or tea-colored)   Negative: " "[1] MODERATE back pain (e.g., interferes with normal activities or sleep) AND [2] present > 3 days   Negative: [1] Pain radiates into the thigh or further down the leg AND [2] one leg   Negative: Back pain present > 2 weeks   Commented on: [1] Increase in vaginal discharge AND [2] < 37 weeks pregnant (i.e., )     N/a 12 weeks    Protocols used: Pregnancy - Vaginal Bleeding Less Than 20 Weeks EGA-A-AH, Pregnancy - Back Pain-A-AH  Pt states she is 12 weeks pregnant. States when she wipes sees light blood on tissue. Pt answered "No" to the Triage assessment questions above. States she has mild low back pain and she just took Tylenol. Pt denies any other symptoms. Pt states this is her 7th preganancy and she had similar symptoms with her 23 year old son, but not complications.     Advised per the Triage Care Advice. Advised to see her Healthcare Provider within 3 days (Nurses Judgement). Advised this message will go to Dr. Varghese's office to contact her tomorrow. Advised to call OOC back for new/worsening symptoms.     Pt also advised she can go to the ED for evaluation today if she is still concerned. She verbalized understanding of the Triage advice/instructions.   "

## 2023-10-22 NOTE — PROGRESS NOTES
Subjective:      Patient ID: Gilberto Bueno is a 43 y.o. female.    Vitals:  vitals were not taken for this visit.     Chief Complaint: Urinary Tract Infection      Visit Type: TELE AUDIOVISUAL    Present with the patient at the time of consultation: TELEMED PRESENT WITH PATIENT: None    Past Medical History:   Diagnosis Date    History of chlamydia     History of gonorrhea      History reviewed. No pertinent surgical history.  Review of patient's allergies indicates:  No Known Allergies  Current Outpatient Medications on File Prior to Visit   Medication Sig Dispense Refill    cephALEXin (KEFLEX) 500 MG capsule Take 500 mg by mouth every 12 (twelve) hours.      esomeprazole (NEXIUM) 20 MG capsule Take 1 capsule (20 mg total) by mouth before breakfast. 30 capsule 3    [] fluconazole (DIFLUCAN) 150 MG Tab Take 1 tablet (150 mg total) by mouth once daily. for 1 day 1 tablet 0    HYDROcodone-acetaminophen (NORCO) 5-325 mg per tablet Take 1 tablet by mouth every 6 (six) hours as needed.      metroNIDAZOLE (FLAGYL) 500 MG tablet Take 1 tablet (500 mg total) by mouth every 12 (twelve) hours. 14 tablet 0    ondansetron (ZOFRAN) 4 MG tablet Take 1 tablet (4 mg total) by mouth every 8 (eight) hours as needed for Nausea. 30 tablet 1    PNV,calcium 72-iron-folic acid (PRENATAL VITAMIN PLUS LOW IRON) 27 mg iron- 1 mg Tab Take one tablet daily.  Prescribe prenatal covered by insurance 90 tablet 11     No current facility-administered medications on file prior to visit.     History reviewed. No pertinent family history.        Ohs Peq Odvv Intake    10/22/2023  8:10 AM CDT - Filed by Patient   Describe your reason for todays visit Im 12 werks & im bleeding when wipe   What is your current physical address in the event of a medical emergency? [id number]   Are you able to take your vital signs? No   Please attach any relevant images or files          44 yo with c/o vaginal bleeding. She states  small amount of bleeding. She states a little size amount about a dime. She states just one time when wiping. She denies cramping. Positive lower back pain. She states she has 6 children. She states she was recently on abx yeast and bacteria.   Patient is in her second trimester pregnancy       Urinary Tract Infection   The current episode started acute onset. Pertinent negatives include no frequency or urgency.       Constitution: Negative.   HENT: Negative.     Cardiovascular: Negative.    Respiratory: Negative.     Gastrointestinal: Negative.  Negative for abdominal pain.   Endocrine: negative.   Genitourinary:  Positive for vaginal bleeding. Negative for frequency and urgency.   Musculoskeletal:  Positive for back pain.   Skin: Negative.    Allergic/Immunologic: Negative.    Neurological: Negative.    Hematologic/Lymphatic: Negative.    Psychiatric/Behavioral: Negative.          Objective:   The physical exam was conducted virtually.  Physical Exam   Constitutional: She is oriented to person, place, and time. She appears well-developed.   HENT:   Head: Normocephalic and atraumatic.   Ears:   Right Ear: Hearing, tympanic membrane and external ear normal.   Left Ear: Hearing, tympanic membrane and external ear normal.   Nose: Nose normal.   Mouth/Throat: Uvula is midline, oropharynx is clear and moist and mucous membranes are normal.   Eyes: Conjunctivae and EOM are normal. Pupils are equal, round, and reactive to light.   Neck: Neck supple.   Cardiovascular: Normal rate.   Pulmonary/Chest: Effort normal and breath sounds normal.   Musculoskeletal: Normal range of motion.         General: Normal range of motion.   Neurological: She is alert and oriented to person, place, and time.   Skin: Skin is warm.   Psychiatric: Her behavior is normal. Thought content normal.   Nursing note and vitals reviewed.      Assessment:     1. Spotting during pregnancy        Plan:     Patient Instructions   Advised to go to Emergency  room for further evaluation of spotting during pregnancy second trimester.     Spotting during pregnancy

## 2023-10-22 NOTE — PATIENT INSTRUCTIONS
Advised to go to Emergency room for further evaluation of spotting during pregnancy second trimester.

## 2023-10-26 ENCOUNTER — ROUTINE PRENATAL (OUTPATIENT)
Dept: OBSTETRICS AND GYNECOLOGY | Facility: CLINIC | Age: 43
End: 2023-10-26
Payer: MEDICAID

## 2023-10-26 VITALS
SYSTOLIC BLOOD PRESSURE: 126 MMHG | WEIGHT: 191.81 LBS | DIASTOLIC BLOOD PRESSURE: 66 MMHG | BODY MASS INDEX: 29.16 KG/M2

## 2023-10-26 DIAGNOSIS — Z34.91 FIRST TRIMESTER PREGNANCY: ICD-10-CM

## 2023-10-26 DIAGNOSIS — Z3A.12 12 WEEKS GESTATION OF PREGNANCY: Primary | ICD-10-CM

## 2023-10-26 DIAGNOSIS — O26.851 SPOTTING AFFECTING PREGNANCY IN FIRST TRIMESTER: ICD-10-CM

## 2023-10-26 PROCEDURE — 99213 OFFICE O/P EST LOW 20 MIN: CPT | Mod: TH,S$PBB,, | Performed by: OBSTETRICS & GYNECOLOGY

## 2023-10-26 PROCEDURE — 99213 PR OFFICE/OUTPT VISIT, EST, LEVL III, 20-29 MIN: ICD-10-PCS | Mod: TH,S$PBB,, | Performed by: OBSTETRICS & GYNECOLOGY

## 2023-10-26 PROCEDURE — 99999 PR PBB SHADOW E&M-EST. PATIENT-LVL III: ICD-10-PCS | Mod: PBBFAC,,, | Performed by: OBSTETRICS & GYNECOLOGY

## 2023-10-26 PROCEDURE — 99999 PR PBB SHADOW E&M-EST. PATIENT-LVL III: CPT | Mod: PBBFAC,,, | Performed by: OBSTETRICS & GYNECOLOGY

## 2023-10-26 PROCEDURE — 99213 OFFICE O/P EST LOW 20 MIN: CPT | Mod: PBBFAC,TH | Performed by: OBSTETRICS & GYNECOLOGY

## 2023-10-26 NOTE — PROGRESS NOTES
12w5d  Patient comes in today for follow-up prenatal care  Having some vaginal bleeding/spotting since yesterday  No vaginal bleeding, contractions, or rupture of membranes.  Good fetal movements.    Eating well without significant nausea/vomiting  Gaining weight   Taking prenatal vitamins, iron supplement  Has yet started baby aspirin    Exam with minimal brown discharge  Discussed [facility].    Program explained with risks and benefits.  Patient opted in.  Orders in.  Equipments will be sent to her along with specific instructions.    Discussed with patient MFM's findings and recommendations  Pelvic rest without heavy lifting  Start baby aspirin  Discussed care for AMA    Back in 4 weeks    Vitals signs, FHTs, urine dip, and PE findings documented, reviewed and available in OB flow chart.   I spent a total of 20 minutes on the day of the visit. This includes face to face time and non-face to face time preparing to see the patient (eg, review of tests and charts), Obtaining and/or reviewing separately obtained history, Documenting clinical information in the electronic or other health record, Independently interpreting results and communicating results to the patient/family/caregiver, or Care coordination.

## 2023-10-28 ENCOUNTER — TELEPHONE (OUTPATIENT)
Dept: OBSTETRICS AND GYNECOLOGY | Facility: CLINIC | Age: 43
End: 2023-10-28
Payer: MEDICAID

## 2023-10-28 DIAGNOSIS — O35.13X1: Primary | ICD-10-CM

## 2023-10-28 NOTE — TELEPHONE ENCOUNTER
I called to give the patient results of [id number] showing positive for Down.  Will request M ultrasound and consultation soon.  Patient with follow-up appointment with us on 11/20/23  All of her questions were answered

## 2023-10-31 ENCOUNTER — OFFICE VISIT (OUTPATIENT)
Dept: MATERNAL FETAL MEDICINE | Facility: CLINIC | Age: 43
End: 2023-10-31
Payer: MEDICAID

## 2023-10-31 DIAGNOSIS — O28.9 POSITIVE ANTENATAL SCREENING TEST: Primary | ICD-10-CM

## 2023-10-31 PROCEDURE — 96040 PR GENETIC COUNSELING, EACH 30 MIN: CPT | Mod: 95,,, | Performed by: GENETIC COUNSELOR, MS

## 2023-10-31 PROCEDURE — 96040 PR GENETIC COUNSELING, EACH 30 MIN: ICD-10-PCS | Mod: 95,,, | Performed by: GENETIC COUNSELOR, MS
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
## 2023-11-06 ENCOUNTER — PROCEDURE VISIT (OUTPATIENT)
Dept: MATERNAL FETAL MEDICINE | Facility: CLINIC | Age: 43
End: 2023-11-06
Payer: MEDICAID

## 2023-11-06 ENCOUNTER — OFFICE VISIT (OUTPATIENT)
Dept: MATERNAL FETAL MEDICINE | Facility: CLINIC | Age: 43
End: 2023-11-06
Payer: MEDICAID

## 2023-11-06 ENCOUNTER — TELEPHONE (OUTPATIENT)
Dept: MATERNAL FETAL MEDICINE | Facility: CLINIC | Age: 43
End: 2023-11-06

## 2023-11-06 VITALS
DIASTOLIC BLOOD PRESSURE: 78 MMHG | WEIGHT: 193.56 LBS | BODY MASS INDEX: 29.34 KG/M2 | HEIGHT: 68 IN | SYSTOLIC BLOOD PRESSURE: 122 MMHG

## 2023-11-06 DIAGNOSIS — O35.13X1: ICD-10-CM

## 2023-11-06 DIAGNOSIS — O09.292 HISTORY OF PRETERM PREMATURE RUPTURE OF MEMBRANES (PROM) IN PREVIOUS PREGNANCY, CURRENTLY PREGNANT IN SECOND TRIMESTER: ICD-10-CM

## 2023-11-06 DIAGNOSIS — O28.5 MATERNAL SERUM SCREEN POSITIVE FOR TRISOMY 21: Primary | ICD-10-CM

## 2023-11-06 DIAGNOSIS — O09.522 MULTIGRAVIDA OF ADVANCED MATERNAL AGE IN SECOND TRIMESTER: ICD-10-CM

## 2023-11-06 PROCEDURE — 1159F MED LIST DOCD IN RCRD: CPT | Mod: CPTII,,, | Performed by: OBSTETRICS & GYNECOLOGY

## 2023-11-06 PROCEDURE — 3008F BODY MASS INDEX DOCD: CPT | Mod: CPTII,,, | Performed by: OBSTETRICS & GYNECOLOGY

## 2023-11-06 PROCEDURE — 3044F PR MOST RECENT HEMOGLOBIN A1C LEVEL <7.0%: ICD-10-PCS | Mod: CPTII,,, | Performed by: OBSTETRICS & GYNECOLOGY

## 2023-11-06 PROCEDURE — 99215 PR OFFICE/OUTPT VISIT, EST, LEVL V, 40-54 MIN: ICD-10-PCS | Mod: S$PBB,TH,, | Performed by: OBSTETRICS & GYNECOLOGY

## 2023-11-06 PROCEDURE — 1160F RVW MEDS BY RX/DR IN RCRD: CPT | Mod: CPTII,,, | Performed by: OBSTETRICS & GYNECOLOGY

## 2023-11-06 PROCEDURE — 99999 PR PBB SHADOW E&M-EST. PATIENT-LVL III: ICD-10-PCS | Mod: PBBFAC,,, | Performed by: OBSTETRICS & GYNECOLOGY

## 2023-11-06 PROCEDURE — 3074F SYST BP LT 130 MM HG: CPT | Mod: CPTII,,, | Performed by: OBSTETRICS & GYNECOLOGY

## 2023-11-06 PROCEDURE — 76816 US MFM PROCEDURE (VIEWPOINT): ICD-10-PCS | Mod: 26,S$PBB,, | Performed by: OBSTETRICS & GYNECOLOGY

## 2023-11-06 PROCEDURE — 1159F PR MEDICATION LIST DOCUMENTED IN MEDICAL RECORD: ICD-10-PCS | Mod: CPTII,,, | Performed by: OBSTETRICS & GYNECOLOGY

## 2023-11-06 PROCEDURE — 3044F HG A1C LEVEL LT 7.0%: CPT | Mod: CPTII,,, | Performed by: OBSTETRICS & GYNECOLOGY

## 2023-11-06 PROCEDURE — 1160F PR REVIEW ALL MEDS BY PRESCRIBER/CLIN PHARMACIST DOCUMENTED: ICD-10-PCS | Mod: CPTII,,, | Performed by: OBSTETRICS & GYNECOLOGY

## 2023-11-06 PROCEDURE — 3078F DIAST BP <80 MM HG: CPT | Mod: CPTII,,, | Performed by: OBSTETRICS & GYNECOLOGY

## 2023-11-06 PROCEDURE — 76816 OB US FOLLOW-UP PER FETUS: CPT | Mod: PBBFAC | Performed by: OBSTETRICS & GYNECOLOGY

## 2023-11-06 PROCEDURE — 99215 OFFICE O/P EST HI 40 MIN: CPT | Mod: S$PBB,TH,, | Performed by: OBSTETRICS & GYNECOLOGY

## 2023-11-06 PROCEDURE — 3008F PR BODY MASS INDEX (BMI) DOCUMENTED: ICD-10-PCS | Mod: CPTII,,, | Performed by: OBSTETRICS & GYNECOLOGY

## 2023-11-06 PROCEDURE — 3074F PR MOST RECENT SYSTOLIC BLOOD PRESSURE < 130 MM HG: ICD-10-PCS | Mod: CPTII,,, | Performed by: OBSTETRICS & GYNECOLOGY

## 2023-11-06 PROCEDURE — 99213 OFFICE O/P EST LOW 20 MIN: CPT | Mod: PBBFAC,TH | Performed by: OBSTETRICS & GYNECOLOGY

## 2023-11-06 PROCEDURE — 99999 PR PBB SHADOW E&M-EST. PATIENT-LVL III: CPT | Mod: PBBFAC,,, | Performed by: OBSTETRICS & GYNECOLOGY

## 2023-11-06 PROCEDURE — 3078F PR MOST RECENT DIASTOLIC BLOOD PRESSURE < 80 MM HG: ICD-10-PCS | Mod: CPTII,,, | Performed by: OBSTETRICS & GYNECOLOGY

## 2023-11-06 NOTE — TELEPHONE ENCOUNTER
Call to patient to schedule cervical length ultrasounds starting at 16 wks. Explained they would be every 2 wks until at least 20 wks. She stated she did not want to do that as she didn't do it for her other pregnancies and they were term. Explained we could do one just at her anatomy scan. Patient did agree to this. Explained would schedule that and she would see it on [facility].

## 2023-11-06 NOTE — ASSESSMENT & PLAN NOTE
P4 (): 33w PPROM managed expectantly inpatient through 36w with subsequent spontaneous labor and   P6 (2019): 17-OHP with normal cervical lengths, term delivery  No subsequent  deliveries (unclear if 17P or cervical length screening in  gestation)    Gilberto's history of prior  birth puts her at increased risk of  delivery in a subsequent pregnancy. The risk of recurrent  birth is directly and inversely correlated to gestational age at time of prior  delivery. Women with prior  delivery at 36 weeks corresponds with a 21% rate of subsequent delivery before 37 weeks, though her interval term deliveries likely reduce that likelihood.    Mitigating this risk is a challenge. While weekly 17-alpha hydroxyprogesterone caproate (17-OHP) injections, starting around 16 weeks gestation, initially seemed to have been an effective prophylaxis against recurrent  birth, more recent data casts doubt upon the efficacy of this intervention, and the medication was recently withdrawn from the market. Our group has since pivoted to vaginal progesterone, which has been more reliably shown to reduce risk of recurrent  delivery. The benefit is likely limited to patients with history of  delivery and short cervix.  Gilberto is a candidate for cervical length screening. Short cervix as measured on early second trimester ultrasound is predictive of  delivery. If cervical length less than 25 mm is found on screening, an ultrasound indicated cerclage is a reasonable choice to decrease risk of previable  birth and  mortality. In women with cervical length less than 15 mm, cerclage has been shown to prevent  birth before 35 weeks.     Recommendations:  1. Transvaginal cervical length measurements every other week from 16-22 weeks gestation  2. Consider progesterone 200 mg capsules per vagina nightly  3. Continue close follow up with primary  OB team

## 2023-11-06 NOTE — ASSESSMENT & PLAN NOTE
Today I counseled the patient on the relationship between maternal age and adverse pregnancy outcomes. The most prominent is fetal aneuploidy, addressed under the T21 header.   Additional risks associated with maternal age greater than 35 include preeclampsia, gestational diabetes, and fetal growth restriction.  Maternal age greater than 40 is associated with increased risk of stillbirth. There is no established preventative measures against gestational diabetes once gestation is already started.  Early onset preeclampsia can be prevented in high risk patients with daily baby aspirin.  Fetal growth restriction is screened for with 3rd trimester ultrasound.     Recommendations:  1. Detailed anatomic survey at 19-20 weeks    2. Start ASA 81 mg daily  3. Initiate weekly  fetal surveillance at 32 weeks

## 2023-11-06 NOTE — ASSESSMENT & PLAN NOTE
Cell free DNA screen positive for trisomy 21  Positive predictive value 98.1%  S/p Genetic Counseling consultation, see note from Miracle Santillan [facility] for further detail  Declines diagnostic testing  Today's thickened NT consistent with trisomy 21    Recommendations:  1. Given high positive predictive value, recommend presumptive mgmt as T21  2. Fetal echo at 22-24 weeks  3. Serial third trimester growth assessment  - Aside from fetal echocardiogram and serial third trimester growth, management is consistent otherwise with that of AMA > 40 years

## 2023-11-06 NOTE — PROGRESS NOTES
MATERNAL-FETAL MEDICINE   CONSULT NOTE    Provider requesting consultation: Dr. Henderson    SUBJECTIVE:     Ms. Gilberto Bueno is a 43 y.o.  female with IUP at 14w1d who is seen in consultation by MFM for evaluation and management of:  Problem   Maternal Serum Screen Positive for Trisomy 21   Multigravida of Advanced Maternal Age in Second Trimester            Medication List with Changes/Refills   Current Medications    ONDANSETRON (ZOFRAN) 4 MG TABLET    Take 1 tablet (4 mg total) by mouth every 8 (eight) hours as needed for Nausea.    PNV,CALCIUM 72-IRON-FOLIC ACID (PRENATAL VITAMIN PLUS LOW IRON) 27 MG IRON- 1 MG TAB    Take one tablet daily.  Prescribe prenatal covered by insurance   Discontinued Medications    ESOMEPRAZOLE (NEXIUM) 20 MG CAPSULE    Take 1 capsule (20 mg total) by mouth before breakfast.    HYDROCODONE-ACETAMINOPHEN (NORCO) 5-325 MG PER TABLET    Take 1 tablet by mouth every 6 (six) hours as needed.       Review of patient's allergies indicates:  No Known Allergies    PMH:  Past Medical History:   Diagnosis Date    History of chlamydia     History of gonorrhea        PObHx:  OB History    Para Term  AB Living   8 6 5 1 1 6   SAB IAB Ectopic Multiple Live Births   1     0 6      # Outcome Date GA Lbr Zeus/2nd Weight Sex Delivery Anes PTL Lv   8 Current            7 SAB 23           6 Term 19 39w0d / 00:30 3.485 kg (7 lb 10.9 oz) M Vag-Vacuum EPI N    5 Term 11/24/10   3.24 kg (7 lb 2.3 oz) F Vag-Spont  N CHRIS   4  07 36w0d  2.807 kg (6 lb 3 oz) F Vag-Spont  Y CHRIS      Birth Comments: PPROM at 33 wks but she stayed in hospital until 36 and delivered then following spontaneous labor      Complications:  premature rupture of membranes (PPROM) with onset of labor after 24 hours of rupture in first trimester, antepartum   3 Term 05   3.997 kg (8 lb 13 oz) M Vag-Spont  N CHRIS   2 Term 01   4.99 kg (11 lb) M Vag-Spont  N CHRIS   1  "Term 99   3.459 kg (7 lb 10 oz) M Vag-Spont  N CHRIS       PSH:History reviewed. No pertinent surgical history.    Family history:family history includes Diabetes in her maternal grandmother; Hypertension in her cousin.    Social history: reports that she has never smoked. She has never used smokeless tobacco. She reports that she does not currently use alcohol. She reports that she does not use drugs.    Genetic history: The patient denies any inherited genetic diseases or birth defects in herself or her partner's personal history or family.    Objective:   /78 (BP Location: Left arm)   Ht 5' 8" (1.727 m)   Wt 87.8 kg (193 lb 9 oz)   LMP 2023 (Exact Date)   BMI 29.43 kg/m²     Ultrasound performed. See viewpoint for full ultrasound report.  1. A phillips living IUP is identified.   2. Fetal size is appropriate for established dating.  3. Thickened nuchal fold is appreciated subjectively, though optimal positioning for assessment is not achieved. Otherwise no fetal structural malformations are identified.   4.Placental location is anterior without evidence of previa.   5. Amniotic fluid volume appears to be a normal amount.   6. Single small posterior fibroid measures 1.5 cm in mean diameter.  7. Normal bilateral adnexa, including a small simple L para-ovarian cyst.    Significant labs/imaging:  Cell free DNA: positive for T21 (PPV 98.1%)    ASSESSMENT/PLAN:     43 y.o.  female with IUP at 14w1d     Maternal serum screen positive for trisomy 21  Cell free DNA screen positive for trisomy 21  Positive predictive value 98.1%  S/p Genetic Counseling consultation, see note from Miracle Santillan [facility] for further detail  Declines diagnostic testing  Today's thickened NT consistent with trisomy 21    Recommendations:  1. Given high positive predictive value, recommend presumptive mgmt as T21  2. Fetal echo at 22-24 weeks  3. Serial third trimester growth assessment  - Aside from fetal echocardiogram " and serial third trimester growth, management is consistent otherwise with that of AMA > 40 years    Multigravida of advanced maternal age in second trimester  Today I counseled the patient on the relationship between maternal age and adverse pregnancy outcomes. The most prominent is fetal aneuploidy, addressed under the T21 header.   Additional risks associated with maternal age greater than 35 include preeclampsia, gestational diabetes, and fetal growth restriction.  Maternal age greater than 40 is associated with increased risk of stillbirth. There is no established preventative measures against gestational diabetes once gestation is already started.  Early onset preeclampsia can be prevented in high risk patients with daily baby aspirin.  Fetal growth restriction is screened for with 3rd trimester ultrasound.     Recommendations:  1. Detailed anatomic survey at 19-20 weeks    2. Start ASA 81 mg daily  3. Initiate weekly  fetal surveillance at 32 weeks       History of  premature rupture of membranes (PROM) in previous pregnancy, currently pregnant in second trimester  P4 (): 33w PPROM managed expectantly inpatient through 36w with subsequent spontaneous labor and   P6 (): 17-OHP with normal cervical lengths, term delivery  No subsequent  deliveries (unclear if 17P or cervical length screening in  gestation)    Gilberto's history of prior  birth puts her at increased risk of  delivery in a subsequent pregnancy. The risk of recurrent  birth is directly and inversely correlated to gestational age at time of prior  delivery. Women with prior  delivery at 36 weeks corresponds with a 21% rate of subsequent delivery before 37 weeks, though her interval term deliveries likely reduce that likelihood.    Mitigating this risk is a challenge. While weekly 17-alpha hydroxyprogesterone caproate (17-OHP) injections, starting around 16 weeks gestation,  initially seemed to have been an effective prophylaxis against recurrent  birth, more recent data casts doubt upon the efficacy of this intervention, and the medication was recently withdrawn from the market. Our group has since pivoted to vaginal progesterone, which has been more reliably shown to reduce risk of recurrent  delivery. The benefit is likely limited to patients with history of  delivery and short cervix.  Gilberto is a candidate for cervical length screening. Short cervix as measured on early second trimester ultrasound is predictive of  delivery. If cervical length less than 25 mm is found on screening, an ultrasound indicated cerclage is a reasonable choice to decrease risk of previable  birth and  mortality. In women with cervical length less than 15 mm, cerclage has been shown to prevent  birth before 35 weeks.     Recommendations:  1. Transvaginal cervical length measurements every other week from 16-22 weeks gestation  2. Consider progesterone 200 mg capsules per vagina nightly  3. Continue close follow up with primary OB team    FOLLOW UP:   F/u in 2 weeks for US  F/u in 4 weeks for US/MFM visit      50 minutes of total time spent on the encounter, which includes face to face time and non-face to face time preparing to see the patient (eg, review of tests), obtaining and/or reviewing separately obtained history, documenting clinical information in the electronic or other health record, independently interpreting results (not separately reported) and communicating results to the patient/family/caregiver, or care coordination (not separately reported).      Neil Avendano III  Maternal-Fetal Medicine    Electronically Signed by Neil Avendano III 2023

## 2023-11-09 ENCOUNTER — PATIENT MESSAGE (OUTPATIENT)
Dept: ADMINISTRATIVE | Facility: OTHER | Age: 43
End: 2023-11-09
Payer: MEDICAID

## 2023-11-20 ENCOUNTER — LAB VISIT (OUTPATIENT)
Dept: LAB | Facility: HOSPITAL | Age: 43
End: 2023-11-20
Attending: OBSTETRICS & GYNECOLOGY
Payer: MEDICAID

## 2023-11-20 ENCOUNTER — ROUTINE PRENATAL (OUTPATIENT)
Dept: OBSTETRICS AND GYNECOLOGY | Facility: CLINIC | Age: 43
End: 2023-11-20
Payer: MEDICAID

## 2023-11-20 VITALS
BODY MASS INDEX: 29.43 KG/M2 | WEIGHT: 193.56 LBS | SYSTOLIC BLOOD PRESSURE: 100 MMHG | DIASTOLIC BLOOD PRESSURE: 62 MMHG

## 2023-11-20 DIAGNOSIS — Z3A.16 16 WEEKS GESTATION OF PREGNANCY: Primary | ICD-10-CM

## 2023-11-20 DIAGNOSIS — Z34.92 SECOND TRIMESTER PREGNANCY: ICD-10-CM

## 2023-11-20 DIAGNOSIS — O09.291 HISTORY OF SPONTANEOUS ABORTION, CURRENTLY PREGNANT, FIRST TRIMESTER: ICD-10-CM

## 2023-11-20 DIAGNOSIS — O35.13X1: ICD-10-CM

## 2023-11-20 DIAGNOSIS — O09.521 ADVANCED MATERNAL AGE IN MULTIGRAVIDA, FIRST TRIMESTER: ICD-10-CM

## 2023-11-20 DIAGNOSIS — Z3A.16 16 WEEKS GESTATION OF PREGNANCY: ICD-10-CM

## 2023-11-20 PROCEDURE — 99213 OFFICE O/P EST LOW 20 MIN: CPT | Mod: PBBFAC,TH | Performed by: OBSTETRICS & GYNECOLOGY

## 2023-11-20 PROCEDURE — 99213 OFFICE O/P EST LOW 20 MIN: CPT | Mod: TH,S$PBB,, | Performed by: OBSTETRICS & GYNECOLOGY

## 2023-11-20 PROCEDURE — 82105 ALPHA-FETOPROTEIN SERUM: CPT | Performed by: OBSTETRICS & GYNECOLOGY

## 2023-11-20 PROCEDURE — 99999 PR PBB SHADOW E&M-EST. PATIENT-LVL III: CPT | Mod: PBBFAC,,, | Performed by: OBSTETRICS & GYNECOLOGY

## 2023-11-20 PROCEDURE — 99213 PR OFFICE/OUTPT VISIT, EST, LEVL III, 20-29 MIN: ICD-10-PCS | Mod: TH,S$PBB,, | Performed by: OBSTETRICS & GYNECOLOGY

## 2023-11-20 PROCEDURE — 99999 PR PBB SHADOW E&M-EST. PATIENT-LVL III: ICD-10-PCS | Mod: PBBFAC,,, | Performed by: OBSTETRICS & GYNECOLOGY

## 2023-11-20 PROCEDURE — 36415 COLL VENOUS BLD VENIPUNCTURE: CPT | Performed by: OBSTETRICS & GYNECOLOGY

## 2023-11-20 RX ORDER — UREA 40 %
CREAM (GRAM) TOPICAL
COMMUNITY
Start: 2023-11-18

## 2023-11-20 NOTE — PROGRESS NOTES
12w5d  Patient comes in today for follow-up prenatal care  No vaginal bleeding, contractions, or rupture of membranes.  Good fetal movements.    Eating well without significant nausea/vomiting  Gaining weight   Taking prenatal vitamins, iron supplement, and baby aspirin    Signed up for [facility].    Has not done it regularly.    Discussed with patient MFM's findings and recommendations  Discussed Down syndrome.  AFP  Discussed care for AMA  Discussed follow-up ultrasound  Back in 4 weeks    Vitals signs, FHTs, urine dip, and PE findings documented, reviewed and available in OB flow chart.   I spent a total of 20 minutes on the day of the visit. This includes face to face time and non-face to face time preparing to see the patient (eg, review of tests and charts), Obtaining and/or reviewing separately obtained history, Documenting clinical information in the electronic or other health record, Independently interpreting results and communicating results to the patient/family/caregiver, or Care coordination.

## 2023-11-25 NOTE — PROGRESS NOTES
Now with gestational diabetes.  Getting weekly Rosette.  Taking  Baby aspirin.    Discussed diet and exercise.  Discussed AccuCheck and low carb diet.  Appointment with MFM in 2 weeks    Back next week with records   All other review of systems negative, except as noted in HPI

## 2023-11-27 LAB
# FETUSES US: NORMAL
AFP INTERPRETATION: NORMAL
AFP MOM SERPL: 0.69
AFP SERPL-MCNC: 21.1 NG/ML
AFP SERPL-MCNC: NEGATIVE NG/ML
AGE AT DELIVERY: 44
GA (DAYS): 1 D
GA (WEEKS): 16 WK
GESTATIONAL AGE METHOD: NORMAL
IDDM PATIENT QL: NORMAL
SMOKING STATUS FTND: NO

## 2023-11-28 PROBLEM — O46.92 VAGINAL BLEEDING IN PREGNANCY, SECOND TRIMESTER: Status: ACTIVE | Noted: 2023-11-28

## 2023-11-29 ENCOUNTER — PATIENT MESSAGE (OUTPATIENT)
Dept: OBSTETRICS AND GYNECOLOGY | Facility: CLINIC | Age: 43
End: 2023-11-29
Payer: MEDICAID

## 2023-11-29 ENCOUNTER — HOSPITAL ENCOUNTER (INPATIENT)
Facility: HOSPITAL | Age: 43
LOS: 30 days | Discharge: HOME | End: 2023-12-29
Attending: STUDENT IN AN ORGANIZED HEALTH CARE EDUCATION/TRAINING PROGRAM | Admitting: OBSTETRICS & GYNECOLOGY
Payer: MEDICAID

## 2023-11-29 DIAGNOSIS — Z98.891 STATUS POST CESAREAN SECTION: Primary | ICD-10-CM

## 2023-11-29 DIAGNOSIS — O26.899 VAGINAL DISCHARGE DURING PREGNANCY: ICD-10-CM

## 2023-11-29 DIAGNOSIS — N89.8 VAGINAL DISCHARGE DURING PREGNANCY: ICD-10-CM

## 2023-11-29 DIAGNOSIS — O42.90 PROM (PREMATURE RUPTURE OF MEMBRANES): ICD-10-CM

## 2023-11-29 LAB — RUPTURE OF MEMBRANE: POSITIVE

## 2023-11-29 PROCEDURE — 84112 EVAL AMNIOTIC FLUID PROTEIN: CPT | Performed by: STUDENT IN AN ORGANIZED HEALTH CARE EDUCATION/TRAINING PROGRAM

## 2023-11-29 PROCEDURE — 11000001 HC ACUTE MED/SURG PRIVATE ROOM

## 2023-11-29 PROCEDURE — 63700000 PHARM REV CODE 250 ALT 637 W/O HCPCS: Performed by: STUDENT IN AN ORGANIZED HEALTH CARE EDUCATION/TRAINING PROGRAM

## 2023-11-29 RX ORDER — AZITHROMYCIN 250 MG/1
1000 TABLET, FILM COATED ORAL ONCE
Status: COMPLETED | OUTPATIENT
Start: 2023-11-29 | End: 2023-11-29

## 2023-11-29 RX ORDER — SODIUM CHLORIDE, SODIUM LACTATE, POTASSIUM CHLORIDE, CALCIUM CHLORIDE 600; 310; 30; 20 MG/100ML; MG/100ML; MG/100ML; MG/100ML
INJECTION, SOLUTION INTRAVENOUS CONTINUOUS
Status: DISCONTINUED | OUTPATIENT
Start: 2023-11-29 | End: 2023-12-01

## 2023-11-29 RX ADMIN — AZITHROMYCIN DIHYDRATE 1000 MG: 250 TABLET ORAL at 11:11

## 2023-11-30 PROBLEM — Z3A.17 17 WEEKS GESTATION OF PREGNANCY: Status: ACTIVE | Noted: 2023-11-30

## 2023-11-30 PROBLEM — O42.912 PREMATURE RUPTURE OF MEMBRANES IN SECOND TRIMESTER: Status: ACTIVE | Noted: 2023-11-30

## 2023-11-30 LAB
ABO + RH BLD: NORMAL
BASOPHILS # BLD AUTO: 0.05 K/UL (ref 0–0.2)
BASOPHILS NFR BLD: 0.6 % (ref 0–1.9)
BLD GP AB SCN CELLS X3 SERPL QL: NORMAL
DIFFERENTIAL METHOD BLD: ABNORMAL
EOSINOPHIL # BLD AUTO: 0.1 K/UL (ref 0–0.5)
EOSINOPHIL NFR BLD: 1 % (ref 0–8)
ERYTHROCYTE [DISTWIDTH] IN BLOOD BY AUTOMATED COUNT: 12.9 % (ref 11.5–14.5)
HCT VFR BLD AUTO: 34.7 % (ref 37–48.5)
HGB BLD-MCNC: 11.5 G/DL (ref 12–16)
IMM GRANULOCYTES # BLD AUTO: 0.03 K/UL (ref 0–0.04)
IMM GRANULOCYTES NFR BLD AUTO: 0.4 % (ref 0–0.5)
LYMPHOCYTES # BLD AUTO: 1.8 K/UL (ref 1–4.8)
LYMPHOCYTES NFR BLD: 21.6 % (ref 18–48)
MCH RBC QN AUTO: 30.1 PG (ref 27–31)
MCHC RBC AUTO-ENTMCNC: 33.1 G/DL (ref 32–36)
MCV RBC AUTO: 91 FL (ref 82–98)
MONOCYTES # BLD AUTO: 0.4 K/UL (ref 0.3–1)
MONOCYTES NFR BLD: 5 % (ref 4–15)
NEUTROPHILS # BLD AUTO: 5.8 K/UL (ref 1.8–7.7)
NEUTROPHILS NFR BLD: 71.4 % (ref 38–73)
NRBC BLD-RTO: 0 /100 WBC
PLATELET # BLD AUTO: 249 K/UL (ref 150–450)
PMV BLD AUTO: 10.5 FL (ref 9.2–12.9)
RBC # BLD AUTO: 3.82 M/UL (ref 4–5.4)
SPECIMEN OUTDATE: NORMAL
WBC # BLD AUTO: 8.18 K/UL (ref 3.9–12.7)

## 2023-11-30 PROCEDURE — 99211 OFF/OP EST MAY X REQ PHY/QHP: CPT | Mod: 25

## 2023-11-30 PROCEDURE — 11000001 HC ACUTE MED/SURG PRIVATE ROOM

## 2023-11-30 PROCEDURE — 96360 HYDRATION IV INFUSION INIT: CPT

## 2023-11-30 PROCEDURE — 86901 BLOOD TYPING SEROLOGIC RH(D): CPT | Performed by: STUDENT IN AN ORGANIZED HEALTH CARE EDUCATION/TRAINING PROGRAM

## 2023-11-30 PROCEDURE — 25000003 PHARM REV CODE 250: Performed by: STUDENT IN AN ORGANIZED HEALTH CARE EDUCATION/TRAINING PROGRAM

## 2023-11-30 PROCEDURE — 63600175 PHARM REV CODE 636 W HCPCS: Performed by: STUDENT IN AN ORGANIZED HEALTH CARE EDUCATION/TRAINING PROGRAM

## 2023-11-30 PROCEDURE — 99223 1ST HOSP IP/OBS HIGH 75: CPT | Mod: ,,, | Performed by: OBSTETRICS & GYNECOLOGY

## 2023-11-30 PROCEDURE — 85025 COMPLETE CBC W/AUTO DIFF WBC: CPT | Performed by: STUDENT IN AN ORGANIZED HEALTH CARE EDUCATION/TRAINING PROGRAM

## 2023-11-30 PROCEDURE — 96361 HYDRATE IV INFUSION ADD-ON: CPT

## 2023-11-30 RX ADMIN — SODIUM CHLORIDE, POTASSIUM CHLORIDE, SODIUM LACTATE AND CALCIUM CHLORIDE: 600; 310; 30; 20 INJECTION, SOLUTION INTRAVENOUS at 04:11

## 2023-11-30 RX ADMIN — AMPICILLIN SODIUM 2 G: 2 INJECTION, POWDER, FOR SOLUTION INTRAVENOUS at 12:11

## 2023-11-30 RX ADMIN — AMPICILLIN SODIUM 2 G: 2 INJECTION, POWDER, FOR SOLUTION INTRAVENOUS at 06:11

## 2023-11-30 RX ADMIN — SODIUM CHLORIDE, POTASSIUM CHLORIDE, SODIUM LACTATE AND CALCIUM CHLORIDE: 600; 310; 30; 20 INJECTION, SOLUTION INTRAVENOUS at 12:11

## 2023-11-30 NOTE — H&P
[facility] - [place], [place] - Labor & Delivery  Obstetrics  Antepartum Progress Note    Patient Name: Gilberto Hendrickson  MRN: [id number]  Admission Date: 2023  Hospital Length of Stay: 0 days  Attending Physician: Abimael Henderson MD  Primary Care Provider: Liss Wise MD    Subjective:     Principal Problem:Premature rupture of membranes in second trimester    HPI:  44 yo  at 17w4d  Advanced maternal age  Fetus with Down syndrome  Has been with spotting for the past couple of days  Went to our Emergency on 2023 with minimal spotting and occasional abdominal pain.  Ultrasound that day with MARCUS of 5.9 cm  Good fetal movements    Noted with ROM last night at 1800.    Denies fever and chills.  No longer with pain/contractions        Hospital Course:  On Labor and Delivery, vitals stable  FHT at 140   Contraction: None  Cervix: 1 cm  Started on antibiotic therapy.  Zithromax and Ampicillin    2023 No longer with contractions/spotting this morning.  No fundal tenderness.  No leakage of fluid. Does not want     Obstetric HPI:  Patient reports irregular contractions, good fetal movement, Yes vaginal bleeding , Yes loss of fluid     This pregnancy has been complicated by   AMA  Fetus with Down syndrome  PROM  History of miscarriage  GBS bacteriuria    OB History    Para Term  AB Living   8 6 5 1 1 6   SAB IAB Ectopic Multiple Live Births   1 0 0 0 6      # Outcome Date GA Lbr Zeus/2nd Weight Sex Delivery Anes PTL Lv   8 Current            7 SAB 23           6 Term 19 39w0d / 00:30 3.485 kg (7 lb 10.9 oz) M Vag-Vacuum EPI N       Name: RADHA HENDRICKSON      Apgar1: 9  Apgar5: 9   5 Term 11/24/10   3.24 kg (7 lb 2.3 oz) F Vag-Spont  N CHRIS   4  07 36w0d  2.807 kg (6 lb 3 oz) F Vag-Spont  Y CHRIS      Birth Comments: PPROM at 33 wks but she stayed in hospital until 36 and delivered then following spontaneous labor      Complications:  premature rupture of membranes  (PPROM) with onset of labor after 24 hours of rupture in first trimester, antepartum   3 Term 03/06/05   3.997 kg (8 lb 13 oz) M Vag-Spont  N CHRIS   2 Term 08/12/01   4.99 kg (11 lb) M Vag-Spont  N CHRIS   1 Term 04/25/99   3.459 kg (7 lb 10 oz) M Vag-Spont  N CHRIS     Past Medical History:   Diagnosis Date    History of chlamydia 2019    History of gonorrhea 2016     History reviewed. No pertinent surgical history.    PTA Medications   Medication Sig    metroNIDAZOLE (FLAGYL) 500 MG tablet Take 1 tablet (500 mg total) by mouth 2 (two) times a day. for 7 days    ondansetron (ZOFRAN) 4 MG tablet Take 1 tablet (4 mg total) by mouth every 8 (eight) hours as needed for Nausea.    PNV,calcium 72-iron-folic acid (PRENATAL VITAMIN PLUS LOW IRON) 27 mg iron- 1 mg Tab Take one tablet daily.  Prescribe prenatal covered by insurance    urea (CARMOL) 40 % Crea Apply topically.       Review of patient's allergies indicates:  No Known Allergies     Family History       Problem Relation (Age of Onset)    Diabetes Maternal Grandmother    Hypertension Cousin          Tobacco Use    Smoking status: Never    Smokeless tobacco: Never   Substance and Sexual Activity    Alcohol use: Not Currently     Comment: occ    Drug use: No    Sexual activity: Yes     Partners: Male     Review of Systems   Constitutional:  Positive for fatigue. Negative for activity change, appetite change, fever and unexpected weight change.   Respiratory:  Negative for cough, shortness of breath and wheezing.    Cardiovascular:  Negative for chest pain and palpitations.   Gastrointestinal:  Negative for abdominal pain, nausea and vomiting.   Endocrine: Negative for hot flashes.   Genitourinary:  Positive for vaginal discharge. Negative for dysmenorrhea, dyspareunia, frequency, pelvic pain, urgency, vaginal bleeding and postcoital bleeding.   Musculoskeletal:  Negative for back pain and myalgias.   Integumentary:  Negative for rash, breast mass and nipple discharge.    Neurological:  Positive for headaches. Negative for seizures.   Psychiatric/Behavioral:  Negative for depression and sleep disturbance. The patient is not nervous/anxious.    Breast: Negative for mass, mastodynia and nipple discharge     Objective:     Vital Signs (Most Recent):  Temp: 98.5 °F (36.9 °C) (23)  Pulse: 84 (23)  Resp: 18 (23)  BP: (!) 99/49 (23)  SpO2: 100 % (23) Vital Signs (24h Range):  Temp:  [98.2 °F (36.8 °C)-99.2 °F (37.3 °C)] 98.5 °F (36.9 °C)  Pulse:  [] 84  Resp:  [17-18] 18  SpO2:  [93 %-100 %] 100 %  BP: ()/(49-66) 99/49     Weight: 88 kg (194 lb 0.1 oz)  Body mass index is 31.31 kg/m².    FHT: 150 Cat 1 (reassuring)  TOCO:  No contraction     Physical Exam:   Constitutional: She appears well-developed and well-nourished. No distress.    HENT:   Head: Normocephalic and atraumatic.    Eyes: EOM are normal.     Cardiovascular:  Normal rate.             Pulmonary/Chest: Effort normal. No respiratory distress.        Abdominal: Soft. She exhibits no distension. There is no abdominal tenderness. There is no rebound and no guarding.     Genitourinary:    Genitourinary Comments: Minimal brown discharge             Musculoskeletal: Normal range of motion.       Neurological: She is alert.    Skin: Skin is warm and dry.    Psychiatric: She has a normal mood and affect.        Cervix:  Dilation:  1       Significant Labs:  Lab Results   Component Value Date    GROUPTRH A POS 2023    HEPBSAG Non-reactive 10/02/2023    STREPBCULT No Group B Streptococcus isolated 2019    AFP 80.3 ng/mL 2010       I have personallly reviewed all pertinent lab results from the last 24 hours.  Assessment/Plan:     43 y.o. female  at 17w4d for:    * Premature rupture of membranes in second trimester  Discussed with patient management options. Risks and benefits of labor induction vs close observation presented including risks of  infection, bleeding and sepsis.  Patient would like to wait.  Does not want to proceed with delivery/induction  Will follow clinically with CBC/WBC, Temp, and fundal tenderness.  Would proceed with induction if signs of infection.  Would allow to labor if spontaneous  Continue with antibiotic for now.  All of her questions were answered appropriately.      17 weeks gestation of pregnancy  Admit for close observation.      Vaginal bleeding in pregnancy, second trimester  No longer bleeding.  Cervix at 1 cm  Discussed with patient management options. Risks and benefits of labor induction vs close observation presented including risks of infection, bleeding and sepsis.  Patient would like to wait.  Does not want to proceed with delivery/induction  Will follow clinically with CBC/WBC, Temp, and fundal tenderness.  Would proceed with induction if signs of infection.  Would allow to labor if spontaneous  Continue with antibiotic for now.  All of her questions were answered appropriately.    History of  premature rupture of membranes (PROM) in previous pregnancy, currently pregnant in second trimester  Discussed with patient management options. Risks and benefits of labor induction vs close observation presented including risks of infection, bleeding and sepsis.  Patient would like to wait.  Does not want to proceed with delivery/induction  Will follow clinically with CBC/WBC, Temp, and fundal tenderness.  Would proceed with induction if signs of infection.  Would allow to labor if spontaneous  Continue with antibiotic for now.  All of her questions were answered appropriately.    Maternal serum screen positive for trisomy 21  Now with PROM  Discussed with patient management options. Risks and benefits of labor induction vs close observation presented including risks of infection, bleeding and sepsis.  Patient would like to wait.  Does not want to proceed with delivery/induction  Will follow clinically with CBC/WBC,  Temp, and fundal tenderness.  Would proceed with induction if signs of infection.  Would allow to labor if spontaneous  Continue with antibiotic for now.  All of her questions were answered appropriately.    Multigravida of advanced maternal age in second trimester  Now with PROM  Discussed with patient management options. Risks and benefits of labor induction vs close observation presented including risks of infection, bleeding and sepsis.  Patient would like to wait.  Does not want to proceed with delivery/induction  Will follow clinically with CBC/WBC, Temp, and fundal tenderness.  Would proceed with induction if signs of infection.  Would allow to labor if spontaneous  Continue with antibiotic for now.  All of her questions were answered appropriately.          Abimael Henderson MD  Obstetrics  [facility] - [place] - Labor & Delivery

## 2023-11-30 NOTE — FIRST PROVIDER EVALUATION
Medical screening examination initiated.  I have conducted a focused provider triage encounter, findings are as follows:    Brief history of present illness:  43 y. o. female, A1, ~17 weeks gestation presents to this facility after her water broke. .  Patient presented to this facility yesterday for abdominal pain where she had an ultrasound that reveals single live intrauterine gestation in transverse presentation.    Vitals:    23 1945 23 1947   BP: (!) 115/57    BP Location: Right arm    Patient Position: Sitting    Pulse: 101    Resp: 18    Temp:  99 °F (37.2 °C)   TempSrc:  Oral   SpO2: 99%    Weight: 88 kg (194 lb)        Pertinent physical exam:  No acute distress.    Brief workup plan:  Further evaluation once patient is placed in a room    Preliminary workup initiated; this workup will be continued and followed by the physician or advanced practice provider that is assigned to the patient when roomed.

## 2023-11-30 NOTE — ASSESSMENT & PLAN NOTE
Now with PROM  Discussed with patient management options. Risks and benefits of labor induction vs close observation presented including risks of infection, bleeding and sepsis.  Patient would like to wait.  Does not want to proceed with delivery/induction  Will follow clinically with CBC/WBC, Temp, and fundal tenderness.  Would proceed with induction if signs of infection.  Would allow to labor if spontaneous  Continue with antibiotic for now.  All of her questions were answered appropriately.

## 2023-11-30 NOTE — ED PROVIDER NOTES
Encounter Date: 2023    SCRIBE #1 NOTE: I, Roxanne Anderson, am scribing for, and in the presence of,  Eros Heard NP. I have scribed the following portions of the note - Other sections scribed: HPI, ROS.       History     Chief Complaint   Patient presents with    Threatened Miscarriage     Pt reports water broke today, was seen yesterday for bleeding and cramping. US done yesterday and everything was normal. Pt reports 17 weeks pregnant      43 y. o. female, A1, 17 weeks gestation, with a PMHx of miscarriage, who presents to the ED for an evaluation s/p prelabor rupture of membranes onset PTA. Patient reports she was here yesterday for vaginal bleeding and abdominal tightness, had US done yesterday and fetus was normal with fetal HB of 152 bpm. Further reports her bleeding is resolved today, but she's still feeling the tightness to her abdomen intermittently. No attempted Tx for the Sx. No other alleviating or exacerbating factors. Denies any tissue passing, or other associated symptoms. NKDA.           The history is provided by the patient. No  was used.     Review of patient's allergies indicates:  No Known Allergies  Past Medical History:   Diagnosis Date    History of chlamydia 2019    History of gonorrhea 2016     History reviewed. No pertinent surgical history.  Family History   Problem Relation Age of Onset    Diabetes Maternal Grandmother     Hypertension Cousin         Preeclampsia    Venous thrombosis Neg Hx      Social History     Tobacco Use    Smoking status: Never    Smokeless tobacco: Never   Substance Use Topics    Alcohol use: Not Currently     Comment: occ    Drug use: No     Review of Systems   Constitutional:  Negative for chills, fatigue and fever.   HENT:  Negative for congestion, ear pain, nosebleeds, postnasal drip, rhinorrhea, sinus pressure and sore throat.    Eyes:  Negative for photophobia and pain.   Respiratory:  Negative for apnea, cough, choking,  chest tightness, shortness of breath, wheezing and stridor.    Cardiovascular:  Negative for chest pain, palpitations and leg swelling.   Gastrointestinal:  Negative for abdominal pain, constipation, diarrhea, nausea and vomiting.        (+) abdominal tightness   Genitourinary:  Negative for dysuria, flank pain, hematuria, pelvic pain and vaginal discharge.   Musculoskeletal:  Negative for arthralgias, back pain, gait problem and neck pain.   Skin:  Negative for color change, pallor, rash and wound.   Neurological:  Negative for dizziness, seizures, syncope, facial asymmetry, light-headedness and headaches.   Hematological:  Negative for adenopathy.   Psychiatric/Behavioral:  Negative for confusion. The patient is not nervous/anxious.        Physical Exam     Initial Vitals   BP Pulse Resp Temp SpO2   11/29/23 1945 11/29/23 1945 11/29/23 1945 11/29/23 1947 11/29/23 1945   (!) 115/57 101 18 99 °F (37.2 °C) 99 %      MAP       --                Physical Exam    Nursing note and vitals reviewed.  Constitutional: She appears well-developed and well-nourished. She is not diaphoretic. No distress.   HENT:   Head: Normocephalic and atraumatic.   Right Ear: External ear normal.   Left Ear: External ear normal.   Nose: Nose normal.   Eyes: Conjunctivae and EOM are normal. Right eye exhibits no discharge. Left eye exhibits no discharge.   Neck: Neck supple. No tracheal deviation present.   Normal range of motion.  Cardiovascular:  Normal rate.           Pulmonary/Chest: No stridor. No respiratory distress.   Abdominal: Abdomen is soft. She exhibits no distension. There is no abdominal tenderness.   Musculoskeletal:         General: No tenderness. Normal range of motion.      Cervical back: Normal range of motion and neck supple.     Neurological: She is alert and oriented to person, place, and time. She has normal strength. No cranial nerve deficit or sensory deficit.   Skin: Skin is warm and dry.   Psychiatric: She has a  normal mood and affect. Her behavior is normal. Judgment and thought content normal.         ED Course   Procedures  Labs Reviewed - No data to display       Imaging Results    None          Medications - No data to display  Medical Decision Making  Patient concern for possible premature rupture of membranes.  She has had 6 children in the past and states that feeling is the same as previous pregnancies.  Discussed case with Dr. Samir Gayle (OBGYN) who recommends sending to L&D for ROM plus and further management as needed.     Amount and/or Complexity of Data Reviewed  External Data Reviewed: labs, radiology and notes.     Details: See HPI                                      Clinical Impression:  Final diagnoses:  [O26.899, N89.8] Vaginal discharge during pregnancy (Primary)          ED Disposition Condition    Send to L&D Stable                rEos Heard NP  11/29/23 [id number]

## 2023-11-30 NOTE — NURSING
2105-2140 This RN at bedside. Pt c/o LOF from vagina that started around 5pm while pt was cooking in her kitchen. Pt reports active FM. FHTs via doppler 160-180bpm.   Pink fluid noted upon inspect of perineum and peripad. Abd soft and nontender upon palpation. Gentle SVE performed 1cm/60%/high. Pink fluid noted on glove, foul odor present. ROM+ swab collected.  Pt states she was seen in ED yesterday for vaginal bleeding and her cervix was closed.   RAFFAELE Tinajero RN at bedside for IV placement and blood draw  Dr. Gayle called prior to pt arrival (2044) and gave order for ROM+ test and doppler FHTS. Orders received.

## 2023-11-30 NOTE — HOSPITAL COURSE
On Labor and Delivery, vitals stable  FHT at 140   Contraction: None  Cervix: 1 cm  Started on antibiotic therapy.  Zithromax and Ampicillin    11/30/2023 No longer with contractions/spotting this morning.  No fundal tenderness.  No leakage of fluid. Does not want to deliver now.  Wants to wait for sealing of the membranes    12/1/2023 No pain.  No leakage. No vaginal bleeding. Good fetal movements  12/2/2023 HD#3/PPROM Day #4. No pain.  No leakage. No vaginal bleeding. Good fetal movements  12/3/2023 HD#4/PPROM Day #5. No pain.  No leakage. No vaginal bleeding. Good fetal movements    12/4/2023 HD#5 PPROM Day #6. No pain.  No leakage. No vaginal bleeding.  Good fetal movements.  Has been ambulating. Bedside ultrasound with no amniotic fluid. Oral ampicillin started.  12/5/2023 HD#6 PPROM Day #7. No pain.  No leakage. No vaginal bleeding.  Good fetal movements.  Has been ambulating to the bathroom.  Still in good spirit.  12/6/2023 HD#7 PPROM Day #8. No pain.  No leakage. No vaginal bleeding.  Good fetal movements.  Has been ambulating to the bathroom.  Still in good spirit. Had a visit with our hospital  last night  12/7/2023 HD#8 PPROM Day #9. No change. No pain.  No leakage. No vaginal bleeding.  Good fetal movements.  Has been ambulating to the bathroom.  Still in good spirit.   12/8/2023 HD#9 PPROM Day #10. No change. No pain.  No leakage. No vaginal bleeding.  Good fetal movements.  Has been ambulating to the bathroom.  Still in good spirit.  12/9/2023 HD#10 PPROM Day #11.  No change. No pain.  No leakage. No vaginal bleeding.  Good fetal movements.  Has been ambulating to the bathroom.  Still in good spirit.   12/10/2023 HD#11 PPROM Day #12.  No change. No pain.  No leakage. No vaginal bleeding.  Good fetal movements.  Has been ambulating to the bathroom.  Still in good spirit. But has a difficult time sleeping last night     12/11/2023 HD#12 PPROM Day #13. No change. No pain.  No leakage. No vaginal  bleeding.  Good fetal movements.  Has been ambulating to the bathroom.  Still in good spirit. Bedside ultrasound by me, 0.5 cm fluid pocket by fetal feet with chord.  FHT at 148   12/12/2023 HD#13 PPROM Day #14. No change. No pain.  No leakage. No vaginal bleeding.  Good fetal movements.  Has been ambulating to the bathroom.  Still in good spirit.   12/13/2023 HD#14 PPROM Day #15. No change. No pain.  No leakage. No vaginal bleeding.  Good fetal movements.  Has been ambulating to the bathroom.  Still in good spirit. Will stop antibiotic therapy after today  12/14/2023 HD#15 PPROM Day #16. No change. No pain.  No leakage. No vaginal bleeding.  Good fetal movements.  Has been ambulating to the bathroom.  Still in good spirit. Will stop antibiotic therapy today  12/15/2023 HD#16 PPROM Day #17. No change. No pain.  No leakage. No vaginal bleeding.  Good fetal movements.  Has been ambulating to the bathroom.  Still in good spirit.  Antibiotic discontinued yesterday.  12/16/2023: HD #17 PPROM Day #18. No change, not in pain. No leakage or change in discharge   12/17/2023: HD #18 PPROM Day #19. No change or abdominal pain, no vaginal bleeding or fluid loss. In good spirits     12/18/2023 HD#19 PPROM Day #20. No change. No pain.  No leakage. No vaginal bleeding.  Good fetal movements.  Has been ambulating to the bathroom.  Still in good spirit. Bedside ultrasound done by me today. Footling breech with minimal fluid.  Good fetal heart tones and movements.   12/19/23:  HD#20 PPROM D#21:  pt denies any c/o + FM, no pain or bleeding  12/20/2023 HD#21 PPROM D#22:  pt denies any c/o + FM, no pain or bleeding  12/21/2023 HD#22 PPROM D#23: pt denies abdominal pain, subjective fever, change in discharge or odor, VB. Noting FM  12/22/2023 HD#23 PPROM D#24: pt denies abdominal pain, subjective fever, change in discharge or odor, VB. Noting FM. In good spirit  12/23/2023 HD#24 PPROM D#25: pt denies abdominal pain, subjective fever,  change in discharge or odor, VB. Noting FM. In good spirit. Refused blood drawn today  2023 HD#25 PPROM D#26: pt denies abdominal pain, subjective fever, change in discharge or odor, Noting FM.  Had one episode of vaginal spotting without mucous last night.  None since then.  She had an argument with FOB during that time. US ordered today for MARCUS, r/o abruption    Patient called out.  Re.  She thought baby was coming out.  Exam by our staff and MD on call, Dr. Alvarado.  Prolapsed cord visible at perineum.  Ultrasound with footling breech.  Discussed with patient regarding viability status of her fetus.  She would like for us to do everything possible for her fetus.  We discussed emergency/urgent  section.  Risks and benefits discussed.    We then proceeded with primary low transverse  section.    Primary low transverse  section  Surgeon: Abimael Henderson MD  Assistant: Tiffanie Alvarado MD  Anesthesia: GETA by Mario Brennan MD and Jabari Reveles CRNA  Non-viable male infant at 1338 2023  Weight: 440 gm or 15.5 oz  Apgar: 3  Infant with signs of Down syndrome  Placenta: manually extracted intact with three vessels  Normal uterus, tubes and ovaries  Very small uterine fibroid at uterine incision.  Removed and sent to Path  EBL: 500 cc  No complication  No specimen    Abimael Henderson MD    23.  PPD # 1, doing well, baby on her chest  23  PPD#2.  Doing well. No complaint.  Vitals with tachycardia.  Baby next to her in bed.  Does not want to see the . Tm 102 at 2330 2023 PPD#3.  Tm up to 102.5 at 1120 on 2023.  Now on Gent and Clinda after PICC placed by our IV team.  Feeling better this morning.  Still with uterine tenderness.    1717 Came to see patient.  Sitting in a chair.  Still in pain.  Still not moving much.    We discussed depression and treatment.  We discussed discharge home in the morning  Zoloft 25 mg tonight.  Consider sending home with some Ambien  and Zoloft tomorrow  2023 PPD#4 Remains afebrile.  Still with pain.  On Gent/Clinda day #2.  Sertraline started yesterday.      2023  Postpartum course was significant for postop endometritis and depression  Problems dealing with her loss and  arrangement.  No help at home  Social Service consult requested.  Patient refused psych eval and visit with our .  Exam was benign with patient afebrile, vitals stable, and minimal bleeding.  Normal activities.  Patient discharged home on postpartum day #5, 2023   Discharge medications include Percocet, Motrin, prenatal vitamins, iron supplement, sertraline, and Ambien.  Follow-up with me next week for dressing removal and postoperative follow-up.    Abimael Henderson MD.

## 2023-11-30 NOTE — SUBJECTIVE & OBJECTIVE
Obstetric HPI:  Patient reports irregular contractions, good fetal movement, Yes vaginal bleeding , Yes loss of fluid     This pregnancy has been complicated by   AMA  Fetus with Down syndrome  PROM  History of miscarriage  GBS bacteriuria    OB History    Para Term  AB Living   8 6 5 1 1 6   SAB IAB Ectopic Multiple Live Births   1 0 0 0 6      # Outcome Date GA Lbr Zeus/2nd Weight Sex Delivery Anes PTL Lv   8 Current            7 SAB 23           6 Term 19 39w0d / 00:30 3.485 kg (7 lb 10.9 oz) M Vag-Vacuum EPI N       Name: RADHA HENDRICKSON      Apgar1: 9  Apgar5: 9   5 Term 11/24/10   3.24 kg (7 lb 2.3 oz) F Vag-Spont  N CHRIS   4  07 36w0d  2.807 kg (6 lb 3 oz) F Vag-Spont  Y CHRIS      Birth Comments: PPROM at 33 wks but she stayed in hospital until 36 and delivered then following spontaneous labor      Complications:  premature rupture of membranes (PPROM) with onset of labor after 24 hours of rupture in first trimester, antepartum   3 Term 05   3.997 kg (8 lb 13 oz) M Vag-Spont  N CHRIS   2 Term 01   4.99 kg (11 lb) M Vag-Spont  N CHRIS   1 Term 99   3.459 kg (7 lb 10 oz) M Vag-Spont  N CHRIS     Past Medical History:   Diagnosis Date    History of chlamydia     History of gonorrhea 2016     History reviewed. No pertinent surgical history.    PTA Medications   Medication Sig    metroNIDAZOLE (FLAGYL) 500 MG tablet Take 1 tablet (500 mg total) by mouth 2 (two) times a day. for 7 days    ondansetron (ZOFRAN) 4 MG tablet Take 1 tablet (4 mg total) by mouth every 8 (eight) hours as needed for Nausea.    PNV,calcium 72-iron-folic acid (PRENATAL VITAMIN PLUS LOW IRON) 27 mg iron- 1 mg Tab Take one tablet daily.  Prescribe prenatal covered by insurance    urea (CARMOL) 40 % Crea Apply topically.       Review of patient's allergies indicates:  No Known Allergies     Family History       Problem Relation (Age of Onset)    Diabetes Maternal Grandmother     Hypertension Cousin          Tobacco Use    Smoking status: Never    Smokeless tobacco: Never   Substance and Sexual Activity    Alcohol use: Not Currently     Comment: occ    Drug use: No    Sexual activity: Yes     Partners: Male     Review of Systems   Constitutional:  Positive for fatigue. Negative for activity change, appetite change, fever and unexpected weight change.   Respiratory:  Negative for cough, shortness of breath and wheezing.    Cardiovascular:  Negative for chest pain and palpitations.   Gastrointestinal:  Negative for abdominal pain, nausea and vomiting.   Endocrine: Negative for hot flashes.   Genitourinary:  Positive for vaginal discharge. Negative for dysmenorrhea, dyspareunia, frequency, pelvic pain, urgency, vaginal bleeding and postcoital bleeding.   Musculoskeletal:  Negative for back pain and myalgias.   Integumentary:  Negative for rash, breast mass and nipple discharge.   Neurological:  Positive for headaches. Negative for seizures.   Psychiatric/Behavioral:  Negative for depression and sleep disturbance. The patient is not nervous/anxious.    Breast: Negative for mass, mastodynia and nipple discharge     Objective:     Vital Signs (Most Recent):  Temp: 98.5 °F (36.9 °C) (11/30/23 0724)  Pulse: 84 (11/30/23 0724)  Resp: 18 (11/30/23 0724)  BP: (!) 99/49 (11/30/23 0724)  SpO2: 100 % (11/30/23 0724) Vital Signs (24h Range):  Temp:  [98.2 °F (36.8 °C)-99.2 °F (37.3 °C)] 98.5 °F (36.9 °C)  Pulse:  [] 84  Resp:  [17-18] 18  SpO2:  [93 %-100 %] 100 %  BP: ()/(49-66) 99/49     Weight: 88 kg (194 lb 0.1 oz)  Body mass index is 31.31 kg/m².    FHT: 150 Cat 1 (reassuring)  TOCO:  No contraction     Physical Exam:   Constitutional: She appears well-developed and well-nourished. No distress.    HENT:   Head: Normocephalic and atraumatic.    Eyes: EOM are normal.     Cardiovascular:  Normal rate.             Pulmonary/Chest: Effort normal. No respiratory distress.        Abdominal:  Soft. She exhibits no distension. There is no abdominal tenderness. There is no rebound and no guarding.     Genitourinary:    Genitourinary Comments: Minimal brown discharge             Musculoskeletal: Normal range of motion.       Neurological: She is alert.    Skin: Skin is warm and dry.    Psychiatric: She has a normal mood and affect.        Cervix:  Dilation:  1       Significant Labs:  Lab Results   Component Value Date    GROUPTRH A POS 11/30/2023    HEPBSAG Non-reactive 10/02/2023    STREPBCULT No Group B Streptococcus isolated 05/20/2019    AFP 80.3 ng/mL 08/11/2010       I have personallly reviewed all pertinent lab results from the last 24 hours.

## 2023-11-30 NOTE — ASSESSMENT & PLAN NOTE
Discussed with patient management options. Risks and benefits of labor induction vs close observation presented including risks of infection, bleeding and sepsis.  Patient would like to wait.  Does not want to proceed with delivery/induction  Will follow clinically with CBC/WBC, Temp, and fundal tenderness.  Would proceed with induction if signs of infection.  Would allow to labor if spontaneous  Continue with antibiotic for now.  All of her questions were answered appropriately.

## 2023-11-30 NOTE — NURSING
230 Dr. Gayle called. Report given. MD notified of positive ROM+ result, SVE w/ odor, and FHTs 160-180BPM. MD gave order for VS q 4hrs, IV access, CBC and T&S, 1gr Azitrho PO, 2g Ampicillin IV, consult NNP to speak to pt and discuss what to expect at 17w3d GA. Orders received.

## 2023-11-30 NOTE — NURSING
Pt back from bathroom. Pt denies abd pain or cramping. Pt denies vaginal pressure or vaginal bleeding w/ void.

## 2023-11-30 NOTE — H&P
42 yo  at 17w4d  Advanced maternal age  Fetus with Down syndrome  Has been with spotting for the past couple of days  Went to our Emergency on 2023 with minimal spotting and occasional abdominal pain.  Ultrasound that day with MARCUS of 5.9 cm  Good fetal movements     Noted with ROM last night at 1800.    Denies fever and chills.  No longer with pain/contractions    On Labor and Delivery, vitals stable  FHT at 140   Contraction: None  Cervix: 1 cm  Started on antibiotic therapy.  Zithromax and Ampicillin    2023 No longer with contractions/spotting this morning.  No fundal tenderness.  No leakage of fluid. Does not want     Past Medical History:   Diagnosis Date    History of chlamydia     History of gonorrhea        History reviewed. No pertinent surgical history.    Family History   Problem Relation Age of Onset    Diabetes Maternal Grandmother     Hypertension Cousin         Preeclampsia    Venous thrombosis Neg Hx        Social History     Socioeconomic History    Marital status: Single   Tobacco Use    Smoking status: Never    Smokeless tobacco: Never   Substance and Sexual Activity    Alcohol use: Not Currently     Comment: occ    Drug use: No    Sexual activity: Yes     Partners: Male   Social History Narrative    Together for a long time    Getting  2016    He owns his own business    She is doing home health       Current Facility-Administered Medications   Medication Dose Route Frequency Provider Last Rate Last Admin    ampicillin (OMNIPEN) 2 g in sodium chloride 0.9 % 100 mL IVPB (MB+)  2 g Intravenous Q6H Samir Gayle III, MD   Stopped at 23 0719    lactated ringers infusion   Intravenous Continuous Samir Gayle III, MD   Stopped at 23 0757       Review of patient's allergies indicates:  No Known Allergies    Review of Systems   Constitutional: Positive for fatigue. Negative for fever, chills, appetite change and unexpected weight change.   HENT: Positive  for congestion. Negative for hearing loss, ear pain, sore throat, rhinorrhea, sneezing, mouth sores, neck pain, neck stiffness, voice change and postnasal drip.   Eyes: Negative for photophobia, itching and visual disturbance.   Respiratory: Negative for cough, chest tightness, shortness of breath and wheezing.   Cardiovascular: Negative for chest pain, palpitations and leg swelling.   Gastrointestinal: Positive for nausea, vomiting, abdominal pain and constipation. Negative for diarrhea, blood in stool and abdominal distention.   Genitourinary: Positive for leakage of fluid, vaginal discharge and pelvic pain. Negative for dysuria, urgency, frequency, hematuria, flank pain, decreased urine volume, vaginal bleeding, difficulty urinating, genital sores, vaginal pain, menstrual problem and dyspareunia.   Musculoskeletal: Positive for back pain. Negative for myalgias and joint swelling.   Skin: Negative for rash and wound.   Neurological: Positive for headaches. Negative for dizziness, tremors, syncope, speech difficulty, weakness, light-headedness and numbness.   Hematological: Negative for adenopathy. Does not bruise/bleed easily.   Psychiatric/Behavioral: Negative for suicidal ideas, hallucinations, confusion, sleep disturbance, dysphoric mood, decreased concentration and agitation. The patient is not nervous/anxious and is not hyperactive.       Physical Exam   Constitutional: She is oriented to person, place, and time. She appears well-developed and well-nourished. No distress.   HENT:   Head: Normocephalic and atraumatic.   Mouth/Throat: No oropharyngeal exudate.   Eyes: Conjunctivae and EOM are normal.   Neck: Neck supple. No thyromegaly present.   Pulmonary/Chest: Effort normal. No respiratory distress.      Abdominal: Soft. She exhibits no distension and no mass. There is no tenderness. There is no rebound and no guarding.   Genitourinary: Vagina normal and uterus normal. No vaginal discharge found.   Vulva  with minimal brown discharge.  ?Odor  Musculoskeletal: Normal range of motion. She exhibits no edema or tenderness.   Lymphadenopathy:   She has no cervical adenopathy.   Neurological: She is alert and oriented to person, place, and time.   Skin: Skin is warm and dry. No rash noted. No erythema.   Psychiatric: She has a normal mood and affect. Judgment and thought content normal.     Assessment/Plan:    Hospital (Problems being addressed during this admission)       Multigravida of advanced maternal age in second trimester  []  Unprioritized         Current Assessment & Plan Note  Written:Abimael Henderson MD11/30/2023 8:05 AM    Now with PROM  Discussed with patient management options. Risks and benefits of labor induction vs close observation presented including risks of infection, bleeding and sepsis.  Patient would like to wait.  Does not want to proceed with delivery/induction  Will follow clinically with CBC/WBC, Temp, and fundal tenderness.  Would proceed with induction if signs of infection.  Would allow to labor if spontaneous  Continue with antibiotic for now.  All of her questions were answered appropriately.   Last Assessment & Plan Note under Obstetrics and Gynecology             Maternal serum screen positive for trisomy 21  []  Unprioritized         Current Assessment & Plan Note  Written:Abimael Henderson MD11/30/2023 8:05 AM    Now with PROM  Discussed with patient management options. Risks and benefits of labor induction vs close observation presented including risks of infection, bleeding and sepsis.  Patient would like to wait.  Does not want to proceed with delivery/induction  Will follow clinically with CBC/WBC, Temp, and fundal tenderness.  Would proceed with induction if signs of infection.  Would allow to labor if spontaneous  Continue with antibiotic for now.  All of her questions were answered appropriately.   Last Assessment & Plan Note under Obstetrics and Gynecology             History of   premature rupture of membranes (PROM) in previous pregnancy, currently pregnant in second trimester  []  Unprioritized         Current Assessment & Plan Note  Written:Abimael Henderson MD2023 8:04 AM    Discussed with patient management options. Risks and benefits of labor induction vs close observation presented including risks of infection, bleeding and sepsis.  Patient would like to wait.  Does not want to proceed with delivery/induction  Will follow clinically with CBC/WBC, Temp, and fundal tenderness.  Would proceed with induction if signs of infection.  Would allow to labor if spontaneous  Continue with antibiotic for now.  All of her questions were answered appropriately.   Last Assessment & Plan Note under Obstetrics and Gynecology             Vaginal bleeding in pregnancy, second trimester  []  Unprioritized         Current Assessment & Plan Note  Written:Abimael Henderson MD2023 8:04 AM    No longer bleeding.  Cervix at 1 cm  Discussed with patient management options. Risks and benefits of labor induction vs close observation presented including risks of infection, bleeding and sepsis.  Patient would like to wait.  Does not want to proceed with delivery/induction  Will follow clinically with CBC/WBC, Temp, and fundal tenderness.  Would proceed with induction if signs of infection.  Would allow to labor if spontaneous  Continue with antibiotic for now.  All of her questions were answered appropriately.   Last Assessment & Plan Note under Obstetrics and Gynecology             17 weeks gestation of pregnancy  []  Unprioritized         Current Assessment & Plan Note  Written:Abimael Henderson MD2023 7:59 AM    Admit for close observation.          Premature rupture of membranes in second trimester  []  Unprioritized         Current Assessment & Plan Note  Written:Abimael Henderson MD2023 8:03 AM    Discussed with patient management options. Risks and benefits of labor induction vs close  observation presented including risks of infection, bleeding and sepsis.  Patient would like to wait.  Does not want to proceed with delivery/induction  Will follow clinically with CBC/WBC, Temp, and fundal tenderness.  Would proceed with induction if signs of infection.  Would allow to labor if spontaneous  Continue with antibiotic for now.  All of her questions were answered appropriately.

## 2023-11-30 NOTE — DISCHARGE INSTRUCTIONS
After a Cesearean Birth    General Discharge Instructions  May follow a regular diet, unless otherwise discussed with physician.  Take showers, not baths until instructed by your doctor.   For the next 5 days, continue to use Hibiclens solution when you shower. Always use a clean towel when drying incision.  Incision dressing should be removed 7 days after surgery. If dressing begins to peel up prior to this day, gently remove.    Activity as tolerated.  No lifting or heavy exercise for 6 weeks, no driving for 2 weeks, no sexual intercourse, douching or tampons for 6 weeks  May return to work/school as discussed with physician  Discuss birth control with physician  Breast care support bra worn at all times    Call Your Healthcare Provider Right Away If You Have:  A temperature of 100.4°F or higher.  If your blood pressure is over 155/105.  You have difficulty catching your breath or trouble breathing.  Heavy vaginal bleeding, clots, or vaginal discharge with foul odor. (heavier than menses)  Persistent nausea or vomiting.  You gain more than 3 pounds in 3 days.  Severe headaches not relieved by Tylenol (acetaminophen) or Motrin (ibuprofen)  Blurry or double vision, see spots or flashing lights.  Dizziness or fainting.  New onset swelling or worsening of existing swelling.  Burning or pain when you urinate.  No bowel movement for 5 days.  Redness, warmth, swelling, or pain in the lower leg.  Redness, discharge, or pain worse than you had in the hospital.  Burning, pain, red streaks, or lumpy areas in your breasts.  Feelings of extreme sadness or anxiety  If you have any new or unusual symptoms or have questions or concerns    Incision Care  You will be able to shower and pat the incision dry.  Watch your incision for signs of infection, such as increasing redness or drainage.  For ease of movement, hold a pillow against the incision when you get up from a lying or sitting position, and when you laugh or cough.  Avoid  Sofia Fink [id number] [address]  [place], [address] [facility]    Consent for Operation  Date: __________________                                Time: _______________    1.  I authorize the performance upon Josh Bucio the following operation:  Procedure heavy lifting--nothing heavier than your baby until your doctor instructs you otherwise.     Follow-Up  Schedule a  follow-up exam with your healthcare provider to be seen in 1 week for abdominal dressing removal.                                                                                                            procedure has been videotaped, the surgeon will obtain the original videotape. The hospital will not be responsible for storage or maintenance of this tape.   8. For the purpose of advancing medical education, I consent to the admittance of observers to the STATEMENTS REQUIRING INSERTION OR COMPLETION WERE FILLED IN.     Signature of Patient:   ___________________________    When the patient is a minor or mentally incompetent to give consent:  Signature of person authorized to consent for patient: ____________ supplements, and pills I can buy without a prescription (including street drugs/illegal medications). Failure to inform my anesthesiologist about these medicines may increase my risk of anesthetic complications. iv.  If I am allergic to anything or have ha Anesthesiologist Signature     Date   Time  I have discussed the procedure and information above with the patient (or patient’s representative) and answered their questions. The patient or their representative has agreed to have anesthesia services.     ___

## 2023-11-30 NOTE — NURSING
This RN at bedside. Pt denies abd pain, denies vaginal pressure. Pt instructed to call RN if she begins to feel any pain. Pt verbalized understanding. Call bell within reach.

## 2023-11-30 NOTE — ED NOTES
"Pt. Reports she was seen in the ED on yesterday. Pt. Reports she is 17 weeks pregnant and her " water broke". Pt. Reports she does not recall the color or the fluid and cont to have vaginal drainage. Pt. Reports she has slight vaginal pressure. Denies any abd pain or back pain.   "

## 2023-11-30 NOTE — ASSESSMENT & PLAN NOTE
No longer bleeding.  Cervix at 1 cm  Discussed with patient management options. Risks and benefits of labor induction vs close observation presented including risks of infection, bleeding and sepsis.  Patient would like to wait.  Does not want to proceed with delivery/induction  Will follow clinically with CBC/WBC, Temp, and fundal tenderness.  Would proceed with induction if signs of infection.  Would allow to labor if spontaneous  Continue with antibiotic for now.  All of her questions were answered appropriately.

## 2023-11-30 NOTE — NURSING
RN ABS obtaining fetal heart tones with hand held doppler at this time. Patient states positive fetal movement. Denies any vaginal bleeding.     Fetal heart tones 150-155.

## 2023-11-30 NOTE — HPI
42 yo  at 17w4d  Advanced maternal age  Fetus with Down syndrome  Has been with spotting for the past couple of days  Went to our Emergency on 2023 with minimal spotting and occasional abdominal pain.  Ultrasound that day with MARCUS of 5.9 cm  Good fetal movements    Noted with ROM last night at 1800.    Denies fever and chills.  No longer with pain/contractions

## 2023-11-30 NOTE — NURSING
MD at bedside at this time. Discussing POC. Plan is to monitor patient at this time until fever or pain is present and to continue with antibiotics. MD performed bedside US per patient request. No visible fluid. Confirmed fetal heart beat at this time with US. MD orders to obtain fetal heart rate doppler q shift.

## 2023-12-01 LAB
ALBUMIN SERPL BCP-MCNC: 2.5 G/DL (ref 3.5–5.2)
ALP SERPL-CCNC: 64 U/L (ref 55–135)
ALT SERPL W/O P-5'-P-CCNC: 15 U/L (ref 10–44)
ANION GAP SERPL CALC-SCNC: 8 MMOL/L (ref 8–16)
AST SERPL-CCNC: 12 U/L (ref 10–40)
BASOPHILS # BLD AUTO: 0.03 K/UL (ref 0–0.2)
BASOPHILS NFR BLD: 0.5 % (ref 0–1.9)
BILIRUB SERPL-MCNC: 0.2 MG/DL (ref 0.1–1)
BUN SERPL-MCNC: 4 MG/DL (ref 6–20)
CALCIUM SERPL-MCNC: 8.7 MG/DL (ref 8.7–10.5)
CHLORIDE SERPL-SCNC: 108 MMOL/L (ref 95–110)
CO2 SERPL-SCNC: 20 MMOL/L (ref 23–29)
CREAT SERPL-MCNC: 0.6 MG/DL (ref 0.5–1.4)
DIFFERENTIAL METHOD BLD: ABNORMAL
EOSINOPHIL # BLD AUTO: 0.1 K/UL (ref 0–0.5)
EOSINOPHIL NFR BLD: 2.2 % (ref 0–8)
ERYTHROCYTE [DISTWIDTH] IN BLOOD BY AUTOMATED COUNT: 12.9 % (ref 11.5–14.5)
EST. GFR  (NO RACE VARIABLE): >60 ML/MIN/1.73 M^2
GLUCOSE SERPL-MCNC: 86 MG/DL (ref 70–110)
HCT VFR BLD AUTO: 30.9 % (ref 37–48.5)
HGB BLD-MCNC: 10.1 G/DL (ref 12–16)
IMM GRANULOCYTES # BLD AUTO: 0.02 K/UL (ref 0–0.04)
IMM GRANULOCYTES NFR BLD AUTO: 0.4 % (ref 0–0.5)
LYMPHOCYTES # BLD AUTO: 1.7 K/UL (ref 1–4.8)
LYMPHOCYTES NFR BLD: 30.5 % (ref 18–48)
MCH RBC QN AUTO: 30.5 PG (ref 27–31)
MCHC RBC AUTO-ENTMCNC: 32.7 G/DL (ref 32–36)
MCV RBC AUTO: 93 FL (ref 82–98)
MONOCYTES # BLD AUTO: 0.5 K/UL (ref 0.3–1)
MONOCYTES NFR BLD: 9.3 % (ref 4–15)
NEUTROPHILS # BLD AUTO: 3.1 K/UL (ref 1.8–7.7)
NEUTROPHILS NFR BLD: 57.1 % (ref 38–73)
NRBC BLD-RTO: 0 /100 WBC
PLATELET # BLD AUTO: 232 K/UL (ref 150–450)
PMV BLD AUTO: 10 FL (ref 9.2–12.9)
POTASSIUM SERPL-SCNC: 3.5 MMOL/L (ref 3.5–5.1)
PROT SERPL-MCNC: 6.5 G/DL (ref 6–8.4)
RBC # BLD AUTO: 3.31 M/UL (ref 4–5.4)
SODIUM SERPL-SCNC: 136 MMOL/L (ref 136–145)
WBC # BLD AUTO: 5.5 K/UL (ref 3.9–12.7)

## 2023-12-01 PROCEDURE — 63600175 PHARM REV CODE 636 W HCPCS: Performed by: STUDENT IN AN ORGANIZED HEALTH CARE EDUCATION/TRAINING PROGRAM

## 2023-12-01 PROCEDURE — 99232 SBSQ HOSP IP/OBS MODERATE 35: CPT | Mod: ,,, | Performed by: OBSTETRICS & GYNECOLOGY

## 2023-12-01 PROCEDURE — 85025 COMPLETE CBC W/AUTO DIFF WBC: CPT | Performed by: OBSTETRICS & GYNECOLOGY

## 2023-12-01 PROCEDURE — 80053 COMPREHEN METABOLIC PANEL: CPT | Performed by: OBSTETRICS & GYNECOLOGY

## 2023-12-01 PROCEDURE — 25000003 PHARM REV CODE 250: Performed by: STUDENT IN AN ORGANIZED HEALTH CARE EDUCATION/TRAINING PROGRAM

## 2023-12-01 PROCEDURE — 11000001 HC ACUTE MED/SURG PRIVATE ROOM

## 2023-12-01 PROCEDURE — 25000003 PHARM REV CODE 250: Performed by: OBSTETRICS & GYNECOLOGY

## 2023-12-01 PROCEDURE — 36415 COLL VENOUS BLD VENIPUNCTURE: CPT | Performed by: OBSTETRICS & GYNECOLOGY

## 2023-12-01 RX ORDER — ZOLPIDEM TARTRATE 5 MG/1
5 TABLET ORAL NIGHTLY PRN
Status: DISCONTINUED | OUTPATIENT
Start: 2023-12-01 | End: 2023-12-24

## 2023-12-01 RX ORDER — ACETAMINOPHEN 500 MG
1000 TABLET ORAL EVERY 8 HOURS PRN
Status: DISCONTINUED | OUTPATIENT
Start: 2023-12-01 | End: 2023-12-24

## 2023-12-01 RX ORDER — LORAZEPAM 0.5 MG/1
0.5 TABLET ORAL EVERY 6 HOURS PRN
Status: DISCONTINUED | OUTPATIENT
Start: 2023-12-01 | End: 2023-12-29 | Stop reason: HOSPADM

## 2023-12-01 RX ORDER — PRENATAL WITH FERROUS FUM AND FOLIC ACID 3080; 920; 120; 400; 22; 1.84; 3; 20; 10; 1; 12; 200; 27; 25; 2 [IU]/1; [IU]/1; MG/1; [IU]/1; MG/1; MG/1; MG/1; MG/1; MG/1; MG/1; UG/1; MG/1; MG/1; MG/1; MG/1
1 TABLET ORAL DAILY
Status: DISCONTINUED | OUTPATIENT
Start: 2023-12-01 | End: 2023-12-24

## 2023-12-01 RX ADMIN — AMPICILLIN SODIUM 2 G: 2 INJECTION, POWDER, FOR SOLUTION INTRAVENOUS at 12:12

## 2023-12-01 RX ADMIN — ACETAMINOPHEN 1000 MG: 500 TABLET, FILM COATED ORAL at 05:12

## 2023-12-01 RX ADMIN — AMPICILLIN SODIUM 2 G: 2 INJECTION, POWDER, FOR SOLUTION INTRAVENOUS at 01:12

## 2023-12-01 RX ADMIN — LORAZEPAM 0.5 MG: 0.5 TABLET ORAL at 02:12

## 2023-12-01 RX ADMIN — PRENATAL VIT W/ FE FUMARATE-FA TAB 27-0.8 MG 1 TABLET: 27-0.8 TAB at 09:12

## 2023-12-01 RX ADMIN — AMPICILLIN SODIUM 2 G: 2 INJECTION, POWDER, FOR SOLUTION INTRAVENOUS at 06:12

## 2023-12-01 RX ADMIN — LORAZEPAM 0.5 MG: 0.5 TABLET ORAL at 09:12

## 2023-12-01 RX ADMIN — ZOLPIDEM TARTRATE 5 MG: 5 TABLET ORAL at 10:12

## 2023-12-01 RX ADMIN — LORAZEPAM 0.5 MG: 0.5 TABLET ORAL at 10:12

## 2023-12-01 NOTE — NURSING
This RN at bedside. Pt ambulating back from bathroom. VSS, afebrile.   Pt denies pain in abd, back, and vagina. Pt denies vaginal pressure and vaginal bleeding. Pt instructed to inform RN if she starts to experience any pain or discomfort.   FHTs via doppler 145-150sBPM  [place] adjusted.

## 2023-12-01 NOTE — SUBJECTIVE & OBJECTIVE
Obstetric HPI:  Patient reports None contractions, active fetal movement, absent vaginal bleeding , present loss of fluid   Ambulating around the room.     Objective:     Vital Signs (Most Recent):  Temp: 98.8 °F (37.1 °C) (12/01/23 0516)  Pulse: 83 (12/01/23 0719)  Resp: 20 (12/01/23 0516)  BP: (!) 94/44 (12/01/23 0617)  SpO2: 98 % (12/01/23 0719) Vital Signs (24h Range):  Temp:  [97.9 °F (36.6 °C)-98.9 °F (37.2 °C)] 98.8 °F (37.1 °C)  Pulse:  [] 83  Resp:  [18-20] 20  SpO2:  [92 %-100 %] 98 %  BP: ()/(40-58) 94/44     Weight: 88 kg (194 lb 0.1 oz)  Body mass index is 31.31 kg/m².    FHT: 150         Intake/Output Summary (Last 24 hours) at 12/1/2023 0722  Last data filed at 12/1/2023 0629  Gross per 24 hour   Intake 744.83 ml   Output --   Net 744.83 ml       Cervical Exam:  Dilation:  deferred     Significant Labs:  Recent Lab Results         12/01/23  0551        Albumin 2.5       ALP 64       ALT 15       Anion Gap 8       AST 12       Baso # 0.03       Basophil % 0.5       BUN 4       Calcium 8.7       Chloride 108       CO2 20       Creatinine 0.6       Differential Method Automated       eGFR >60       Eos # 0.1       Eosinophil % 2.2       Glucose 86       Gran # (ANC) 3.1       Gran % 57.1       Hematocrit 30.9       Hemoglobin 10.1       Immature Grans (Abs) 0.02  Comment: Mild elevation in immature granulocytes is non specific and   can be seen in a variety of conditions including stress response,   acute inflammation, trauma and pregnancy. Correlation with other   laboratory and clinical findings is essential.         Immature Granulocytes 0.4       Lymph # 1.7       Lymph % 30.5       MCH 30.5       MCHC 32.7       MCV 93       Mono # 0.5       Mono % 9.3       MPV 10.0       nRBC 0       Platelet Count 232       Potassium 3.5       PROTEIN TOTAL 6.5       RBC 3.31       RDW 12.9       Sodium 136       WBC 5.50               Physical Exam:   Constitutional: She appears well-developed and  well-nourished. No distress.    HENT:   Head: Normocephalic and atraumatic.    Eyes: EOM are normal.     Cardiovascular:  Normal rate.             Pulmonary/Chest: Effort normal. No respiratory distress.        Abdominal: Soft. She exhibits no distension. There is no abdominal tenderness. There is no rebound and no guarding.   No fundal tenderness             Musculoskeletal: Normal range of motion.       Neurological: She is alert.    Skin: Skin is warm and dry.    Psychiatric: She has a normal mood and affect.       Review of Systems

## 2023-12-01 NOTE — ASSESSMENT & PLAN NOTE
Discussed with patient management options. Risks and benefits of labor induction vs close observation presented including risks of infection, bleeding and sepsis.  Patient still would like to wait.  Does not want to proceed with delivery/induction  Will follow clinically with CBC/WBC, Temp, and fundal tenderness.  All normal at this time  Would proceed with induction if signs of infection.  Would allow to labor if spontaneous  Continue with antibiotic for now.  All of her questions were answered appropriately.    EVIN loera  Heplock IV  FHT Qshift  OK to ambulate  Daily CBC

## 2023-12-01 NOTE — PROGRESS NOTES
[facility] - [place] - Labor & Delivery  Obstetrics  Antepartum Progress Note    Patient Name: Gilberto Bueno  MRN: [id number]  Admission Date: 2023  Hospital Length of Stay: 0 days  Attending Physician: Abimael Henderson MD  Primary Care Provider: Liss Wise MD    Subjective:     Principal Problem:Premature rupture of membranes in second trimester    HPI:  42 yo  at 17w4d  Advanced maternal age  Fetus with Down syndrome  Has been with spotting for the past couple of days  Went to our Emergency on 2023 with minimal spotting and occasional abdominal pain.  Ultrasound that day with MARCUS of 5.9 cm  Good fetal movements    Noted with ROM last night at 1800.    Denies fever and chills.  No longer with pain/contractions        Hospital Course:  On Labor and Delivery, vitals stable  FHT at 140   Contraction: None  Cervix: 1 cm  Started on antibiotic therapy.  Zithromax and Ampicillin    2023 No longer with contractions/spotting this morning.  No fundal tenderness.  No leakage of fluid. Does not want to deliver now.  Wants to wait for sealing of the membranes    2023 No pain.  No leakage. No vaginal bleeding. Good fetal movements    Obstetric HPI:  Patient reports None contractions, active fetal movement, absent vaginal bleeding , present loss of fluid   Ambulating around the room.     Objective:     Vital Signs (Most Recent):  Temp: 98.8 °F (37.1 °C) (23)  Pulse: 83 (23 07)  Resp: 20 (23)  BP: (!) 94/44 (23)  SpO2: 98 % (23) Vital Signs (24h Range):  Temp:  [97.9 °F (36.6 °C)-98.9 °F (37.2 °C)] 98.8 °F (37.1 °C)  Pulse:  [] 83  Resp:  [18-20] 20  SpO2:  [92 %-100 %] 98 %  BP: ()/(40-58) 94/44     Weight: 88 kg (194 lb 0.1 oz)  Body mass index is 31.31 kg/m².    FHT: 150         Intake/Output Summary (Last 24 hours) at 2023 0722  Last data filed at 2023 0629  Gross per 24 hour   Intake 744.83 ml   Output --   Net 744.83 ml        Cervical Exam:  Dilation:  deferred     Significant Labs:  Recent Lab Results         23  0551        Albumin 2.5       ALP 64       ALT 15       Anion Gap 8       AST 12       Baso # 0.03       Basophil % 0.5       BUN 4       Calcium 8.7       Chloride 108       CO2 20       Creatinine 0.6       Differential Method Automated       eGFR >60       Eos # 0.1       Eosinophil % 2.2       Glucose 86       Gran # (ANC) 3.1       Gran % 57.1       Hematocrit 30.9       Hemoglobin 10.1       Immature Grans (Abs) 0.02  Comment: Mild elevation in immature granulocytes is non specific and   can be seen in a variety of conditions including stress response,   acute inflammation, trauma and pregnancy. Correlation with other   laboratory and clinical findings is essential.         Immature Granulocytes 0.4       Lymph # 1.7       Lymph % 30.5       MCH 30.5       MCHC 32.7       MCV 93       Mono # 0.5       Mono % 9.3       MPV 10.0       nRBC 0       Platelet Count 232       Potassium 3.5       PROTEIN TOTAL 6.5       RBC 3.31       RDW 12.9       Sodium 136       WBC 5.50               Physical Exam:   Constitutional: She appears well-developed and well-nourished. No distress.    HENT:   Head: Normocephalic and atraumatic.    Eyes: EOM are normal.     Cardiovascular:  Normal rate.             Pulmonary/Chest: Effort normal. No respiratory distress.        Abdominal: Soft. She exhibits no distension. There is no abdominal tenderness. There is no rebound and no guarding.   No fundal tenderness             Musculoskeletal: Normal range of motion.       Neurological: She is alert.    Skin: Skin is warm and dry.    Psychiatric: She has a normal mood and affect.       Review of Systems  Assessment/Plan:     43 y.o. female  at 17w5d for:    * Premature rupture of membranes in second trimester  Discussed with patient management options. Risks and benefits of labor induction vs close observation presented including  risks of infection, bleeding and sepsis.  Patient still would like to wait.  Does not want to proceed with delivery/induction  Will follow clinically with CBC/WBC, Temp, and fundal tenderness.  All normal at this time  Would proceed with induction if signs of infection.  Would allow to labor if spontaneous  Continue with antibiotic for now.  All of her questions were answered appropriately.    EVIN loera  Heplock IV  FHT Qshift  OK to ambulate  Daily CBC    17 weeks gestation of pregnancy  Admit for close observation.      Vaginal bleeding in pregnancy, second trimester  No longer bleeding.  Cervix at 1 cm  Discussed with patient management options. Risks and benefits of labor induction vs close observation presented including risks of infection, bleeding and sepsis.  Patient still would like to wait.  Does not want to proceed with delivery/induction  Will follow clinically with CBC/WBC, Temp, and fundal tenderness.  All normal at this time  Would proceed with induction if signs of infection.  Would allow to labor if spontaneous  Continue with antibiotic for now.  All of her questions were answered appropriately.    EVIN loera  Heplock IV  FHT Qshift  OK to ambulate  Daily CBC    History of  premature rupture of membranes (PROM) in previous pregnancy, currently pregnant in second trimester  Discussed with patient management options. Risks and benefits of labor induction vs close observation presented including risks of infection, bleeding and sepsis.  Patient still would like to wait.  Does not want to proceed with delivery/induction  Will follow clinically with CBC/WBC, Temp, and fundal tenderness.  All normal at this time  Would proceed with induction if signs of infection.  Would allow to labor if spontaneous  Continue with antibiotic for now.  All of her questions were answered appropriately.    EVIN loera  Heplock IV  FHT Qshift  OK to ambulate  Daily CBC    Maternal serum screen positive for trisomy  21  Now with PROM  Discussed with patient management options. Risks and benefits of labor induction vs close observation presented including risks of infection, bleeding and sepsis.  Patient would like to wait.  Does not want to proceed with delivery/induction  Will follow clinically with CBC/WBC, Temp, and fundal tenderness.  Would proceed with induction if signs of infection.  Would allow to labor if spontaneous  Continue with antibiotic for now.  All of her questions were answered appropriately.    Multigravida of advanced maternal age in second trimester  Now with PROM  Discussed with patient management options. Risks and benefits of labor induction vs close observation presented including risks of infection, bleeding and sepsis.  Patient still would like to wait.  Does not want to proceed with delivery/induction  Will follow clinically with CBC/WBC, Temp, and fundal tenderness.  All normal at this time  Would proceed with induction if signs of infection.  Would allow to labor if spontaneous  Continue with antibiotic for now.  All of her questions were answered appropriately.    EVIN loera  Heplock IV  FHT Qshift  OK to ambulate  Daily CBC          Abimael Henderson MD  Obstetrics  [facility] - [place] - Labor & Delivery

## 2023-12-01 NOTE — NURSING
Resting.Abd soft and non tender to palpate.'s to RLQ. Per doppler.Plan of care reviewed.Verb.understanding.

## 2023-12-01 NOTE — ASSESSMENT & PLAN NOTE
No longer bleeding.  Cervix at 1 cm  Discussed with patient management options. Risks and benefits of labor induction vs close observation presented including risks of infection, bleeding and sepsis.  Patient still would like to wait.  Does not want to proceed with delivery/induction  Will follow clinically with CBC/WBC, Temp, and fundal tenderness.  All normal at this time  Would proceed with induction if signs of infection.  Would allow to labor if spontaneous  Continue with antibiotic for now.  All of her questions were answered appropriately.    EVIN loera  Heplock IV  FHT Qshift  OK to ambulate  Daily CBC

## 2023-12-01 NOTE — ASSESSMENT & PLAN NOTE
Now with PROM  Discussed with patient management options. Risks and benefits of labor induction vs close observation presented including risks of infection, bleeding and sepsis.  Patient still would like to wait.  Does not want to proceed with delivery/induction  Will follow clinically with CBC/WBC, Temp, and fundal tenderness.  All normal at this time  Would proceed with induction if signs of infection.  Would allow to labor if spontaneous  Continue with antibiotic for now.  All of her questions were answered appropriately.    EVIN loera  Heplock IV  FHT Qshift  OK to ambulate  Daily CBC

## 2023-12-01 NOTE — NURSING
Dr. Henderson ABS discussing POC with patient. RN informed MD of patient's BP. Patient asymptomatic. MD order CBC and CMP for AM. MD okay with RN taking BP q 4 hours unless symptomatic.   Will continue continuous toco, doppler q shift, temps q 2 hour.   MD plans for US on Monday.   Patient remains hopeful.

## 2023-12-02 PROCEDURE — 25000003 PHARM REV CODE 250: Performed by: STUDENT IN AN ORGANIZED HEALTH CARE EDUCATION/TRAINING PROGRAM

## 2023-12-02 PROCEDURE — 25000003 PHARM REV CODE 250: Performed by: OBSTETRICS & GYNECOLOGY

## 2023-12-02 PROCEDURE — 11000001 HC ACUTE MED/SURG PRIVATE ROOM

## 2023-12-02 PROCEDURE — 99233 SBSQ HOSP IP/OBS HIGH 50: CPT | Mod: ,,, | Performed by: OBSTETRICS & GYNECOLOGY

## 2023-12-02 PROCEDURE — 63600175 PHARM REV CODE 636 W HCPCS: Performed by: STUDENT IN AN ORGANIZED HEALTH CARE EDUCATION/TRAINING PROGRAM

## 2023-12-02 RX ADMIN — AMPICILLIN SODIUM 2 G: 2 INJECTION, POWDER, FOR SOLUTION INTRAVENOUS at 06:12

## 2023-12-02 RX ADMIN — AMPICILLIN SODIUM 2 G: 2 INJECTION, POWDER, FOR SOLUTION INTRAVENOUS at 12:12

## 2023-12-02 RX ADMIN — ACETAMINOPHEN 1000 MG: 500 TABLET, FILM COATED ORAL at 09:12

## 2023-12-02 RX ADMIN — PRENATAL VIT W/ FE FUMARATE-FA TAB 27-0.8 MG 1 TABLET: 27-0.8 TAB at 09:12

## 2023-12-02 RX ADMIN — AMPICILLIN SODIUM 2 G: 2 INJECTION, POWDER, FOR SOLUTION INTRAVENOUS at 08:12

## 2023-12-02 NOTE — PROGRESS NOTES
[facility] - Labor & Delivery  Obstetrics  Antepartum Progress Note    Patient Name: Gilberto Bueno  MRN: [id number]  Admission Date: 2023  Hospital Length of Stay: 1 days  Attending Physician: Abimael Henderson MD  Primary Care Provider: Liss Wise MD    Subjective:     Principal Problem:Premature rupture of membranes in second trimester    HPI:  42 yo  at 17w4d  Advanced maternal age  Fetus with Down syndrome  Has been with spotting for the past couple of days  Went to our Emergency on 2023 with minimal spotting and occasional abdominal pain.  Ultrasound that day with MARCUS of 5.9 cm  Good fetal movements    Noted with ROM last night at 1800.    Denies fever and chills.  No longer with pain/contractions        Hospital Course:  On Labor and Delivery, vitals stable  FHT at 140   Contraction: None  Cervix: 1 cm  Started on antibiotic therapy.  Zithromax and Ampicillin    2023 No longer with contractions/spotting this morning.  No fundal tenderness.  No leakage of fluid. Does not want to deliver now.  Wants to wait for sealing of the membranes    2023 No pain.  No leakage. No vaginal bleeding. Good fetal movements  2023 HD#3/PPROM D#2. No pain.  No leakage. No vaginal bleeding. Good fetal movements    Obstetric HPI:  Patient reports None contractions, absent vaginal bleeding , DENIES loss of fluid      Objective:     Vital Signs (Most Recent):  Temp: 98.3 °F (36.8 °C) (23)  Pulse: 75 (23)  Resp: 18 (23)  BP: (!) 98/51 (23)  SpO2: 100 % (23) Vital Signs (24h Range):  Temp:  [98.3 °F (36.8 °C)] 98.3 °F (36.8 °C)  Pulse:  [65-99] 75  Resp:  [18] 18  SpO2:  [96 %-100 %] 100 %  BP: (85-98)/(42-51) 98/51     Weight: 88 kg (194 lb 0.1 oz)  Body mass index is 31.31 kg/m².    FHT: 160 bpm  TOCO:  none      Intake/Output Summary (Last 24 hours) at 2023 0555  Last data filed at 2023 1855  Gross per 24 hour   Intake 303.63 ml    Output --   Net 303.63 ml       Cervical Exam: deferred     Significant Labs:  Recent Lab Results       None            Physical Exam:   Constitutional: She is oriented to person, place, and time. She appears well-developed and well-nourished. No distress.       Cardiovascular:  Normal rate.             Pulmonary/Chest: Effort normal.        Abdominal: Soft. She exhibits no distension.                 Neurological: She is alert and oriented to person, place, and time.    Skin: Skin is warm and dry.    Psychiatric: She has a normal mood and affect.       Review of Systems   Constitutional:  Negative for chills and fever.   Eyes:  Negative for visual disturbance.   Respiratory:  Negative for cough and wheezing.    Cardiovascular:  Negative for chest pain and palpitations.   Gastrointestinal:  Negative for abdominal pain, nausea and vomiting.   Genitourinary:  Negative for dysuria, frequency, hematuria, pelvic pain, vaginal bleeding, vaginal discharge and vaginal pain.   Neurological:  Negative for headaches.   Psychiatric/Behavioral:  Negative for depression.      Assessment/Plan:     43 y.o. female  at 17w6d for:    * Premature rupture of membranes in second trimester  Discussed with patient management options. Risks and benefits of labor induction vs close observation presented including risks of infection, bleeding and sepsis.  Patient still would like to wait.  Does not want to proceed with delivery/induction  Will follow clinically with CBC/WBC, Temp, and fundal tenderness.  All normal at this time  Would proceed with induction if signs of infection.  Would allow to labor if spontaneous  Continue with antibiotic for now.  All of her questions were answered appropriately.    EVIN Agustin IV  FHT Qshift  OK to ambulate  Daily CBC    17 weeks gestation of pregnancy  Admit for close observation.      Vaginal bleeding in pregnancy, second trimester  No longer bleeding.  Cervix at 1 cm  Discussed with  patient management options. Risks and benefits of labor induction vs close observation presented including risks of infection, bleeding and sepsis.  Patient still would like to wait.  Does not want to proceed with delivery/induction  Will follow clinically with CBC/WBC, Temp, and fundal tenderness.  All normal at this time  Would proceed with induction if signs of infection.  Would allow to labor if spontaneous  Continue with antibiotic for now.  All of her questions were answered appropriately.    EVIN loera  Heplock IV  FHT Qshift  OK to ambulate  Daily CBC    History of  premature rupture of membranes (PROM) in previous pregnancy, currently pregnant in second trimester  Discussed with patient management options. Risks and benefits of labor induction vs close observation presented including risks of infection, bleeding and sepsis.  Patient still would like to wait.  Does not want to proceed with delivery/induction  Will follow clinically with CBC/WBC, Temp, and fundal tenderness.  All normal at this time  Would proceed with induction if signs of infection.  Would allow to labor if spontaneous  Continue with antibiotic for now.  All of her questions were answered appropriately.    EVIN loera  Heplock IV  FHT Qshift  OK to ambulate  Daily CBC    Maternal serum screen positive for trisomy 21  Now with PROM  Discussed with patient management options. Risks and benefits of labor induction vs close observation presented including risks of infection, bleeding and sepsis.  Patient would like to wait.  Does not want to proceed with delivery/induction  Will follow clinically with CBC/WBC, Temp, and fundal tenderness.  Would proceed with induction if signs of infection.  Would allow to labor if spontaneous  Continue with antibiotic for now.  All of her questions were answered appropriately.    Multigravida of advanced maternal age in second trimester  Now with PROM  Discussed with patient management options. Risks and  benefits of labor induction vs close observation presented including risks of infection, bleeding and sepsis.  Patient still would like to wait.  Does not want to proceed with delivery/induction  Will follow clinically with CBC/WBC, Temp, and fundal tenderness.  All normal at this time  Would proceed with induction if signs of infection.  Would allow to labor if spontaneous  Continue with antibiotic for now.  All of her questions were answered appropriately.    EVIN loera  Heplock IV  FHT Qshift  OK to ambulate  Daily CBC          German Byrd MD  Obstetrics  [facility] - Labor & Delivery

## 2023-12-02 NOTE — SUBJECTIVE & OBJECTIVE
Obstetric HPI:  Patient reports None contractions, absent vaginal bleeding , DENIES loss of fluid      Objective:     Vital Signs (Most Recent):  Temp: 98.3 °F (36.8 °C) (12/01/23 2045)  Pulse: 75 (12/01/23 2100)  Resp: 18 (12/01/23 2045)  BP: (!) 98/51 (12/01/23 2045)  SpO2: 100 % (12/01/23 2100) Vital Signs (24h Range):  Temp:  [98.3 °F (36.8 °C)] 98.3 °F (36.8 °C)  Pulse:  [65-99] 75  Resp:  [18] 18  SpO2:  [96 %-100 %] 100 %  BP: (85-98)/(42-51) 98/51     Weight: 88 kg (194 lb 0.1 oz)  Body mass index is 31.31 kg/m².    FHT: 160 bpm  TOCO:  none      Intake/Output Summary (Last 24 hours) at 12/2/2023 0555  Last data filed at 12/1/2023 1855  Gross per 24 hour   Intake 303.63 ml   Output --   Net 303.63 ml       Cervical Exam: deferred     Significant Labs:  Recent Lab Results       None            Physical Exam:   Constitutional: She is oriented to person, place, and time. She appears well-developed and well-nourished. No distress.       Cardiovascular:  Normal rate.             Pulmonary/Chest: Effort normal.        Abdominal: Soft. She exhibits no distension.                 Neurological: She is alert and oriented to person, place, and time.    Skin: Skin is warm and dry.    Psychiatric: She has a normal mood and affect.       Review of Systems   Constitutional:  Negative for chills and fever.   Eyes:  Negative for visual disturbance.   Respiratory:  Negative for cough and wheezing.    Cardiovascular:  Negative for chest pain and palpitations.   Gastrointestinal:  Negative for abdominal pain, nausea and vomiting.   Genitourinary:  Negative for dysuria, frequency, hematuria, pelvic pain, vaginal bleeding, vaginal discharge and vaginal pain.   Neurological:  Negative for headaches.   Psychiatric/Behavioral:  Negative for depression.

## 2023-12-03 PROCEDURE — 99232 SBSQ HOSP IP/OBS MODERATE 35: CPT | Mod: ,,, | Performed by: OBSTETRICS & GYNECOLOGY

## 2023-12-03 PROCEDURE — 25000003 PHARM REV CODE 250: Performed by: OBSTETRICS & GYNECOLOGY

## 2023-12-03 PROCEDURE — 11000001 HC ACUTE MED/SURG PRIVATE ROOM

## 2023-12-03 PROCEDURE — 63600175 PHARM REV CODE 636 W HCPCS: Performed by: STUDENT IN AN ORGANIZED HEALTH CARE EDUCATION/TRAINING PROGRAM

## 2023-12-03 PROCEDURE — 25000003 PHARM REV CODE 250: Performed by: STUDENT IN AN ORGANIZED HEALTH CARE EDUCATION/TRAINING PROGRAM

## 2023-12-03 RX ADMIN — AMPICILLIN SODIUM 2 G: 2 INJECTION, POWDER, FOR SOLUTION INTRAVENOUS at 03:12

## 2023-12-03 RX ADMIN — LORAZEPAM 0.5 MG: 0.5 TABLET ORAL at 12:12

## 2023-12-03 RX ADMIN — AMPICILLIN SODIUM 2 G: 2 INJECTION, POWDER, FOR SOLUTION INTRAVENOUS at 09:12

## 2023-12-03 RX ADMIN — AMPICILLIN SODIUM 2 G: 2 INJECTION, POWDER, FOR SOLUTION INTRAVENOUS at 02:12

## 2023-12-03 RX ADMIN — LORAZEPAM 0.5 MG: 0.5 TABLET ORAL at 04:12

## 2023-12-03 RX ADMIN — ZOLPIDEM TARTRATE 5 MG: 5 TABLET ORAL at 12:12

## 2023-12-03 RX ADMIN — PRENATAL VIT W/ FE FUMARATE-FA TAB 27-0.8 MG 1 TABLET: 27-0.8 TAB at 09:12

## 2023-12-03 NOTE — SUBJECTIVE & OBJECTIVE
Obstetric HPI:  Patient reports None contractions, absent vaginal bleeding , denies loss of fluid   Objective:     Vital Signs (Most Recent):  Temp: 98.4 °F (36.9 °C) (12/02/23 2010)  Pulse: 80 (12/02/23 2114)  Resp: 18 (12/02/23 1935)  BP: (!) 110/53 (12/02/23 2104)  SpO2: 99 % (12/02/23 2110) Vital Signs (24h Range):  Temp:  [98.4 °F (36.9 °C)] 98.4 °F (36.9 °C)  Pulse:  [80-95] 80  Resp:  [18] 18  SpO2:  [89 %-100 %] 99 %  BP: (110)/(53) 110/53     Weight: 88 kg (194 lb 0.1 oz)  Body mass index is 31.31 kg/m².    FHT: 145 bpm  TOCO:  none    No intake or output data in the 24 hours ending 12/03/23 0952    Cervical Exam: deferred     Significant Labs:  Recent Lab Results       None            Physical Exam:   Constitutional: She is oriented to person, place, and time. She appears well-developed and well-nourished. No distress.       Cardiovascular:  Normal rate.             Pulmonary/Chest: Effort normal.        Abdominal: Soft. She exhibits no distension.                 Neurological: She is alert and oriented to person, place, and time.    Skin: Skin is warm and dry.    Psychiatric: She has a normal mood and affect.       Review of Systems   Constitutional:  Negative for chills and fever.   Eyes:  Negative for visual disturbance.   Respiratory:  Negative for cough and wheezing.    Cardiovascular:  Negative for chest pain and palpitations.   Gastrointestinal:  Negative for abdominal pain, nausea and vomiting.   Genitourinary:  Negative for dysuria, frequency, hematuria, pelvic pain, vaginal bleeding, vaginal discharge and vaginal pain.   Neurological:  Negative for headaches.   Psychiatric/Behavioral:  Negative for depression.       37

## 2023-12-03 NOTE — PROGRESS NOTES
[facility] - Labor & Delivery  Obstetrics  Antepartum Progress Note    Patient Name: Gilberto Bueno  MRN: [id number]  Admission Date: 2023  Hospital Length of Stay: 2 days  Attending Physician: Abimael Henderson MD  Primary Care Provider: Liss Wise MD    Subjective:     Principal Problem:Premature rupture of membranes in second trimester    HPI:  42 yo  at 17w4d  Advanced maternal age  Fetus with Down syndrome  Has been with spotting for the past couple of days  Went to our Emergency on 2023 with minimal spotting and occasional abdominal pain.  Ultrasound that day with MARCUS of 5.9 cm  Good fetal movements    Noted with ROM last night at 1800.    Denies fever and chills.  No longer with pain/contractions        Hospital Course:  On Labor and Delivery, vitals stable  FHT at 140   Contraction: None  Cervix: 1 cm  Started on antibiotic therapy.  Zithromax and Ampicillin    2023 No longer with contractions/spotting this morning.  No fundal tenderness.  No leakage of fluid. Does not want to deliver now.  Wants to wait for sealing of the membranes    2023 No pain.  No leakage. No vaginal bleeding. Good fetal movements  2023 HD#3/PPROM Day #2. No pain.  No leakage. No vaginal bleeding. Good fetal movements  12/3/2023 HD#4/PPROM Day #3. No pain.  No leakage. No vaginal bleeding. Good fetal movements    Obstetric HPI:  Patient reports None contractions, absent vaginal bleeding , denies loss of fluid   Objective:     Vital Signs (Most Recent):  Temp: 98.4 °F (36.9 °C) (23)  Pulse: 80 (23)  Resp: 18 (23)  BP: (!) 110/53 (23)  SpO2: 99 % (23) Vital Signs (24h Range):  Temp:  [98.4 °F (36.9 °C)] 98.4 °F (36.9 °C)  Pulse:  [80-95] 80  Resp:  [18] 18  SpO2:  [89 %-100 %] 99 %  BP: (110)/(53) 110/53     Weight: 88 kg (194 lb 0.1 oz)  Body mass index is 31.31 kg/m².    FHT: 145 bpm  TOCO:  none    No intake or output data in the 24 hours  ending 2352    Cervical Exam: deferred     Significant Labs:  Recent Lab Results       None            Physical Exam:   Constitutional: She is oriented to person, place, and time. She appears well-developed and well-nourished. No distress.       Cardiovascular:  Normal rate.             Pulmonary/Chest: Effort normal.        Abdominal: Soft. She exhibits no distension.                 Neurological: She is alert and oriented to person, place, and time.    Skin: Skin is warm and dry.    Psychiatric: She has a normal mood and affect.       Review of Systems   Constitutional:  Negative for chills and fever.   Eyes:  Negative for visual disturbance.   Respiratory:  Negative for cough and wheezing.    Cardiovascular:  Negative for chest pain and palpitations.   Gastrointestinal:  Negative for abdominal pain, nausea and vomiting.   Genitourinary:  Negative for dysuria, frequency, hematuria, pelvic pain, vaginal bleeding, vaginal discharge and vaginal pain.   Neurological:  Negative for headaches.   Psychiatric/Behavioral:  Negative for depression.      Assessment/Plan:     43 y.o. female  at 18w0d for:    * Premature rupture of membranes in second trimester  Discussed with patient management options. Risks and benefits of labor induction vs close observation presented including risks of infection, bleeding and sepsis.  Patient still would like to wait.  Does not want to proceed with delivery/induction  Will follow clinically with CBC/WBC, Temp, and fundal tenderness.  All normal at this time  Would proceed with induction if signs of infection.  Would allow to labor if spontaneous  Continue with antibiotic for now.  All of her questions were answered appropriately.    EVIN Agustin IV  FHT Qshift  OK to ambulate  Daily CBC    17 weeks gestation of pregnancy  Admit for close observation.      Vaginal bleeding in pregnancy, second trimester  No longer bleeding.  Cervix at 1 cm  Discussed with patient  management options. Risks and benefits of labor induction vs close observation presented including risks of infection, bleeding and sepsis.  Patient still would like to wait.  Does not want to proceed with delivery/induction  Will follow clinically with CBC/WBC, Temp, and fundal tenderness.  All normal at this time  Would proceed with induction if signs of infection.  Would allow to labor if spontaneous  Continue with antibiotic for now.  All of her questions were answered appropriately.    EVIN loera  Heplock IV  FHT Qshift  OK to ambulate  Daily CBC    History of  premature rupture of membranes (PROM) in previous pregnancy, currently pregnant in second trimester  Discussed with patient management options. Risks and benefits of labor induction vs close observation presented including risks of infection, bleeding and sepsis.  Patient still would like to wait.  Does not want to proceed with delivery/induction  Will follow clinically with CBC/WBC, Temp, and fundal tenderness.  All normal at this time  Would proceed with induction if signs of infection.  Would allow to labor if spontaneous  Continue with antibiotic for now.  All of her questions were answered appropriately.    EVIN loera  Heplock IV  FHT Qshift  OK to ambulate  Daily CBC    Maternal serum screen positive for trisomy 21  Now with PROM  Discussed with patient management options. Risks and benefits of labor induction vs close observation presented including risks of infection, bleeding and sepsis.  Patient would like to wait.  Does not want to proceed with delivery/induction  Will follow clinically with CBC/WBC, Temp, and fundal tenderness.  Would proceed with induction if signs of infection.  Would allow to labor if spontaneous  Continue with antibiotic for now.  All of her questions were answered appropriately.    Multigravida of advanced maternal age in second trimester  Now with PROM  Discussed with patient management options. Risks and benefits  of labor induction vs close observation presented including risks of infection, bleeding and sepsis.  Patient still would like to wait.  Does not want to proceed with delivery/induction  Will follow clinically with CBC/WBC, Temp, and fundal tenderness.  All normal at this time  Would proceed with induction if signs of infection.  Would allow to labor if spontaneous  Continue with antibiotic for now.  All of her questions were answered appropriately.    EVIN loera  Heplock IV  FHT Qshift  OK to ambulate  Daily CBC          German Byrd MD  Obstetrics  [facility] - Labor & Delivery

## 2023-12-04 PROBLEM — Z3A.18 18 WEEKS GESTATION OF PREGNANCY: Status: ACTIVE | Noted: 2023-11-30

## 2023-12-04 LAB
BASOPHILS # BLD AUTO: 0.05 K/UL (ref 0–0.2)
BASOPHILS NFR BLD: 0.8 % (ref 0–1.9)
DIFFERENTIAL METHOD BLD: ABNORMAL
EOSINOPHIL # BLD AUTO: 0.1 K/UL (ref 0–0.5)
EOSINOPHIL NFR BLD: 1.6 % (ref 0–8)
ERYTHROCYTE [DISTWIDTH] IN BLOOD BY AUTOMATED COUNT: 12.7 % (ref 11.5–14.5)
HCT VFR BLD AUTO: 31.3 % (ref 37–48.5)
HGB BLD-MCNC: 10.4 G/DL (ref 12–16)
IMM GRANULOCYTES # BLD AUTO: 0.02 K/UL (ref 0–0.04)
IMM GRANULOCYTES NFR BLD AUTO: 0.3 % (ref 0–0.5)
LYMPHOCYTES # BLD AUTO: 2.2 K/UL (ref 1–4.8)
LYMPHOCYTES NFR BLD: 36.3 % (ref 18–48)
MCH RBC QN AUTO: 30.7 PG (ref 27–31)
MCHC RBC AUTO-ENTMCNC: 33.2 G/DL (ref 32–36)
MCV RBC AUTO: 92 FL (ref 82–98)
MONOCYTES # BLD AUTO: 0.4 K/UL (ref 0.3–1)
MONOCYTES NFR BLD: 7.2 % (ref 4–15)
NEUTROPHILS # BLD AUTO: 3.3 K/UL (ref 1.8–7.7)
NEUTROPHILS NFR BLD: 53.8 % (ref 38–73)
NRBC BLD-RTO: 0 /100 WBC
PLATELET # BLD AUTO: 245 K/UL (ref 150–450)
PMV BLD AUTO: 9.9 FL (ref 9.2–12.9)
RBC # BLD AUTO: 3.39 M/UL (ref 4–5.4)
WBC # BLD AUTO: 6.14 K/UL (ref 3.9–12.7)

## 2023-12-04 PROCEDURE — 85025 COMPLETE CBC W/AUTO DIFF WBC: CPT | Performed by: OBSTETRICS & GYNECOLOGY

## 2023-12-04 PROCEDURE — 63600175 PHARM REV CODE 636 W HCPCS: Performed by: STUDENT IN AN ORGANIZED HEALTH CARE EDUCATION/TRAINING PROGRAM

## 2023-12-04 PROCEDURE — 25000003 PHARM REV CODE 250: Performed by: OBSTETRICS & GYNECOLOGY

## 2023-12-04 PROCEDURE — 99232 SBSQ HOSP IP/OBS MODERATE 35: CPT | Mod: ,,, | Performed by: OBSTETRICS & GYNECOLOGY

## 2023-12-04 PROCEDURE — 36415 COLL VENOUS BLD VENIPUNCTURE: CPT | Performed by: OBSTETRICS & GYNECOLOGY

## 2023-12-04 PROCEDURE — 11000001 HC ACUTE MED/SURG PRIVATE ROOM

## 2023-12-04 PROCEDURE — 25000003 PHARM REV CODE 250: Performed by: STUDENT IN AN ORGANIZED HEALTH CARE EDUCATION/TRAINING PROGRAM

## 2023-12-04 RX ORDER — AMPICILLIN 500 MG/1
500 CAPSULE ORAL 4 TIMES DAILY
Status: DISCONTINUED | OUTPATIENT
Start: 2023-12-04 | End: 2023-12-14

## 2023-12-04 RX ADMIN — AMPICILLIN 500 MG: 500 CAPSULE ORAL at 05:12

## 2023-12-04 RX ADMIN — AMPICILLIN 500 MG: 500 CAPSULE ORAL at 01:12

## 2023-12-04 RX ADMIN — AMPICILLIN SODIUM 2 G: 2 INJECTION, POWDER, FOR SOLUTION INTRAVENOUS at 03:12

## 2023-12-04 RX ADMIN — PRENATAL VIT W/ FE FUMARATE-FA TAB 27-0.8 MG 1 TABLET: 27-0.8 TAB at 08:12

## 2023-12-04 RX ADMIN — ZOLPIDEM TARTRATE 5 MG: 5 TABLET ORAL at 01:12

## 2023-12-04 RX ADMIN — AMPICILLIN 500 MG: 500 CAPSULE ORAL at 09:12

## 2023-12-04 RX ADMIN — LORAZEPAM 0.5 MG: 0.5 TABLET ORAL at 01:12

## 2023-12-04 NOTE — PROGRESS NOTES
[facility] - Labor & Delivery  Obstetrics  Antepartum Progress Note    Patient Name: Gilberto Bueno  MRN: [id number]  Admission Date: 2023  Hospital Length of Stay: 3 days  Attending Physician: Abimael Henderson MD  Primary Care Provider: Liss Wise MD    Subjective:     Principal Problem:18 weeks gestation of pregnancy    HPI:  44 yo  at 17w4d  Advanced maternal age  Fetus with Down syndrome  Has been with spotting for the past couple of days  Went to our Emergency on 2023 with minimal spotting and occasional abdominal pain.  Ultrasound that day with MARCUS of 5.9 cm  Good fetal movements    Noted with ROM last night at 1800.    Denies fever and chills.  No longer with pain/contractions        Hospital Course:  On Labor and Delivery, vitals stable  FHT at 140   Contraction: None  Cervix: 1 cm  Started on antibiotic therapy.  Zithromax and Ampicillin    2023 No longer with contractions/spotting this morning.  No fundal tenderness.  No leakage of fluid. Does not want to deliver now.  Wants to wait for sealing of the membranes    2023 No pain.  No leakage. No vaginal bleeding. Good fetal movements  2023 HD#3/PPROM Day #4. No pain.  No leakage. No vaginal bleeding. Good fetal movements  12/3/2023 HD#4/PPROM Day #5. No pain.  No leakage. No vaginal bleeding. Good fetal movements    2023 HD#5 PPROM Day #6. No pain.  No leakage. No vaginal bleeding.  Good fetal movements.  Has been ambulating. Bedside ultrasound with no amniotic fluid    No new subjective & objective note has been filed under this hospital service since the last note was generated.    Assessment/Plan:     43 y.o. female  at 18w1d for:    * 18 weeks gestation of pregnancy  Admit for close observation.      Premature rupture of membranes in second trimester  Discussed with patient management options. Risks and benefits of labor induction vs close observation presented including risks of infection, bleeding  and sepsis.  Patient still would like to wait.  Does not want to proceed with delivery/induction  Will follow clinically with CBC/WBC, Temp, and fundal tenderness.  All normal at this time  Would proceed with induction if signs of infection.  Would allow to labor if spontaneous  Continue with antibiotic for now.  All of her questions were answered appropriately.    EVIN loera  Heplock IV  FHT Qshift  OK to ambulate  Daily CBC    Vaginal bleeding in pregnancy, second trimester  No longer bleeding.  Cervix at 1 cm  Discussed with patient management options. Risks and benefits of labor induction vs close observation presented including risks of infection, bleeding and sepsis.  Patient still would like to wait.  Does not want to proceed with delivery/induction  Will follow clinically with CBC/WBC, Temp, and fundal tenderness.  All normal at this time  Would proceed with induction if signs of infection.  Would allow to labor if spontaneous  Continue with antibiotic for now.  All of her questions were answered appropriately.    EVIN loera  Heplock IV  FHT Qshift  OK to ambulate  Daily CBC    History of  premature rupture of membranes (PROM) in previous pregnancy, currently pregnant in second trimester  Discussed with patient management options. Risks and benefits of labor induction vs close observation presented including risks of infection, bleeding and sepsis.  Patient still would like to wait.  Does not want to proceed with delivery/induction  Will follow clinically with CBC/WBC, Temp, and fundal tenderness.  All normal at this time  Would proceed with induction if signs of infection.  Would allow to labor if spontaneous  Continue with antibiotic for now.  All of her questions were answered appropriately.    EVIN hose  Heplock IV  FHT Qshift  OK to ambulate  Daily CBC    Maternal serum screen positive for trisomy 21  Now with PROM  Discussed with patient management options. Risks and benefits of labor induction vs  close observation presented including risks of infection, bleeding and sepsis.  Patient would like to wait.  Does not want to proceed with delivery/induction  Will follow clinically with CBC/WBC, Temp, and fundal tenderness.  Would proceed with induction if signs of infection.  Would allow to labor if spontaneous  Continue with antibiotic for now.  All of her questions were answered appropriately.    Multigravida of advanced maternal age in second trimester  Now with PROM  Discussed with patient management options. Risks and benefits of labor induction vs close observation presented including risks of infection, bleeding and sepsis.  Patient still would like to wait.  Does not want to proceed with delivery/induction  Will follow clinically with CBC/WBC, Temp, and fundal tenderness.  All normal at this time  Would proceed with induction if signs of infection.  Would allow to labor if spontaneous  Continue with antibiotic for now.  All of her questions were answered appropriately.    EVIN loera  Heplock IV  FHT Qshift  OK to ambulate  Daily CBC          Abimael Henderson MD  Obstetrics  [facility] [place] - Labor & Delivery

## 2023-12-04 NOTE — PROGRESS NOTES
[facility] - Labor & Delivery  Obstetrics  Antepartum Progress Note    Patient Name: Gilberto Bueno  MRN: [id number]  Admission Date: 2023  Hospital Length of Stay: 3 days  Attending Physician: Abimael Henderson MD  Primary Care Provider: Liss Wise MD    Subjective:     Principal Problem:18 weeks gestation of pregnancy    HPI:  42 yo  at 17w4d  Advanced maternal age  Fetus with Down syndrome  Has been with spotting for the past couple of days  Went to our Emergency on 2023 with minimal spotting and occasional abdominal pain.  Ultrasound that day with MARCUS of 5.9 cm  Good fetal movements    Noted with ROM last night at 1800.    Denies fever and chills.  No longer with pain/contractions        Hospital Course:  On Labor and Delivery, vitals stable  FHT at 140   Contraction: None  Cervix: 1 cm  Started on antibiotic therapy.  Zithromax and Ampicillin    2023 No longer with contractions/spotting this morning.  No fundal tenderness.  No leakage of fluid. Does not want to deliver now.  Wants to wait for sealing of the membranes    2023 No pain.  No leakage. No vaginal bleeding. Good fetal movements  2023 HD#3/PPROM Day #4. No pain.  No leakage. No vaginal bleeding. Good fetal movements  12/3/2023 HD#4/PPROM Day #5. No pain.  No leakage. No vaginal bleeding. Good fetal movements    2023 HD#5 PPROM Day #6. No pain.  No leakage. No vaginal bleeding.  Good fetal movements.  Has been ambulating. Bedside ultrasound with no amniotic fluid    Obstetric HPI:  Patient reports None contractions, active fetal movement, absent vaginal bleeding , absent loss of fluid      Objective:     Vital Signs (Most Recent):  Temp: 98.4 °F (36.9 °C) (23)  Pulse: 82 (23)  Resp: 16 (23)  BP: (!) 81/42 (23)  SpO2: 99 % (23 0935) Vital Signs (24h Range):  Temp:  [98.4 °F (36.9 °C)-98.8 °F (37.1 °C)] 98.4 °F (36.9 °C)  Pulse:  [79-87] 82  Resp:  [16-18]  16  SpO2:  [97 %-99 %] 99 %  BP: ()/(42-53) 81/42     Weight: 88 kg (194 lb 0.1 oz)  Body mass index is 31.31 kg/m².    FHT: 148     No intake or output data in the 24 hours ending 12/04/23 0726    Cervical Exam:  deferred     Significant Labs:  Recent Lab Results       None            Physical Exam:   Constitutional: She appears well-developed and well-nourished. No distress.    HENT:   Head: Normocephalic and atraumatic.    Eyes: EOM are normal.     Cardiovascular:  Normal rate.             Pulmonary/Chest: Effort normal. No respiratory distress.        Abdominal: Soft. She exhibits no distension. There is no abdominal tenderness. There is no rebound and no guarding.   Gravid.  No fundal tenderness     Genitourinary:    Genitourinary Comments: No bleeding.             Musculoskeletal: Normal range of motion.       Neurological: She is alert.    Skin: Skin is warm and dry.    Psychiatric: She has a normal mood and affect.       Review of Systems   Constitutional:  Negative for activity change, appetite change, chills, fatigue, fever and unexpected weight change.   HENT:  Negative for mouth sores.    Respiratory:  Negative for cough, shortness of breath and wheezing.    Cardiovascular:  Negative for chest pain and palpitations.   Gastrointestinal:  Negative for abdominal pain, bloating, blood in stool, constipation, nausea and vomiting.   Endocrine: Negative for diabetes and hot flashes.   Genitourinary:  Negative for dysmenorrhea, dyspareunia, dysuria, frequency, hematuria, menorrhagia, menstrual problem, pelvic pain, urgency, vaginal bleeding, vaginal discharge, vaginal pain, urinary incontinence, postcoital bleeding and vaginal odor.   Musculoskeletal:  Negative for back pain and myalgias.   Integumentary:  Negative for rash, breast mass and nipple discharge.   Neurological:  Negative for seizures and headaches.   Psychiatric/Behavioral:  Negative for depression and sleep disturbance. The patient is not  nervous/anxious.    Breast: Negative for mass, mastodynia and nipple discharge    Assessment/Plan:     43 y.o. female  at 18w1d for:    * 18 weeks gestation of pregnancy  Admit for close observation.      Premature rupture of membranes in second trimester  Discussed with patient management options. Risks and benefits of labor induction vs close observation presented including risks of infection, bleeding and sepsis.  Patient still would like to wait.  Does not want to proceed with delivery/induction  Will follow clinically with CBC/WBC, Temp, and fundal tenderness.  All normal at this time  Would proceed with induction if signs of infection.  Would allow to labor if spontaneous  Continue with antibiotic for now.  All of her questions were answered appropriately.    EVIN hose  Heplock IV  FHT Qshift  OK to ambulate  Daily CBC    Vaginal bleeding in pregnancy, second trimester  No longer bleeding.  Cervix at 1 cm  Discussed with patient management options. Risks and benefits of labor induction vs close observation presented including risks of infection, bleeding and sepsis.  Patient still would like to wait.  Does not want to proceed with delivery/induction  Will follow clinically with CBC/WBC, Temp, and fundal tenderness.  All normal at this time  Would proceed with induction if signs of infection.  Would allow to labor if spontaneous  Continue with antibiotic for now.  All of her questions were answered appropriately.    JOOS hose  Heplock IV  FHT Qshift  OK to ambulate  Daily CBC    History of  premature rupture of membranes (PROM) in previous pregnancy, currently pregnant in second trimester  Discussed with patient management options. Risks and benefits of labor induction vs close observation presented including risks of infection, bleeding and sepsis.  Patient still would like to wait.  Does not want to proceed with delivery/induction  Will follow clinically with CBC/WBC, Temp, and fundal  tenderness.  All normal at this time  Would proceed with induction if signs of infection.  Would allow to labor if spontaneous  Continue with antibiotic for now.  All of her questions were answered appropriately.    EVIN loera  Heplock IV  FHT Qshift  OK to ambulate  Daily CBC    Maternal serum screen positive for trisomy 21  Now with PROM  Discussed with patient management options. Risks and benefits of labor induction vs close observation presented including risks of infection, bleeding and sepsis.  Patient would like to wait.  Does not want to proceed with delivery/induction  Will follow clinically with CBC/WBC, Temp, and fundal tenderness.  Would proceed with induction if signs of infection.  Would allow to labor if spontaneous  Continue with antibiotic for now.  All of her questions were answered appropriately.    Multigravida of advanced maternal age in second trimester  Now with PROM  Discussed with patient management options. Risks and benefits of labor induction vs close observation presented including risks of infection, bleeding and sepsis.  Patient still would like to wait.  Does not want to proceed with delivery/induction  Will follow clinically with CBC/WBC, Temp, and fundal tenderness.  All normal at this time  Would proceed with induction if signs of infection.  Would allow to labor if spontaneous  Continue with antibiotic for now.  All of her questions were answered appropriately.    EVIN loera  Heplock IV  FHT Qshift  OK to ambulate  Daily CBC          Abimael Henderson MD  Obstetrics  [facility] - Labor & Delivery

## 2023-12-04 NOTE — NURSING
Dr. Henderson ABS. Performing bedside US. No fluid noted around fetus. Heartbeat observed. 145s. Discussed POC with patient at this time. Will continue to monitor for infection, pain, bleeding, temperature.

## 2023-12-04 NOTE — SUBJECTIVE & OBJECTIVE
Obstetric HPI:  Patient reports None contractions, active fetal movement, absent vaginal bleeding , absent loss of fluid      Objective:     Vital Signs (Most Recent):  Temp: 98.4 °F (36.9 °C) (12/03/23 1940)  Pulse: 82 (12/03/23 1940)  Resp: 16 (12/03/23 1940)  BP: (!) 81/42 (12/03/23 1940)  SpO2: 99 % (12/03/23 0935) Vital Signs (24h Range):  Temp:  [98.4 °F (36.9 °C)-98.8 °F (37.1 °C)] 98.4 °F (36.9 °C)  Pulse:  [79-87] 82  Resp:  [16-18] 16  SpO2:  [97 %-99 %] 99 %  BP: ()/(42-53) 81/42     Weight: 88 kg (194 lb 0.1 oz)  Body mass index is 31.31 kg/m².    FHT: 148     No intake or output data in the 24 hours ending 12/04/23 0726    Cervical Exam:  deferred     Significant Labs:  Recent Lab Results       None            Physical Exam:   Constitutional: She appears well-developed and well-nourished. No distress.    HENT:   Head: Normocephalic and atraumatic.    Eyes: EOM are normal.     Cardiovascular:  Normal rate.             Pulmonary/Chest: Effort normal. No respiratory distress.        Abdominal: Soft. She exhibits no distension. There is no abdominal tenderness. There is no rebound and no guarding.   Gravid.  No fundal tenderness     Genitourinary:    Genitourinary Comments: No bleeding.             Musculoskeletal: Normal range of motion.       Neurological: She is alert.    Skin: Skin is warm and dry.    Psychiatric: She has a normal mood and affect.       Review of Systems   Constitutional:  Negative for activity change, appetite change, chills, fatigue, fever and unexpected weight change.   HENT:  Negative for mouth sores.    Respiratory:  Negative for cough, shortness of breath and wheezing.    Cardiovascular:  Negative for chest pain and palpitations.   Gastrointestinal:  Negative for abdominal pain, bloating, blood in stool, constipation, nausea and vomiting.   Endocrine: Negative for diabetes and hot flashes.   Genitourinary:  Negative for dysmenorrhea, dyspareunia, dysuria, frequency,  hematuria, menorrhagia, menstrual problem, pelvic pain, urgency, vaginal bleeding, vaginal discharge, vaginal pain, urinary incontinence, postcoital bleeding and vaginal odor.   Musculoskeletal:  Negative for back pain and myalgias.   Integumentary:  Negative for rash, breast mass and nipple discharge.   Neurological:  Negative for seizures and headaches.   Psychiatric/Behavioral:  Negative for depression and sleep disturbance. The patient is not nervous/anxious.    Breast: Negative for mass, mastodynia and nipple discharge

## 2023-12-05 LAB
BASOPHILS # BLD AUTO: 0.03 K/UL (ref 0–0.2)
BASOPHILS NFR BLD: 0.5 % (ref 0–1.9)
DIFFERENTIAL METHOD BLD: ABNORMAL
EOSINOPHIL # BLD AUTO: 0.1 K/UL (ref 0–0.5)
EOSINOPHIL NFR BLD: 2 % (ref 0–8)
ERYTHROCYTE [DISTWIDTH] IN BLOOD BY AUTOMATED COUNT: 12.9 % (ref 11.5–14.5)
HCT VFR BLD AUTO: 32 % (ref 37–48.5)
HGB BLD-MCNC: 10.5 G/DL (ref 12–16)
IMM GRANULOCYTES # BLD AUTO: 0.02 K/UL (ref 0–0.04)
IMM GRANULOCYTES NFR BLD AUTO: 0.3 % (ref 0–0.5)
LYMPHOCYTES # BLD AUTO: 2.1 K/UL (ref 1–4.8)
LYMPHOCYTES NFR BLD: 34.9 % (ref 18–48)
MCH RBC QN AUTO: 30.4 PG (ref 27–31)
MCHC RBC AUTO-ENTMCNC: 32.8 G/DL (ref 32–36)
MCV RBC AUTO: 93 FL (ref 82–98)
MONOCYTES # BLD AUTO: 0.4 K/UL (ref 0.3–1)
MONOCYTES NFR BLD: 7.3 % (ref 4–15)
NEUTROPHILS # BLD AUTO: 3.3 K/UL (ref 1.8–7.7)
NEUTROPHILS NFR BLD: 55 % (ref 38–73)
NRBC BLD-RTO: 0 /100 WBC
PLATELET # BLD AUTO: 218 K/UL (ref 150–450)
PMV BLD AUTO: 9.9 FL (ref 9.2–12.9)
RBC # BLD AUTO: 3.45 M/UL (ref 4–5.4)
WBC # BLD AUTO: 5.91 K/UL (ref 3.9–12.7)

## 2023-12-05 PROCEDURE — 85025 COMPLETE CBC W/AUTO DIFF WBC: CPT | Performed by: OBSTETRICS & GYNECOLOGY

## 2023-12-05 PROCEDURE — 11000001 HC ACUTE MED/SURG PRIVATE ROOM

## 2023-12-05 PROCEDURE — 99232 SBSQ HOSP IP/OBS MODERATE 35: CPT | Mod: ,,, | Performed by: OBSTETRICS & GYNECOLOGY

## 2023-12-05 PROCEDURE — 25000003 PHARM REV CODE 250: Performed by: OBSTETRICS & GYNECOLOGY

## 2023-12-05 PROCEDURE — 36415 COLL VENOUS BLD VENIPUNCTURE: CPT | Performed by: OBSTETRICS & GYNECOLOGY

## 2023-12-05 RX ADMIN — LORAZEPAM 0.5 MG: 0.5 TABLET ORAL at 01:12

## 2023-12-05 RX ADMIN — AMPICILLIN 500 MG: 500 CAPSULE ORAL at 04:12

## 2023-12-05 RX ADMIN — AMPICILLIN 500 MG: 500 CAPSULE ORAL at 08:12

## 2023-12-05 RX ADMIN — LORAZEPAM 0.5 MG: 0.5 TABLET ORAL at 12:12

## 2023-12-05 RX ADMIN — PRENATAL VIT W/ FE FUMARATE-FA TAB 27-0.8 MG 1 TABLET: 27-0.8 TAB at 08:12

## 2023-12-05 RX ADMIN — AMPICILLIN 500 MG: 500 CAPSULE ORAL at 01:12

## 2023-12-05 RX ADMIN — ZOLPIDEM TARTRATE 5 MG: 5 TABLET ORAL at 11:12

## 2023-12-05 RX ADMIN — ZOLPIDEM TARTRATE 5 MG: 5 TABLET ORAL at 12:12

## 2023-12-05 NOTE — ASSESSMENT & PLAN NOTE
Discussed with patient management options. Risks and benefits of labor induction vs close observation presented including risks of infection, bleeding and sepsis.  Patient still would like to wait.  Does not want to proceed with delivery/induction  Will follow clinically with CBC/WBC, Temp, and fundal tenderness.  All normal at this time  Would proceed with induction if signs of infection.  Would allow to labor if spontaneous  Continue with antibiotic for now.  All of her questions were answered appropriately.    EVIN loera  Heplock IV  FHT Qshift  OK to ambulate  Daily CBC  Oral ampicillin

## 2023-12-05 NOTE — SUBJECTIVE & OBJECTIVE
Obstetric HPI:  Patient reports None contractions, active fetal movement, absent vaginal bleeding , absent loss of fluid      Objective:     Vital Signs (Most Recent):  Temp: 98.5 °F (36.9 °C) (12/04/23 2020)  Pulse: 86 (12/04/23 2020)  Resp: 16 (12/04/23 2020)  BP: (!) 125/58 (12/04/23 2020)  SpO2: 100 % (12/04/23 2020) Vital Signs (24h Range):  Temp:  [97.9 °F (36.6 °C)-98.7 °F (37.1 °C)] 98.5 °F (36.9 °C)  Pulse:  [75-94] 86  Resp:  [16] 16  SpO2:  [98 %-100 %] 100 %  BP: ()/(53-58) 125/58     Weight: 88 kg (194 lb 0.1 oz)  Body mass index is 31.31 kg/m².    FHT: 150    No intake or output data in the 24 hours ending 12/05/23 0656    Cervical Exam:  Deferred     Significant Labs:  Recent Lab Results         12/05/23  0530   12/04/23  0736        Baso # 0.03   0.05       Basophil % 0.5   0.8       Differential Method Automated   Automated       Eos # 0.1   0.1       Eosinophil % 2.0   1.6       Gran # (ANC) 3.3   3.3       Gran % 55.0   53.8       Hematocrit 32.0   31.3       Hemoglobin 10.5   10.4       Immature Grans (Abs) 0.02  Comment: Mild elevation in immature granulocytes is non specific and   can be seen in a variety of conditions including stress response,   acute inflammation, trauma and pregnancy. Correlation with other   laboratory and clinical findings is essential.     0.02  Comment: Mild elevation in immature granulocytes is non specific and   can be seen in a variety of conditions including stress response,   acute inflammation, trauma and pregnancy. Correlation with other   laboratory and clinical findings is essential.         Immature Granulocytes 0.3   0.3       Lymph # 2.1   2.2       Lymph % 34.9   36.3       MCH 30.4   30.7       MCHC 32.8   33.2       MCV 93   92       Mono # 0.4   0.4       Mono % 7.3   7.2       MPV 9.9   9.9       nRBC 0   0       Platelet Count 218   245       RBC 3.45   3.39       RDW 12.9   12.7       WBC 5.91   6.14               Physical Exam:    Constitutional: She appears well-developed and well-nourished. No distress.    HENT:   Head: Normocephalic and atraumatic.    Eyes: EOM are normal.     Cardiovascular:  Normal rate.             Pulmonary/Chest: Effort normal. No respiratory distress.        Abdominal: Soft. She exhibits no distension. There is no abdominal tenderness. There is no rebound and no guarding.   Gravid.  No fundal tenderness     Genitourinary:    Genitourinary Comments: No bleeding.             Musculoskeletal: Normal range of motion.       Neurological: She is alert.    Skin: Skin is warm and dry.    Psychiatric: She has a normal mood and affect.       Review of Systems

## 2023-12-05 NOTE — CONSULTS
2023   Josephomero LorenzoJohn   [id number]     Consulted by Obstetrician to  mom about prematurity.    Mother's history reviewed (including H&P and progress notes).  ________________________________________    ________________________________________ 18 weeks gestation of pregnancy  Admit for close observation.      Mom was visited yesterday  Premature rupture of membranes in second trimester  Discussed with patient management options. Risks and benefits of labor induction vs close observation presented including risks of infection, bleeding and sepsis.  Patient still would like to wait.  Does not want to proceed with delivery/induction  Will follow clinically with CBC/WBC, Temp, and fundal tenderness.  All normal at this time  Would proceed with induction if signs of infection.  Would allow to labor if spontaneous  Continue with antibiotic for now.  Above copied from OB notes.    Talked to mom about prematurity and 18 wks GA baby.  Non viability at this gestation discusse but mom is in denial and persisting with the thought that babies can survive at this gestation.  Decided to let nature take its course and final decision to be made at time and gestation of delivery  I discussed in detail about possible complications, need for ventilator b/c of immature lungs and IV antibiotics for possible sepsis.   Discussed increased morbidity and mortality as compared to term babies.  Discussed developmental concerns and stay in NICU till near due date.   Provided information about NICU.    Mom verbalized understanding.    Please feel free to call us if mom has any other questions.     Thanks for the consult.    MD Joi,  [facility]

## 2023-12-05 NOTE — NURSING
RN abs. Patient denies bleeding, cramping, chills, sweating, pain. States she has positive fetal movement. Fetal heart tones dopplered at this time 145s to 150s.

## 2023-12-05 NOTE — PROGRESS NOTES
[facility] [place]stetrics  Antepartum Progress Note    Patient Name: Gilberto Bueno  MRN: [id number]  Admission Date: 2023  Hospital Length of Stay: 4 days  Attending Physician: Abimael Henderson MD  Primary Care Provider: Liss Wise MD    Subjective:     Principal Problem:18 weeks gestation of pregnancy    HPI:  44 yo  at 17w4d  Advanced maternal age  Fetus with Down syndrome  Has been with spotting for the past couple of days  Went to our Emergency on 2023 with minimal spotting and occasional abdominal pain.  Ultrasound that day with MARCUS of 5.9 cm  Good fetal movements    Noted with ROM last night at 1800.    Denies fever and chills.  No longer with pain/contractions        Hospital Course:  On Labor and Delivery, vitals stable  FHT at 140   Contraction: None  Cervix: 1 cm  Started on antibiotic therapy.  Zithromax and Ampicillin    2023 No longer with contractions/spotting this morning.  No fundal tenderness.  No leakage of fluid. Does not want to deliver now.  Wants to wait for sealing of the membranes    2023 No pain.  No leakage. No vaginal bleeding. Good fetal movements  2023 HD#3/PPROM Day #4. No pain.  No leakage. No vaginal bleeding. Good fetal movements  12/3/2023 HD#4/PPROM Day #5. No pain.  No leakage. No vaginal bleeding. Good fetal movements    2023 HD#5 PPROM Day #6. No pain.  No leakage. No vaginal bleeding.  Good fetal movements.  Has been ambulating. Bedside ultrasound with no amniotic fluid. Oral ampicillin started.  2023 HD#6 PPROM Day #7. No pain.  No leakage. No vaginal bleeding.  Good fetal movements.  Has been ambulating to the bathroom.  Still in good spirit.    Obstetric HPI:  Patient reports None contractions, active fetal movement, absent vaginal bleeding , absent loss of fluid      Objective:     Vital Signs (Most Recent):  Temp: 98.5 °F (36.9 °C) (23)  Pulse: 86 (23)  Resp: 16 (23)  BP:  (!) 125/58 (12/04/23 2020)  SpO2: 100 % (12/04/23 2020) Vital Signs (24h Range):  Temp:  [97.9 °F (36.6 °C)-98.7 °F (37.1 °C)] 98.5 °F (36.9 °C)  Pulse:  [75-94] 86  Resp:  [16] 16  SpO2:  [98 %-100 %] 100 %  BP: ()/(53-58) 125/58     Weight: 88 kg (194 lb 0.1 oz)  Body mass index is 31.31 kg/m².    FHT: 150    No intake or output data in the 24 hours ending 12/05/23 0656    Cervical Exam:  Deferred     Significant Labs:  Recent Lab Results         12/05/23 0530   12/04/23  0736        Baso # 0.03   0.05       Basophil % 0.5   0.8       Differential Method Automated   Automated       Eos # 0.1   0.1       Eosinophil % 2.0   1.6       Gran # (ANC) 3.3   3.3       Gran % 55.0   53.8       Hematocrit 32.0   31.3       Hemoglobin 10.5   10.4       Immature Grans (Abs) 0.02  Comment: Mild elevation in immature granulocytes is non specific and   can be seen in a variety of conditions including stress response,   acute inflammation, trauma and pregnancy. Correlation with other   laboratory and clinical findings is essential.     0.02  Comment: Mild elevation in immature granulocytes is non specific and   can be seen in a variety of conditions including stress response,   acute inflammation, trauma and pregnancy. Correlation with other   laboratory and clinical findings is essential.         Immature Granulocytes 0.3   0.3       Lymph # 2.1   2.2       Lymph % 34.9   36.3       MCH 30.4   30.7       MCHC 32.8   33.2       MCV 93   92       Mono # 0.4   0.4       Mono % 7.3   7.2       MPV 9.9   9.9       nRBC 0   0       Platelet Count 218   245       RBC 3.45   3.39       RDW 12.9   12.7       WBC 5.91   6.14               Physical Exam:   Constitutional: She appears well-developed and well-nourished. No distress.    HENT:   Head: Normocephalic and atraumatic.    Eyes: EOM are normal.     Cardiovascular:  Normal rate.             Pulmonary/Chest: Effort normal. No respiratory distress.        Abdominal: Soft. She  exhibits no distension. There is no abdominal tenderness. There is no rebound and no guarding.   Gravid.  No fundal tenderness     Genitourinary:    Genitourinary Comments: No bleeding.             Musculoskeletal: Normal range of motion.       Neurological: She is alert.    Skin: Skin is warm and dry.    Psychiatric: She has a normal mood and affect.       Review of Systems  Assessment/Plan:     43 y.o. female  at 18w2d for:    * 18 weeks gestation of pregnancy  Admit for close observation.      Premature rupture of membranes in second trimester  Discussed with patient management options. Risks and benefits of labor induction vs close observation presented including risks of infection, bleeding and sepsis.  Patient still would like to wait.  Does not want to proceed with delivery/induction  Will follow clinically with CBC/WBC, Temp, and fundal tenderness.  All normal at this time  Would proceed with induction if signs of infection.  Would allow to labor if spontaneous  Continue with antibiotic for now.  All of her questions were answered appropriately.    EVIN hose  Heplock IV  FHT Qshift  OK to ambulate  Daily CBC  Oral ampicillin    Vaginal bleeding in pregnancy, second trimester  No longer bleeding.  Cervix at 1 cm  Discussed with patient management options. Risks and benefits of labor induction vs close observation presented including risks of infection, bleeding and sepsis.  Patient still would like to wait.  Does not want to proceed with delivery/induction  Will follow clinically with CBC/WBC, Temp, and fundal tenderness.  All normal at this time  Would proceed with induction if signs of infection.  Would allow to labor if spontaneous  Continue with antibiotic for now.  All of her questions were answered appropriately.    EVIN hose  Heplock IV  FHT Qshift  OK to ambulate  Daily CBC  Oral ampicillin    History of  premature rupture of membranes (PROM) in previous pregnancy, currently pregnant in  second trimester  Discussed with patient management options. Risks and benefits of labor induction vs close observation presented including risks of infection, bleeding and sepsis.  Patient still would like to wait.  Does not want to proceed with delivery/induction  Will follow clinically with CBC/WBC, Temp, and fundal tenderness.  All normal at this time  Would proceed with induction if signs of infection.  Would allow to labor if spontaneous  Continue with antibiotic for now.  All of her questions were answered appropriately.    EVIN hosyanira  Heplock IV  FHT Qshift  OK to ambulate  Daily CBC  Oral ampicillin    Maternal serum screen positive for trisomy 21  Now with PROM  Discussed with patient management options. Risks and benefits of labor induction vs close observation presented including risks of infection, bleeding and sepsis.  Patient would like to wait.  Does not want to proceed with delivery/induction  Will follow clinically with CBC/WBC, Temp, and fundal tenderness.  Would proceed with induction if signs of infection.  Would allow to labor if spontaneous  Continue with antibiotic for now.  All of her questions were answered appropriately.    Multigravida of advanced maternal age in second trimester  Now with PROM  Discussed with patient management options. Risks and benefits of labor induction vs close observation presented including risks of infection, bleeding and sepsis.  Patient still would like to wait.  Does not want to proceed with delivery/induction  Will follow clinically with CBC/WBC, Temp, and fundal tenderness.  All normal at this time  Would proceed with induction if signs of infection.  Would allow to labor if spontaneous  Continue with antibiotic for now.  All of her questions were answered appropriately.    EVIN hose  Heplock IV  FHT Qshift  OK to ambulate  Daily CBC  Oral ampicillin          Abimael Henderson MD  Obstetrics  [facility] - Labor & Delivery

## 2023-12-05 NOTE — ASSESSMENT & PLAN NOTE
Now with PROM  Discussed with patient management options. Risks and benefits of labor induction vs close observation presented including risks of infection, bleeding and sepsis.  Patient still would like to wait.  Does not want to proceed with delivery/induction  Will follow clinically with CBC/WBC, Temp, and fundal tenderness.  All normal at this time  Would proceed with induction if signs of infection.  Would allow to labor if spontaneous  Continue with antibiotic for now.  All of her questions were answered appropriately.    EVIN loera  Heplock IV  FHT Qshift  OK to ambulate  Daily CBC  Oral ampicillin

## 2023-12-05 NOTE — ASSESSMENT & PLAN NOTE
No longer bleeding.  Cervix at 1 cm  Discussed with patient management options. Risks and benefits of labor induction vs close observation presented including risks of infection, bleeding and sepsis.  Patient still would like to wait.  Does not want to proceed with delivery/induction  Will follow clinically with CBC/WBC, Temp, and fundal tenderness.  All normal at this time  Would proceed with induction if signs of infection.  Would allow to labor if spontaneous  Continue with antibiotic for now.  All of her questions were answered appropriately.    EVIN loera  Heplock IV  FHT Qshift  OK to ambulate  Daily CBC  Oral ampicillin

## 2023-12-06 LAB
BASOPHILS # BLD AUTO: 0.04 K/UL (ref 0–0.2)
BASOPHILS NFR BLD: 0.7 % (ref 0–1.9)
DIFFERENTIAL METHOD BLD: ABNORMAL
EOSINOPHIL # BLD AUTO: 0.1 K/UL (ref 0–0.5)
EOSINOPHIL NFR BLD: 1.9 % (ref 0–8)
ERYTHROCYTE [DISTWIDTH] IN BLOOD BY AUTOMATED COUNT: 12.8 % (ref 11.5–14.5)
HCT VFR BLD AUTO: 31 % (ref 37–48.5)
HGB BLD-MCNC: 10.1 G/DL (ref 12–16)
IMM GRANULOCYTES # BLD AUTO: 0.02 K/UL (ref 0–0.04)
IMM GRANULOCYTES NFR BLD AUTO: 0.3 % (ref 0–0.5)
LYMPHOCYTES # BLD AUTO: 1.9 K/UL (ref 1–4.8)
LYMPHOCYTES NFR BLD: 32.7 % (ref 18–48)
MCH RBC QN AUTO: 30 PG (ref 27–31)
MCHC RBC AUTO-ENTMCNC: 32.6 G/DL (ref 32–36)
MCV RBC AUTO: 92 FL (ref 82–98)
MONOCYTES # BLD AUTO: 0.5 K/UL (ref 0.3–1)
MONOCYTES NFR BLD: 8 % (ref 4–15)
NEUTROPHILS # BLD AUTO: 3.2 K/UL (ref 1.8–7.7)
NEUTROPHILS NFR BLD: 56.4 % (ref 38–73)
NRBC BLD-RTO: 0 /100 WBC
PLATELET # BLD AUTO: 240 K/UL (ref 150–450)
PMV BLD AUTO: 10 FL (ref 9.2–12.9)
RBC # BLD AUTO: 3.37 M/UL (ref 4–5.4)
WBC # BLD AUTO: 5.75 K/UL (ref 3.9–12.7)

## 2023-12-06 PROCEDURE — 25000003 PHARM REV CODE 250: Performed by: OBSTETRICS & GYNECOLOGY

## 2023-12-06 PROCEDURE — 36415 COLL VENOUS BLD VENIPUNCTURE: CPT | Performed by: OBSTETRICS & GYNECOLOGY

## 2023-12-06 PROCEDURE — 99232 SBSQ HOSP IP/OBS MODERATE 35: CPT | Mod: ,,, | Performed by: OBSTETRICS & GYNECOLOGY

## 2023-12-06 PROCEDURE — 11000001 HC ACUTE MED/SURG PRIVATE ROOM

## 2023-12-06 PROCEDURE — 85025 COMPLETE CBC W/AUTO DIFF WBC: CPT | Performed by: OBSTETRICS & GYNECOLOGY

## 2023-12-06 RX ADMIN — AMPICILLIN 500 MG: 500 CAPSULE ORAL at 09:12

## 2023-12-06 RX ADMIN — LORAZEPAM 0.5 MG: 0.5 TABLET ORAL at 11:12

## 2023-12-06 RX ADMIN — AMPICILLIN 500 MG: 500 CAPSULE ORAL at 01:12

## 2023-12-06 RX ADMIN — AMPICILLIN 500 MG: 500 CAPSULE ORAL at 08:12

## 2023-12-06 RX ADMIN — AMPICILLIN 500 MG: 500 CAPSULE ORAL at 04:12

## 2023-12-06 RX ADMIN — PRENATAL VIT W/ FE FUMARATE-FA TAB 27-0.8 MG 1 TABLET: 27-0.8 TAB at 09:12

## 2023-12-06 NOTE — PROGRESS NOTES
[facility] [place]  Obstetrics  Antepartum Progress Note    Patient Name: Gilberto Bueno  MRN: [id number]  Admission Date: 2023  Hospital Length of Stay: 5 days  Attending Physician: Abimael Henderson MD  Primary Care Provider: Liss Wise MD    Subjective:     Principal Problem:18 weeks gestation of pregnancy    HPI:  44 yo  at 17w4d  Advanced maternal age  Fetus with Down syndrome  Has been with spotting for the past couple of days  Went to our Emergency on 2023 with minimal spotting and occasional abdominal pain.  Ultrasound that day with MARCUS of 5.9 cm  Good fetal movements    Noted with ROM last night at 1800.    Denies fever and chills.  No longer with pain/contractions        Hospital Course:  On Labor and Delivery, vitals stable  FHT at 140   Contraction: None  Cervix: 1 cm  Started on antibiotic therapy.  Zithromax and Ampicillin    2023 No longer with contractions/spotting this morning.  No fundal tenderness.  No leakage of fluid. Does not want to deliver now.  Wants to wait for sealing of the membranes    2023 No pain.  No leakage. No vaginal bleeding. Good fetal movements  2023 HD#3/PPROM Day #4. No pain.  No leakage. No vaginal bleeding. Good fetal movements  12/3/2023 HD#4/PPROM Day #5. No pain.  No leakage. No vaginal bleeding. Good fetal movements    2023 HD#5 PPROM Day #6. No pain.  No leakage. No vaginal bleeding.  Good fetal movements.  Has been ambulating. Bedside ultrasound with no amniotic fluid. Oral ampicillin started.  2023 HD#6 PPROM Day #7. No pain.  No leakage. No vaginal bleeding.  Good fetal movements.  Has been ambulating to the bathroom.  Still in good spirit.  2023 HD#7 PPROM Day #8. No pain.  No leakage. No vaginal bleeding.  Good fetal movements.  Has been ambulating to the bathroom.  Still in good spirit. Had a visit with our hospital  last night    Obstetric HPI:  Patient reports None contractions, active  fetal movement, absent vaginal bleeding , absent loss of fluid      Objective:     Vital Signs (Most Recent):  Temp: 98.5 °F (36.9 °C) (12/06/23 0522)  Pulse: 86 (12/06/23 0522)  Resp: 18 (12/06/23 0522)  BP: (!) 100/50 (12/06/23 0522)  SpO2: 97 % (12/06/23 0523) Vital Signs (24h Range):  Temp:  [98.2 °F (36.8 °C)-98.9 °F (37.2 °C)] 98.5 °F (36.9 °C)  Pulse:  [73-90] 86  Resp:  [16-18] 18  SpO2:  [88 %-100 %] 97 %  BP: ()/(47-60) 100/50     Weight: 88 kg (194 lb 0.1 oz)  Body mass index is 31.31 kg/m².    FHT: 150    No intake or output data in the 24 hours ending 12/06/23 0659    Cervical Exam:  Deffered     Significant Labs:  Recent Lab Results         12/06/23 0527        Baso # 0.04       Basophil % 0.7       Differential Method Automated       Eos # 0.1       Eosinophil % 1.9       Gran # (ANC) 3.2       Gran % 56.4       Hematocrit 31.0       Hemoglobin 10.1       Immature Grans (Abs) 0.02  Comment: Mild elevation in immature granulocytes is non specific and   can be seen in a variety of conditions including stress response,   acute inflammation, trauma and pregnancy. Correlation with other   laboratory and clinical findings is essential.         Immature Granulocytes 0.3       Lymph # 1.9       Lymph % 32.7       MCH 30.0       MCHC 32.6       MCV 92       Mono # 0.5       Mono % 8.0       MPV 10.0       nRBC 0       Platelet Count 240       RBC 3.37       RDW 12.8       WBC 5.75               Physical Exam:   Constitutional: She appears well-developed and well-nourished. No distress.    HENT:   Head: Normocephalic and atraumatic.    Eyes: EOM are normal.     Cardiovascular:  Normal rate.             Pulmonary/Chest: Effort normal. No respiratory distress.        Abdominal: Soft. She exhibits no distension. There is no abdominal tenderness. There is no rebound and no guarding.   Gravid.  Non-tender             Musculoskeletal: Normal range of motion.       Neurological: She is alert.    Skin: Skin is  warm and dry.    Psychiatric: She has a normal mood and affect.       Review of Systems  Assessment/Plan:     43 y.o. female  at 18w3d for:    * 18 weeks gestation of pregnancy  Admit for close observation.      Premature rupture of membranes in second trimester  Discussed with patient management options. Risks and benefits of labor induction vs close observation presented including risks of infection, bleeding and sepsis.  Patient still would like to wait.  Does not want to proceed with delivery/induction  Will follow clinically with CBC/WBC, Temp, and fundal tenderness.  All normal at this time  Would proceed with induction if signs of infection.  Would allow to labor if spontaneous  Continue with antibiotic for now.  All of her questions were answered appropriately.    EVIN hose  Heplock IV  FHT Qshift  OK to ambulate  Daily CBC  Oral ampicillin    Vaginal bleeding in pregnancy, second trimester  No longer bleeding.  Cervix at 1 cm  Discussed with patient management options. Risks and benefits of labor induction vs close observation presented including risks of infection, bleeding and sepsis.  Patient still would like to wait.  Does not want to proceed with delivery/induction  Will follow clinically with CBC/WBC, Temp, and fundal tenderness.  All normal at this time  Would proceed with induction if signs of infection.  Would allow to labor if spontaneous  Continue with antibiotic for now.  All of her questions were answered appropriately.    EVIN hose  Heplock IV  FHT Qshift  OK to ambulate  Daily CBC  Oral ampicillin    History of  premature rupture of membranes (PROM) in previous pregnancy, currently pregnant in second trimester  Discussed with patient management options. Risks and benefits of labor induction vs close observation presented including risks of infection, bleeding and sepsis.  Patient still would like to wait.  Does not want to proceed with delivery/induction  Will follow clinically  with CBC/WBC, Temp, and fundal tenderness.  All normal at this time  Would proceed with induction if signs of infection.  Would allow to labor if spontaneous  Continue with antibiotic for now.  All of her questions were answered appropriately.    EVIN hosyanira  Heplock IV  FHT Qshift  OK to ambulate  Daily CBC  Oral ampicillin    Maternal serum screen positive for trisomy 21  Now with PROM  Discussed with patient management options. Risks and benefits of labor induction vs close observation presented including risks of infection, bleeding and sepsis.  Patient would like to wait.  Does not want to proceed with delivery/induction  Will follow clinically with CBC/WBC, Temp, and fundal tenderness.  Would proceed with induction if signs of infection.  Would allow to labor if spontaneous  Continue with antibiotic for now.  All of her questions were answered appropriately.    Multigravida of advanced maternal age in second trimester  Now with PROM  Discussed with patient management options. Risks and benefits of labor induction vs close observation presented including risks of infection, bleeding and sepsis.  Patient still would like to wait.  Does not want to proceed with delivery/induction  Will follow clinically with CBC/WBC, Temp, and fundal tenderness.  All normal at this time  Would proceed with induction if signs of infection.  Would allow to labor if spontaneous  Continue with antibiotic for now.  All of her questions were answered appropriately.    EVIN loera  Heplock IV  FHT Qshift  OK to ambulate  Daily CBC  Oral ampicillin          Abimael Henderson MD  Obstetrics  [facility] - [place] - Labor & Delivery

## 2023-12-06 NOTE — SUBJECTIVE & OBJECTIVE
Obstetric HPI:  Patient reports None contractions, active fetal movement, absent vaginal bleeding , absent loss of fluid      Objective:     Vital Signs (Most Recent):  Temp: 98.5 °F (36.9 °C) (12/06/23 0522)  Pulse: 86 (12/06/23 0522)  Resp: 18 (12/06/23 0522)  BP: (!) 100/50 (12/06/23 0522)  SpO2: 97 % (12/06/23 0523) Vital Signs (24h Range):  Temp:  [98.2 °F (36.8 °C)-98.9 °F (37.2 °C)] 98.5 °F (36.9 °C)  Pulse:  [73-90] 86  Resp:  [16-18] 18  SpO2:  [88 %-100 %] 97 %  BP: ()/(47-60) 100/50     Weight: 88 kg (194 lb 0.1 oz)  Body mass index is 31.31 kg/m².    FHT: 150    No intake or output data in the 24 hours ending 12/06/23 0659    Cervical Exam:  Deffered     Significant Labs:  Recent Lab Results         12/06/23 0527        Baso # 0.04       Basophil % 0.7       Differential Method Automated       Eos # 0.1       Eosinophil % 1.9       Gran # (ANC) 3.2       Gran % 56.4       Hematocrit 31.0       Hemoglobin 10.1       Immature Grans (Abs) 0.02  Comment: Mild elevation in immature granulocytes is non specific and   can be seen in a variety of conditions including stress response,   acute inflammation, trauma and pregnancy. Correlation with other   laboratory and clinical findings is essential.         Immature Granulocytes 0.3       Lymph # 1.9       Lymph % 32.7       MCH 30.0       MCHC 32.6       MCV 92       Mono # 0.5       Mono % 8.0       MPV 10.0       nRBC 0       Platelet Count 240       RBC 3.37       RDW 12.8       WBC 5.75               Physical Exam:   Constitutional: She appears well-developed and well-nourished. No distress.    HENT:   Head: Normocephalic and atraumatic.    Eyes: EOM are normal.     Cardiovascular:  Normal rate.             Pulmonary/Chest: Effort normal. No respiratory distress.        Abdominal: Soft. She exhibits no distension. There is no abdominal tenderness. There is no rebound and no guarding.   Gravid.  Non-tender             Musculoskeletal: Normal range of  motion.       Neurological: She is alert.    Skin: Skin is warm and dry.    Psychiatric: She has a normal mood and affect.       Review of Systems

## 2023-12-06 NOTE — NURSING
[id number]   This RN at bedside w/ PO abx. VSS, afebrile. Pt denies abd or back pain, denies vag bleeding. POC discussed w/ pt. Pt agrees w/ POC. Pt states she will call RN to doppler FHTs before she goes to sleep.   IV in L wrist flushed, dressing and tape peeling off site. Sterile dressing change.

## 2023-12-06 NOTE — NURSING
[id number] Pt requesting Ambien and Ativan for sleep. This RN called pharmacy. Pharmacy advised giving Ambien first, give Ativan in 1-2hrs if pt requesting. POC discussed w/ pt and pt agrees.

## 2023-12-06 NOTE — CONSULTS
2027:    Consult was conducted with the patient and her medical Provider. The patient's medical provider explained the patient's position of active cristobal as well as his educating her on the medical aspect of her case.  The patient basically wanted someone to understand her spiritual  cristobal. The patient presented as being easy to talk to and she went over a lot of her life's experiences.  Prayers were offered for the patient and her family. The Spiritual Care Team will continue to provide spiritual support to the patient and her family.        LOI Martínez   [phone]

## 2023-12-07 LAB
BASOPHILS # BLD AUTO: 0.04 K/UL (ref 0–0.2)
BASOPHILS NFR BLD: 0.6 % (ref 0–1.9)
DIFFERENTIAL METHOD BLD: ABNORMAL
EOSINOPHIL # BLD AUTO: 0.1 K/UL (ref 0–0.5)
EOSINOPHIL NFR BLD: 1.9 % (ref 0–8)
ERYTHROCYTE [DISTWIDTH] IN BLOOD BY AUTOMATED COUNT: 12.7 % (ref 11.5–14.5)
HCT VFR BLD AUTO: 32 % (ref 37–48.5)
HGB BLD-MCNC: 10.6 G/DL (ref 12–16)
IMM GRANULOCYTES # BLD AUTO: 0.03 K/UL (ref 0–0.04)
IMM GRANULOCYTES NFR BLD AUTO: 0.4 % (ref 0–0.5)
LYMPHOCYTES # BLD AUTO: 2.1 K/UL (ref 1–4.8)
LYMPHOCYTES NFR BLD: 30.4 % (ref 18–48)
MCH RBC QN AUTO: 30.6 PG (ref 27–31)
MCHC RBC AUTO-ENTMCNC: 33.1 G/DL (ref 32–36)
MCV RBC AUTO: 93 FL (ref 82–98)
MONOCYTES # BLD AUTO: 0.6 K/UL (ref 0.3–1)
MONOCYTES NFR BLD: 8.7 % (ref 4–15)
NEUTROPHILS # BLD AUTO: 4 K/UL (ref 1.8–7.7)
NEUTROPHILS NFR BLD: 58 % (ref 38–73)
NRBC BLD-RTO: 0 /100 WBC
PLATELET # BLD AUTO: 246 K/UL (ref 150–450)
PMV BLD AUTO: 10.2 FL (ref 9.2–12.9)
RBC # BLD AUTO: 3.46 M/UL (ref 4–5.4)
WBC # BLD AUTO: 6.91 K/UL (ref 3.9–12.7)

## 2023-12-07 PROCEDURE — 11000001 HC ACUTE MED/SURG PRIVATE ROOM

## 2023-12-07 PROCEDURE — 25000003 PHARM REV CODE 250: Performed by: OBSTETRICS & GYNECOLOGY

## 2023-12-07 PROCEDURE — 99232 SBSQ HOSP IP/OBS MODERATE 35: CPT | Mod: ,,, | Performed by: OBSTETRICS & GYNECOLOGY

## 2023-12-07 PROCEDURE — 85025 COMPLETE CBC W/AUTO DIFF WBC: CPT | Performed by: OBSTETRICS & GYNECOLOGY

## 2023-12-07 PROCEDURE — 36415 COLL VENOUS BLD VENIPUNCTURE: CPT | Performed by: OBSTETRICS & GYNECOLOGY

## 2023-12-07 RX ADMIN — PRENATAL VIT W/ FE FUMARATE-FA TAB 27-0.8 MG 1 TABLET: 27-0.8 TAB at 09:12

## 2023-12-07 RX ADMIN — ZOLPIDEM TARTRATE 5 MG: 5 TABLET ORAL at 12:12

## 2023-12-07 RX ADMIN — AMPICILLIN 500 MG: 500 CAPSULE ORAL at 01:12

## 2023-12-07 RX ADMIN — AMPICILLIN 500 MG: 500 CAPSULE ORAL at 10:12

## 2023-12-07 RX ADMIN — AMPICILLIN 500 MG: 500 CAPSULE ORAL at 09:12

## 2023-12-07 RX ADMIN — LORAZEPAM 0.5 MG: 0.5 TABLET ORAL at 08:12

## 2023-12-07 RX ADMIN — ACETAMINOPHEN 1000 MG: 500 TABLET, FILM COATED ORAL at 08:12

## 2023-12-07 RX ADMIN — AMPICILLIN 500 MG: 500 CAPSULE ORAL at 05:12

## 2023-12-07 RX ADMIN — ZOLPIDEM TARTRATE 5 MG: 5 TABLET ORAL at 10:12

## 2023-12-07 NOTE — PROGRESS NOTES
[facility] - Labor & Delivery  Obstetrics  Antepartum Progress Note    Patient Name: Gilberto Bueno  MRN: [id number]  Admission Date: 2023  Hospital Length of Stay: 6 days  Attending Physician: Abimael Henderson MD  Primary Care Provider: Liss Wise MD    Subjective:     Principal Problem:18 weeks gestation of pregnancy    HPI:  42 yo  at 17w4d  Advanced maternal age  Fetus with Down syndrome  Has been with spotting for the past couple of days  Went to our Emergency on 2023 with minimal spotting and occasional abdominal pain.  Ultrasound that day with MARCUS of 5.9 cm  Good fetal movements    Noted with ROM last night at 1800.    Denies fever and chills.  No longer with pain/contractions        Hospital Course:  On Labor and Delivery, vitals stable  FHT at 140   Contraction: None  Cervix: 1 cm  Started on antibiotic therapy.  Zithromax and Ampicillin    2023 No longer with contractions/spotting this morning.  No fundal tenderness.  No leakage of fluid. Does not want to deliver now.  Wants to wait for sealing of the membranes    2023 No pain.  No leakage. No vaginal bleeding. Good fetal movements  2023 HD#3/PPROM Day #4. No pain.  No leakage. No vaginal bleeding. Good fetal movements  12/3/2023 HD#4/PPROM Day #5. No pain.  No leakage. No vaginal bleeding. Good fetal movements    2023 HD#5 PPROM Day #6. No pain.  No leakage. No vaginal bleeding.  Good fetal movements.  Has been ambulating. Bedside ultrasound with no amniotic fluid. Oral ampicillin started.  2023 HD#6 PPROM Day #7. No pain.  No leakage. No vaginal bleeding.  Good fetal movements.  Has been ambulating to the bathroom.  Still in good spirit.  2023 HD#7 PPROM Day #8. No pain.  No leakage. No vaginal bleeding.  Good fetal movements.  Has been ambulating to the bathroom.  Still in good spirit. Had a visit with our hospital  last night  2023 HD#8 PPROM Day #9. No change. No pain.  No leakage.  No vaginal bleeding.  Good fetal movements.  Has been ambulating to the bathroom.  Still in good spirit.     Obstetric HPI:  Patient reports None contractions, active fetal movement, absent vaginal bleeding , absent loss of fluid      Objective:     Vital Signs (Most Recent):  Temp: 98.2 °F (36.8 °C) (23)  Pulse: 83 (23)  Resp: 18 (23)  BP: (!) 91/50 (23)  SpO2: 98 % (23 0914) Vital Signs (24h Range):  Temp:  [98.2 °F (36.8 °C)-98.6 °F (37 °C)] 98.2 °F (36.8 °C)  Pulse:  [74-86] 83  Resp:  [18-20] 18  SpO2:  [98 %] 98 %  BP: ()/(50-58) 91/50     Weight: 88 kg (194 lb 0.1 oz)  Body mass index is 31.31 kg/m².    FHT: 150  No intake or output data in the 24 hours ending 23    Cervical Exam:  Deferred     Significant Labs:  Recent Lab Results       None            Physical Exam:   Constitutional: She appears well-developed and well-nourished. No distress.    HENT:   Head: Normocephalic and atraumatic.    Eyes: EOM are normal.     Cardiovascular:  Normal rate.             Pulmonary/Chest: Effort normal. No respiratory distress.        Abdominal: Soft. She exhibits no distension. There is no abdominal tenderness. There is no rebound and no guarding.   Non-tender     Genitourinary:    Genitourinary Comments: No blood or fluid at perineum             Musculoskeletal: Normal range of motion.       Neurological: She is alert.    Skin: Skin is warm and dry.    Psychiatric: She has a normal mood and affect.       Review of Systems  Assessment/Plan:     43 y.o. female  at 18w4d for:    * 18 weeks gestation of pregnancy  Admit for close observation.      Premature rupture of membranes in second trimester  Discussed with patient management options. Risks and benefits of labor induction vs close observation presented including risks of infection, bleeding and sepsis.  Patient still would like to wait.  Does not want to proceed with delivery/induction  Will  follow clinically with CBC/WBC, Temp, and fundal tenderness.  All normal at this time  Would proceed with induction if signs of infection.  Would allow to labor if spontaneous  Continue with antibiotic for now.  All of her questions were answered appropriately.    EVIN Agustin IV  FHT Qshift  OK to ambulate  Daily CBC  Oral ampicillin    Vaginal bleeding in pregnancy, second trimester  No longer bleeding.  Cervix at 1 cm  Discussed with patient management options. Risks and benefits of labor induction vs close observation presented including risks of infection, bleeding and sepsis.  Patient still would like to wait.  Does not want to proceed with delivery/induction  Will follow clinically with CBC/WBC, Temp, and fundal tenderness.  All normal at this time  Would proceed with induction if signs of infection.  Would allow to labor if spontaneous  Continue with antibiotic for now.  All of her questions were answered appropriately.    EVIN Agustin IV  FHT Qshift  OK to ambulate  Daily CBC  Oral ampicillin    History of  premature rupture of membranes (PROM) in previous pregnancy, currently pregnant in second trimester  Discussed with patient management options. Risks and benefits of labor induction vs close observation presented including risks of infection, bleeding and sepsis.  Patient still would like to wait.  Does not want to proceed with delivery/induction  Will follow clinically with CBC/WBC, Temp, and fundal tenderness.  All normal at this time  Would proceed with induction if signs of infection.  Would allow to labor if spontaneous  Continue with antibiotic for now.  All of her questions were answered appropriately.    EVIN Agustin IV  FHT Qshift  OK to ambulate  Daily CBC  Oral ampicillin    Maternal serum screen positive for trisomy 21  Now with PROM  Discussed with patient management options. Risks and benefits of labor induction vs close observation presented including risks of  infection, bleeding and sepsis.  Patient would like to wait.  Does not want to proceed with delivery/induction  Will follow clinically with CBC/WBC, Temp, and fundal tenderness.  Would proceed with induction if signs of infection.  Would allow to labor if spontaneous  Continue with antibiotic for now.  All of her questions were answered appropriately.    Multigravida of advanced maternal age in second trimester  Now with PROM  Discussed with patient management options. Risks and benefits of labor induction vs close observation presented including risks of infection, bleeding and sepsis.  Patient still would like to wait.  Does not want to proceed with delivery/induction  Will follow clinically with CBC/WBC, Temp, and fundal tenderness.  All normal at this time  Would proceed with induction if signs of infection.  Would allow to labor if spontaneous  Continue with antibiotic for now.  All of her questions were answered appropriately.    EVIN loera  Heplock IV  FHT Qshift  OK to ambulate  Daily CBC  Oral ampicillin          Abimael Henderson MD  [place]st[place]s  [facility] [place]

## 2023-12-07 NOTE — NURSING
In room to see pt.  Laying in bed eating snacks talking on the phone.  D/w pt POC for the night - abx at 2100 and doppler FHTs.  No needs at this time.

## 2023-12-07 NOTE — SUBJECTIVE & OBJECTIVE
Obstetric HPI:  Patient reports None contractions, active fetal movement, absent vaginal bleeding , absent loss of fluid      Objective:     Vital Signs (Most Recent):  Temp: 98.2 °F (36.8 °C) (12/07/23 0625)  Pulse: 83 (12/07/23 0630)  Resp: 18 (12/07/23 0630)  BP: (!) 91/50 (12/07/23 0630)  SpO2: 98 % (12/06/23 0914) Vital Signs (24h Range):  Temp:  [98.2 °F (36.8 °C)-98.6 °F (37 °C)] 98.2 °F (36.8 °C)  Pulse:  [74-86] 83  Resp:  [18-20] 18  SpO2:  [98 %] 98 %  BP: ()/(50-58) 91/50     Weight: 88 kg (194 lb 0.1 oz)  Body mass index is 31.31 kg/m².    FHT: 150  No intake or output data in the 24 hours ending 12/07/23 0718    Cervical Exam:  Deferred     Significant Labs:  Recent Lab Results       None            Physical Exam:   Constitutional: She appears well-developed and well-nourished. No distress.    HENT:   Head: Normocephalic and atraumatic.    Eyes: EOM are normal.     Cardiovascular:  Normal rate.             Pulmonary/Chest: Effort normal. No respiratory distress.        Abdominal: Soft. She exhibits no distension. There is no abdominal tenderness. There is no rebound and no guarding.   Non-tender     Genitourinary:    Genitourinary Comments: No blood or fluid at perineum             Musculoskeletal: Normal range of motion.       Neurological: She is alert.    Skin: Skin is warm and dry.    Psychiatric: She has a normal mood and affect.       Review of Systems

## 2023-12-07 NOTE — PLAN OF CARE
Problem: Grieving  Goal: Expression of Grief  Outcome: Adequate for Care Transition - Patient remains hopeful that pregnancy continues. No signs of grief noted     Problem: Prelabor Rupture of Membranes  Goal: Delayed Delivery with Absence of Infection  Outcome: Ongoing, Progressing - PPROM on 11/29/23 at 8pm. Afebrile, WBC 6.91

## 2023-12-08 LAB
ABO + RH BLD: NORMAL
BASOPHILS # BLD AUTO: 0.06 K/UL (ref 0–0.2)
BASOPHILS NFR BLD: 0.8 % (ref 0–1.9)
BLD GP AB SCN CELLS X3 SERPL QL: NORMAL
DIFFERENTIAL METHOD BLD: ABNORMAL
EOSINOPHIL # BLD AUTO: 0.1 K/UL (ref 0–0.5)
EOSINOPHIL NFR BLD: 1.7 % (ref 0–8)
ERYTHROCYTE [DISTWIDTH] IN BLOOD BY AUTOMATED COUNT: 12.7 % (ref 11.5–14.5)
HCT VFR BLD AUTO: 32 % (ref 37–48.5)
HGB BLD-MCNC: 10.5 G/DL (ref 12–16)
IMM GRANULOCYTES # BLD AUTO: 0.03 K/UL (ref 0–0.04)
IMM GRANULOCYTES NFR BLD AUTO: 0.4 % (ref 0–0.5)
LYMPHOCYTES # BLD AUTO: 1.6 K/UL (ref 1–4.8)
LYMPHOCYTES NFR BLD: 21.6 % (ref 18–48)
MCH RBC QN AUTO: 30 PG (ref 27–31)
MCHC RBC AUTO-ENTMCNC: 32.8 G/DL (ref 32–36)
MCV RBC AUTO: 91 FL (ref 82–98)
MONOCYTES # BLD AUTO: 0.5 K/UL (ref 0.3–1)
MONOCYTES NFR BLD: 6.8 % (ref 4–15)
NEUTROPHILS # BLD AUTO: 4.9 K/UL (ref 1.8–7.7)
NEUTROPHILS NFR BLD: 68.7 % (ref 38–73)
NRBC BLD-RTO: 0 /100 WBC
PLATELET # BLD AUTO: 227 K/UL (ref 150–450)
PMV BLD AUTO: 10.1 FL (ref 9.2–12.9)
RBC # BLD AUTO: 3.5 M/UL (ref 4–5.4)
SPECIMEN OUTDATE: NORMAL
WBC # BLD AUTO: 7.17 K/UL (ref 3.9–12.7)

## 2023-12-08 PROCEDURE — 36415 COLL VENOUS BLD VENIPUNCTURE: CPT | Performed by: OBSTETRICS & GYNECOLOGY

## 2023-12-08 PROCEDURE — 85025 COMPLETE CBC W/AUTO DIFF WBC: CPT | Performed by: OBSTETRICS & GYNECOLOGY

## 2023-12-08 PROCEDURE — 25000003 PHARM REV CODE 250: Performed by: OBSTETRICS & GYNECOLOGY

## 2023-12-08 PROCEDURE — 86850 RBC ANTIBODY SCREEN: CPT | Performed by: OBSTETRICS & GYNECOLOGY

## 2023-12-08 PROCEDURE — 11000001 HC ACUTE MED/SURG PRIVATE ROOM

## 2023-12-08 PROCEDURE — 99232 SBSQ HOSP IP/OBS MODERATE 35: CPT | Mod: ,,, | Performed by: OBSTETRICS & GYNECOLOGY

## 2023-12-08 RX ADMIN — AMPICILLIN 500 MG: 500 CAPSULE ORAL at 09:12

## 2023-12-08 RX ADMIN — AMPICILLIN 500 MG: 500 CAPSULE ORAL at 04:12

## 2023-12-08 RX ADMIN — PRENATAL VIT W/ FE FUMARATE-FA TAB 27-0.8 MG 1 TABLET: 27-0.8 TAB at 09:12

## 2023-12-08 RX ADMIN — AMPICILLIN 500 MG: 500 CAPSULE ORAL at 10:12

## 2023-12-08 NOTE — NURSING
Pt sitting up in bed visiting with children. Alert and oriented. Speech clear w appropriate response. Appears in pleasant disposition.  No complaints at this time. C/o small amount of leaking w/o foul odor or bleeding. Abdomen soft w/o tenderness to palpation.     Fht dopplered 131-155.

## 2023-12-08 NOTE — SUBJECTIVE & OBJECTIVE
Obstetric HPI:  Patient reports None contractions, active fetal movement, absent vaginal bleeding , absent loss of fluid      Objective:     Vital Signs (Most Recent):  Temp: 98 °F (36.7 °C) (12/07/23 2330)  Pulse: 83 (12/07/23 2010)  Resp: 16 (12/07/23 2330)  BP: (!) 94/44 (12/07/23 2010)  SpO2: 99 % (12/07/23 1510) Vital Signs (24h Range):  Temp:  [97.9 °F (36.6 °C)-98.6 °F (37 °C)] 98 °F (36.7 °C)  Pulse:  [83-94] 83  Resp:  [16-18] 16  SpO2:  [99 %-100 %] 99 %  BP: ()/(44-52) 94/44     Weight: 88 kg (194 lb 0.1 oz)  Body mass index is 31.31 kg/m².    FHT: 150    No intake or output data in the 24 hours ending 12/08/23 0720    Cervical Exam:  Deferred     Significant Labs:  Recent Lab Results       None            Physical Exam:   Constitutional: She appears well-developed and well-nourished. No distress.    HENT:   Head: Normocephalic and atraumatic.    Eyes: EOM are normal.     Cardiovascular:  Normal rate.             Pulmonary/Chest: Effort normal. No respiratory distress.        Abdominal: Soft. She exhibits no distension. There is no abdominal tenderness. There is no rebound and no guarding.   Non-tender     Genitourinary:    Genitourinary Comments: No blood or fluid at perineum             Musculoskeletal: Normal range of motion.       Neurological: She is alert.    Skin: Skin is warm and dry.    Psychiatric: She has a normal mood and affect.       Review of Systems   Constitutional:  Positive for fatigue. Negative for activity change, appetite change, fever and unexpected weight change.   Respiratory:  Negative for cough, shortness of breath and wheezing.    Cardiovascular:  Negative for chest pain and palpitations.   Gastrointestinal:  Negative for abdominal pain, nausea and vomiting.   Endocrine: Negative for hot flashes.   Genitourinary:  Negative for dysmenorrhea, dyspareunia, frequency, pelvic pain, urgency, vaginal bleeding, vaginal discharge and postcoital bleeding.   Musculoskeletal:   Positive for back pain. Negative for myalgias.   Integumentary:  Negative for rash, breast mass and nipple discharge.   Neurological:  Positive for headaches. Negative for seizures.   Psychiatric/Behavioral:  Negative for depression and sleep disturbance. The patient is not nervous/anxious.    Breast: Negative for mass, mastodynia and nipple discharge

## 2023-12-08 NOTE — PROGRESS NOTES
[facility] - Labor & Delivery  Obstetrics  Antepartum Progress Note    Patient Name: Gilberto uBeno  MRN: [id number]  Admission Date: 2023  Hospital Length of Stay: 7 days  Attending Physician: Abimael Henderson MD  Primary Care Provider: Liss Wise MD    Subjective:     Principal Problem:18 weeks gestation of pregnancy    HPI:  44 yo  at 17w4d  Advanced maternal age  Fetus with Down syndrome  Has been with spotting for the past couple of days  Went to our Emergency on 2023 with minimal spotting and occasional abdominal pain.  Ultrasound that day with MARCUS of 5.9 cm  Good fetal movements    Noted with ROM last night at 1800.    Denies fever and chills.  No longer with pain/contractions        Hospital Course:  On Labor and Delivery, vitals stable  FHT at 140   Contraction: None  Cervix: 1 cm  Started on antibiotic therapy.  Zithromax and Ampicillin    2023 No longer with contractions/spotting this morning.  No fundal tenderness.  No leakage of fluid. Does not want to deliver now.  Wants to wait for sealing of the membranes    2023 No pain.  No leakage. No vaginal bleeding. Good fetal movements  2023 HD#3/PPROM Day #4. No pain.  No leakage. No vaginal bleeding. Good fetal movements  12/3/2023 HD#4/PPROM Day #5. No pain.  No leakage. No vaginal bleeding. Good fetal movements    2023 HD#5 PPROM Day #6. No pain.  No leakage. No vaginal bleeding.  Good fetal movements.  Has been ambulating. Bedside ultrasound with no amniotic fluid. Oral ampicillin started.  2023 HD#6 PPROM Day #7. No pain.  No leakage. No vaginal bleeding.  Good fetal movements.  Has been ambulating to the bathroom.  Still in good spirit.  2023 HD#7 PPROM Day #8. No pain.  No leakage. No vaginal bleeding.  Good fetal movements.  Has been ambulating to the bathroom.  Still in good spirit. Had a visit with our hospital  last night  2023 HD#8 PPROM Day #9. No change. No pain.  No leakage.  No vaginal bleeding.  Good fetal movements.  Has been ambulating to the bathroom.  Still in good spirit.   12/8/2023 HD#9 PPROM Day #10. No change. No pain.  No leakage. No vaginal bleeding.  Good fetal movements.  Has been ambulating to the bathroom.  Still in good spirit.    Obstetric HPI:  Patient reports None contractions, active fetal movement, absent vaginal bleeding , absent loss of fluid      Objective:     Vital Signs (Most Recent):  Temp: 98 °F (36.7 °C) (12/07/23 2330)  Pulse: 83 (12/07/23 2010)  Resp: 16 (12/07/23 2330)  BP: (!) 94/44 (12/07/23 2010)  SpO2: 99 % (12/07/23 1510) Vital Signs (24h Range):  Temp:  [97.9 °F (36.6 °C)-98.6 °F (37 °C)] 98 °F (36.7 °C)  Pulse:  [83-94] 83  Resp:  [16-18] 16  SpO2:  [99 %-100 %] 99 %  BP: ()/(44-52) 94/44     Weight: 88 kg (194 lb 0.1 oz)  Body mass index is 31.31 kg/m².    FHT: 150    No intake or output data in the 24 hours ending 12/08/23 0720    Cervical Exam:  Deferred     Significant Labs:  Recent Lab Results       None            Physical Exam:   Constitutional: She appears well-developed and well-nourished. No distress.    HENT:   Head: Normocephalic and atraumatic.    Eyes: EOM are normal.     Cardiovascular:  Normal rate.             Pulmonary/Chest: Effort normal. No respiratory distress.        Abdominal: Soft. She exhibits no distension. There is no abdominal tenderness. There is no rebound and no guarding.   Non-tender     Genitourinary:    Genitourinary Comments: No blood or fluid at perineum             Musculoskeletal: Normal range of motion.       Neurological: She is alert.    Skin: Skin is warm and dry.    Psychiatric: She has a normal mood and affect.       Review of Systems   Constitutional:  Positive for fatigue. Negative for activity change, appetite change, fever and unexpected weight change.   Respiratory:  Negative for cough, shortness of breath and wheezing.    Cardiovascular:  Negative for chest pain and palpitations.    Gastrointestinal:  Negative for abdominal pain, nausea and vomiting.   Endocrine: Negative for hot flashes.   Genitourinary:  Negative for dysmenorrhea, dyspareunia, frequency, pelvic pain, urgency, vaginal bleeding, vaginal discharge and postcoital bleeding.   Musculoskeletal:  Positive for back pain. Negative for myalgias.   Integumentary:  Negative for rash, breast mass and nipple discharge.   Neurological:  Positive for headaches. Negative for seizures.   Psychiatric/Behavioral:  Negative for depression and sleep disturbance. The patient is not nervous/anxious.    Breast: Negative for mass, mastodynia and nipple discharge    Assessment/Plan:     43 y.o. female  at 18w5d for:    * 18 weeks gestation of pregnancy  Admit for close observation.      Premature rupture of membranes in second trimester  Discussed with patient management options. Risks and benefits of labor induction vs close observation presented including risks of infection, bleeding and sepsis.  Patient still would like to wait.  Does not want to proceed with delivery/induction  Will follow clinically with CBC/WBC, Temp, and fundal tenderness.  All normal at this time  Would proceed with induction if signs of infection.  Would allow to labor if spontaneous  Continue with antibiotic for now.  All of her questions were answered appropriately.    EVIN loera  Heplock IV  FHT Qshift  OK to ambulate  Daily CBC  Oral ampicillin    Vaginal bleeding in pregnancy, second trimester  No longer bleeding.  Cervix at 1 cm  Discussed with patient management options. Risks and benefits of labor induction vs close observation presented including risks of infection, bleeding and sepsis.  Patient still would like to wait.  Does not want to proceed with delivery/induction  Will follow clinically with CBC/WBC, Temp, and fundal tenderness.  All normal at this time  Would proceed with induction if signs of infection.  Would allow to labor if spontaneous  Continue  with antibiotic for now.  All of her questions were answered appropriately.    EVIN loera  Heplock IV  FHT Qshift  OK to ambulate  Daily CBC  Oral ampicillin    History of  premature rupture of membranes (PROM) in previous pregnancy, currently pregnant in second trimester  Discussed with patient management options. Risks and benefits of labor induction vs close observation presented including risks of infection, bleeding and sepsis.  Patient still would like to wait.  Does not want to proceed with delivery/induction  Will follow clinically with CBC/WBC, Temp, and fundal tenderness.  All normal at this time  Would proceed with induction if signs of infection.  Would allow to labor if spontaneous  Continue with antibiotic for now.  All of her questions were answered appropriately.    EVIN loera  Heplock IV  FHT Qshift  OK to ambulate  Daily CBC  Oral ampicillin    Maternal serum screen positive for trisomy 21  Now with PROM  Discussed with patient management options. Risks and benefits of labor induction vs close observation presented including risks of infection, bleeding and sepsis.  Patient would like to wait.  Does not want to proceed with delivery/induction  Will follow clinically with CBC/WBC, Temp, and fundal tenderness.  Would proceed with induction if signs of infection.  Would allow to labor if spontaneous  Continue with antibiotic for now.  All of her questions were answered appropriately.    Multigravida of advanced maternal age in second trimester  Now with PROM  Discussed with patient management options. Risks and benefits of labor induction vs close observation presented including risks of infection, bleeding and sepsis.  Patient still would like to wait.  Does not want to proceed with delivery/induction  Will follow clinically with CBC/WBC, Temp, and fundal tenderness.  All normal at this time  Would proceed with induction if signs of infection.  Would allow to labor if spontaneous  Continue with  antibiotic for now.  All of her questions were answered appropriately.    EVIN hose  Heplock IV  FHT Qshift  OK to ambulate  Daily CBC  Oral ampicillin          Abimael Henderson MD  Obstetrics  [facility] [place] [facility] [place]

## 2023-12-09 LAB
BASOPHILS # BLD AUTO: 0.05 K/UL (ref 0–0.2)
BASOPHILS NFR BLD: 0.6 % (ref 0–1.9)
DIFFERENTIAL METHOD BLD: ABNORMAL
EOSINOPHIL # BLD AUTO: 0.1 K/UL (ref 0–0.5)
EOSINOPHIL NFR BLD: 1.8 % (ref 0–8)
ERYTHROCYTE [DISTWIDTH] IN BLOOD BY AUTOMATED COUNT: 12.8 % (ref 11.5–14.5)
HCT VFR BLD AUTO: 30.5 % (ref 37–48.5)
HGB BLD-MCNC: 9.9 G/DL (ref 12–16)
IMM GRANULOCYTES # BLD AUTO: 0.03 K/UL (ref 0–0.04)
IMM GRANULOCYTES NFR BLD AUTO: 0.4 % (ref 0–0.5)
LYMPHOCYTES # BLD AUTO: 2.1 K/UL (ref 1–4.8)
LYMPHOCYTES NFR BLD: 27.2 % (ref 18–48)
MCH RBC QN AUTO: 30 PG (ref 27–31)
MCHC RBC AUTO-ENTMCNC: 32.5 G/DL (ref 32–36)
MCV RBC AUTO: 92 FL (ref 82–98)
MONOCYTES # BLD AUTO: 0.7 K/UL (ref 0.3–1)
MONOCYTES NFR BLD: 8.5 % (ref 4–15)
NEUTROPHILS # BLD AUTO: 4.9 K/UL (ref 1.8–7.7)
NEUTROPHILS NFR BLD: 61.5 % (ref 38–73)
NRBC BLD-RTO: 0 /100 WBC
PLATELET # BLD AUTO: 236 K/UL (ref 150–450)
PMV BLD AUTO: 10.6 FL (ref 9.2–12.9)
RBC # BLD AUTO: 3.3 M/UL (ref 4–5.4)
WBC # BLD AUTO: 7.88 K/UL (ref 3.9–12.7)

## 2023-12-09 PROCEDURE — 11000001 HC ACUTE MED/SURG PRIVATE ROOM

## 2023-12-09 PROCEDURE — 25000003 PHARM REV CODE 250: Performed by: OBSTETRICS & GYNECOLOGY

## 2023-12-09 PROCEDURE — 85025 COMPLETE CBC W/AUTO DIFF WBC: CPT | Performed by: OBSTETRICS & GYNECOLOGY

## 2023-12-09 PROCEDURE — 36415 COLL VENOUS BLD VENIPUNCTURE: CPT | Performed by: OBSTETRICS & GYNECOLOGY

## 2023-12-09 RX ADMIN — ZOLPIDEM TARTRATE 5 MG: 5 TABLET ORAL at 11:12

## 2023-12-09 RX ADMIN — LORAZEPAM 0.5 MG: 0.5 TABLET ORAL at 11:12

## 2023-12-09 RX ADMIN — AMPICILLIN 500 MG: 500 CAPSULE ORAL at 04:12

## 2023-12-09 RX ADMIN — LORAZEPAM 0.5 MG: 0.5 TABLET ORAL at 01:12

## 2023-12-09 RX ADMIN — ACETAMINOPHEN 1000 MG: 500 TABLET, FILM COATED ORAL at 01:12

## 2023-12-09 RX ADMIN — AMPICILLIN 500 MG: 500 CAPSULE ORAL at 08:12

## 2023-12-09 RX ADMIN — LORAZEPAM 0.5 MG: 0.5 TABLET ORAL at 10:12

## 2023-12-09 RX ADMIN — ZOLPIDEM TARTRATE 5 MG: 5 TABLET ORAL at 01:12

## 2023-12-09 RX ADMIN — AMPICILLIN 500 MG: 500 CAPSULE ORAL at 10:12

## 2023-12-09 RX ADMIN — PRENATAL VIT W/ FE FUMARATE-FA TAB 27-0.8 MG 1 TABLET: 27-0.8 TAB at 10:12

## 2023-12-09 NOTE — NURSING
VSS performed on pt. Pain 0/10 at this time. Pt declined doppler at this time, wishes to do later in shift.

## 2023-12-09 NOTE — PROGRESS NOTES
[facility] - Labor & Delivery  Obstetrics  Antepartum Progress Note    Patient Name: Gilberto Bueno  MRN: [id number]  Admission Date: 2023  Hospital Length of Stay: 8 days  Attending Physician: Abimael Henderson MD  Primary Care Provider: Liss Wise MD    Subjective:     Principal Problem:18 weeks gestation of pregnancy    HPI:  44 yo  at 17w4d  Advanced maternal age  Fetus with Down syndrome  Has been with spotting for the past couple of days  Went to our Emergency on 2023 with minimal spotting and occasional abdominal pain.  Ultrasound that day with MARCUS of 5.9 cm  Good fetal movements    Noted with ROM last night at 1800.    Denies fever and chills.  No longer with pain/contractions        Hospital Course:  On Labor and Delivery, vitals stable  FHT at 140   Contraction: None  Cervix: 1 cm  Started on antibiotic therapy.  Zithromax and Ampicillin    2023 No longer with contractions/spotting this morning.  No fundal tenderness.  No leakage of fluid. Does not want to deliver now.  Wants to wait for sealing of the membranes    2023 No pain.  No leakage. No vaginal bleeding. Good fetal movements  2023 HD#3/PPROM Day #4. No pain.  No leakage. No vaginal bleeding. Good fetal movements  12/3/2023 HD#4/PPROM Day #5. No pain.  No leakage. No vaginal bleeding. Good fetal movements    2023 HD#5 PPROM Day #6. No pain.  No leakage. No vaginal bleeding.  Good fetal movements.  Has been ambulating. Bedside ultrasound with no amniotic fluid. Oral ampicillin started.  2023 HD#6 PPROM Day #7. No pain.  No leakage. No vaginal bleeding.  Good fetal movements.  Has been ambulating to the bathroom.  Still in good spirit.  2023 HD#7 PPROM Day #8. No pain.  No leakage. No vaginal bleeding.  Good fetal movements.  Has been ambulating to the bathroom.  Still in good spirit. Had a visit with our hospital  last night  2023 HD#8 PPROM Day #9. No change. No pain.  No leakage.  No vaginal bleeding.  Good fetal movements.  Has been ambulating to the bathroom.  Still in good spirit.   12/8/2023 HD#9 PPROM Day #10. No change. No pain.  No leakage. No vaginal bleeding.  Good fetal movements.  Has been ambulating to the bathroom.  Still in good spirit.  12/9/2023 HD#10 PPROM Day #11.  No change. No pain.  No leakage. No vaginal bleeding.  Good fetal movements.  Has been ambulating to the bathroom.  Still in good spirit.     Obstetric HPI:  Patient reports None contractions, active fetal movement, absent vaginal bleeding , absent loss of fluid      Objective:     Vital Signs (Most Recent):  Temp: 97.9 °F (36.6 °C) (12/08/23 2025)  Pulse: 82 (12/08/23 2025)  Resp: 16 (12/08/23 2025)  BP: (!) 109/59 (12/08/23 2020)  SpO2: 100 % (12/08/23 2025) Vital Signs (24h Range):  Temp:  [97.8 °F (36.6 °C)-98.7 °F (37.1 °C)] 97.9 °F (36.6 °C)  Pulse:  [80-82] 82  Resp:  [16-17] 16  SpO2:  [100 %] 100 %  BP: (103-109)/(49-59) 109/59     Weight: 88 kg (194 lb 0.1 oz)  Body mass index is 31.31 kg/m².    FHT: 150     No intake or output data in the 24 hours ending 12/09/23 0901    Cervical Exam:  Deferred     Significant Labs:  Recent Lab Results         12/09/23  0625   12/08/23  0955        Baso # 0.05   0.06       Basophil % 0.6   0.8       Differential Method Automated   Automated       Eos # 0.1   0.1       Eosinophil % 1.8   1.7       Gran # (ANC) 4.9   4.9       Gran % 61.5   68.7       Group & Rh   A POS       Hematocrit 30.5   32.0       Hemoglobin 9.9   10.5       Immature Grans (Abs) 0.03  Comment: Mild elevation in immature granulocytes is non specific and   can be seen in a variety of conditions including stress response,   acute inflammation, trauma and pregnancy. Correlation with other   laboratory and clinical findings is essential.     0.03  Comment: Mild elevation in immature granulocytes is non specific and   can be seen in a variety of conditions including stress response,   acute  inflammation, trauma and pregnancy. Correlation with other   laboratory and clinical findings is essential.         Immature Granulocytes 0.4   0.4       INDIRECT LOLLY   NEG       Lymph # 2.1   1.6       Lymph % 27.2   21.6       MCH 30.0   30.0       MCHC 32.5   32.8       MCV 92   91       Mono # 0.7   0.5       Mono % 8.5   6.8       MPV 10.6   10.1       nRBC 0   0       Platelet Count 236   227       RBC 3.30   3.50       RDW 12.8   12.7       Specimen Outdate   2023 23:59       WBC 7.88   7.17               Physical Exam:   Constitutional: She appears well-developed and well-nourished. No distress.    HENT:   Head: Normocephalic and atraumatic.    Eyes: EOM are normal.     Cardiovascular:  Normal rate.             Pulmonary/Chest: Effort normal. No respiratory distress.        Abdominal: Soft. She exhibits no distension. There is no abdominal tenderness. There is no rebound and no guarding.   Non-tender     Genitourinary:    Genitourinary Comments: No blood or fluid at perineum             Musculoskeletal: Normal range of motion.       Neurological: She is alert.    Skin: Skin is warm and dry.    Psychiatric: She has a normal mood and affect.       Review of Systems  Assessment/Plan:     43 y.o. female  at 18w6d for:    * 18 weeks gestation of pregnancy  Admit for close observation.      Premature rupture of membranes in second trimester  Discussed with patient management options. Risks and benefits of labor induction vs close observation presented including risks of infection, bleeding and sepsis.  Patient still would like to wait.  Does not want to proceed with delivery/induction  Will follow clinically with CBC/WBC, Temp, and fundal tenderness.  All normal at this time  Would proceed with induction if signs of infection.  Would allow to labor if spontaneous  Continue with antibiotic for now.  All of her questions were answered appropriately.    EVIN Agustin IV  T Qshift  OK to  ambulate  Daily CBC  Oral ampicillin    Vaginal bleeding in pregnancy, second trimester  No longer bleeding.  Cervix at 1 cm  Discussed with patient management options. Risks and benefits of labor induction vs close observation presented including risks of infection, bleeding and sepsis.  Patient still would like to wait.  Does not want to proceed with delivery/induction  Will follow clinically with CBC/WBC, Temp, and fundal tenderness.  All normal at this time  Would proceed with induction if signs of infection.  Would allow to labor if spontaneous  Continue with antibiotic for now.  All of her questions were answered appropriately.    EVIN Velásquezlock IV  FHT Qshift  OK to ambulate  Daily CBC  Oral ampicillin    History of  premature rupture of membranes (PROM) in previous pregnancy, currently pregnant in second trimester  Discussed with patient management options. Risks and benefits of labor induction vs close observation presented including risks of infection, bleeding and sepsis.  Patient still would like to wait.  Does not want to proceed with delivery/induction  Will follow clinically with CBC/WBC, Temp, and fundal tenderness.  All normal at this time  Would proceed with induction if signs of infection.  Would allow to labor if spontaneous  Continue with antibiotic for now.  All of her questions were answered appropriately.    EVIN Velásquezlock IV  FHT Qshift  OK to ambulate  Daily CBC  Oral ampicillin    Maternal serum screen positive for trisomy 21  Now with PROM  Discussed with patient management options. Risks and benefits of labor induction vs close observation presented including risks of infection, bleeding and sepsis.  Patient would like to wait.  Does not want to proceed with delivery/induction  Will follow clinically with CBC/WBC, Temp, and fundal tenderness.  Would proceed with induction if signs of infection.  Would allow to labor if spontaneous  Continue with antibiotic for now.  All of  her questions were answered appropriately.    Multigravida of advanced maternal age in second trimester  Now with PROM  Discussed with patient management options. Risks and benefits of labor induction vs close observation presented including risks of infection, bleeding and sepsis.  Patient still would like to wait.  Does not want to proceed with delivery/induction  Will follow clinically with CBC/WBC, Temp, and fundal tenderness.  All normal at this time  Would proceed with induction if signs of infection.  Would allow to labor if spontaneous  Continue with antibiotic for now.  All of her questions were answered appropriately.    EVIN niñoe  Heplock IV  FHT Qshift  OK to ambulate  Daily CBC  Oral ampicillin          Abimael Henderson MD  Obstetrics  [facility] [place] - Labor & Delivery

## 2023-12-09 NOTE — SUBJECTIVE & OBJECTIVE
Obstetric HPI:  Patient reports None contractions, active fetal movement, absent vaginal bleeding , absent loss of fluid      Objective:     Vital Signs (Most Recent):  Temp: 97.9 °F (36.6 °C) (12/08/23 2025)  Pulse: 82 (12/08/23 2025)  Resp: 16 (12/08/23 2025)  BP: (!) 109/59 (12/08/23 2020)  SpO2: 100 % (12/08/23 2025) Vital Signs (24h Range):  Temp:  [97.8 °F (36.6 °C)-98.7 °F (37.1 °C)] 97.9 °F (36.6 °C)  Pulse:  [80-82] 82  Resp:  [16-17] 16  SpO2:  [100 %] 100 %  BP: (103-109)/(49-59) 109/59     Weight: 88 kg (194 lb 0.1 oz)  Body mass index is 31.31 kg/m².    FHT: 150     No intake or output data in the 24 hours ending 12/09/23 0901    Cervical Exam:  Deferred     Significant Labs:  Recent Lab Results         12/09/23  0625   12/08/23  0955        Baso # 0.05   0.06       Basophil % 0.6   0.8       Differential Method Automated   Automated       Eos # 0.1   0.1       Eosinophil % 1.8   1.7       Gran # (ANC) 4.9   4.9       Gran % 61.5   68.7       Group & Rh   A POS       Hematocrit 30.5   32.0       Hemoglobin 9.9   10.5       Immature Grans (Abs) 0.03  Comment: Mild elevation in immature granulocytes is non specific and   can be seen in a variety of conditions including stress response,   acute inflammation, trauma and pregnancy. Correlation with other   laboratory and clinical findings is essential.     0.03  Comment: Mild elevation in immature granulocytes is non specific and   can be seen in a variety of conditions including stress response,   acute inflammation, trauma and pregnancy. Correlation with other   laboratory and clinical findings is essential.         Immature Granulocytes 0.4   0.4       INDIRECT LOLLY   NEG       Lymph # 2.1   1.6       Lymph % 27.2   21.6       MCH 30.0   30.0       MCHC 32.5   32.8       MCV 92   91       Mono # 0.7   0.5       Mono % 8.5   6.8       MPV 10.6   10.1       nRBC 0   0       Platelet Count 236   227       RBC 3.30   3.50       RDW 12.8   12.7        Specimen Outdate   12/11/2023 23:59       WBC 7.88   7.17               Physical Exam:   Constitutional: She appears well-developed and well-nourished. No distress.    HENT:   Head: Normocephalic and atraumatic.    Eyes: EOM are normal.     Cardiovascular:  Normal rate.             Pulmonary/Chest: Effort normal. No respiratory distress.        Abdominal: Soft. She exhibits no distension. There is no abdominal tenderness. There is no rebound and no guarding.   Non-tender     Genitourinary:    Genitourinary Comments: No blood or fluid at perineum             Musculoskeletal: Normal range of motion.       Neurological: She is alert.    Skin: Skin is warm and dry.    Psychiatric: She has a normal mood and affect.       Review of Systems

## 2023-12-10 PROBLEM — Z3A.19 19 WEEKS GESTATION OF PREGNANCY: Status: ACTIVE | Noted: 2023-11-30

## 2023-12-10 LAB
BASOPHILS # BLD AUTO: 0.04 K/UL (ref 0–0.2)
BASOPHILS NFR BLD: 0.6 % (ref 0–1.9)
DIFFERENTIAL METHOD BLD: ABNORMAL
EOSINOPHIL # BLD AUTO: 0.1 K/UL (ref 0–0.5)
EOSINOPHIL NFR BLD: 1.4 % (ref 0–8)
ERYTHROCYTE [DISTWIDTH] IN BLOOD BY AUTOMATED COUNT: 12.8 % (ref 11.5–14.5)
HCT VFR BLD AUTO: 30 % (ref 37–48.5)
HGB BLD-MCNC: 9.9 G/DL (ref 12–16)
IMM GRANULOCYTES # BLD AUTO: 0.03 K/UL (ref 0–0.04)
IMM GRANULOCYTES NFR BLD AUTO: 0.4 % (ref 0–0.5)
LYMPHOCYTES # BLD AUTO: 1.9 K/UL (ref 1–4.8)
LYMPHOCYTES NFR BLD: 26.8 % (ref 18–48)
MCH RBC QN AUTO: 30.3 PG (ref 27–31)
MCHC RBC AUTO-ENTMCNC: 33 G/DL (ref 32–36)
MCV RBC AUTO: 92 FL (ref 82–98)
MONOCYTES # BLD AUTO: 0.6 K/UL (ref 0.3–1)
MONOCYTES NFR BLD: 7.8 % (ref 4–15)
NEUTROPHILS # BLD AUTO: 4.5 K/UL (ref 1.8–7.7)
NEUTROPHILS NFR BLD: 63 % (ref 38–73)
NRBC BLD-RTO: 0 /100 WBC
PLATELET # BLD AUTO: 235 K/UL (ref 150–450)
PMV BLD AUTO: 10.1 FL (ref 9.2–12.9)
RBC # BLD AUTO: 3.27 M/UL (ref 4–5.4)
WBC # BLD AUTO: 7.17 K/UL (ref 3.9–12.7)

## 2023-12-10 PROCEDURE — 99232 SBSQ HOSP IP/OBS MODERATE 35: CPT | Mod: ,,, | Performed by: OBSTETRICS & GYNECOLOGY

## 2023-12-10 PROCEDURE — 85025 COMPLETE CBC W/AUTO DIFF WBC: CPT | Performed by: OBSTETRICS & GYNECOLOGY

## 2023-12-10 PROCEDURE — 36415 COLL VENOUS BLD VENIPUNCTURE: CPT | Performed by: OBSTETRICS & GYNECOLOGY

## 2023-12-10 PROCEDURE — 25000003 PHARM REV CODE 250: Performed by: OBSTETRICS & GYNECOLOGY

## 2023-12-10 PROCEDURE — 11000001 HC ACUTE MED/SURG PRIVATE ROOM

## 2023-12-10 RX ADMIN — AMPICILLIN 500 MG: 500 CAPSULE ORAL at 09:12

## 2023-12-10 RX ADMIN — AMPICILLIN 500 MG: 500 CAPSULE ORAL at 02:12

## 2023-12-10 RX ADMIN — ACETAMINOPHEN 1000 MG: 500 TABLET, FILM COATED ORAL at 02:12

## 2023-12-10 RX ADMIN — AMPICILLIN 500 MG: 500 CAPSULE ORAL at 08:12

## 2023-12-10 RX ADMIN — PRENATAL VIT W/ FE FUMARATE-FA TAB 27-0.8 MG 1 TABLET: 27-0.8 TAB at 08:12

## 2023-12-10 NOTE — PROGRESS NOTES
[facility] - [place] - Labor & Delivery  Obstetrics  Antepartum Progress Note    Patient Name: Gilberto Bueno  MRN: [id number]  Admission Date: 2023  Hospital Length of Stay: 9 days  Attending Physician: Abimael Henderson MD  Primary Care Provider: Liss Wise MD    Subjective:     Principal Problem:19 weeks gestation of pregnancy    HPI:  44 yo  at 17w4d  Advanced maternal age  Fetus with Down syndrome  Has been with spotting for the past couple of days  Went to our Emergency on 2023 with minimal spotting and occasional abdominal pain.  Ultrasound that day with MARCUS of 5.9 cm  Good fetal movements    Noted with ROM last night at 1800.    Denies fever and chills.  No longer with pain/contractions        Hospital Course:  On Labor and Delivery, vitals stable  FHT at 140   Contraction: None  Cervix: 1 cm  Started on antibiotic therapy.  Zithromax and Ampicillin    2023 No longer with contractions/spotting this morning.  No fundal tenderness.  No leakage of fluid. Does not want to deliver now.  Wants to wait for sealing of the membranes    2023 No pain.  No leakage. No vaginal bleeding. Good fetal movements  2023 HD#3/PPROM Day #4. No pain.  No leakage. No vaginal bleeding. Good fetal movements  12/3/2023 HD#4/PPROM Day #5. No pain.  No leakage. No vaginal bleeding. Good fetal movements    2023 HD#5 PPROM Day #6. No pain.  No leakage. No vaginal bleeding.  Good fetal movements.  Has been ambulating. Bedside ultrasound with no amniotic fluid. Oral ampicillin started.  2023 HD#6 PPROM Day #7. No pain.  No leakage. No vaginal bleeding.  Good fetal movements.  Has been ambulating to the bathroom.  Still in good spirit.  2023 HD#7 PPROM Day #8. No pain.  No leakage. No vaginal bleeding.  Good fetal movements.  Has been ambulating to the bathroom.  Still in good spirit. Had a visit with our hospital  last night  2023 HD#8 PPROM Day #9. No change. No pain.  No leakage.  No vaginal bleeding.  Good fetal movements.  Has been ambulating to the bathroom.  Still in good spirit.   12/8/2023 HD#9 PPROM Day #10. No change. No pain.  No leakage. No vaginal bleeding.  Good fetal movements.  Has been ambulating to the bathroom.  Still in good spirit.  12/9/2023 HD#10 PPROM Day #11.  No change. No pain.  No leakage. No vaginal bleeding.  Good fetal movements.  Has been ambulating to the bathroom.  Still in good spirit.   12/10/2023 HD#11 PPROM Day #12.  No change. No pain.  No leakage. No vaginal bleeding.  Good fetal movements.  Has been ambulating to the bathroom.  Still in good spirit. But has a difficult time sleeping last night     Obstetric HPI:  Patient reports None contractions, active fetal movement, absent vaginal bleeding , absent loss of fluid      Objective:     Vital Signs (Most Recent):  Temp: 98.5 °F (36.9 °C) (12/10/23 0610)  Pulse: 83 (12/09/23 2020)  Resp: 18 (12/09/23 2020)  BP: (!) 103/51 (12/09/23 2020)  SpO2: 100 % (12/09/23 2020) Vital Signs (24h Range):  Temp:  [98 °F (36.7 °C)-99.2 °F (37.3 °C)] 98.5 °F (36.9 °C)  Pulse:  [83-86] 83  Resp:  [17-18] 18  SpO2:  [97 %-100 %] 100 %  BP: (100-103)/(50-51) 103/51     Weight: 88 kg (194 lb 0.1 oz)  Body mass index is 31.31 kg/m².    FHT: 150  No intake or output data in the 24 hours ending 12/10/23 0932    Cervical Exam:  Deferred     Significant Labs:  Recent Lab Results         12/10/23  0613        Baso # 0.04       Basophil % 0.6       Differential Method Automated       Eos # 0.1       Eosinophil % 1.4       Gran # (ANC) 4.5       Gran % 63.0       Hematocrit 30.0       Hemoglobin 9.9       Immature Grans (Abs) 0.03  Comment: Mild elevation in immature granulocytes is non specific and   can be seen in a variety of conditions including stress response,   acute inflammation, trauma and pregnancy. Correlation with other   laboratory and clinical findings is essential.         Immature Granulocytes 0.4       Lymph # 1.9        Lymph % 26.8       MCH 30.3       MCHC 33.0       MCV 92       Mono # 0.6       Mono % 7.8       MPV 10.1       nRBC 0       Platelet Count 235       RBC 3.27       RDW 12.8       WBC 7.17               Physical Exam:   Constitutional: She appears well-developed and well-nourished. No distress.    HENT:   Head: Normocephalic and atraumatic.    Eyes: EOM are normal.     Cardiovascular:  Normal rate.             Pulmonary/Chest: Effort normal. No respiratory distress.        Abdominal: Soft. She exhibits no distension. There is no abdominal tenderness. There is no rebound and no guarding.   Non-tender     Genitourinary:    Genitourinary Comments: No blood or fluid at perineum             Musculoskeletal: Normal range of motion.       Neurological: She is alert.    Skin: Skin is warm and dry.    Psychiatric: She has a normal mood and affect.       Review of Systems   Constitutional:  Positive for fatigue. Negative for activity change, appetite change, fever and unexpected weight change.   Respiratory:  Negative for cough, shortness of breath and wheezing.    Cardiovascular:  Negative for chest pain and palpitations.   Gastrointestinal:  Negative for abdominal pain, nausea and vomiting.   Endocrine: Negative for hot flashes.   Genitourinary:  Negative for dysmenorrhea, dyspareunia, frequency, pelvic pain, urgency, vaginal bleeding, vaginal discharge and postcoital bleeding.   Musculoskeletal:  Positive for back pain. Negative for myalgias.   Integumentary:  Negative for rash, breast mass and nipple discharge.   Neurological:  Positive for headaches. Negative for seizures.   Psychiatric/Behavioral:  Negative for depression and sleep disturbance. The patient is not nervous/anxious.    Breast: Negative for mass, mastodynia and nipple discharge    Assessment/Plan:     43 y.o. female  at 19w0d for:    * 19 weeks gestation of pregnancy  Admit for close observation.      Premature rupture of membranes in second  trimester  Discussed with patient management options. Risks and benefits of labor induction vs close observation presented including risks of infection, bleeding and sepsis.  Patient still would like to wait.  Does not want to proceed with delivery/induction  Will follow clinically with CBC/WBC, Temp, and fundal tenderness.  All normal at this time  Would proceed with induction if signs of infection.  Would allow to labor if spontaneous  Continue with antibiotic for now.  All of her questions were answered appropriately.    EVIN loera  Heplock IV  FHT Qshift  OK to ambulate  Daily CBC  Oral ampicillin    Vaginal bleeding in pregnancy, second trimester  No longer bleeding.  Cervix at 1 cm  Discussed with patient management options. Risks and benefits of labor induction vs close observation presented including risks of infection, bleeding and sepsis.  Patient still would like to wait.  Does not want to proceed with delivery/induction  Will follow clinically with CBC/WBC, Temp, and fundal tenderness.  All normal at this time  Would proceed with induction if signs of infection.  Would allow to labor if spontaneous  Continue with antibiotic for now.  All of her questions were answered appropriately.    EVIN loera  Heplock IV  FHT Qshift  OK to ambulate  Daily CBC  Oral ampicillin    History of  premature rupture of membranes (PROM) in previous pregnancy, currently pregnant in second trimester  Discussed with patient management options. Risks and benefits of labor induction vs close observation presented including risks of infection, bleeding and sepsis.  Patient still would like to wait.  Does not want to proceed with delivery/induction  Will follow clinically with CBC/WBC, Temp, and fundal tenderness.  All normal at this time  Would proceed with induction if signs of infection.  Would allow to labor if spontaneous  Continue with antibiotic for now.  All of her questions were answered appropriately.    EVIN  hose  Heplock IV  FHT Qshift  OK to ambulate  Daily CBC  Oral ampicillin    Maternal serum screen positive for trisomy 21  Now with PROM  Discussed with patient management options. Risks and benefits of labor induction vs close observation presented including risks of infection, bleeding and sepsis.  Patient would like to wait.  Does not want to proceed with delivery/induction  Will follow clinically with CBC/WBC, Temp, and fundal tenderness.  Would proceed with induction if signs of infection.  Would allow to labor if spontaneous  Continue with antibiotic for now.  All of her questions were answered appropriately.    Multigravida of advanced maternal age in second trimester  Now with PROM  Discussed with patient management options. Risks and benefits of labor induction vs close observation presented including risks of infection, bleeding and sepsis.  Patient still would like to wait.  Does not want to proceed with delivery/induction  Will follow clinically with CBC/WBC, Temp, and fundal tenderness.  All normal at this time  Would proceed with induction if signs of infection.  Would allow to labor if spontaneous  Continue with antibiotic for now.  All of her questions were answered appropriately.    EVIN niñoe  Heplock IV  FHT Qshift  OK to ambulate  Daily CBC  Oral ampicillin          Abimael Henderson MD  Obstetrics  [facility] [place] - Labor & Delivery

## 2023-12-10 NOTE — SUBJECTIVE & OBJECTIVE
Obstetric HPI:  Patient reports None contractions, active fetal movement, absent vaginal bleeding , absent loss of fluid      Objective:     Vital Signs (Most Recent):  Temp: 98.5 °F (36.9 °C) (12/10/23 0610)  Pulse: 83 (12/09/23 2020)  Resp: 18 (12/09/23 2020)  BP: (!) 103/51 (12/09/23 2020)  SpO2: 100 % (12/09/23 2020) Vital Signs (24h Range):  Temp:  [98 °F (36.7 °C)-99.2 °F (37.3 °C)] 98.5 °F (36.9 °C)  Pulse:  [83-86] 83  Resp:  [17-18] 18  SpO2:  [97 %-100 %] 100 %  BP: (100-103)/(50-51) 103/51     Weight: 88 kg (194 lb 0.1 oz)  Body mass index is 31.31 kg/m².    FHT: 150  No intake or output data in the 24 hours ending 12/10/23 0932    Cervical Exam:  Deferred     Significant Labs:  Recent Lab Results         12/10/23  0613        Baso # 0.04       Basophil % 0.6       Differential Method Automated       Eos # 0.1       Eosinophil % 1.4       Gran # (ANC) 4.5       Gran % 63.0       Hematocrit 30.0       Hemoglobin 9.9       Immature Grans (Abs) 0.03  Comment: Mild elevation in immature granulocytes is non specific and   can be seen in a variety of conditions including stress response,   acute inflammation, trauma and pregnancy. Correlation with other   laboratory and clinical findings is essential.         Immature Granulocytes 0.4       Lymph # 1.9       Lymph % 26.8       MCH 30.3       MCHC 33.0       MCV 92       Mono # 0.6       Mono % 7.8       MPV 10.1       nRBC 0       Platelet Count 235       RBC 3.27       RDW 12.8       WBC 7.17               Physical Exam:   Constitutional: She appears well-developed and well-nourished. No distress.    HENT:   Head: Normocephalic and atraumatic.    Eyes: EOM are normal.     Cardiovascular:  Normal rate.             Pulmonary/Chest: Effort normal. No respiratory distress.        Abdominal: Soft. She exhibits no distension. There is no abdominal tenderness. There is no rebound and no guarding.   Non-tender     Genitourinary:    Genitourinary Comments: No blood or  fluid at perineum             Musculoskeletal: Normal range of motion.       Neurological: She is alert.    Skin: Skin is warm and dry.    Psychiatric: She has a normal mood and affect.       Review of Systems   Constitutional:  Positive for fatigue. Negative for activity change, appetite change, fever and unexpected weight change.   Respiratory:  Negative for cough, shortness of breath and wheezing.    Cardiovascular:  Negative for chest pain and palpitations.   Gastrointestinal:  Negative for abdominal pain, nausea and vomiting.   Endocrine: Negative for hot flashes.   Genitourinary:  Negative for dysmenorrhea, dyspareunia, frequency, pelvic pain, urgency, vaginal bleeding, vaginal discharge and postcoital bleeding.   Musculoskeletal:  Positive for back pain. Negative for myalgias.   Integumentary:  Negative for rash, breast mass and nipple discharge.   Neurological:  Positive for headaches. Negative for seizures.   Psychiatric/Behavioral:  Negative for depression and sleep disturbance. The patient is not nervous/anxious.    Breast: Negative for mass, mastodynia and nipple discharge

## 2023-12-11 LAB
BASOPHILS # BLD AUTO: 0.05 K/UL (ref 0–0.2)
BASOPHILS NFR BLD: 0.6 % (ref 0–1.9)
DIFFERENTIAL METHOD BLD: ABNORMAL
EOSINOPHIL # BLD AUTO: 0.1 K/UL (ref 0–0.5)
EOSINOPHIL NFR BLD: 1.3 % (ref 0–8)
ERYTHROCYTE [DISTWIDTH] IN BLOOD BY AUTOMATED COUNT: 12.7 % (ref 11.5–14.5)
HCT VFR BLD AUTO: 29.9 % (ref 37–48.5)
HGB BLD-MCNC: 10 G/DL (ref 12–16)
IMM GRANULOCYTES # BLD AUTO: 0.03 K/UL (ref 0–0.04)
IMM GRANULOCYTES NFR BLD AUTO: 0.4 % (ref 0–0.5)
LYMPHOCYTES # BLD AUTO: 2 K/UL (ref 1–4.8)
LYMPHOCYTES NFR BLD: 25.9 % (ref 18–48)
MCH RBC QN AUTO: 30.7 PG (ref 27–31)
MCHC RBC AUTO-ENTMCNC: 33.4 G/DL (ref 32–36)
MCV RBC AUTO: 92 FL (ref 82–98)
MONOCYTES # BLD AUTO: 0.7 K/UL (ref 0.3–1)
MONOCYTES NFR BLD: 8.9 % (ref 4–15)
NEUTROPHILS # BLD AUTO: 4.9 K/UL (ref 1.8–7.7)
NEUTROPHILS NFR BLD: 62.9 % (ref 38–73)
NRBC BLD-RTO: 0 /100 WBC
PLATELET # BLD AUTO: 225 K/UL (ref 150–450)
PMV BLD AUTO: 10 FL (ref 9.2–12.9)
RBC # BLD AUTO: 3.26 M/UL (ref 4–5.4)
WBC # BLD AUTO: 7.73 K/UL (ref 3.9–12.7)

## 2023-12-11 PROCEDURE — 76810 OB US >/= 14 WKS ADDL FETUS: CPT

## 2023-12-11 PROCEDURE — 85025 COMPLETE CBC W/AUTO DIFF WBC: CPT | Performed by: OBSTETRICS & GYNECOLOGY

## 2023-12-11 PROCEDURE — 36415 COLL VENOUS BLD VENIPUNCTURE: CPT | Performed by: OBSTETRICS & GYNECOLOGY

## 2023-12-11 PROCEDURE — 99232 SBSQ HOSP IP/OBS MODERATE 35: CPT | Mod: ,,, | Performed by: OBSTETRICS & GYNECOLOGY

## 2023-12-11 PROCEDURE — 25000003 PHARM REV CODE 250: Performed by: OBSTETRICS & GYNECOLOGY

## 2023-12-11 PROCEDURE — 76805 OB US >/= 14 WKS SNGL FETUS: CPT | Mod: TC

## 2023-12-11 PROCEDURE — 11000001 HC ACUTE MED/SURG PRIVATE ROOM

## 2023-12-11 RX ADMIN — PRENATAL VIT W/ FE FUMARATE-FA TAB 27-0.8 MG 1 TABLET: 27-0.8 TAB at 09:12

## 2023-12-11 RX ADMIN — ACETAMINOPHEN 1000 MG: 500 TABLET, FILM COATED ORAL at 12:12

## 2023-12-11 RX ADMIN — ZOLPIDEM TARTRATE 5 MG: 5 TABLET ORAL at 12:12

## 2023-12-11 RX ADMIN — AMPICILLIN 500 MG: 500 CAPSULE ORAL at 04:12

## 2023-12-11 RX ADMIN — AMPICILLIN 500 MG: 500 CAPSULE ORAL at 09:12

## 2023-12-11 RX ADMIN — LORAZEPAM 0.5 MG: 0.5 TABLET ORAL at 12:12

## 2023-12-11 RX ADMIN — AMPICILLIN 500 MG: 500 CAPSULE ORAL at 03:12

## 2023-12-11 NOTE — SUBJECTIVE & OBJECTIVE
Obstetric HPI:  Patient reports None contractions, active fetal movement, absent vaginal bleeding , absent loss of fluid      Objective:     Vital Signs (Most Recent):  Temp: 97.7 °F (36.5 °C) (12/11/23 0400)  Pulse: 81 (12/11/23 0400)  Resp: 18 (12/11/23 0400)  BP: (!) 112/59 (12/11/23 0400)  SpO2: 100 % (12/11/23 0400) Vital Signs (24h Range):  Temp:  [97.7 °F (36.5 °C)-98.4 °F (36.9 °C)] 97.7 °F (36.5 °C)  Pulse:  [78-88] 81  Resp:  [18] 18  SpO2:  [100 %] 100 %  BP: ()/(48-59) 112/59     Weight: 88 kg (194 lb 0.1 oz)  Body mass index is 31.31 kg/m².    FHT: 148    No intake or output data in the 24 hours ending 12/11/23 0820    Cervical Exam:  Deferred     Significant Labs:  Recent Lab Results         12/11/23  0544        Baso # 0.05       Basophil % 0.6       Differential Method Automated       Eos # 0.1       Eosinophil % 1.3       Gran # (ANC) 4.9       Gran % 62.9       Hematocrit 29.9       Hemoglobin 10.0       Immature Grans (Abs) 0.03  Comment: Mild elevation in immature granulocytes is non specific and   can be seen in a variety of conditions including stress response,   acute inflammation, trauma and pregnancy. Correlation with other   laboratory and clinical findings is essential.         Immature Granulocytes 0.4       Lymph # 2.0       Lymph % 25.9       MCH 30.7       MCHC 33.4       MCV 92       Mono # 0.7       Mono % 8.9       MPV 10.0       nRBC 0       Platelet Count 225       RBC 3.26       RDW 12.7       WBC 7.73               Physical Exam:   Constitutional: She appears well-developed and well-nourished. No distress.    HENT:   Head: Normocephalic and atraumatic.    Eyes: EOM are normal.     Cardiovascular:  Normal rate.             Pulmonary/Chest: Effort normal. No respiratory distress.        Abdominal: Soft. She exhibits no distension. There is no abdominal tenderness. There is no rebound and no guarding.   Non-tender     Genitourinary:    Genitourinary Comments: No blood or  fluid at perineum             Musculoskeletal: Normal range of motion.       Neurological: She is alert.    Skin: Skin is warm and dry.    Psychiatric: She has a normal mood and affect.       Review of Systems

## 2023-12-11 NOTE — ASSESSMENT & PLAN NOTE
Now with PROM  Discussed with patient management options. Risks and benefits of labor induction vs close observation presented including risks of infection, bleeding and sepsis.  Patient still would like to wait.  Does not want to proceed with delivery/induction  Will follow clinically with CBC/WBC, Temp, and fundal tenderness.  All normal at this time  Would proceed with induction if signs of infection.  Would allow to labor if spontaneous  Continue with antibiotic for now.  All of her questions were answered appropriately.    EVIN loera  Heplock IV  FHT Qshift  OK to ambulate  Daily CBC  Oral ampicillin. Discussed with patient stopping antibiotic after 14 days

## 2023-12-11 NOTE — ASSESSMENT & PLAN NOTE
Discussed with patient management options. Risks and benefits of labor induction vs close observation presented including risks of infection, bleeding and sepsis.  Patient still would like to wait.  Does not want to proceed with delivery/induction  Will follow clinically with CBC/WBC, Temp, and fundal tenderness.  All normal at this time  Would proceed with induction if signs of infection.  Would allow to labor if spontaneous  Continue with antibiotic for now.  Discussed with patient stopping antibiotic after 14 days  All of her questions were answered appropriately.    EVIN loera  Heplock IV  FHT Qshift  OK to ambulate  Daily CBC  Oral ampicillin.  Discussed with patient stopping antibiotic after 14 days   Requested Prescriptions     Signed Prescriptions Disp Refills   • ALPRAZolam (XANAX) 0.5 MG Tab 60 Tablet 0     Sig: Take 1 Tablet by mouth 2 times a day as needed for Anxiety for up to 30 days.     Authorizing Provider: EFREN IRENE PMP reviewed today    TAMICA Tinoco.

## 2023-12-11 NOTE — ASSESSMENT & PLAN NOTE
Discussed with patient management options. Risks and benefits of labor induction vs close observation presented including risks of infection, bleeding and sepsis.  Patient still would like to wait.  Does not want to proceed with delivery/induction  Will follow clinically with CBC/WBC, Temp, and fundal tenderness.  All normal at this time  Would proceed with induction if signs of infection.  Would allow to labor if spontaneous  Continue with antibiotic for now.  All of her questions were answered appropriately.    EVIN loera  Heplock IV  FHT Qshift  OK to ambulate  Daily CBC  Oral ampicillin. Discussed with patient stopping antibiotic after 14 days

## 2023-12-11 NOTE — ASSESSMENT & PLAN NOTE
No longer bleeding.  Cervix at 1 cm  Discussed with patient management options. Risks and benefits of labor induction vs close observation presented including risks of infection, bleeding and sepsis.  Patient still would like to wait.  Does not want to proceed with delivery/induction  Will follow clinically with CBC/WBC, Temp, and fundal tenderness.  All normal at this time  Would proceed with induction if signs of infection.  Would allow to labor if spontaneous  Continue with antibiotic for now.  All of her questions were answered appropriately.    EVIN loera  Heplock IV  FHT Qshift  OK to ambulate  Daily CBC  Oral ampicillin. Discussed with patient stopping antibiotic after 14 days

## 2023-12-11 NOTE — PROGRESS NOTES
[facility] [place] - Labor & Delivery  Obstetrics  Antepartum Progress Note    Patient Name: Gilberto Bueno  MRN: [id number]  Admission Date: 2023  Hospital Length of Stay: 10 days  Attending Physician: Abimael Henderson MD  Primary Care Provider: Liss Wise MD    Subjective:     Principal Problem:19 weeks gestation of pregnancy    HPI:  42 yo  at 17w4d  Advanced maternal age  Fetus with Down syndrome  Has been with spotting for the past couple of days  Went to our Emergency on 2023 with minimal spotting and occasional abdominal pain.  Ultrasound that day with MARCUS of 5.9 cm  Good fetal movements    Noted with ROM last night at 1800.    Denies fever and chills.  No longer with pain/contractions        Hospital Course:  On Labor and Delivery, vitals stable  FHT at 140   Contraction: None  Cervix: 1 cm  Started on antibiotic therapy.  Zithromax and Ampicillin    2023 No longer with contractions/spotting this morning.  No fundal tenderness.  No leakage of fluid. Does not want to deliver now.  Wants to wait for sealing of the membranes    2023 No pain.  No leakage. No vaginal bleeding. Good fetal movements  2023 HD#3/PPROM Day #4. No pain.  No leakage. No vaginal bleeding. Good fetal movements  12/3/2023 HD#4/PPROM Day #5. No pain.  No leakage. No vaginal bleeding. Good fetal movements    2023 HD#5 PPROM Day #6. No pain.  No leakage. No vaginal bleeding.  Good fetal movements.  Has been ambulating. Bedside ultrasound with no amniotic fluid. Oral ampicillin started.  2023 HD#6 PPROM Day #7. No pain.  No leakage. No vaginal bleeding.  Good fetal movements.  Has been ambulating to the bathroom.  Still in good spirit.  2023 HD#7 PPROM Day #8. No pain.  No leakage. No vaginal bleeding.  Good fetal movements.  Has been ambulating to the bathroom.  Still in good spirit. Had a visit with our hospital  last night  2023 HD#8 PPROM Day #9. No change. No pain.  No leakage.  No vaginal bleeding.  Good fetal movements.  Has been ambulating to the bathroom.  Still in good spirit.   12/8/2023 HD#9 PPROM Day #10. No change. No pain.  No leakage. No vaginal bleeding.  Good fetal movements.  Has been ambulating to the bathroom.  Still in good spirit.  12/9/2023 HD#10 PPROM Day #11.  No change. No pain.  No leakage. No vaginal bleeding.  Good fetal movements.  Has been ambulating to the bathroom.  Still in good spirit.   12/10/2023 HD#11 PPROM Day #12.  No change. No pain.  No leakage. No vaginal bleeding.  Good fetal movements.  Has been ambulating to the bathroom.  Still in good spirit. But has a difficult time sleeping last night     12/11/2023 HD#12 PPROM Day #13. No change. No pain.  No leakage. No vaginal bleeding.  Good fetal movements.  Has been ambulating to the bathroom.  Still in good spirit. Bedside ultrasound by me, 0.5 cm fluid pocket by fetal feet with chord.  FHT at 148     Obstetric HPI:  Patient reports None contractions, active fetal movement, absent vaginal bleeding , absent loss of fluid      Objective:     Vital Signs (Most Recent):  Temp: 97.7 °F (36.5 °C) (12/11/23 0400)  Pulse: 81 (12/11/23 0400)  Resp: 18 (12/11/23 0400)  BP: (!) 112/59 (12/11/23 0400)  SpO2: 100 % (12/11/23 0400) Vital Signs (24h Range):  Temp:  [97.7 °F (36.5 °C)-98.4 °F (36.9 °C)] 97.7 °F (36.5 °C)  Pulse:  [78-88] 81  Resp:  [18] 18  SpO2:  [100 %] 100 %  BP: ()/(48-59) 112/59     Weight: 88 kg (194 lb 0.1 oz)  Body mass index is 31.31 kg/m².    FHT: 148    No intake or output data in the 24 hours ending 12/11/23 0820    Cervical Exam:  Deferred     Significant Labs:  Recent Lab Results         12/11/23  0544        Baso # 0.05       Basophil % 0.6       Differential Method Automated       Eos # 0.1       Eosinophil % 1.3       Gran # (ANC) 4.9       Gran % 62.9       Hematocrit 29.9       Hemoglobin 10.0       Immature Grans (Abs) 0.03  Comment: Mild elevation in immature granulocytes is  non specific and   can be seen in a variety of conditions including stress response,   acute inflammation, trauma and pregnancy. Correlation with other   laboratory and clinical findings is essential.         Immature Granulocytes 0.4       Lymph # 2.0       Lymph % 25.9       MCH 30.7       MCHC 33.4       MCV 92       Mono # 0.7       Mono % 8.9       MPV 10.0       nRBC 0       Platelet Count 225       RBC 3.26       RDW 12.7       WBC 7.73               Physical Exam:   Constitutional: She appears well-developed and well-nourished. No distress.    HENT:   Head: Normocephalic and atraumatic.    Eyes: EOM are normal.     Cardiovascular:  Normal rate.             Pulmonary/Chest: Effort normal. No respiratory distress.        Abdominal: Soft. She exhibits no distension. There is no abdominal tenderness. There is no rebound and no guarding.   Non-tender     Genitourinary:    Genitourinary Comments: No blood or fluid at perineum             Musculoskeletal: Normal range of motion.       Neurological: She is alert.    Skin: Skin is warm and dry.    Psychiatric: She has a normal mood and affect.       Review of Systems  Assessment/Plan:     43 y.o. female  at 19w1d for:    * 19 weeks gestation of pregnancy  Admit for close observation.      Premature rupture of membranes in second trimester  Discussed with patient management options. Risks and benefits of labor induction vs close observation presented including risks of infection, bleeding and sepsis.  Patient still would like to wait.  Does not want to proceed with delivery/induction  Will follow clinically with CBC/WBC, Temp, and fundal tenderness.  All normal at this time  Would proceed with induction if signs of infection.  Would allow to labor if spontaneous  Continue with antibiotic for now.  Discussed with patient stopping antibiotic after 14 days  All of her questions were answered appropriately.    EVIN Agustin IV  T Qshift  OK to  ambulate  Daily CBC  Oral ampicillin.  Discussed with patient stopping antibiotic after 14 days    Vaginal bleeding in pregnancy, second trimester  No longer bleeding.  Cervix at 1 cm  Discussed with patient management options. Risks and benefits of labor induction vs close observation presented including risks of infection, bleeding and sepsis.  Patient still would like to wait.  Does not want to proceed with delivery/induction  Will follow clinically with CBC/WBC, Temp, and fundal tenderness.  All normal at this time  Would proceed with induction if signs of infection.  Would allow to labor if spontaneous  Continue with antibiotic for now.  All of her questions were answered appropriately.    JOOS hose  Heplock IV  FHT Qshift  OK to ambulate  Daily CBC  Oral ampicillin. Discussed with patient stopping antibiotic after 14 days    History of  premature rupture of membranes (PROM) in previous pregnancy, currently pregnant in second trimester  Discussed with patient management options. Risks and benefits of labor induction vs close observation presented including risks of infection, bleeding and sepsis.  Patient still would like to wait.  Does not want to proceed with delivery/induction  Will follow clinically with CBC/WBC, Temp, and fundal tenderness.  All normal at this time  Would proceed with induction if signs of infection.  Would allow to labor if spontaneous  Continue with antibiotic for now.  All of her questions were answered appropriately.    JOOS hose  Heplock IV  FHT Qshift  OK to ambulate  Daily CBC  Oral ampicillin. Discussed with patient stopping antibiotic after 14 days    Maternal serum screen positive for trisomy 21  Now with PROM  Discussed with patient management options. Risks and benefits of labor induction vs close observation presented including risks of infection, bleeding and sepsis.  Patient would like to wait.  Does not want to proceed with delivery/induction  Will follow clinically  with CBC/WBC, Temp, and fundal tenderness.  Would proceed with induction if signs of infection.  Would allow to labor if spontaneous  Continue with antibiotic for now.  All of her questions were answered appropriately.    Multigravida of advanced maternal age in second trimester  Now with PROM  Discussed with patient management options. Risks and benefits of labor induction vs close observation presented including risks of infection, bleeding and sepsis.  Patient still would like to wait.  Does not want to proceed with delivery/induction  Will follow clinically with CBC/WBC, Temp, and fundal tenderness.  All normal at this time  Would proceed with induction if signs of infection.  Would allow to labor if spontaneous  Continue with antibiotic for now.  All of her questions were answered appropriately.    EVIN hose  Heplock IV  FHT Qshift  OK to ambulate  Daily CBC  Oral ampicillin. Discussed with patient stopping antibiotic after 14 days          Abimael Henderson MD  Obstetrics  [facility] - Labor & Delivery

## 2023-12-12 LAB
BASOPHILS # BLD AUTO: 0.07 K/UL (ref 0–0.2)
BASOPHILS NFR BLD: 0.8 % (ref 0–1.9)
DIFFERENTIAL METHOD BLD: ABNORMAL
EOSINOPHIL # BLD AUTO: 0.1 K/UL (ref 0–0.5)
EOSINOPHIL NFR BLD: 1.1 % (ref 0–8)
ERYTHROCYTE [DISTWIDTH] IN BLOOD BY AUTOMATED COUNT: 12.9 % (ref 11.5–14.5)
HCT VFR BLD AUTO: 30.9 % (ref 37–48.5)
HGB BLD-MCNC: 10.2 G/DL (ref 12–16)
IMM GRANULOCYTES # BLD AUTO: 0.04 K/UL (ref 0–0.04)
IMM GRANULOCYTES NFR BLD AUTO: 0.4 % (ref 0–0.5)
LYMPHOCYTES # BLD AUTO: 2.1 K/UL (ref 1–4.8)
LYMPHOCYTES NFR BLD: 23.9 % (ref 18–48)
MCH RBC QN AUTO: 30.4 PG (ref 27–31)
MCHC RBC AUTO-ENTMCNC: 33 G/DL (ref 32–36)
MCV RBC AUTO: 92 FL (ref 82–98)
MONOCYTES # BLD AUTO: 0.7 K/UL (ref 0.3–1)
MONOCYTES NFR BLD: 8 % (ref 4–15)
NEUTROPHILS # BLD AUTO: 5.9 K/UL (ref 1.8–7.7)
NEUTROPHILS NFR BLD: 65.8 % (ref 38–73)
NRBC BLD-RTO: 0 /100 WBC
PLATELET # BLD AUTO: 229 K/UL (ref 150–450)
PMV BLD AUTO: 10.6 FL (ref 9.2–12.9)
RBC # BLD AUTO: 3.35 M/UL (ref 4–5.4)
WBC # BLD AUTO: 8.95 K/UL (ref 3.9–12.7)

## 2023-12-12 PROCEDURE — 99232 SBSQ HOSP IP/OBS MODERATE 35: CPT | Mod: ,,, | Performed by: OBSTETRICS & GYNECOLOGY

## 2023-12-12 PROCEDURE — 11000001 HC ACUTE MED/SURG PRIVATE ROOM

## 2023-12-12 PROCEDURE — 36415 COLL VENOUS BLD VENIPUNCTURE: CPT | Performed by: OBSTETRICS & GYNECOLOGY

## 2023-12-12 PROCEDURE — 25000003 PHARM REV CODE 250: Performed by: OBSTETRICS & GYNECOLOGY

## 2023-12-12 PROCEDURE — 85025 COMPLETE CBC W/AUTO DIFF WBC: CPT | Performed by: OBSTETRICS & GYNECOLOGY

## 2023-12-12 RX ADMIN — LORAZEPAM 0.5 MG: 0.5 TABLET ORAL at 11:12

## 2023-12-12 RX ADMIN — AMPICILLIN 500 MG: 500 CAPSULE ORAL at 09:12

## 2023-12-12 RX ADMIN — ACETAMINOPHEN 1000 MG: 500 TABLET, FILM COATED ORAL at 09:12

## 2023-12-12 RX ADMIN — PRENATAL VIT W/ FE FUMARATE-FA TAB 27-0.8 MG 1 TABLET: 27-0.8 TAB at 09:12

## 2023-12-12 RX ADMIN — ZOLPIDEM TARTRATE 5 MG: 5 TABLET ORAL at 11:12

## 2023-12-12 RX ADMIN — AMPICILLIN 500 MG: 500 CAPSULE ORAL at 04:12

## 2023-12-12 NOTE — PLAN OF CARE
[facility] - Labor & Delivery  Discharge Reassessment    Primary Care Provider: Liss Wise MD    Expected Discharge Date: 12/2/2023    Reassessment (most recent)       Discharge Reassessment - 12/12/23 1427          Discharge Reassessment    Assessment Type Discharge Planning Reassessment     Did the patient's condition or plan change since previous assessment? No     Discharge Plan discussed with: --   chart review    Communicated NOLAN with patient/caregiver Date not available/Unable to determine     Discharge Plan A Home     Discharge Plan B Home     DME Needed Upon Discharge  none     Transition of Care Barriers None     Why the patient remains in the hospital Requires continued medical care        Post-Acute Status    Discharge Delays None known at this time                 This patient has been screened for Case Management needs.  Based on (documentation in medical record), patient on day 11 of hospital stay.  Patient admitted for close monitoring vs. induction.   Now with PROM.  19 weeks gestation.  Dx: Trisomy 21.  Close observation is ongoing.  Patient is not Discussed with patient management options. Risks and benefits of labready for discharge.       Case Management/Social Work remains available if a need arises, please enter consult for assistance.  For urgent needs contact Case Management Department/on-call at:  [phone].

## 2023-12-12 NOTE — NURSING
This RN at bedside. Pt appears to be in good spirits, sitting up in bed on phone, laughing. Pt denies abd pain, vag bleeding. VSS, afebrile. Requesting RN to doppler FHTs when returning to bedside for 2100 amp dose, pt wishes respected.

## 2023-12-12 NOTE — PROGRESS NOTES
[facility] - Labor & Delivery  Obstetrics  Antepartum Progress Note    Patient Name: Gilberto Bueno  MRN: [id number]  Admission Date: 2023  Hospital Length of Stay: 11 days  Attending Physician: Abimael Henderson MD  Primary Care Provider: Liss Wise MD    Subjective:     Principal Problem:19 weeks gestation of pregnancy    HPI:  44 yo  at 17w4d  Advanced maternal age  Fetus with Down syndrome  Has been with spotting for the past couple of days  Went to our Emergency on 2023 with minimal spotting and occasional abdominal pain.  Ultrasound that day with MARCUS of 5.9 cm  Good fetal movements    Noted with ROM last night at 1800.    Denies fever and chills.  No longer with pain/contractions        Hospital Course:  On Labor and Delivery, vitals stable  FHT at 140   Contraction: None  Cervix: 1 cm  Started on antibiotic therapy.  Zithromax and Ampicillin    2023 No longer with contractions/spotting this morning.  No fundal tenderness.  No leakage of fluid. Does not want to deliver now.  Wants to wait for sealing of the membranes    2023 No pain.  No leakage. No vaginal bleeding. Good fetal movements  2023 HD#3/PPROM Day #4. No pain.  No leakage. No vaginal bleeding. Good fetal movements  12/3/2023 HD#4/PPROM Day #5. No pain.  No leakage. No vaginal bleeding. Good fetal movements    2023 HD#5 PPROM Day #6. No pain.  No leakage. No vaginal bleeding.  Good fetal movements.  Has been ambulating. Bedside ultrasound with no amniotic fluid. Oral ampicillin started.  2023 HD#6 PPROM Day #7. No pain.  No leakage. No vaginal bleeding.  Good fetal movements.  Has been ambulating to the bathroom.  Still in good spirit.  2023 HD#7 PPROM Day #8. No pain.  No leakage. No vaginal bleeding.  Good fetal movements.  Has been ambulating to the bathroom.  Still in good spirit. Had a visit with our hospital  last night  2023 HD#8 PPROM Day #9. No change. No pain.  No leakage.  No vaginal bleeding.  Good fetal movements.  Has been ambulating to the bathroom.  Still in good spirit.   12/8/2023 HD#9 PPROM Day #10. No change. No pain.  No leakage. No vaginal bleeding.  Good fetal movements.  Has been ambulating to the bathroom.  Still in good spirit.  12/9/2023 HD#10 PPROM Day #11.  No change. No pain.  No leakage. No vaginal bleeding.  Good fetal movements.  Has been ambulating to the bathroom.  Still in good spirit.   12/10/2023 HD#11 PPROM Day #12.  No change. No pain.  No leakage. No vaginal bleeding.  Good fetal movements.  Has been ambulating to the bathroom.  Still in good spirit. But has a difficult time sleeping last night     12/11/2023 HD#12 PPROM Day #13. No change. No pain.  No leakage. No vaginal bleeding.  Good fetal movements.  Has been ambulating to the bathroom.  Still in good spirit. Bedside ultrasound by me, 0.5 cm fluid pocket by fetal feet with chord.  FHT at 148   12/12/2023 HD#13 PPROM Day #14. No change. No pain.  No leakage. No vaginal bleeding.  Good fetal movements.  Has been ambulating to the bathroom.  Still in good spirit.     Obstetric HPI:  Patient reports None contractions, active fetal movement, absent vaginal bleeding , absent loss of fluid      Objective:     Vital Signs (Most Recent):  Temp: 98.8 °F (37.1 °C) (12/12/23 0611)  Pulse: 78 (12/12/23 0652)  Resp: 18 (12/11/23 1933)  BP: (!) 83/38 (12/12/23 0652)  SpO2: 98 % (12/12/23 0610) Vital Signs (24h Range):  Temp:  [98 °F (36.7 °C)-98.9 °F (37.2 °C)] 98.8 °F (37.1 °C)  Pulse:  [73-90] 78  Resp:  [18-22] 18  SpO2:  [98 %-100 %] 98 %  BP: ()/(38-58) 83/38     Weight: 88 kg (194 lb 0.1 oz)  Body mass index is 31.31 kg/m².    FHT: 150    No intake or output data in the 24 hours ending 12/12/23 0658    Cervical Exam:  Deferred     Significant Labs:  Recent Lab Results       None            Physical Exam:   Constitutional: She appears well-developed and well-nourished. No distress.    HENT:   Head:  Normocephalic and atraumatic.    Eyes: EOM are normal.     Cardiovascular:  Normal rate.             Pulmonary/Chest: Effort normal. No respiratory distress.        Abdominal: Soft. She exhibits no distension. There is no abdominal tenderness. There is no rebound and no guarding.   Non-tender     Genitourinary:    Genitourinary Comments: No blood or fluid at perineum             Musculoskeletal: Normal range of motion.       Neurological: She is alert.    Skin: Skin is warm and dry.    Psychiatric: She has a normal mood and affect.       Review of Systems  Assessment/Plan:     43 y.o. female  at 19w2d for:    * 19 weeks gestation of pregnancy  Admit for close observation.      Premature rupture of membranes in second trimester  Discussed with patient management options. Risks and benefits of labor induction vs close observation presented including risks of infection, bleeding and sepsis.  Patient still would like to wait.  Does not want to proceed with delivery/induction  Will follow clinically with CBC/WBC, Temp, and fundal tenderness.  All normal at this time  Would proceed with induction if signs of infection.  Would allow to labor if spontaneous  Continue with antibiotic for now.  Discussed with patient stopping antibiotic after 14 days  All of her questions were answered appropriately.    EVIN loera  Heplock IV  FHT Qshift  OK to ambulate  Daily CBC  Oral ampicillin.  Discussed with patient stopping antibiotic after 14 days    Vaginal bleeding in pregnancy, second trimester  No longer bleeding.  Cervix at 1 cm  Discussed with patient management options. Risks and benefits of labor induction vs close observation presented including risks of infection, bleeding and sepsis.  Patient still would like to wait.  Does not want to proceed with delivery/induction  Will follow clinically with CBC/WBC, Temp, and fundal tenderness.  All normal at this time  Would proceed with induction if signs of infection.  Would  allow to labor if spontaneous  Continue with antibiotic for now.  All of her questions were answered appropriately.    EVIN hose  Heplock IV  FHT Qshift  OK to ambulate  Daily CBC  Oral ampicillin. Discussed with patient stopping antibiotic after 14 days    History of  premature rupture of membranes (PROM) in previous pregnancy, currently pregnant in second trimester  Discussed with patient management options. Risks and benefits of labor induction vs close observation presented including risks of infection, bleeding and sepsis.  Patient still would like to wait.  Does not want to proceed with delivery/induction  Will follow clinically with CBC/WBC, Temp, and fundal tenderness.  All normal at this time  Would proceed with induction if signs of infection.  Would allow to labor if spontaneous  Continue with antibiotic for now.  All of her questions were answered appropriately.    EVIN loera  Heplock IV  FHT Qshift  OK to ambulate  Daily CBC  Oral ampicillin. Discussed with patient stopping antibiotic after 14 days    Maternal serum screen positive for trisomy 21  Now with PROM  Discussed with patient management options. Risks and benefits of labor induction vs close observation presented including risks of infection, bleeding and sepsis.  Patient would like to wait.  Does not want to proceed with delivery/induction  Will follow clinically with CBC/WBC, Temp, and fundal tenderness.  Would proceed with induction if signs of infection.  Would allow to labor if spontaneous  Continue with antibiotic for now.  All of her questions were answered appropriately.    Multigravida of advanced maternal age in second trimester  Now with PROM  Discussed with patient management options. Risks and benefits of labor induction vs close observation presented including risks of infection, bleeding and sepsis.  Patient still would like to wait.  Does not want to proceed with delivery/induction  Will follow clinically with CBC/WBC,  Temp, and fundal tenderness.  All normal at this time  Would proceed with induction if signs of infection.  Would allow to labor if spontaneous  Continue with antibiotic for now.  All of her questions were answered appropriately.    EVIN loera  Heplock IV  FHT Qshift  OK to ambulate  Daily CBC  Oral ampicillin. Discussed with patient stopping antibiotic after 14 days          Abimael Henderson MD  [place]st[place]s  [facility] [place]

## 2023-12-12 NOTE — SUBJECTIVE & OBJECTIVE
Obstetric HPI:  Patient reports None contractions, active fetal movement, absent vaginal bleeding , absent loss of fluid      Objective:     Vital Signs (Most Recent):  Temp: 98.8 °F (37.1 °C) (12/12/23 0611)  Pulse: 78 (12/12/23 0652)  Resp: 18 (12/11/23 1933)  BP: (!) 83/38 (12/12/23 0652)  SpO2: 98 % (12/12/23 0610) Vital Signs (24h Range):  Temp:  [98 °F (36.7 °C)-98.9 °F (37.2 °C)] 98.8 °F (37.1 °C)  Pulse:  [73-90] 78  Resp:  [18-22] 18  SpO2:  [98 %-100 %] 98 %  BP: ()/(38-58) 83/38     Weight: 88 kg (194 lb 0.1 oz)  Body mass index is 31.31 kg/m².    FHT: 150    No intake or output data in the 24 hours ending 12/12/23 0658    Cervical Exam:  Deferred     Significant Labs:  Recent Lab Results       None            Physical Exam:   Constitutional: She appears well-developed and well-nourished. No distress.    HENT:   Head: Normocephalic and atraumatic.    Eyes: EOM are normal.     Cardiovascular:  Normal rate.             Pulmonary/Chest: Effort normal. No respiratory distress.        Abdominal: Soft. She exhibits no distension. There is no abdominal tenderness. There is no rebound and no guarding.   Non-tender     Genitourinary:    Genitourinary Comments: No blood or fluid at perineum             Musculoskeletal: Normal range of motion.       Neurological: She is alert.    Skin: Skin is warm and dry.    Psychiatric: She has a normal mood and affect.       Review of Systems

## 2023-12-13 LAB
BASOPHILS # BLD AUTO: 0.06 K/UL (ref 0–0.2)
BASOPHILS NFR BLD: 0.9 % (ref 0–1.9)
DIFFERENTIAL METHOD BLD: ABNORMAL
EOSINOPHIL # BLD AUTO: 0.1 K/UL (ref 0–0.5)
EOSINOPHIL NFR BLD: 1.3 % (ref 0–8)
ERYTHROCYTE [DISTWIDTH] IN BLOOD BY AUTOMATED COUNT: 12.8 % (ref 11.5–14.5)
HCT VFR BLD AUTO: 29.1 % (ref 37–48.5)
HGB BLD-MCNC: 9.7 G/DL (ref 12–16)
IMM GRANULOCYTES # BLD AUTO: 0.02 K/UL (ref 0–0.04)
IMM GRANULOCYTES NFR BLD AUTO: 0.3 % (ref 0–0.5)
LYMPHOCYTES # BLD AUTO: 2 K/UL (ref 1–4.8)
LYMPHOCYTES NFR BLD: 29.3 % (ref 18–48)
MCH RBC QN AUTO: 30.6 PG (ref 27–31)
MCHC RBC AUTO-ENTMCNC: 33.3 G/DL (ref 32–36)
MCV RBC AUTO: 92 FL (ref 82–98)
MONOCYTES # BLD AUTO: 0.6 K/UL (ref 0.3–1)
MONOCYTES NFR BLD: 8 % (ref 4–15)
NEUTROPHILS # BLD AUTO: 4.1 K/UL (ref 1.8–7.7)
NEUTROPHILS NFR BLD: 60.2 % (ref 38–73)
NRBC BLD-RTO: 0 /100 WBC
PLATELET # BLD AUTO: 215 K/UL (ref 150–450)
PMV BLD AUTO: 10 FL (ref 9.2–12.9)
RBC # BLD AUTO: 3.17 M/UL (ref 4–5.4)
WBC # BLD AUTO: 6.85 K/UL (ref 3.9–12.7)

## 2023-12-13 PROCEDURE — 11000001 HC ACUTE MED/SURG PRIVATE ROOM

## 2023-12-13 PROCEDURE — 36415 COLL VENOUS BLD VENIPUNCTURE: CPT | Performed by: OBSTETRICS & GYNECOLOGY

## 2023-12-13 PROCEDURE — 85025 COMPLETE CBC W/AUTO DIFF WBC: CPT | Performed by: OBSTETRICS & GYNECOLOGY

## 2023-12-13 PROCEDURE — 99232 SBSQ HOSP IP/OBS MODERATE 35: CPT | Mod: ,,, | Performed by: OBSTETRICS & GYNECOLOGY

## 2023-12-13 PROCEDURE — 25000003 PHARM REV CODE 250: Performed by: OBSTETRICS & GYNECOLOGY

## 2023-12-13 RX ORDER — ONDANSETRON 4 MG/1
4 TABLET, ORALLY DISINTEGRATING ORAL ONCE
Status: COMPLETED | OUTPATIENT
Start: 2023-12-13 | End: 2023-12-13

## 2023-12-13 RX ADMIN — ZOLPIDEM TARTRATE 5 MG: 5 TABLET ORAL at 11:12

## 2023-12-13 RX ADMIN — AMPICILLIN 500 MG: 500 CAPSULE ORAL at 05:12

## 2023-12-13 RX ADMIN — PRENATAL VIT W/ FE FUMARATE-FA TAB 27-0.8 MG 1 TABLET: 27-0.8 TAB at 09:12

## 2023-12-13 RX ADMIN — LORAZEPAM 0.5 MG: 0.5 TABLET ORAL at 11:12

## 2023-12-13 RX ADMIN — ONDANSETRON 4 MG: 4 TABLET, ORALLY DISINTEGRATING ORAL at 05:12

## 2023-12-13 RX ADMIN — AMPICILLIN 500 MG: 500 CAPSULE ORAL at 08:12

## 2023-12-13 RX ADMIN — AMPICILLIN 500 MG: 500 CAPSULE ORAL at 09:12

## 2023-12-13 RX ADMIN — AMPICILLIN 500 MG: 500 CAPSULE ORAL at 02:12

## 2023-12-13 RX ADMIN — ACETAMINOPHEN 1000 MG: 500 TABLET, FILM COATED ORAL at 02:12

## 2023-12-13 NOTE — NURSING
RN at bedside. Pt on phone. No complaints or requests at this time. RN to come back at 2100 for medication and doppler FHTs, pt agrees and states she is about to go to sleep.   No

## 2023-12-13 NOTE — SUBJECTIVE & OBJECTIVE
Obstetric HPI:  Patient reports None contractions, active fetal movement, absent vaginal bleeding , absent loss of fluid      Objective:     Vital Signs (Most Recent):  Temp: 97.8 °F (36.6 °C) (12/13/23 0600)  Pulse: 83 (12/12/23 2204)  Resp: 18 (12/12/23 2204)  BP: (!) 90/42 (12/12/23 2204)  SpO2: 99 % (12/12/23 2204) Vital Signs (24h Range):  Temp:  [97.8 °F (36.6 °C)-98.8 °F (37.1 °C)] 97.8 °F (36.6 °C)  Pulse:  [83] 83  Resp:  [18-20] 18  SpO2:  [99 %] 99 %  BP: ()/(42-54) 90/42     Weight: 88 kg (194 lb 0.1 oz)  Body mass index is 31.31 kg/m².    FHT: 150    No intake or output data in the 24 hours ending 12/13/23 0706    Cervical Exam:  Deferred     Significant Labs:  Recent Lab Results         12/13/23  0602        Baso # 0.06       Basophil % 0.9       Differential Method Automated       Eos # 0.1       Eosinophil % 1.3       Gran # (ANC) 4.1       Gran % 60.2       Hematocrit 29.1       Hemoglobin 9.7       Immature Grans (Abs) 0.02  Comment: Mild elevation in immature granulocytes is non specific and   can be seen in a variety of conditions including stress response,   acute inflammation, trauma and pregnancy. Correlation with other   laboratory and clinical findings is essential.         Immature Granulocytes 0.3       Lymph # 2.0       Lymph % 29.3       MCH 30.6       MCHC 33.3       MCV 92       Mono # 0.6       Mono % 8.0       MPV 10.0       nRBC 0       Platelet Count 215       RBC 3.17       RDW 12.8       WBC 6.85               Physical Exam:   Constitutional: She appears well-developed and well-nourished. No distress.    HENT:   Head: Normocephalic and atraumatic.    Eyes: EOM are normal.     Cardiovascular:  Normal rate.             Pulmonary/Chest: Effort normal. No respiratory distress.        Abdominal: Soft. She exhibits no distension. There is no abdominal tenderness. There is no rebound and no guarding.   Non-tender     Genitourinary:    Genitourinary Comments: No blood or fluid at  perineum             Musculoskeletal: Normal range of motion.       Neurological: She is alert.    Skin: Skin is warm and dry.    Psychiatric: She has a normal mood and affect.       Review of Systems

## 2023-12-13 NOTE — PROGRESS NOTES
[facility] - [place] - Labor & Delivery  Obstetrics  Antepartum Progress Note    Patient Name: Gilberto Bueno  MRN: [id number]  Admission Date: 2023  Hospital Length of Stay: 12 days  Attending Physician: Abimael Henderson MD  Primary Care Provider: Liss Wise MD    Subjective:     Principal Problem:19 weeks gestation of pregnancy    HPI:  42 yo  at 17w4d  Advanced maternal age  Fetus with Down syndrome  Has been with spotting for the past couple of days  Went to our Emergency on 2023 with minimal spotting and occasional abdominal pain.  Ultrasound that day with MARCUS of 5.9 cm  Good fetal movements    Noted with ROM last night at 1800.    Denies fever and chills.  No longer with pain/contractions        Hospital Course:  On Labor and Delivery, vitals stable  FHT at 140   Contraction: None  Cervix: 1 cm  Started on antibiotic therapy.  Zithromax and Ampicillin    2023 No longer with contractions/spotting this morning.  No fundal tenderness.  No leakage of fluid. Does not want to deliver now.  Wants to wait for sealing of the membranes    2023 No pain.  No leakage. No vaginal bleeding. Good fetal movements  2023 HD#3/PPROM Day #4. No pain.  No leakage. No vaginal bleeding. Good fetal movements  12/3/2023 HD#4/PPROM Day #5. No pain.  No leakage. No vaginal bleeding. Good fetal movements    2023 HD#5 PPROM Day #6. No pain.  No leakage. No vaginal bleeding.  Good fetal movements.  Has been ambulating. Bedside ultrasound with no amniotic fluid. Oral ampicillin started.  2023 HD#6 PPROM Day #7. No pain.  No leakage. No vaginal bleeding.  Good fetal movements.  Has been ambulating to the bathroom.  Still in good spirit.  2023 HD#7 PPROM Day #8. No pain.  No leakage. No vaginal bleeding.  Good fetal movements.  Has been ambulating to the bathroom.  Still in good spirit. Had a visit with our hospital  last night  2023 HD#8 PPROM Day #9. No change. No pain.  No leakage.  No vaginal bleeding.  Good fetal movements.  Has been ambulating to the bathroom.  Still in good spirit.   12/8/2023 HD#9 PPROM Day #10. No change. No pain.  No leakage. No vaginal bleeding.  Good fetal movements.  Has been ambulating to the bathroom.  Still in good spirit.  12/9/2023 HD#10 PPROM Day #11.  No change. No pain.  No leakage. No vaginal bleeding.  Good fetal movements.  Has been ambulating to the bathroom.  Still in good spirit.   12/10/2023 HD#11 PPROM Day #12.  No change. No pain.  No leakage. No vaginal bleeding.  Good fetal movements.  Has been ambulating to the bathroom.  Still in good spirit. But has a difficult time sleeping last night     12/11/2023 HD#12 PPROM Day #13. No change. No pain.  No leakage. No vaginal bleeding.  Good fetal movements.  Has been ambulating to the bathroom.  Still in good spirit. Bedside ultrasound by me, 0.5 cm fluid pocket by fetal feet with chord.  FHT at 148   12/12/2023 HD#13 PPROM Day #14. No change. No pain.  No leakage. No vaginal bleeding.  Good fetal movements.  Has been ambulating to the bathroom.  Still in good spirit.   12/13/2023 HD#14 PPROM Day #15. No change. No pain.  No leakage. No vaginal bleeding.  Good fetal movements.  Has been ambulating to the bathroom.  Still in good spirit. Will stop antibiotic therapy after today    Obstetric HPI:  Patient reports None contractions, active fetal movement, absent vaginal bleeding , absent loss of fluid      Objective:     Vital Signs (Most Recent):  Temp: 97.8 °F (36.6 °C) (12/13/23 0600)  Pulse: 83 (12/12/23 2204)  Resp: 18 (12/12/23 2204)  BP: (!) 90/42 (12/12/23 2204)  SpO2: 99 % (12/12/23 2204) Vital Signs (24h Range):  Temp:  [97.8 °F (36.6 °C)-98.8 °F (37.1 °C)] 97.8 °F (36.6 °C)  Pulse:  [83] 83  Resp:  [18-20] 18  SpO2:  [99 %] 99 %  BP: ()/(42-54) 90/42     Weight: 88 kg (194 lb 0.1 oz)  Body mass index is 31.31 kg/m².    FHT: 150    No intake or output data in the 24 hours ending 12/13/23  0706    Cervical Exam:  Deferred     Significant Labs:  Recent Lab Results         23  0602        Baso # 0.06       Basophil % 0.9       Differential Method Automated       Eos # 0.1       Eosinophil % 1.3       Gran # (ANC) 4.1       Gran % 60.2       Hematocrit 29.1       Hemoglobin 9.7       Immature Grans (Abs) 0.02  Comment: Mild elevation in immature granulocytes is non specific and   can be seen in a variety of conditions including stress response,   acute inflammation, trauma and pregnancy. Correlation with other   laboratory and clinical findings is essential.         Immature Granulocytes 0.3       Lymph # 2.0       Lymph % 29.3       MCH 30.6       MCHC 33.3       MCV 92       Mono # 0.6       Mono % 8.0       MPV 10.0       nRBC 0       Platelet Count 215       RBC 3.17       RDW 12.8       WBC 6.85               Physical Exam:   Constitutional: She appears well-developed and well-nourished. No distress.    HENT:   Head: Normocephalic and atraumatic.    Eyes: EOM are normal.     Cardiovascular:  Normal rate.             Pulmonary/Chest: Effort normal. No respiratory distress.        Abdominal: Soft. She exhibits no distension. There is no abdominal tenderness. There is no rebound and no guarding.   Non-tender     Genitourinary:    Genitourinary Comments: No blood or fluid at perineum             Musculoskeletal: Normal range of motion.       Neurological: She is alert.    Skin: Skin is warm and dry.    Psychiatric: She has a normal mood and affect.       Review of Systems  Assessment/Plan:     43 y.o. female  at 19w3d for:    * 19 weeks gestation of pregnancy  Admit for close observation.      Premature rupture of membranes in second trimester  Discussed with patient management options. Risks and benefits of labor induction vs close observation presented including risks of infection, bleeding and sepsis.  Patient still would like to wait.  Does not want to proceed with  delivery/induction  Will follow clinically with CBC/WBC, Temp, and fundal tenderness.  All normal at this time  Would proceed with induction if signs of infection.  Would allow to labor if spontaneous  Continue with antibiotic for now.  Discussed with patient stopping antibiotic after 14 days  All of her questions were answered appropriately.    EVIN hose  Heplock IV  FHT Qshift  OK to ambulate  Daily CBC  Oral ampicillin.  Discussed with patient stopping antibiotic after 14 days    Vaginal bleeding in pregnancy, second trimester  No longer bleeding.  Cervix at 1 cm  Discussed with patient management options. Risks and benefits of labor induction vs close observation presented including risks of infection, bleeding and sepsis.  Patient still would like to wait.  Does not want to proceed with delivery/induction  Will follow clinically with CBC/WBC, Temp, and fundal tenderness.  All normal at this time  Would proceed with induction if signs of infection.  Would allow to labor if spontaneous  Continue with antibiotic for now.  All of her questions were answered appropriately.    EVIN hose  Heplock IV  FHT Qshift  OK to ambulate  Daily CBC  Oral ampicillin. Discussed with patient stopping antibiotic after 14 days    History of  premature rupture of membranes (PROM) in previous pregnancy, currently pregnant in second trimester  Discussed with patient management options. Risks and benefits of labor induction vs close observation presented including risks of infection, bleeding and sepsis.  Patient still would like to wait.  Does not want to proceed with delivery/induction  Will follow clinically with CBC/WBC, Temp, and fundal tenderness.  All normal at this time  Would proceed with induction if signs of infection.  Would allow to labor if spontaneous  Continue with antibiotic for now.  All of her questions were answered appropriately.    EVIN hose  Heplock IV  FHT Qshift  OK to ambulate  Daily CBC  Oral ampicillin.  Discussed with patient stopping antibiotic after 14 days    Maternal serum screen positive for trisomy 21  Now with PROM  Discussed with patient management options. Risks and benefits of labor induction vs close observation presented including risks of infection, bleeding and sepsis.  Patient would like to wait.  Does not want to proceed with delivery/induction  Will follow clinically with CBC/WBC, Temp, and fundal tenderness.  Would proceed with induction if signs of infection.  Would allow to labor if spontaneous  Continue with antibiotic for now.  All of her questions were answered appropriately.    Multigravida of advanced maternal age in second trimester  Now with PROM  Discussed with patient management options. Risks and benefits of labor induction vs close observation presented including risks of infection, bleeding and sepsis.  Patient still would like to wait.  Does not want to proceed with delivery/induction  Will follow clinically with CBC/WBC, Temp, and fundal tenderness.  All normal at this time  Would proceed with induction if signs of infection.  Would allow to labor if spontaneous  Continue with antibiotic for now.  All of her questions were answered appropriately.    EVIN hose  Heplock IV  FHT Qshift  OK to ambulate  Daily CBC  Oral ampicillin. Discussed with patient stopping antibiotic after 14 days          Abimael Henderson MD  Obstetrics  [facility] - [place] - Labor & Delivery

## 2023-12-13 NOTE — NURSING
RN to bedside to give meds. Pt in bathroom and asks RN to come back later. Pt will call when ready.

## 2023-12-13 NOTE — ASSESSMENT & PLAN NOTE
Here is the plan from today's visit    1. Greater trochanteric bursitis of right hip  - diclofenac (VOLTAREN) 1 % topical gel; Place 4 g onto the skin 4 times daily as needed for moderate pain  Dispense: 100 g; Refill: 0  - acetaminophen (TYLENOL) 500 MG tablet; Take 2 tablets (1,000 mg) by mouth 3 times daily as needed for mild pain  Dispense: 200 tablet; Refill: 3    2. Hip pain, right  - diclofenac (VOLTAREN) 1 % topical gel; Place 4 g onto the skin 4 times daily as needed for moderate pain  Dispense: 100 g; Refill: 0    3. CKD (chronic kidney disease) stage 2, GFR 60-89 ml/min  - diclofenac (VOLTAREN) 1 % topical gel; Place 4 g onto the skin 4 times daily as needed for moderate pain  Dispense: 100 g; Refill: 0      Please call or return to clinic if your symptoms don't go away.    Follow up plan  Please make a clinic appointment for follow up with your primary physician Ernie Arreola MD in 1 month for follow up, and sooner as needed. Give us a call if you need a new order for physical therapy to change locations.    Thank you for coming to [facility] today.  Lab Testing:  **If you had lab testing today and your results are reassuring or normal they will be mailed to you or sent through [facility] within 7 days.   **If the lab tests need quick action we will call you with the results.  The phone number we will call with results is # [phone] (home) . If this is not the best number please call our clinic and change the number.  Medication Refills:  If you need any refills please call your pharmacy and they will contact us.   If you need to  your refill at a new pharmacy, please contact the new pharmacy directly. The new pharmacy will help you get your medications transferred faster.   Scheduling:  If you have any concerns about today's visit or wish to schedule another appointment please call our office during normal business hours [phone] (8-5:00 M-F)  If a referral was made to a [facility]  Now with PROM  Discussed with patient management options. Risks and benefits of labor induction vs close observation presented including risks of infection, bleeding and sepsis.  Patient still would like to wait.  Does not want to proceed with delivery/induction  Will follow clinically with CBC/WBC, Temp, and fundal tenderness.  All normal at this time  Would proceed with induction if signs of infection.  Would allow to labor if spontaneous  Continue with antibiotic for now.  All of her questions were answered appropriately.    EVIN loera  Heplock IV  FHT Qshift  OK to ambulate  Daily CBC  Oral ampicillin. Discussed with patient stopping antibiotic after 14 days   [facility] and you don't get a call from central scheduling please call [phone].  If a Mammogram was ordered for you at [facility] call [phone] to schedule or change your appointment.  If you had an XRay/CT/Ultrasound/MRI ordered the number is [phone] to schedule or change your radiology appointment.   Medical Concerns:  If you have urgent medical concerns please call [phone] at any time of the day.    Kelly Starkey MD

## 2023-12-13 NOTE — NURSING
RN to bedside. Pt sleeping and requests RN come back @ 0900. Instructed to call RN if needed sooner. Callbell within reach.

## 2023-12-13 NOTE — NURSING
"RN to bedside to give meds. Pt states she has a mild "tension headache". Pt alert talking on phone. Instructed for pt to call out if needed. Callbell within reach.   "

## 2023-12-14 LAB
BASOPHILS # BLD AUTO: 0.06 K/UL (ref 0–0.2)
BASOPHILS NFR BLD: 0.8 % (ref 0–1.9)
DIFFERENTIAL METHOD BLD: ABNORMAL
EOSINOPHIL # BLD AUTO: 0.1 K/UL (ref 0–0.5)
EOSINOPHIL NFR BLD: 1.7 % (ref 0–8)
ERYTHROCYTE [DISTWIDTH] IN BLOOD BY AUTOMATED COUNT: 12.9 % (ref 11.5–14.5)
HCT VFR BLD AUTO: 28.2 % (ref 37–48.5)
HGB BLD-MCNC: 9.2 G/DL (ref 12–16)
IMM GRANULOCYTES # BLD AUTO: 0.04 K/UL (ref 0–0.04)
IMM GRANULOCYTES NFR BLD AUTO: 0.5 % (ref 0–0.5)
LYMPHOCYTES # BLD AUTO: 2.1 K/UL (ref 1–4.8)
LYMPHOCYTES NFR BLD: 27.1 % (ref 18–48)
MCH RBC QN AUTO: 30.5 PG (ref 27–31)
MCHC RBC AUTO-ENTMCNC: 32.6 G/DL (ref 32–36)
MCV RBC AUTO: 93 FL (ref 82–98)
MONOCYTES # BLD AUTO: 0.7 K/UL (ref 0.3–1)
MONOCYTES NFR BLD: 8.8 % (ref 4–15)
NEUTROPHILS # BLD AUTO: 4.8 K/UL (ref 1.8–7.7)
NEUTROPHILS NFR BLD: 61.1 % (ref 38–73)
NRBC BLD-RTO: 0 /100 WBC
PLATELET # BLD AUTO: 218 K/UL (ref 150–450)
PMV BLD AUTO: 10.3 FL (ref 9.2–12.9)
RBC # BLD AUTO: 3.02 M/UL (ref 4–5.4)
WBC # BLD AUTO: 7.85 K/UL (ref 3.9–12.7)

## 2023-12-14 PROCEDURE — 11000001 HC ACUTE MED/SURG PRIVATE ROOM

## 2023-12-14 PROCEDURE — 25000003 PHARM REV CODE 250: Performed by: OBSTETRICS & GYNECOLOGY

## 2023-12-14 PROCEDURE — 36415 COLL VENOUS BLD VENIPUNCTURE: CPT | Performed by: OBSTETRICS & GYNECOLOGY

## 2023-12-14 PROCEDURE — 85025 COMPLETE CBC W/AUTO DIFF WBC: CPT | Performed by: OBSTETRICS & GYNECOLOGY

## 2023-12-14 PROCEDURE — 99232 SBSQ HOSP IP/OBS MODERATE 35: CPT | Mod: ,,, | Performed by: OBSTETRICS & GYNECOLOGY

## 2023-12-14 RX ORDER — CALCIUM CARBONATE 200(500)MG
500 TABLET,CHEWABLE ORAL 3 TIMES DAILY PRN
Status: DISCONTINUED | OUTPATIENT
Start: 2023-12-14 | End: 2023-12-24

## 2023-12-14 RX ADMIN — CALCIUM CARBONATE (ANTACID) CHEW TAB 500 MG 500 MG: 500 CHEW TAB at 04:12

## 2023-12-14 RX ADMIN — PRENATAL VIT W/ FE FUMARATE-FA TAB 27-0.8 MG 1 TABLET: 27-0.8 TAB at 08:12

## 2023-12-14 NOTE — ASSESSMENT & PLAN NOTE
Discussed with patient management options. Risks and benefits of labor induction vs close observation presented including risks of infection, bleeding and sepsis.  Patient still would like to wait.  Does not want to proceed with delivery/induction  Will follow clinically with CBC/WBC, Temp, and fundal tenderness.  All normal at this time  Would proceed with induction if signs of infection.  Would allow to labor if spontaneous  Continue with antibiotic for now.  All of her questions were answered appropriately.    EVIN loera  Heplock IV  FHT Qshift  OK to ambulate  Daily CBC  Oral ampicillin. Discussed with patient stopping antibiotic after 14 days. Discontinue antibiotic today

## 2023-12-14 NOTE — ASSESSMENT & PLAN NOTE
Discussed with patient management options. Risks and benefits of labor induction vs close observation presented including risks of infection, bleeding and sepsis.  Patient still would like to wait.  Does not want to proceed with delivery/induction  Will follow clinically with CBC/WBC, Temp, and fundal tenderness.  All normal at this time  Would proceed with induction if signs of infection.  Would allow to labor if spontaneous  Continue with antibiotic for now.  Discussed with patient stopping antibiotic after 14 days  All of her questions were answered appropriately.    EVIN loera  Heplock IV  FHT Qshift  OK to ambulate  Daily CBC  Oral ampicillin.  Discussed with patient stopping antibiotic after 14 days. Discontinue antibiotic today

## 2023-12-14 NOTE — PROGRESS NOTES
"  [facility] - Labor & Delivery  Adult Nutrition  Consult Note    SUMMARY     Recommendations    1. Continue Regular Diet; monitoring PO intake of meals and snacks.   2. If intake remains below 50%, consider  ONS Boost to help meet EEN/EPN.   3. Continue to monitor weights and labs.   4. Collaboration with medical providers    Goals: 1. Pt to meet % EEN/EPN by RD follow up  Nutrition Goal Status: new  Communication of RD Recs:  (POC)    Assessment and Plan    Nutrition Problem  Increased nutrient needs    Related to (etiology):   Diagnosis related symptoms    Signs and Symptoms (as evidenced by):   19 weeks gestation of pregnancy    Interventions/Recommendations (treatment strategy):  Collaboration with medical providers    Nutrition Diagnosis Status:   New     Reason for Assessment    Reason For Assessment: length of stay  Diagnosis: pregnancy complications  Relevant Medical History:   Past Medical History:   Diagnosis Date    History of chlamydia 2019    History of gonorrhea 2016      Interdisciplinary Rounds: did not attend  General Information Comments: Pt length of stay > 8 days. Pt admitted 19 weeks gestation of pregnancy. Pt on Regular Diet with no intake recorded in chart. BMI 31.31.  Pt with other care at time of visit. LBM not recorded in chart. NKFA. NFPE not appropriate at this time. RD to continue to monitor and follow up  Nutrition Discharge Planning: Regular Diet    Nutrition Risk Screen    Nutrition Risk Screen: no indicators present    Nutrition/Diet History    Food Allergies: NKFA    Anthropometrics    Temp: 98.4 °F (36.9 °C)  Height: 5' 6" (167.6 cm)  Height (inches): 66 in  Weight Method: Standard Scale  Weight: 88 kg (194 lb)  Weight (lb): 194 lb  Ideal Body Weight (IBW), Female: 130 lb  % Ideal Body Weight, Female (lb): 149.23 %  BMI (Calculated): 31.3  BMI Grade: 30 - 34.9- obesity - grade I       Lab/Procedures/Meds    Pertinent Labs Reviewed: reviewed  BMP  Lab Results   Component Value " Date     12/01/2023    K 3.5 12/01/2023     12/01/2023    CO2 20 (L) 12/01/2023    BUN 4 (L) 12/01/2023    CREATININE 0.6 12/01/2023    CALCIUM 8.7 12/01/2023    ANIONGAP 8 12/01/2023    EGFRNORACEVR >60 12/01/2023      Pertinent Medications Reviewed: reviewed  Current Outpatient Medications   Medication Instructions    ondansetron (ZOFRAN) 4 mg, Oral, Every 8 hours PRN    PNV,calcium 72-iron-folic acid (PRENATAL VITAMIN PLUS LOW IRON) 27 mg iron- 1 mg Tab Take one tablet daily.  Prescribe prenatal covered by insurance    urea (CARMOL) 40 % Crea Topical (Top)        Estimated/Assessed Needs    Weight Used For Calorie Calculations: 59 kg (130 lb)  Energy Calorie Requirements (kcal): 2500 kcal  Energy Need Method: [place]-St Jeor  Protein Requirements: 85 g  Weight Used For Protein Calculations: 59 kg (130 lb)     Estimated Fluid Requirement Method: RDA Method  RDA Method (mL): 2500         Nutrition Prescription Ordered    Current Diet Order: Regular Diet    Evaluation of Received Nutrient/Fluid Intake    I/O: no i/o in chart  Energy Calories Required: meeting needs  Protein Required: meeting needs  Fluid Required: meeting needs  Comments: lbm not recorded in chart  % Intake of Estimated Energy Needs: 75 - 100 %  % Meal Intake: 75 - 100 %    Nutrition Risk    Level of Risk/Frequency of Follow-up: low       Monitor and Evaluation    Food and Nutrient Intake: food and beverage intake  Food and Nutrient Adminstration: diet order  Knowledge/Beliefs/Attitudes: food and nutrition knowledge/skill, beliefs and attitudes  Physical Activity and Function: factors affecting access to physical activity, nutrition-related ADLs and IADLs  Anthropometric Measurements: weight, weight change, body mass index  Biochemical Data, Medical Tests and Procedures: gastrointestinal profile  Nutrition-Focused Physical Findings: overall appearance       Nutrition Follow-Up    RD Follow-up?: Yes    Anu Lopez, Registration  Eligible, Provisional LDN

## 2023-12-14 NOTE — NURSING
Dr. Henderson updated that patient stated we are discontinuing antibiotics today. MD confirmed and order received to discontinue ABX

## 2023-12-14 NOTE — ASSESSMENT & PLAN NOTE
Now with PROM  Discussed with patient management options. Risks and benefits of labor induction vs close observation presented including risks of infection, bleeding and sepsis.  Patient still would like to wait.  Does not want to proceed with delivery/induction  Will follow clinically with CBC/WBC, Temp, and fundal tenderness.  All normal at this time  Would proceed with induction if signs of infection.  Would allow to labor if spontaneous  Continue with antibiotic for now.  All of her questions were answered appropriately.    EVIN loera  Heplock IV  FHT Qshift  OK to ambulate  Daily CBC  Oral ampicillin. Discussed with patient stopping antibiotic after 14 days.  Discontinue antibiotic today

## 2023-12-14 NOTE — SUBJECTIVE & OBJECTIVE
Obstetric HPI:  Patient reports None contractions, active fetal movement, absent vaginal bleeding , absent loss of fluid      Objective:     Vital Signs (Most Recent):  Temp: 97.8 °F (36.6 °C) (12/14/23 0600)  Pulse: 82 (12/13/23 2050)  Resp: 16 (12/13/23 2050)  BP: (!) 95/52 (12/13/23 2050)  SpO2: 99 % (12/13/23 2050) Vital Signs (24h Range):  Temp:  [97.7 °F (36.5 °C)-98.4 °F (36.9 °C)] 97.8 °F (36.6 °C)  Pulse:  [78-87] 82  Resp:  [16-18] 16  SpO2:  [99 %-100 %] 99 %  BP: ()/(43-57) 95/52     Weight: 88 kg (194 lb 0.1 oz)  Body mass index is 31.31 kg/m².    FHT: 150   No intake or output data in the 24 hours ending 12/14/23 0732    Cervical Exam:  Deferred     Significant Labs:  Recent Lab Results         12/14/23  0604        Baso # 0.06       Basophil % 0.8       Differential Method Automated       Eos # 0.1       Eosinophil % 1.7       Gran # (ANC) 4.8       Gran % 61.1       Hematocrit 28.2       Hemoglobin 9.2       Immature Grans (Abs) 0.04  Comment: Mild elevation in immature granulocytes is non specific and   can be seen in a variety of conditions including stress response,   acute inflammation, trauma and pregnancy. Correlation with other   laboratory and clinical findings is essential.         Immature Granulocytes 0.5       Lymph # 2.1       Lymph % 27.1       MCH 30.5       MCHC 32.6       MCV 93       Mono # 0.7       Mono % 8.8       MPV 10.3       nRBC 0       Platelet Count 218       RBC 3.02       RDW 12.9       WBC 7.85               Physical Exam:   Constitutional: She appears well-developed and well-nourished. No distress.    HENT:   Head: Normocephalic and atraumatic.    Eyes: EOM are normal.     Cardiovascular:  Normal rate.             Pulmonary/Chest: Effort normal. No respiratory distress.        Abdominal: Soft. She exhibits no distension. There is no abdominal tenderness. There is no rebound and no guarding.   Non-tender     Genitourinary:    Genitourinary Comments: No blood or  fluid at perineum             Musculoskeletal: Normal range of motion.       Neurological: She is alert.    Skin: Skin is warm and dry.    Psychiatric: She has a normal mood and affect.       Review of Systems

## 2023-12-14 NOTE — NURSING
RN ABS. Patient denies any bleeding, contractions, pain, or fever like symptoms. Positive fetal movement felt by patient. Fetal heart tones obtained by hand held doppler 135-140s.

## 2023-12-14 NOTE — PROGRESS NOTES
[facility] [place] - Labor & Delivery  Obstetrics  Antepartum Progress Note    Patient Name: Gilberto Bueno  MRN: [id number]  Admission Date: 2023  Hospital Length of Stay: 13 days  Attending Physician: Abimael Henderson MD  Primary Care Provider: Liss Wise MD    Subjective:     Principal Problem:19 weeks gestation of pregnancy    HPI:  44 yo  at 17w4d  Advanced maternal age  Fetus with Down syndrome  Has been with spotting for the past couple of days  Went to our Emergency on 2023 with minimal spotting and occasional abdominal pain.  Ultrasound that day with MARCUS of 5.9 cm  Good fetal movements    Noted with ROM last night at 1800.    Denies fever and chills.  No longer with pain/contractions        Hospital Course:  On Labor and Delivery, vitals stable  FHT at 140   Contraction: None  Cervix: 1 cm  Started on antibiotic therapy.  Zithromax and Ampicillin    2023 No longer with contractions/spotting this morning.  No fundal tenderness.  No leakage of fluid. Does not want to deliver now.  Wants to wait for sealing of the membranes    2023 No pain.  No leakage. No vaginal bleeding. Good fetal movements  2023 HD#3/PPROM Day #4. No pain.  No leakage. No vaginal bleeding. Good fetal movements  12/3/2023 HD#4/PPROM Day #5. No pain.  No leakage. No vaginal bleeding. Good fetal movements    2023 HD#5 PPROM Day #6. No pain.  No leakage. No vaginal bleeding.  Good fetal movements.  Has been ambulating. Bedside ultrasound with no amniotic fluid. Oral ampicillin started.  2023 HD#6 PPROM Day #7. No pain.  No leakage. No vaginal bleeding.  Good fetal movements.  Has been ambulating to the bathroom.  Still in good spirit.  2023 HD#7 PPROM Day #8. No pain.  No leakage. No vaginal bleeding.  Good fetal movements.  Has been ambulating to the bathroom.  Still in good spirit. Had a visit with our hospital  last night  2023 HD#8 PPROM Day #9. No change. No pain.  No leakage.  No vaginal bleeding.  Good fetal movements.  Has been ambulating to the bathroom.  Still in good spirit.   12/8/2023 HD#9 PPROM Day #10. No change. No pain.  No leakage. No vaginal bleeding.  Good fetal movements.  Has been ambulating to the bathroom.  Still in good spirit.  12/9/2023 HD#10 PPROM Day #11.  No change. No pain.  No leakage. No vaginal bleeding.  Good fetal movements.  Has been ambulating to the bathroom.  Still in good spirit.   12/10/2023 HD#11 PPROM Day #12.  No change. No pain.  No leakage. No vaginal bleeding.  Good fetal movements.  Has been ambulating to the bathroom.  Still in good spirit. But has a difficult time sleeping last night     12/11/2023 HD#12 PPROM Day #13. No change. No pain.  No leakage. No vaginal bleeding.  Good fetal movements.  Has been ambulating to the bathroom.  Still in good spirit. Bedside ultrasound by me, 0.5 cm fluid pocket by fetal feet with chord.  FHT at 148   12/12/2023 HD#13 PPROM Day #14. No change. No pain.  No leakage. No vaginal bleeding.  Good fetal movements.  Has been ambulating to the bathroom.  Still in good spirit.   12/13/2023 HD#14 PPROM Day #15. No change. No pain.  No leakage. No vaginal bleeding.  Good fetal movements.  Has been ambulating to the bathroom.  Still in good spirit. Will stop antibiotic therapy after today  12/14/2023 HD#15 PPROM Day #16. No change. No pain.  No leakage. No vaginal bleeding.  Good fetal movements.  Has been ambulating to the bathroom.  Still in good spirit. Will stop antibiotic therapy today    Obstetric HPI:  Patient reports None contractions, active fetal movement, absent vaginal bleeding , absent loss of fluid      Objective:     Vital Signs (Most Recent):  Temp: 97.8 °F (36.6 °C) (12/14/23 0600)  Pulse: 82 (12/13/23 2050)  Resp: 16 (12/13/23 2050)  BP: (!) 95/52 (12/13/23 2050)  SpO2: 99 % (12/13/23 2050) Vital Signs (24h Range):  Temp:  [97.7 °F (36.5 °C)-98.4 °F (36.9 °C)] 97.8 °F (36.6 °C)  Pulse:  [78-87]  82  Resp:  [16-18] 16  SpO2:  [99 %-100 %] 99 %  BP: ()/(43-57) 95/52     Weight: 88 kg (194 lb 0.1 oz)  Body mass index is 31.31 kg/m².    FHT: 150   No intake or output data in the 24 hours ending 23 0732    Cervical Exam:  Deferred     Significant Labs:  Recent Lab Results         23  0604        Baso # 0.06       Basophil % 0.8       Differential Method Automated       Eos # 0.1       Eosinophil % 1.7       Gran # (ANC) 4.8       Gran % 61.1       Hematocrit 28.2       Hemoglobin 9.2       Immature Grans (Abs) 0.04  Comment: Mild elevation in immature granulocytes is non specific and   can be seen in a variety of conditions including stress response,   acute inflammation, trauma and pregnancy. Correlation with other   laboratory and clinical findings is essential.         Immature Granulocytes 0.5       Lymph # 2.1       Lymph % 27.1       MCH 30.5       MCHC 32.6       MCV 93       Mono # 0.7       Mono % 8.8       MPV 10.3       nRBC 0       Platelet Count 218       RBC 3.02       RDW 12.9       WBC 7.85               Physical Exam:   Constitutional: She appears well-developed and well-nourished. No distress.    HENT:   Head: Normocephalic and atraumatic.    Eyes: EOM are normal.     Cardiovascular:  Normal rate.             Pulmonary/Chest: Effort normal. No respiratory distress.        Abdominal: Soft. She exhibits no distension. There is no abdominal tenderness. There is no rebound and no guarding.   Non-tender     Genitourinary:    Genitourinary Comments: No blood or fluid at perineum             Musculoskeletal: Normal range of motion.       Neurological: She is alert.    Skin: Skin is warm and dry.    Psychiatric: She has a normal mood and affect.       Review of Systems  Assessment/Plan:     43 y.o. female  at 19w4d for:    * 19 weeks gestation of pregnancy  Admit for close observation.      Premature rupture of membranes in second trimester  Discussed with patient management  options. Risks and benefits of labor induction vs close observation presented including risks of infection, bleeding and sepsis.  Patient still would like to wait.  Does not want to proceed with delivery/induction  Will follow clinically with CBC/WBC, Temp, and fundal tenderness.  All normal at this time  Would proceed with induction if signs of infection.  Would allow to labor if spontaneous  Continue with antibiotic for now.  Discussed with patient stopping antibiotic after 14 days  All of her questions were answered appropriately.    EVIN hose  Heplock IV  FHT Qshift  OK to ambulate  Daily CBC  Oral ampicillin.  Discussed with patient stopping antibiotic after 14 days. Discontinue antibiotic today    Vaginal bleeding in pregnancy, second trimester  No longer bleeding.  Cervix at 1 cm on admission  Discussed with patient management options. Risks and benefits of labor induction vs close observation presented including risks of infection, bleeding and sepsis.  Patient still would like to wait.  Does not want to proceed with delivery/induction  Will follow clinically with CBC/WBC, Temp, and fundal tenderness.  All normal at this time  Would proceed with induction if signs of infection.  Would allow to labor if spontaneous  Continue with antibiotic for now.  All of her questions were answered appropriately.    EVIN hose  Heplock IV  FHT Qshift  OK to ambulate  Daily CBC  Oral ampicillin. Discussed with patient stopping antibiotic after 14 days. Discontinue antibiotic today    History of  premature rupture of membranes (PROM) in previous pregnancy, currently pregnant in second trimester  Discussed with patient management options. Risks and benefits of labor induction vs close observation presented including risks of infection, bleeding and sepsis.  Patient still would like to wait.  Does not want to proceed with delivery/induction  Will follow clinically with CBC/WBC, Temp, and fundal tenderness.  All normal at  this time  Would proceed with induction if signs of infection.  Would allow to labor if spontaneous  Continue with antibiotic for now.  All of her questions were answered appropriately.    EVIN hose  Heplock IV  FHT Qshift  OK to ambulate  Daily CBC  Oral ampicillin. Discussed with patient stopping antibiotic after 14 days. Discontinue antibiotic today    Maternal serum screen positive for trisomy 21  Now with PROM  Discussed with patient management options. Risks and benefits of labor induction vs close observation presented including risks of infection, bleeding and sepsis.  Patient would like to wait.  Does not want to proceed with delivery/induction  Will follow clinically with CBC/WBC, Temp, and fundal tenderness.  Would proceed with induction if signs of infection.  Would allow to labor if spontaneous  Continue with antibiotic for now.  All of her questions were answered appropriately.    Multigravida of advanced maternal age in second trimester  Now with PROM  Discussed with patient management options. Risks and benefits of labor induction vs close observation presented including risks of infection, bleeding and sepsis.  Patient still would like to wait.  Does not want to proceed with delivery/induction  Will follow clinically with CBC/WBC, Temp, and fundal tenderness.  All normal at this time  Would proceed with induction if signs of infection.  Would allow to labor if spontaneous  Continue with antibiotic for now.  All of her questions were answered appropriately.    EVIN hose  Heplock IV  FHT Qshift  OK to ambulate  Daily CBC  Oral ampicillin. Discussed with patient stopping antibiotic after 14 days.  Discontinue antibiotic today          Abimael Henderson MD  Obstetrics  [facility] [place] - Labor & Delivery

## 2023-12-14 NOTE — PLAN OF CARE
Recommendations    1. Continue Regular Diet; monitoring PO intake of meals and snacks.   2. If intake remains below 50%, consider  ONS Boost to help meet EEN/EPN.   3. Continue to monitor weights and labs.   4. Collaboration with medical providers    Goals: 1. Pt to meet % EEN/EPN by RD follow up  Nutrition Goal Status: new  Communication of RD Recs:  (POC)    Assessment and Plan    Nutrition Problem  Increased nutrient needs    Related to (etiology):   Diagnosis related symptoms    Signs and Symptoms (as evidenced by):   19 weeks gestation of pregnancy    Interventions/Recommendations (treatment strategy):  Collaboration with medical providers    Nutrition Diagnosis Status:   New    Hydroxychloroquine Counseling:  I discussed with the patient that a baseline ophthalmologic exam is needed at the start of therapy and every year thereafter while on therapy. A CBC may also be warranted for monitoring.  The side effects of this medication were discussed with the patient, including but not limited to agranulocytosis, aplastic anemia, seizures, rashes, retinopathy, and liver toxicity. Patient instructed to call the office should any adverse effect occur.  The patient verbalized understanding of the proper use and possible adverse effects of Plaquenil.  All the patient's questions and concerns were addressed.

## 2023-12-14 NOTE — NURSING
RN at bedside for medication and doppler FHT per pt request. Pt laying in bed on phone. FHTs 145bpm.

## 2023-12-14 NOTE — ASSESSMENT & PLAN NOTE
No longer bleeding.  Cervix at 1 cm on admission  Discussed with patient management options. Risks and benefits of labor induction vs close observation presented including risks of infection, bleeding and sepsis.  Patient still would like to wait.  Does not want to proceed with delivery/induction  Will follow clinically with CBC/WBC, Temp, and fundal tenderness.  All normal at this time  Would proceed with induction if signs of infection.  Would allow to labor if spontaneous  Continue with antibiotic for now.  All of her questions were answered appropriately.    EVIN loera  Heplock IV  FHT Qshift  OK to ambulate  Daily CBC  Oral ampicillin. Discussed with patient stopping antibiotic after 14 days. Discontinue antibiotic today

## 2023-12-15 PROCEDURE — 11000001 HC ACUTE MED/SURG PRIVATE ROOM

## 2023-12-15 PROCEDURE — 25000003 PHARM REV CODE 250: Performed by: OBSTETRICS & GYNECOLOGY

## 2023-12-15 PROCEDURE — 99232 SBSQ HOSP IP/OBS MODERATE 35: CPT | Mod: ,,, | Performed by: OBSTETRICS & GYNECOLOGY

## 2023-12-15 RX ORDER — FERROUS GLUCONATE 324(37.5)
324 TABLET ORAL
Status: DISCONTINUED | OUTPATIENT
Start: 2023-12-15 | End: 2023-12-24

## 2023-12-15 RX ORDER — FERROUS GLUCONATE 324(37.5)
324 TABLET ORAL
Status: DISCONTINUED | OUTPATIENT
Start: 2023-12-15 | End: 2023-12-15

## 2023-12-15 RX ADMIN — Medication 324 MG: at 05:12

## 2023-12-15 RX ADMIN — ZOLPIDEM TARTRATE 5 MG: 5 TABLET ORAL at 12:12

## 2023-12-15 RX ADMIN — PRENATAL VIT W/ FE FUMARATE-FA TAB 27-0.8 MG 1 TABLET: 27-0.8 TAB at 08:12

## 2023-12-15 RX ADMIN — LORAZEPAM 0.5 MG: 0.5 TABLET ORAL at 12:12

## 2023-12-15 NOTE — NURSING
Spoke with Dr. Henderson in regards to POC. MD starting PO Iron. Wants patient to not take with prenatal. Agree to do Iron with dinner and prenatal in am with breakfast.

## 2023-12-15 NOTE — ASSESSMENT & PLAN NOTE
Discussed with patient management options. Risks and benefits of labor induction vs close observation presented including risks of infection, bleeding and sepsis.  Patient still would like to wait.  Does not want to proceed with delivery/induction  Will follow clinically with CBC/WBC, Temp, and fundal tenderness.  All normal at this time  Would proceed with induction if signs of infection.  Would allow to labor if spontaneous  Continue with antibiotic for now.  All of her questions were answered appropriately.    EVIN loera  Heplock IV  FHT Qshift  OK to ambulate  Daily CBC  Oral ampicillin. Discussed with patient stopping antibiotic after 14 days. Antibiotic discontinued yesterday, 12/14/2023

## 2023-12-15 NOTE — ASSESSMENT & PLAN NOTE
No longer bleeding.  Cervix at 1 cm on admission  Discussed with patient management options. Risks and benefits of labor induction vs close observation presented including risks of infection, bleeding and sepsis.  Patient still would like to wait.  Does not want to proceed with delivery/induction  Will follow clinically with CBC/WBC, Temp, and fundal tenderness.  All normal at this time  Would proceed with induction if signs of infection.  Would allow to labor if spontaneous  Continue with antibiotic for now.  All of her questions were answered appropriately.    EVIN loera  Heplock IV  FHT Qshift  OK to ambulate  Daily CBC  Oral ampicillin. Discussed with patient stopping antibiotic after 14 days. Antibiotic discontinued yesterday, 12/14/2023

## 2023-12-15 NOTE — ASSESSMENT & PLAN NOTE
Now with PROM  Discussed with patient management options. Risks and benefits of labor induction vs close observation presented including risks of infection, bleeding and sepsis.  Patient still would like to wait.  Does not want to proceed with delivery/induction  Will follow clinically with CBC/WBC, Temp, and fundal tenderness.  All normal at this time  Would proceed with induction if signs of infection.  Would allow to labor if spontaneous  Continue with antibiotic for now.  All of her questions were answered appropriately.    EVIN loera  Heplock IV  FHT Qshift  OK to ambulate  Daily CBC  Oral ampicillin. Discussed with patient stopping antibiotic after 14 days. Antibiotic discontinued yesterday, 12/14/2023

## 2023-12-15 NOTE — ASSESSMENT & PLAN NOTE
Discussed with patient management options. Risks and benefits of labor induction vs close observation presented including risks of infection, bleeding and sepsis.  Patient still would like to wait.  Does not want to proceed with delivery/induction  Will follow clinically with CBC/WBC, Temp, and fundal tenderness.  All normal at this time  Would proceed with induction if signs of infection.  Would allow to labor if spontaneous  Continue with antibiotic for now.  Discussed with patient stopping antibiotic after 14 days  All of her questions were answered appropriately.    EVIN loera  Heplock IV  FHT Qshift  OK to ambulate  Daily CBC  Oral ampicillin.  Discussed with patient stopping antibiotic after 14 days. Antibiotic discontinued yesterday, 12/14/2023

## 2023-12-15 NOTE — PROGRESS NOTES
[facility] [place]stetrics  Antepartum Progress Note    Patient Name: Gilberto Bueno  MRN: [id number]  Admission Date: 2023  Hospital Length of Stay: 14 days  Attending Physician: Abimael Henderson MD  Primary Care Provider: Liss Wise MD    Subjective:     Principal Problem:19 weeks gestation of pregnancy    HPI:  42 yo  at 17w4d  Advanced maternal age  Fetus with Down syndrome  Has been with spotting for the past couple of days  Went to our Emergency on 2023 with minimal spotting and occasional abdominal pain.  Ultrasound that day with MARCUS of 5.9 cm  Good fetal movements    Noted with ROM last night at 1800.    Denies fever and chills.  No longer with pain/contractions        Hospital Course:  On Labor and Delivery, vitals stable  FHT at 140   Contraction: None  Cervix: 1 cm  Started on antibiotic therapy.  Zithromax and Ampicillin    2023 No longer with contractions/spotting this morning.  No fundal tenderness.  No leakage of fluid. Does not want to deliver now.  Wants to wait for sealing of the membranes    2023 No pain.  No leakage. No vaginal bleeding. Good fetal movements  2023 HD#3/PPROM Day #4. No pain.  No leakage. No vaginal bleeding. Good fetal movements  12/3/2023 HD#4/PPROM Day #5. No pain.  No leakage. No vaginal bleeding. Good fetal movements    2023 HD#5 PPROM Day #6. No pain.  No leakage. No vaginal bleeding.  Good fetal movements.  Has been ambulating. Bedside ultrasound with no amniotic fluid. Oral ampicillin started.  2023 HD#6 PPROM Day #7. No pain.  No leakage. No vaginal bleeding.  Good fetal movements.  Has been ambulating to the bathroom.  Still in good spirit.  2023 HD#7 PPROM Day #8. No pain.  No leakage. No vaginal bleeding.  Good fetal movements.  Has been ambulating to the bathroom.  Still in good spirit. Had a visit with our hospital  last night  2023 HD#8 PPROM Day #9. No change. No pain.  No leakage.  No vaginal bleeding.  Good fetal movements.  Has been ambulating to the bathroom.  Still in good spirit.   12/8/2023 HD#9 PPROM Day #10. No change. No pain.  No leakage. No vaginal bleeding.  Good fetal movements.  Has been ambulating to the bathroom.  Still in good spirit.  12/9/2023 HD#10 PPROM Day #11.  No change. No pain.  No leakage. No vaginal bleeding.  Good fetal movements.  Has been ambulating to the bathroom.  Still in good spirit.   12/10/2023 HD#11 PPROM Day #12.  No change. No pain.  No leakage. No vaginal bleeding.  Good fetal movements.  Has been ambulating to the bathroom.  Still in good spirit. But has a difficult time sleeping last night     12/11/2023 HD#12 PPROM Day #13. No change. No pain.  No leakage. No vaginal bleeding.  Good fetal movements.  Has been ambulating to the bathroom.  Still in good spirit. Bedside ultrasound by me, 0.5 cm fluid pocket by fetal feet with chord.  FHT at 148   12/12/2023 HD#13 PPROM Day #14. No change. No pain.  No leakage. No vaginal bleeding.  Good fetal movements.  Has been ambulating to the bathroom.  Still in good spirit.   12/13/2023 HD#14 PPROM Day #15. No change. No pain.  No leakage. No vaginal bleeding.  Good fetal movements.  Has been ambulating to the bathroom.  Still in good spirit. Will stop antibiotic therapy after today  12/14/2023 HD#15 PPROM Day #16. No change. No pain.  No leakage. No vaginal bleeding.  Good fetal movements.  Has been ambulating to the bathroom.  Still in good spirit. Will stop antibiotic therapy today  12/15/2023 HD#16 PPROM Day #17. No change. No pain.  No leakage. No vaginal bleeding.  Good fetal movements.  Has been ambulating to the bathroom.  Still in good spirit.  Antibiotic discontinued yesterday.    Obstetric HPI:  Patient reports None contractions, active fetal movement, absent vaginal bleeding , absent loss of fluid      Objective:     Vital Signs (Most Recent):  Temp: 97.8 °F (36.6 °C) (12/15/23 0035)  Pulse: 76  (23)  Resp: 16 (23)  BP: (!) 92/45 (23)  SpO2: 100 % (23) Vital Signs (24h Range):  Temp:  [97.8 °F (36.6 °C)-99.3 °F (37.4 °C)] 97.8 °F (36.6 °C)  Pulse:  [76-80] 76  Resp:  [16-18] 16  SpO2:  [99 %-100 %] 100 %  BP: ()/(45-59) 92/45     Weight: 88 kg (194 lb)  Body mass index is 31.31 kg/m².    FHT: 150  No intake or output data in the 24 hours ending 12/15/23 0751    Cervical Exam:  Deferred     Significant Labs:  Recent Lab Results       None            Physical Exam:   Constitutional: She appears well-developed and well-nourished. No distress.    HENT:   Head: Normocephalic and atraumatic.    Eyes: EOM are normal.     Cardiovascular:  Normal rate.             Pulmonary/Chest: Effort normal. No respiratory distress.        Abdominal: Soft. She exhibits no distension. There is no abdominal tenderness. There is no rebound and no guarding.   Non-tender     Genitourinary:    Genitourinary Comments: No blood or fluid at perineum             Musculoskeletal: Normal range of motion.       Neurological: She is alert.    Skin: Skin is warm and dry.    Psychiatric: She has a normal mood and affect.       Review of Systems  Assessment/Plan:     43 y.o. female  at 19w5d for:    * 19 weeks gestation of pregnancy  Admit for close observation.      Premature rupture of membranes in second trimester  Discussed with patient management options. Risks and benefits of labor induction vs close observation presented including risks of infection, bleeding and sepsis.  Patient still would like to wait.  Does not want to proceed with delivery/induction  Will follow clinically with CBC/WBC, Temp, and fundal tenderness.  All normal at this time  Would proceed with induction if signs of infection.  Would allow to labor if spontaneous  Continue with antibiotic for now.  Discussed with patient stopping antibiotic after 14 days  All of her questions were answered appropriately.    EVIN  salinase  Heplock IV  FHT Qshift  OK to ambulate  Daily CBC  Oral ampicillin.  Discussed with patient stopping antibiotic after 14 days. Antibiotic discontinued yesterday, 2023    Vaginal bleeding in pregnancy, second trimester  No longer bleeding.  Cervix at 1 cm on admission  Discussed with patient management options. Risks and benefits of labor induction vs close observation presented including risks of infection, bleeding and sepsis.  Patient still would like to wait.  Does not want to proceed with delivery/induction  Will follow clinically with CBC/WBC, Temp, and fundal tenderness.  All normal at this time  Would proceed with induction if signs of infection.  Would allow to labor if spontaneous  Continue with antibiotic for now.  All of her questions were answered appropriately.    EVIN hose  Heplock IV  FHT Qshift  OK to ambulate  Daily CBC  Oral ampicillin. Discussed with patient stopping antibiotic after 14 days. Antibiotic discontinued yesterday, 2023    History of  premature rupture of membranes (PROM) in previous pregnancy, currently pregnant in second trimester  Discussed with patient management options. Risks and benefits of labor induction vs close observation presented including risks of infection, bleeding and sepsis.  Patient still would like to wait.  Does not want to proceed with delivery/induction  Will follow clinically with CBC/WBC, Temp, and fundal tenderness.  All normal at this time  Would proceed with induction if signs of infection.  Would allow to labor if spontaneous  Continue with antibiotic for now.  All of her questions were answered appropriately.    EVIN loera  Heplock IV  FHT Qshift  OK to ambulate  Daily CBC  Oral ampicillin. Discussed with patient stopping antibiotic after 14 days. Antibiotic discontinued yesterday, 2023    Maternal serum screen positive for trisomy 21  Now with PROM  Discussed with patient management options. Risks and benefits of labor  induction vs close observation presented including risks of infection, bleeding and sepsis.  Patient would like to wait.  Does not want to proceed with delivery/induction  Will follow clinically with CBC/WBC, Temp, and fundal tenderness.  Would proceed with induction if signs of infection.  Would allow to labor if spontaneous  Continue with antibiotic for now.  All of her questions were answered appropriately.    Multigravida of advanced maternal age in second trimester  Now with PROM  Discussed with patient management options. Risks and benefits of labor induction vs close observation presented including risks of infection, bleeding and sepsis.  Patient still would like to wait.  Does not want to proceed with delivery/induction  Will follow clinically with CBC/WBC, Temp, and fundal tenderness.  All normal at this time  Would proceed with induction if signs of infection.  Would allow to labor if spontaneous  Continue with antibiotic for now.  All of her questions were answered appropriately.    EVIN loera  Heplock IV  FHT Qshift  OK to ambulate  Daily CBC  Oral ampicillin. Discussed with patient stopping antibiotic after 14 days. Antibiotic discontinued yesterday, 12/14/2023          Abimael Henderson MD  Obstetrics  [facility] - [place] - [place] & [place]

## 2023-12-15 NOTE — NURSING
RN ABS. Patient denies bleeding, cramping, fever like symptoms, or any changes at this time.     Fetal heart tones dopplered at this time. 135-145s.

## 2023-12-15 NOTE — SUBJECTIVE & OBJECTIVE
Obstetric HPI:  Patient reports None contractions, active fetal movement, absent vaginal bleeding , absent loss of fluid      Objective:     Vital Signs (Most Recent):  Temp: 97.8 °F (36.6 °C) (12/15/23 0035)  Pulse: 76 (12/14/23 2200)  Resp: 16 (12/14/23 2200)  BP: (!) 92/45 (12/14/23 2200)  SpO2: 100 % (12/14/23 2200) Vital Signs (24h Range):  Temp:  [97.8 °F (36.6 °C)-99.3 °F (37.4 °C)] 97.8 °F (36.6 °C)  Pulse:  [76-80] 76  Resp:  [16-18] 16  SpO2:  [99 %-100 %] 100 %  BP: ()/(45-59) 92/45     Weight: 88 kg (194 lb)  Body mass index is 31.31 kg/m².    FHT: 150  No intake or output data in the 24 hours ending 12/15/23 0751    Cervical Exam:  Deferred     Significant Labs:  Recent Lab Results       None            Physical Exam:   Constitutional: She appears well-developed and well-nourished. No distress.    HENT:   Head: Normocephalic and atraumatic.    Eyes: EOM are normal.     Cardiovascular:  Normal rate.             Pulmonary/Chest: Effort normal. No respiratory distress.        Abdominal: Soft. She exhibits no distension. There is no abdominal tenderness. There is no rebound and no guarding.   Non-tender     Genitourinary:    Genitourinary Comments: No blood or fluid at perineum             Musculoskeletal: Normal range of motion.       Neurological: She is alert.    Skin: Skin is warm and dry.    Psychiatric: She has a normal mood and affect.       Review of Systems

## 2023-12-16 PROCEDURE — 11000001 HC ACUTE MED/SURG PRIVATE ROOM

## 2023-12-16 PROCEDURE — 25000003 PHARM REV CODE 250: Performed by: OBSTETRICS & GYNECOLOGY

## 2023-12-16 PROCEDURE — 99231 SBSQ HOSP IP/OBS SF/LOW 25: CPT | Mod: ,,, | Performed by: STUDENT IN AN ORGANIZED HEALTH CARE EDUCATION/TRAINING PROGRAM

## 2023-12-16 RX ADMIN — LORAZEPAM 0.5 MG: 0.5 TABLET ORAL at 01:12

## 2023-12-16 RX ADMIN — ZOLPIDEM TARTRATE 5 MG: 5 TABLET ORAL at 01:12

## 2023-12-16 RX ADMIN — Medication 324 MG: at 04:12

## 2023-12-16 RX ADMIN — PRENATAL VIT W/ FE FUMARATE-FA TAB 27-0.8 MG 1 TABLET: 27-0.8 TAB at 08:12

## 2023-12-16 RX ADMIN — ZOLPIDEM TARTRATE 5 MG: 5 TABLET ORAL at 11:12

## 2023-12-16 RX ADMIN — LORAZEPAM 0.5 MG: 0.5 TABLET ORAL at 11:12

## 2023-12-16 NOTE — PROGRESS NOTES
[facility] - [place] - Labor & Delivery  Obstetrics  Antepartum Progress Note    Patient Name: Gilberto Bueno  MRN: [id number]  Admission Date: 2023  Hospital Length of Stay: 15 days  Attending Physician: Abimael Henderson MD  Primary Care Provider: Liss Wise MD    Subjective:     Principal Problem:19 weeks gestation of pregnancy    HPI: 42 yo  at 19w6d presented to hospital with concern for her water breaking on 23, confirmed with ROM+ and physical exam      2023 No longer with contractions/spotting this morning.  No fundal tenderness.  No leakage of fluid. Does not want to deliver now.  Wants to wait for sealing of the membranes     2023 No pain.  No leakage. No vaginal bleeding. Good fetal movements  2023 HD#3/PPROM Day #4. No pain.  No leakage. No vaginal bleeding. Good fetal movements  12/3/2023 HD#4/PPROM Day #5. No pain.  No leakage. No vaginal bleeding. Good fetal movements     2023 HD#5 PPROM Day #6. No pain.  No leakage. No vaginal bleeding.  Good fetal movements.  Has been ambulating. Bedside ultrasound with no amniotic fluid. Oral ampicillin started.  2023 HD#6 PPROM Day #7. No pain.  No leakage. No vaginal bleeding.  Good fetal movements.  Has been ambulating to the bathroom.  Still in good spirit.  2023 HD#7 PPROM Day #8. No pain.  No leakage. No vaginal bleeding.  Good fetal movements.  Has been ambulating to the bathroom.  Still in good spirit. Had a visit with our hospital  last night  2023 HD#8 PPROM Day #9. No change. No pain.  No leakage. No vaginal bleeding.  Good fetal movements.  Has been ambulating to the bathroom.  Still in good spirit.   2023 HD#9 PPROM Day #10. No change. No pain.  No leakage. No vaginal bleeding.  Good fetal movements.  Has been ambulating to the bathroom.  Still in good spirit.  2023 HD#10 PPROM Day #11.  No change. No pain.  No leakage. No vaginal bleeding.  Good fetal movements.  Has been ambulating  to the bathroom.  Still in good spirit.   12/10/2023 HD#11 PPROM Day #12.  No change. No pain.  No leakage. No vaginal bleeding.  Good fetal movements.  Has been ambulating to the bathroom.  Still in good spirit. But has a difficult time sleeping last night      12/11/2023 HD#12 PPROM Day #13. No change. No pain.  No leakage. No vaginal bleeding.  Good fetal movements.  Has been ambulating to the bathroom.  Still in good spirit. Bedside ultrasound by me, 0.5 cm fluid pocket by fetal feet with chord.  FHT at 148   12/12/2023 HD#13 PPROM Day #14. No change. No pain.  No leakage. No vaginal bleeding.  Good fetal movements.  Has been ambulating to the bathroom.  Still in good spirit.   12/13/2023 HD#14 PPROM Day #15. No change. No pain.  No leakage. No vaginal bleeding.  Good fetal movements.  Has been ambulating to the bathroom.  Still in good spirit. Will stop antibiotic therapy after today  12/14/2023 HD#15 PPROM Day #16. No change. No pain.  No leakage. No vaginal bleeding.  Good fetal movements.  Has been ambulating to the bathroom.  Still in good spirit. Will stop antibiotic therapy today  12/15/2023 HD#16 PPROM Day #17. No change. No pain.  No leakage. No vaginal bleeding.  Good fetal movements.  Has been ambulating to the bathroom.  Still in good spirit.  Antibiotic discontinued yesterday.  12/16/2023: HD #17 PPROM Day #18. No change in patient status, still comfortable without abdominal pain. Denies change in vaginal discharge.      Objective:     Vital Signs (Most Recent):  Temp: 98.4 °F (36.9 °C) (12/16/23 1035)  Pulse: 96 (12/16/23 1035)  Resp: 14 (12/16/23 1035)  BP: (!) 104/56 (12/16/23 1035)  SpO2: 97 % (12/16/23 1035) Vital Signs (24h Range):  Temp:  [97.6 °F (36.4 °C)-98.5 °F (36.9 °C)] 98.4 °F (36.9 °C)  Pulse:  [90-96] 96  Resp:  [14-18] 14  SpO2:  [97 %-99 %] 97 %  BP: ()/(50-56) 104/56     Weight: 88 kg (194 lb)  Body mass index is 31.31 kg/m².      No intake or output data in the 24  hours ending 23 1346    Physical Exam:   Constitutional: She is oriented to person, place, and time. She appears well-developed and well-nourished.    HENT:   Head: Normocephalic and atraumatic.    Eyes: Conjunctivae and EOM are normal.           Abdominal: Soft.             Musculoskeletal: Normal range of motion.       Neurological: She is alert and oriented to person, place, and time.    Skin: Skin is warm and dry.    Psychiatric: She has a normal mood and affect.           Assessment/Plan:     43 y.o. female  at 19w6d for:    Active Diagnoses:    Diagnosis Date Noted POA    PRINCIPAL PROBLEM:  19 weeks gestation of pregnancy [Z3A.19] 2023 Not Applicable    Premature rupture of membranes in second trimester [O42.912] 2023 Yes    Vaginal bleeding in pregnancy, second trimester [O46.92] 2023 Yes    History of  premature rupture of membranes (PROM) in previous pregnancy, currently pregnant in second trimester [O09.292] 2023 Not Applicable    Maternal serum screen positive for trisomy 21 [O28.5] 2023 Yes    Multigravida of advanced maternal age in second trimester [O09.522] 2019 Yes      Problems Resolved During this Admission:       - Continue inpatient management of Previable PROM.   - Abx d/c yesterday   - Last CBC normal  without elevated WBC, re-draw if concern for infection      Iii Samir Gayle MD  Obstetrics  [facility] [place] & [place]

## 2023-12-16 NOTE — PLAN OF CARE
[facility] - Labor & Delivery  OB Initial Discharge Assessment       Primary Care Provider: Dr. Henderson    Expected Discharge Date: 12/2/2023    Initial Assessment (most recent)       OB Discharge Planning Assessment - 12/16/23 1449          OB Discharge Planning Assessment    Assessment Type Discharge Planning Assessment     Source of Information patient     Verified Demographic and Insurance Information Yes     Insurance Medicaid     Medicaid [employer]     Pastoral Care/Clergy/ Contact Status none needed     People in Home child(carlos), dependent     Received Prenatal Care Yes     Transportation Anticipated car, drives self     Receive WIC Benefits Not interested      Arrangements Self     Adoption Planned no     Equipment Currently Used at Home none     DME Needed Upon Discharge  none     Discharge Plan A Home     Discharge Plan B Home     Do you have any problems affording any of your prescribed medications? TYSON BOOKER met with patient at the bedside for assessment.  Patient stated that she would have everything needed for baby.  She was not interested in WIC.  Was in the middle of a family crisis via telephone and did not have time to continue with the assessment.  Case Management will continue to followup.

## 2023-12-17 ENCOUNTER — PATIENT MESSAGE (OUTPATIENT)
Dept: OTHER | Facility: OTHER | Age: 43
End: 2023-12-17
Payer: MEDICAID

## 2023-12-17 PROCEDURE — 25000003 PHARM REV CODE 250: Performed by: OBSTETRICS & GYNECOLOGY

## 2023-12-17 PROCEDURE — 11000001 HC ACUTE MED/SURG PRIVATE ROOM

## 2023-12-17 RX ADMIN — ACETAMINOPHEN 1000 MG: 500 TABLET, FILM COATED ORAL at 08:12

## 2023-12-17 RX ADMIN — Medication 324 MG: at 05:12

## 2023-12-17 RX ADMIN — PRENATAL VIT W/ FE FUMARATE-FA TAB 27-0.8 MG 1 TABLET: 27-0.8 TAB at 08:12

## 2023-12-17 NOTE — PLAN OF CARE
Problem: Prelabor Rupture of Membranes  Goal: Delayed Delivery with Absence of Infection  Outcome: Ongoing, Progressing

## 2023-12-17 NOTE — SUBJECTIVE & OBJECTIVE
Obstetric HPI:  Patient reports None contractions, active fetal movement, absent vaginal bleeding , absent loss of fluid      Objective:     Vital Signs (Most Recent):  Temp: 98.4 °F (36.9 °C) (12/16/23 1948)  Pulse: 86 (12/16/23 1948)  Resp: 16 (12/16/23 1948)  BP: (!) 112/56 (12/16/23 1948)  SpO2: 97 % (12/16/23 1947) Vital Signs (24h Range):  Temp:  [98.4 °F (36.9 °C)] 98.4 °F (36.9 °C)  Pulse:  [83-96] 86  Resp:  [14-16] 16  SpO2:  [97 %] 97 %  BP: (104-112)/(56) 112/56     Weight: 88 kg (194 lb)  Body mass index is 31.31 kg/m².    FHT: 150     No intake or output data in the 24 hours ending 12/17/23 0704      Significant Labs:  Recent Lab Results       None          Recent Labs   Lab 12/18/23  0603   WBC 9.27   RBC 3.15*   HGB 9.5*   HCT 29.2*      MCV 93   MCH 30.2   MCHC 32.5      Physical Exam:   Constitutional: She is oriented to person, place, and time. She appears well-developed and well-nourished.    HENT:   Head: Normocephalic and atraumatic.    Eyes: Conjunctivae and EOM are normal.     Cardiovascular:  Normal rate.             Pulmonary/Chest: Effort normal.        Abdominal: Soft. There is no abdominal tenderness.             Musculoskeletal: Normal range of motion.       Neurological: She is alert and oriented to person, place, and time.    Skin: Skin is warm and dry.        Review of Systems   All other systems reviewed and are negative.

## 2023-12-18 PROBLEM — Z3A.20 20 WEEKS GESTATION OF PREGNANCY: Status: ACTIVE | Noted: 2023-11-30

## 2023-12-18 LAB
BASOPHILS # BLD AUTO: 0.07 K/UL (ref 0–0.2)
BASOPHILS NFR BLD: 0.8 % (ref 0–1.9)
DIFFERENTIAL METHOD BLD: ABNORMAL
EOSINOPHIL # BLD AUTO: 0.2 K/UL (ref 0–0.5)
EOSINOPHIL NFR BLD: 1.7 % (ref 0–8)
ERYTHROCYTE [DISTWIDTH] IN BLOOD BY AUTOMATED COUNT: 13.1 % (ref 11.5–14.5)
HCT VFR BLD AUTO: 29.2 % (ref 37–48.5)
HGB BLD-MCNC: 9.5 G/DL (ref 12–16)
IMM GRANULOCYTES # BLD AUTO: 0.04 K/UL (ref 0–0.04)
IMM GRANULOCYTES NFR BLD AUTO: 0.4 % (ref 0–0.5)
LYMPHOCYTES # BLD AUTO: 1.9 K/UL (ref 1–4.8)
LYMPHOCYTES NFR BLD: 20.3 % (ref 18–48)
MCH RBC QN AUTO: 30.2 PG (ref 27–31)
MCHC RBC AUTO-ENTMCNC: 32.5 G/DL (ref 32–36)
MCV RBC AUTO: 93 FL (ref 82–98)
MONOCYTES # BLD AUTO: 0.7 K/UL (ref 0.3–1)
MONOCYTES NFR BLD: 7.2 % (ref 4–15)
NEUTROPHILS # BLD AUTO: 6.5 K/UL (ref 1.8–7.7)
NEUTROPHILS NFR BLD: 69.6 % (ref 38–73)
NRBC BLD-RTO: 0 /100 WBC
PLATELET # BLD AUTO: 192 K/UL (ref 150–450)
PMV BLD AUTO: 10.5 FL (ref 9.2–12.9)
RBC # BLD AUTO: 3.15 M/UL (ref 4–5.4)
WBC # BLD AUTO: 9.27 K/UL (ref 3.9–12.7)

## 2023-12-18 PROCEDURE — 99232 SBSQ HOSP IP/OBS MODERATE 35: CPT | Mod: ,,, | Performed by: OBSTETRICS & GYNECOLOGY

## 2023-12-18 PROCEDURE — 25000003 PHARM REV CODE 250: Performed by: OBSTETRICS & GYNECOLOGY

## 2023-12-18 PROCEDURE — 85025 COMPLETE CBC W/AUTO DIFF WBC: CPT | Performed by: STUDENT IN AN ORGANIZED HEALTH CARE EDUCATION/TRAINING PROGRAM

## 2023-12-18 PROCEDURE — 36415 COLL VENOUS BLD VENIPUNCTURE: CPT | Performed by: STUDENT IN AN ORGANIZED HEALTH CARE EDUCATION/TRAINING PROGRAM

## 2023-12-18 PROCEDURE — 11000001 HC ACUTE MED/SURG PRIVATE ROOM

## 2023-12-18 RX ORDER — ONDANSETRON 8 MG/1
8 TABLET, ORALLY DISINTEGRATING ORAL EVERY 8 HOURS PRN
Status: DISCONTINUED | OUTPATIENT
Start: 2023-12-18 | End: 2023-12-24

## 2023-12-18 RX ADMIN — PRENATAL VIT W/ FE FUMARATE-FA TAB 27-0.8 MG 1 TABLET: 27-0.8 TAB at 09:12

## 2023-12-18 RX ADMIN — Medication 324 MG: at 07:12

## 2023-12-18 RX ADMIN — ACETAMINOPHEN 1000 MG: 500 TABLET, FILM COATED ORAL at 09:12

## 2023-12-18 RX ADMIN — ZOLPIDEM TARTRATE 5 MG: 5 TABLET ORAL at 12:12

## 2023-12-18 RX ADMIN — LORAZEPAM 0.5 MG: 0.5 TABLET ORAL at 12:12

## 2023-12-18 RX ADMIN — ACETAMINOPHEN 1000 MG: 500 TABLET, FILM COATED ORAL at 12:12

## 2023-12-18 RX ADMIN — ACETAMINOPHEN 1000 MG: 500 TABLET, FILM COATED ORAL at 07:12

## 2023-12-18 RX ADMIN — ONDANSETRON 8 MG: 8 TABLET, ORALLY DISINTEGRATING ORAL at 07:12

## 2023-12-18 NOTE — ASSESSMENT & PLAN NOTE
Discussed with patient management options. Risks and benefits of labor induction vs close observation presented including risks of infection, bleeding and sepsis.  Patient still would like to wait.  Does not want to proceed with delivery/induction  Will follow clinically with CBC/WBC, Temp, and fundal tenderness.  All normal at this time  Would proceed with induction if signs of infection.  Would allow to labor if spontaneous  Continue with antibiotic for now.  Discussed with patient stopping antibiotic after 14 days  All of her questions were answered appropriately.    EVIN loera  Heplock IV  FHT Qshift  OK to ambulate  Daily CBC  Was on oral ampicillin. Antibiotic discontinued on 12/14/2023, after 14 days

## 2023-12-18 NOTE — ASSESSMENT & PLAN NOTE
No longer bleeding.  Cervix at 1 cm on admission  Discussed with patient management options. Risks and benefits of labor induction vs close observation presented including risks of infection, bleeding and sepsis.  Patient still would like to wait.  Does not want to proceed with delivery/induction  Will follow clinically with CBC/WBC, Temp, and fundal tenderness.  All normal at this time  Would proceed with induction if signs of infection.  Would allow to labor if spontaneous  Continue with antibiotic for now.  All of her questions were answered appropriately.    EVIN loera  Heplock IV  FHT Qshift  OK to ambulate  Daily CBC  Was on oral ampicillin. Antibiotic discontinued on 12/14/2023, after 14 days

## 2023-12-18 NOTE — ASSESSMENT & PLAN NOTE
Now with PROM  Discussed with patient management options. Risks and benefits of labor induction vs close observation presented including risks of infection, bleeding and sepsis.  Patient still would like to wait.  Does not want to proceed with delivery/induction  Will follow clinically with CBC/WBC, Temp, and fundal tenderness.  All normal at this time  Would proceed with induction if signs of infection.  Would allow to labor if spontaneous  Continue with antibiotic for now.  All of her questions were answered appropriately.    EVIN loera  Heplock IV  FHT Qshift  OK to ambulate  Daily CBC  Was on oral ampicillin. Antibiotic discontinued on 12/14/2023, after 14 days

## 2023-12-18 NOTE — NURSING
Notified Dr. Zuniga who is covering for Dr. Henderson that pt is refusing treatment until 7pm shift change

## 2023-12-18 NOTE — ASSESSMENT & PLAN NOTE
Discussed with patient management options. Risks and benefits of labor induction vs close observation presented including risks of infection, bleeding and sepsis.  Patient still would like to wait.  Does not want to proceed with delivery/induction  Will follow clinically with CBC/WBC, Temp, and fundal tenderness.  All normal at this time  Would proceed with induction if signs of infection.  Would allow to labor if spontaneous  Continue with antibiotic for now.  All of her questions were answered appropriately.    EVIN loera  Heplock IV  FHT Qshift  OK to ambulate  Daily CBC  Was on oral ampicillin. Antibiotic discontinued on 12/14/2023, after 14 days

## 2023-12-18 NOTE — PROGRESS NOTES
[facility] - [place] - Labor & Delivery  Obstetrics  Antepartum Progress Note    Patient Name: Gilberto Bueno  MRN: [id number]  Admission Date: 2023  Hospital Length of Stay: 17 days  Attending Physician: Abimael Henderson MD  Primary Care Provider: Liss Wise MD    Subjective:     Principal Problem:20 weeks gestation of pregnancy    HPI:  42 yo  at 17w4d  Advanced maternal age  Fetus with Down syndrome  Has been with spotting for the past couple of days  Went to our Emergency on 2023 with minimal spotting and occasional abdominal pain.  Ultrasound that day with MARCUS of 5.9 cm  Good fetal movements    Noted with ROM last night at 1800.    Denies fever and chills.  No longer with pain/contractions        Hospital Course:  On Labor and Delivery, vitals stable  FHT at 140   Contraction: None  Cervix: 1 cm  Started on antibiotic therapy.  Zithromax and Ampicillin    2023 No longer with contractions/spotting this morning.  No fundal tenderness.  No leakage of fluid. Does not want to deliver now.  Wants to wait for sealing of the membranes    2023 No pain.  No leakage. No vaginal bleeding. Good fetal movements  2023 HD#3/PPROM Day #4. No pain.  No leakage. No vaginal bleeding. Good fetal movements  12/3/2023 HD#4/PPROM Day #5. No pain.  No leakage. No vaginal bleeding. Good fetal movements    2023 HD#5 PPROM Day #6. No pain.  No leakage. No vaginal bleeding.  Good fetal movements.  Has been ambulating. Bedside ultrasound with no amniotic fluid. Oral ampicillin started.  2023 HD#6 PPROM Day #7. No pain.  No leakage. No vaginal bleeding.  Good fetal movements.  Has been ambulating to the bathroom.  Still in good spirit.  2023 HD#7 PPROM Day #8. No pain.  No leakage. No vaginal bleeding.  Good fetal movements.  Has been ambulating to the bathroom.  Still in good spirit. Had a visit with our hospital  last night  2023 HD#8 PPROM Day #9. No change. No pain.  No leakage.  No vaginal bleeding.  Good fetal movements.  Has been ambulating to the bathroom.  Still in good spirit.   12/8/2023 HD#9 PPROM Day #10. No change. No pain.  No leakage. No vaginal bleeding.  Good fetal movements.  Has been ambulating to the bathroom.  Still in good spirit.  12/9/2023 HD#10 PPROM Day #11.  No change. No pain.  No leakage. No vaginal bleeding.  Good fetal movements.  Has been ambulating to the bathroom.  Still in good spirit.   12/10/2023 HD#11 PPROM Day #12.  No change. No pain.  No leakage. No vaginal bleeding.  Good fetal movements.  Has been ambulating to the bathroom.  Still in good spirit. But has a difficult time sleeping last night     12/11/2023 HD#12 PPROM Day #13. No change. No pain.  No leakage. No vaginal bleeding.  Good fetal movements.  Has been ambulating to the bathroom.  Still in good spirit. Bedside ultrasound by me, 0.5 cm fluid pocket by fetal feet with chord.  FHT at 148   12/12/2023 HD#13 PPROM Day #14. No change. No pain.  No leakage. No vaginal bleeding.  Good fetal movements.  Has been ambulating to the bathroom.  Still in good spirit.   12/13/2023 HD#14 PPROM Day #15. No change. No pain.  No leakage. No vaginal bleeding.  Good fetal movements.  Has been ambulating to the bathroom.  Still in good spirit. Will stop antibiotic therapy after today  12/14/2023 HD#15 PPROM Day #16. No change. No pain.  No leakage. No vaginal bleeding.  Good fetal movements.  Has been ambulating to the bathroom.  Still in good spirit. Will stop antibiotic therapy today  12/15/2023 HD#16 PPROM Day #17. No change. No pain.  No leakage. No vaginal bleeding.  Good fetal movements.  Has been ambulating to the bathroom.  Still in good spirit.  Antibiotic discontinued yesterday.  12/16/2023: HD #17 PPROM Day #18. No change, not in pain. No leakage or change in discharge   12/17/2023: HD #18 PPROM Day #19. No change or abdominal pain, no vaginal bleeding or fluid loss. In good spirits     12/18/2023 HD#19  PPROM Day #20. No change. No pain.  No leakage. No vaginal bleeding.  Good fetal movements.  Has been ambulating to the bathroom.  Still in good spirit. Ultrasound done today. Footling breech with minimal fluid.  Good fetal heart tones and movements.     Obstetric HPI:  Patient reports None contractions, active fetal movement, absent vaginal bleeding , absent loss of fluid      Objective:     Vital Signs (Most Recent):  Temp: 98.4 °F (36.9 °C) (23)  Pulse: 86 (23)  Resp: 16 (23)  BP: (!) 112/56 (23)  SpO2: 97 % (23) Vital Signs (24h Range):  Temp:  [98.4 °F (36.9 °C)] 98.4 °F (36.9 °C)  Pulse:  [83-96] 86  Resp:  [14-16] 16  SpO2:  [97 %] 97 %  BP: (104-112)/(56) 112/56     Weight: 88 kg (194 lb)  Body mass index is 31.31 kg/m².    FHT: 150     No intake or output data in the 24 hours ending 23 0704      Significant Labs:  Recent Lab Results       None          Recent Labs   Lab 23  0603   WBC 9.27   RBC 3.15*   HGB 9.5*   HCT 29.2*      MCV 93   MCH 30.2   MCHC 32.5      Physical Exam:   Constitutional: She is oriented to person, place, and time. She appears well-developed and well-nourished.    HENT:   Head: Normocephalic and atraumatic.    Eyes: Conjunctivae and EOM are normal.     Cardiovascular:  Normal rate.             Pulmonary/Chest: Effort normal.        Abdominal: Soft. There is no abdominal tenderness.             Musculoskeletal: Normal range of motion.       Neurological: She is alert and oriented to person, place, and time.    Skin: Skin is warm and dry.        Review of Systems   All other systems reviewed and are negative.    Assessment/Plan:     43 y.o. female  at 20w1d for:    * 20 weeks gestation of pregnancy  Admit for close observation.      Premature rupture of membranes in second trimester  Discussed with patient management options. Risks and benefits of labor induction vs close observation presented including  risks of infection, bleeding and sepsis.  Patient still would like to wait.  Does not want to proceed with delivery/induction  Will follow clinically with CBC/WBC, Temp, and fundal tenderness.  All normal at this time  Would proceed with induction if signs of infection.  Would allow to labor if spontaneous  Continue with antibiotic for now.  Discussed with patient stopping antibiotic after 14 days  All of her questions were answered appropriately.    EVIN Velásquezlock IV  FHT Qshift  OK to ambulate  Daily CBC  Was on oral ampicillin. Antibiotic discontinued on 2023, after 14 days    Vaginal bleeding in pregnancy, second trimester  No longer bleeding.  Cervix at 1 cm on admission  Discussed with patient management options. Risks and benefits of labor induction vs close observation presented including risks of infection, bleeding and sepsis.  Patient still would like to wait.  Does not want to proceed with delivery/induction  Will follow clinically with CBC/WBC, Temp, and fundal tenderness.  All normal at this time  Would proceed with induction if signs of infection.  Would allow to labor if spontaneous  Continue with antibiotic for now.  All of her questions were answered appropriately.    EVIN Velásquezlock IV  FHT Qshift  OK to ambulate  Daily CBC  Was on oral ampicillin. Antibiotic discontinued on 2023, after 14 days    History of  premature rupture of membranes (PROM) in previous pregnancy, currently pregnant in second trimester  Discussed with patient management options. Risks and benefits of labor induction vs close observation presented including risks of infection, bleeding and sepsis.  Patient still would like to wait.  Does not want to proceed with delivery/induction  Will follow clinically with CBC/WBC, Temp, and fundal tenderness.  All normal at this time  Would proceed with induction if signs of infection.  Would allow to labor if spontaneous  Continue with antibiotic for now.  All of  her questions were answered appropriately.    EVIN loera  Heplock IV  FHT Qshift  OK to ambulate  Daily CBC  Was on oral ampicillin. Antibiotic discontinued on 12/14/2023, after 14 days    Maternal serum screen positive for trisomy 21  Now with PROM  Discussed with patient management options. Risks and benefits of labor induction vs close observation presented including risks of infection, bleeding and sepsis.  Patient would like to wait.  Does not want to proceed with delivery/induction  Will follow clinically with CBC/WBC, Temp, and fundal tenderness.  Would proceed with induction if signs of infection.  Would allow to labor if spontaneous  Continue with antibiotic for now.  All of her questions were answered appropriately.    Multigravida of advanced maternal age in second trimester  Now with PROM  Discussed with patient management options. Risks and benefits of labor induction vs close observation presented including risks of infection, bleeding and sepsis.  Patient still would like to wait.  Does not want to proceed with delivery/induction  Will follow clinically with CBC/WBC, Temp, and fundal tenderness.  All normal at this time  Would proceed with induction if signs of infection.  Would allow to labor if spontaneous  Continue with antibiotic for now.  All of her questions were answered appropriately.    EVIN loera  Heplock IV  FHT Qshift  OK to ambulate  Daily CBC  Was on oral ampicillin. Antibiotic discontinued on 12/14/2023, after 14 days          Abimael Henderson MD  Obstetrics  [facility] - Labor & Delivery

## 2023-12-19 PROCEDURE — 25000003 PHARM REV CODE 250: Performed by: OBSTETRICS & GYNECOLOGY

## 2023-12-19 PROCEDURE — 99231 SBSQ HOSP IP/OBS SF/LOW 25: CPT | Mod: 95,,, | Performed by: OBSTETRICS & GYNECOLOGY

## 2023-12-19 PROCEDURE — 11000001 HC ACUTE MED/SURG PRIVATE ROOM

## 2023-12-19 RX ADMIN — PRENATAL VIT W/ FE FUMARATE-FA TAB 27-0.8 MG 1 TABLET: 27-0.8 TAB at 08:12

## 2023-12-19 RX ADMIN — Medication 324 MG: at 05:12

## 2023-12-19 RX ADMIN — ZOLPIDEM TARTRATE 5 MG: 5 TABLET ORAL at 01:12

## 2023-12-19 RX ADMIN — ACETAMINOPHEN 1000 MG: 500 TABLET, FILM COATED ORAL at 05:12

## 2023-12-19 RX ADMIN — LORAZEPAM 0.5 MG: 0.5 TABLET ORAL at 01:12

## 2023-12-19 NOTE — ASSESSMENT & PLAN NOTE
Discussed with patient management options. Risks and benefits of labor induction vs close observation presented including risks of infection, bleeding and sepsis.  Patient still would like to wait.  Does not want to proceed with delivery/induction  Will follow clinically with CBC/WBC, Temp, and fundal tenderness.  All normal at this time  Would proceed with induction if signs of infection.  Would allow to labor if spontaneous  Abx's completed  All of her questions were answered appropriately.    EVIN loera  Heplock IV  FHT Qshift  OK to ambulate  Daily CBC  Was on oral ampicillin. Antibiotic discontinued on 12/14/2023, after 14 days

## 2023-12-19 NOTE — NURSING
RN ABS. Patient denies pain, contractions, fever like symptoms, bleeding. States positive fetal movement. No complaints at this time. FHTs obtained with monitor ultra sounds. 155s

## 2023-12-19 NOTE — ASSESSMENT & PLAN NOTE
Now with PROM  Discussed with patient management options. Risks and benefits of labor induction vs close observation presented including risks of infection, bleeding and sepsis.  Patient still would like to wait.  Does not want to proceed with delivery/induction  Will follow clinically with CBC/WBC, Temp, and fundal tenderness.  All normal at this time  Would proceed with induction if signs of infection.  Would allow to labor if spontaneous  All of her questions were answered appropriately.    EVIN loera  Heplock IV  FHT Qshift  OK to ambulate  Daily CBC  Was on oral ampicillin. Antibiotic discontinued on 12/14/2023, after 14 days

## 2023-12-19 NOTE — PLAN OF CARE
[facility] - [place] - Labor & Delivery  Discharge Reassessment    Primary Care Provider: Dr. Abimael Henderson    Expected Discharge Date: 12/2/2023    Reassessment (most recent)       Discharge Reassessment - 12/19/23 1150          Discharge Reassessment    Assessment Type Discharge Planning Reassessment     Did the patient's condition or plan change since previous assessment? No     Discharge Plan discussed with: Patient     Communicated NOLAN with patient/caregiver No     Discharge Plan A Home     Discharge Plan B Home     DME Needed Upon Discharge  none     Transition of Care Barriers None     Why the patient remains in the hospital Requires continued medical care        Post-Acute Status    Discharge Delays None known at this time                 This patient has been screened for Case Management needs.  Based on (documentation in medical record/communication with nursing), patient's treatment on L&D is ongoing. Per chart review,  HD #20 w/ PPROM @ 20 wk 2 d. Anticipated discharge:24 weeks (potentially).    Case Management/Social Work remains available if a need arises, please enter consult for assistance.  For urgent needs contact Case Management Department/on-call at:  [phone].    SW met with patient today to discuss concerns regarding hospital. Patient advised that  would followup with the Patient Advocate as patient would like to meet with a Leader on L&D.  Concerns shared with patient's nurse.     Discussed patient's concern about home situation and lack of support system.  Patient trying to determine who might be able to help get her children to school if she is still in the hospital at the end of the children's [employer] vacation.  SW offered to call the school to determine if they might be able to provide information on transportation service (providers) but patient stated that she had already explored that and SW didn't have to.      Case Management will continue to follow and assist as needed.

## 2023-12-19 NOTE — ASSESSMENT & PLAN NOTE
Now with PROM  Discussed with patient management options. Risks and benefits of labor induction vs close observation presented including risks of infection, bleeding and sepsis.  Patient would like to wait.  Does not want to proceed with delivery/induction  Will follow clinically with CBC/WBC, Temp, and fundal tenderness.  Would proceed with induction if signs of infection.  Would allow to labor if spontaneous    All of her questions were answered appropriately.

## 2023-12-19 NOTE — ASSESSMENT & PLAN NOTE
Discussed with patient management options. Risks and benefits of labor induction vs close observation presented including risks of infection, bleeding and sepsis.  Patient still would like to wait.  Does not want to proceed with delivery/induction  Will follow clinically with CBC/WBC, Temp, and fundal tenderness.  All normal at this time  Would proceed with induction if signs of infection.  Would allow to labor if spontane

## 2023-12-19 NOTE — PROGRESS NOTES
[facility] - Labor & Delivery  Obstetrics  Antepartum Progress Note    Patient Name: Gilberto Bueno  MRN: [id number]  Admission Date: 2023  Hospital Length of Stay: 18 days  Attending Physician: Abimael Henderson MD  Primary Care Provider: Liss Wise MD    Subjective:     Principal Problem:Premature rupture of membranes in second trimester    HPI:  44 yo  at 17w4d  Advanced maternal age  Fetus with Down syndrome  Has been with spotting for the past couple of days  Went to our Emergency on 2023 with minimal spotting and occasional abdominal pain.  Ultrasound that day with MARCUS of 5.9 cm  Good fetal movements    Noted with ROM last night at 1800.    Denies fever and chills.  No longer with pain/contractions        Hospital Course:  On Labor and Delivery, vitals stable  FHT at 140   Contraction: None  Cervix: 1 cm  Started on antibiotic therapy.  Zithromax and Ampicillin    2023 No longer with contractions/spotting this morning.  No fundal tenderness.  No leakage of fluid. Does not want to deliver now.  Wants to wait for sealing of the membranes    2023 No pain.  No leakage. No vaginal bleeding. Good fetal movements  2023 HD#3/PPROM Day #4. No pain.  No leakage. No vaginal bleeding. Good fetal movements  12/3/2023 HD#4/PPROM Day #5. No pain.  No leakage. No vaginal bleeding. Good fetal movements    2023 HD#5 PPROM Day #6. No pain.  No leakage. No vaginal bleeding.  Good fetal movements.  Has been ambulating. Bedside ultrasound with no amniotic fluid. Oral ampicillin started.  2023 HD#6 PPROM Day #7. No pain.  No leakage. No vaginal bleeding.  Good fetal movements.  Has been ambulating to the bathroom.  Still in good spirit.  2023 HD#7 PPROM Day #8. No pain.  No leakage. No vaginal bleeding.  Good fetal movements.  Has been ambulating to the bathroom.  Still in good spirit. Had a visit with our hospital  last night  2023 HD#8 PPROM Day #9. No change. No  pain.  No leakage. No vaginal bleeding.  Good fetal movements.  Has been ambulating to the bathroom.  Still in good spirit.   12/8/2023 HD#9 PPROM Day #10. No change. No pain.  No leakage. No vaginal bleeding.  Good fetal movements.  Has been ambulating to the bathroom.  Still in good spirit.  12/9/2023 HD#10 PPROM Day #11.  No change. No pain.  No leakage. No vaginal bleeding.  Good fetal movements.  Has been ambulating to the bathroom.  Still in good spirit.   12/10/2023 HD#11 PPROM Day #12.  No change. No pain.  No leakage. No vaginal bleeding.  Good fetal movements.  Has been ambulating to the bathroom.  Still in good spirit. But has a difficult time sleeping last night     12/11/2023 HD#12 PPROM Day #13. No change. No pain.  No leakage. No vaginal bleeding.  Good fetal movements.  Has been ambulating to the bathroom.  Still in good spirit. Bedside ultrasound by me, 0.5 cm fluid pocket by fetal feet with chord.  FHT at 148   12/12/2023 HD#13 PPROM Day #14. No change. No pain.  No leakage. No vaginal bleeding.  Good fetal movements.  Has been ambulating to the bathroom.  Still in good spirit.   12/13/2023 HD#14 PPROM Day #15. No change. No pain.  No leakage. No vaginal bleeding.  Good fetal movements.  Has been ambulating to the bathroom.  Still in good spirit. Will stop antibiotic therapy after today  12/14/2023 HD#15 PPROM Day #16. No change. No pain.  No leakage. No vaginal bleeding.  Good fetal movements.  Has been ambulating to the bathroom.  Still in good spirit. Will stop antibiotic therapy today  12/15/2023 HD#16 PPROM Day #17. No change. No pain.  No leakage. No vaginal bleeding.  Good fetal movements.  Has been ambulating to the bathroom.  Still in good spirit.  Antibiotic discontinued yesterday.  12/16/2023: HD #17 PPROM Day #18. No change, not in pain. No leakage or change in discharge   12/17/2023: HD #18 PPROM Day #19. No change or abdominal pain, no vaginal bleeding or fluid loss. In good spirits      12/18/2023 HD#19 PPROM Day #20. No change. No pain.  No leakage. No vaginal bleeding.  Good fetal movements.  Has been ambulating to the bathroom.  Still in good spirit. Bedside ultrasound done by me today. Footling breech with minimal fluid.  Good fetal heart tones and movements.     12/19/23:  HD#20 PPROM D#21:  pt denies any c/o + FM, no pain or bleeding    Interval History: HD #20 w/ PPROM @ 20 wk 2 d    She is doing well this morning. She is tolerating a regular diet without nausea or vomiting. She is voiding spontaneously. She is ambulating. She has passed flatus, and has a BM. Vaginal bleeding is absent. She denies fever or chills. Abdominal pain is absent and controlled with oral medications.     Objective:     Vital Signs (Most Recent):  Temp: 98.9 °F (37.2 °C) (12/19/23 0130)  Pulse: 80 (12/18/23 1935)  Resp: 18 (12/18/23 1935)  BP: (!) 135/49 (12/18/23 1935)  SpO2: 100 % (12/18/23 1935) Vital Signs (24h Range):  Temp:  [97.8 °F (36.6 °C)-98.9 °F (37.2 °C)] 98.9 °F (37.2 °C)  Pulse:  [80] 80  Resp:  [18] 18  SpO2:  [100 %] 100 %  BP: (135)/(49) 135/49     Weight: 88 kg (194 lb)  Body mass index is 31.31 kg/m².    No intake or output data in the 24 hours ending 12/19/23 0758      Significant Labs:  Lab Results   Component Value Date    GROUPTRH A POS 12/08/2023    HEPBSAG Non-reactive 10/02/2023    STREPBCULT No Group B Streptococcus isolated 05/20/2019    AFP 80.3 ng/mL 08/11/2010     Recent Labs   Lab 12/18/23  0603   HGB 9.5*   HCT 29.2*       I have personallly reviewed all pertinent lab results from the last 24 hours.    Physical Exam:   Constitutional: She is oriented to person, place, and time. She appears well-developed and well-nourished. No distress.    HENT:   Head: Normocephalic and atraumatic.       Pulmonary/Chest: Effort normal. No respiratory distress.        Abdominal: Soft. She exhibits no distension and no mass. There is no abdominal tenderness. There is no rebound and no guarding.              Musculoskeletal: Normal range of motion and moves all extremeties. No tenderness.       Neurological: She is alert and oriented to person, place, and time. No cranial nerve deficit. Coordination normal.    Skin: She is not diaphoretic.    Psychiatric: She has a normal mood and affect. Her behavior is normal. Judgment and thought content normal.         Assessment/Plan:     43 y.o. female  at 20w2d for:    * Premature rupture of membranes in second trimester  Discussed with patient management options. Risks and benefits of labor induction vs close observation presented including risks of infection, bleeding and sepsis.  Patient still would like to wait.  Does not want to proceed with delivery/induction  Will follow clinically with CBC/WBC, Temp, and fundal tenderness.  All normal at this time  Would proceed with induction if signs of infection.  Would allow to labor if spontaneous  Abx's completed  All of her questions were answered appropriately.    EVIN salinasyanira  Vamsi IV  FHT Qshift  OK to ambulate  Daily CBC  Was on oral ampicillin. Antibiotic discontinued on 2023, after 14 days    20 weeks gestation of pregnancy  Continue close observation.      Vaginal bleeding in pregnancy, second trimester  PT no longer having any bleeding    History of  premature rupture of membranes (PROM) in previous pregnancy, currently pregnant in second trimester  Discussed with patient management options. Risks and benefits of labor induction vs close observation presented including risks of infection, bleeding and sepsis.  Patient still would like to wait.  Does not want to proceed with delivery/induction  Will follow clinically with CBC/WBC, Temp, and fundal tenderness.  All normal at this time  Would proceed with induction if signs of infection.  Would allow to labor if spontane    Maternal serum screen positive for trisomy 21  Now with PROM  Discussed with patient management options. Risks and benefits of  labor induction vs close observation presented including risks of infection, bleeding and sepsis.  Patient would like to wait.  Does not want to proceed with delivery/induction  Will follow clinically with CBC/WBC, Temp, and fundal tenderness.  Would proceed with induction if signs of infection.  Would allow to labor if spontaneous    All of her questions were answered appropriately.    Multigravida of advanced maternal age in second trimester  Now with PROM  Discussed with patient management options. Risks and benefits of labor induction vs close observation presented including risks of infection, bleeding and sepsis.  Patient still would like to wait.  Does not want to proceed with delivery/induction  Will follow clinically with CBC/WBC, Temp, and fundal tenderness.  All normal at this time  Would proceed with induction if signs of infection.  Would allow to labor if spontaneous  All of her questions were answered appropriately.    EVIN loera  Heplock IV  FHT Qshift  OK to ambulate  Daily CBC  Was on oral ampicillin. Antibiotic discontinued on 12/14/2023, after 14 days          Savage Redmond MD  Obstetrics  [facility] - [place] - Labor & Delivery

## 2023-12-19 NOTE — SUBJECTIVE & OBJECTIVE
Interval History: HD #20 w/ PPROM @ 20 wk 2 d    She is doing well this morning. She is tolerating a regular diet without nausea or vomiting. She is voiding spontaneously. She is ambulating. She has passed flatus, and has a BM. Vaginal bleeding is absent. She denies fever or chills. Abdominal pain is absent and controlled with oral medications.     Objective:     Vital Signs (Most Recent):  Temp: 98.9 °F (37.2 °C) (12/19/23 0130)  Pulse: 80 (12/18/23 1935)  Resp: 18 (12/18/23 1935)  BP: (!) 135/49 (12/18/23 1935)  SpO2: 100 % (12/18/23 1935) Vital Signs (24h Range):  Temp:  [97.8 °F (36.6 °C)-98.9 °F (37.2 °C)] 98.9 °F (37.2 °C)  Pulse:  [80] 80  Resp:  [18] 18  SpO2:  [100 %] 100 %  BP: (135)/(49) 135/49     Weight: 88 kg (194 lb)  Body mass index is 31.31 kg/m².    No intake or output data in the 24 hours ending 12/19/23 0758      Significant Labs:  Lab Results   Component Value Date    GROUPTRH A POS 12/08/2023    HEPBSAG Non-reactive 10/02/2023    STREPBCULT No Group B Streptococcus isolated 05/20/2019    AFP 80.3 ng/mL 08/11/2010     Recent Labs   Lab 12/18/23  0603   HGB 9.5*   HCT 29.2*       I have personallly reviewed all pertinent lab results from the last 24 hours.    Physical Exam:   Constitutional: She is oriented to person, place, and time. She appears well-developed and well-nourished. No distress.    HENT:   Head: Normocephalic and atraumatic.       Pulmonary/Chest: Effort normal. No respiratory distress.        Abdominal: Soft. She exhibits no distension and no mass. There is no abdominal tenderness. There is no rebound and no guarding.             Musculoskeletal: Normal range of motion and moves all extremeties. No tenderness.       Neurological: She is alert and oriented to person, place, and time. No cranial nerve deficit. Coordination normal.    Skin: She is not diaphoretic.    Psychiatric: She has a normal mood and affect. Her behavior is normal. Judgment and thought content normal.

## 2023-12-20 PROCEDURE — 25000003 PHARM REV CODE 250: Performed by: OBSTETRICS & GYNECOLOGY

## 2023-12-20 PROCEDURE — 11000001 HC ACUTE MED/SURG PRIVATE ROOM

## 2023-12-20 PROCEDURE — 99231 SBSQ HOSP IP/OBS SF/LOW 25: CPT | Mod: ,,, | Performed by: OBSTETRICS & GYNECOLOGY

## 2023-12-20 RX ADMIN — LORAZEPAM 0.5 MG: 0.5 TABLET ORAL at 12:12

## 2023-12-20 RX ADMIN — PRENATAL VIT W/ FE FUMARATE-FA TAB 27-0.8 MG 1 TABLET: 27-0.8 TAB at 09:12

## 2023-12-20 RX ADMIN — ZOLPIDEM TARTRATE 5 MG: 5 TABLET ORAL at 12:12

## 2023-12-20 RX ADMIN — Medication 324 MG: at 10:12

## 2023-12-20 NOTE — SUBJECTIVE & OBJECTIVE
"Interval History: HD #21 w/ PPROM @ 20 wk 3 d    She is doing well this morning. She is tolerating a regular diet without nausea or vomiting. She is voiding spontaneously. She is ambulating. She has passed flatus, and has a BM. Vaginal bleeding is absent. She denies fever or chills. Abdominal pain is absent and controlled with oral medications.     Objective:     Vital Signs (Most Recent):  Temp: 98.3 °F (36.8 °C) (12/20/23 0400)  Pulse: 74 (12/20/23 0116)  Resp: 18 (12/20/23 0116)  BP: (!) 96/50 (12/20/23 0116)  SpO2: 98 % (12/20/23 0115) Vital Signs (24h Range):  Temp:  [98.2 °F (36.8 °C)-98.7 °F (37.1 °C)] 98.3 °F (36.8 °C)  Pulse:  [74-87] 74  Resp:  [18] 18  SpO2:  [93 %-100 %] 98 %  BP: (83-98)/(46-55) 96/50     Weight: 88 kg (194 lb)  Body mass index is 31.31 kg/m².    No intake or output data in the 24 hours ending 12/20/23 0732      Significant Labs:  Lab Results   Component Value Date    GROUPTRH A POS 12/08/2023    HEPBSAG Non-reactive 10/02/2023    STREPBCULT No Group B Streptococcus isolated 05/20/2019    AFP 80.3 ng/mL 08/11/2010     No results for input(s): "HGB", "HCT" in the last 48 hours.      I have personallly reviewed all pertinent lab results from the last 24 hours.    Physical Exam:   Constitutional: She is oriented to person, place, and time. She appears well-developed and well-nourished. No distress.    HENT:   Head: Normocephalic and atraumatic.       Pulmonary/Chest: Effort normal. No respiratory distress.        Abdominal: Soft. She exhibits no distension and no mass. There is no abdominal tenderness. There is no rebound and no guarding.             Musculoskeletal: Normal range of motion and moves all extremeties. No tenderness.       Neurological: She is alert and oriented to person, place, and time. No cranial nerve deficit. Coordination normal.    Skin: She is not diaphoretic.    Psychiatric: She has a normal mood and affect. Her behavior is normal. Judgment and thought content " normal.         Review of Systems

## 2023-12-20 NOTE — PROGRESS NOTES
[facility] - [place] - Labor & Delivery  Obstetrics  Antepartum Progress Note    Patient Name: Gilberto Bueno  MRN: [id number]  Admission Date: 2023  Hospital Length of Stay: 19 days  Attending Physician: Abimael Henderson MD  Primary Care Provider: Liss Wise MD    Subjective:     Principal Problem:Premature rupture of membranes in second trimester    HPI:  42 yo  at 17w4d  Advanced maternal age  Fetus with Down syndrome  Has been with spotting for the past couple of days  Went to our Emergency on 2023 with minimal spotting and occasional abdominal pain.  Ultrasound that day with MARCUS of 5.9 cm  Good fetal movements    Noted with ROM last night at 1800.    Denies fever and chills.  No longer with pain/contractions        Hospital Course:  On Labor and Delivery, vitals stable  FHT at 140   Contraction: None  Cervix: 1 cm  Started on antibiotic therapy.  Zithromax and Ampicillin    2023 No longer with contractions/spotting this morning.  No fundal tenderness.  No leakage of fluid. Does not want to deliver now.  Wants to wait for sealing of the membranes    2023 No pain.  No leakage. No vaginal bleeding. Good fetal movements  2023 HD#3/PPROM Day #4. No pain.  No leakage. No vaginal bleeding. Good fetal movements  12/3/2023 HD#4/PPROM Day #5. No pain.  No leakage. No vaginal bleeding. Good fetal movements    2023 HD#5 PPROM Day #6. No pain.  No leakage. No vaginal bleeding.  Good fetal movements.  Has been ambulating. Bedside ultrasound with no amniotic fluid. Oral ampicillin started.  2023 HD#6 PPROM Day #7. No pain.  No leakage. No vaginal bleeding.  Good fetal movements.  Has been ambulating to the bathroom.  Still in good spirit.  2023 HD#7 PPROM Day #8. No pain.  No leakage. No vaginal bleeding.  Good fetal movements.  Has been ambulating to the bathroom.  Still in good spirit. Had a visit with our hospital  last night  2023 HD#8 PPROM Day #9. No change. No  pain.  No leakage. No vaginal bleeding.  Good fetal movements.  Has been ambulating to the bathroom.  Still in good spirit.   12/8/2023 HD#9 PPROM Day #10. No change. No pain.  No leakage. No vaginal bleeding.  Good fetal movements.  Has been ambulating to the bathroom.  Still in good spirit.  12/9/2023 HD#10 PPROM Day #11.  No change. No pain.  No leakage. No vaginal bleeding.  Good fetal movements.  Has been ambulating to the bathroom.  Still in good spirit.   12/10/2023 HD#11 PPROM Day #12.  No change. No pain.  No leakage. No vaginal bleeding.  Good fetal movements.  Has been ambulating to the bathroom.  Still in good spirit. But has a difficult time sleeping last night     12/11/2023 HD#12 PPROM Day #13. No change. No pain.  No leakage. No vaginal bleeding.  Good fetal movements.  Has been ambulating to the bathroom.  Still in good spirit. Bedside ultrasound by me, 0.5 cm fluid pocket by fetal feet with chord.  FHT at 148   12/12/2023 HD#13 PPROM Day #14. No change. No pain.  No leakage. No vaginal bleeding.  Good fetal movements.  Has been ambulating to the bathroom.  Still in good spirit.   12/13/2023 HD#14 PPROM Day #15. No change. No pain.  No leakage. No vaginal bleeding.  Good fetal movements.  Has been ambulating to the bathroom.  Still in good spirit. Will stop antibiotic therapy after today  12/14/2023 HD#15 PPROM Day #16. No change. No pain.  No leakage. No vaginal bleeding.  Good fetal movements.  Has been ambulating to the bathroom.  Still in good spirit. Will stop antibiotic therapy today  12/15/2023 HD#16 PPROM Day #17. No change. No pain.  No leakage. No vaginal bleeding.  Good fetal movements.  Has been ambulating to the bathroom.  Still in good spirit.  Antibiotic discontinued yesterday.  12/16/2023: HD #17 PPROM Day #18. No change, not in pain. No leakage or change in discharge   12/17/2023: HD #18 PPROM Day #19. No change or abdominal pain, no vaginal bleeding or fluid loss. In good spirits  "    12/18/2023 HD#19 PPROM Day #20. No change. No pain.  No leakage. No vaginal bleeding.  Good fetal movements.  Has been ambulating to the bathroom.  Still in good spirit. Bedside ultrasound done by me today. Footling breech with minimal fluid.  Good fetal heart tones and movements.     12/19/23:  HD#20 PPROM D#21:  pt denies any c/o + FM, no pain or bleeding    12/20/2023 HD#21 PPROM D#22:  pt denies any c/o + FM, no pain or bleeding    Interval History: HD #21 w/ PPROM @ 20 wk 3 d    She is doing well this morning. She is tolerating a regular diet without nausea or vomiting. She is voiding spontaneously. She is ambulating. She has passed flatus, and has a BM. Vaginal bleeding is absent. She denies fever or chills. Abdominal pain is absent and controlled with oral medications.     Objective:     Vital Signs (Most Recent):  Temp: 98.3 °F (36.8 °C) (12/20/23 0400)  Pulse: 74 (12/20/23 0116)  Resp: 18 (12/20/23 0116)  BP: (!) 96/50 (12/20/23 0116)  SpO2: 98 % (12/20/23 0115) Vital Signs (24h Range):  Temp:  [98.2 °F (36.8 °C)-98.7 °F (37.1 °C)] 98.3 °F (36.8 °C)  Pulse:  [74-87] 74  Resp:  [18] 18  SpO2:  [93 %-100 %] 98 %  BP: (83-98)/(46-55) 96/50     Weight: 88 kg (194 lb)  Body mass index is 31.31 kg/m².    No intake or output data in the 24 hours ending 12/20/23 0732      Significant Labs:  Lab Results   Component Value Date    GROUPTRH A POS 12/08/2023    HEPBSAG Non-reactive 10/02/2023    STREPBCULT No Group B Streptococcus isolated 05/20/2019    AFP 80.3 ng/mL 08/11/2010     No results for input(s): "HGB", "HCT" in the last 48 hours.      I have personallly reviewed all pertinent lab results from the last 24 hours.    Physical Exam:   Constitutional: She is oriented to person, place, and time. She appears well-developed and well-nourished. No distress.    HENT:   Head: Normocephalic and atraumatic.       Pulmonary/Chest: Effort normal. No respiratory distress.        Abdominal: Soft. She exhibits no " distension and no mass. There is no abdominal tenderness. There is no rebound and no guarding.             Musculoskeletal: Normal range of motion and moves all extremeties. No tenderness.       Neurological: She is alert and oriented to person, place, and time. No cranial nerve deficit. Coordination normal.    Skin: She is not diaphoretic.    Psychiatric: She has a normal mood and affect. Her behavior is normal. Judgment and thought content normal.         Review of Systems  Assessment/Plan:     43 y.o. female  at 20w3d for:    * Premature rupture of membranes in second trimester  Discussed with patient management options. Risks and benefits of labor induction vs close observation presented including risks of infection, bleeding and sepsis.  Patient still would like to wait.  Does not want to proceed with delivery/induction  Will follow clinically with CBC/WBC, Temp, and fundal tenderness.  All normal at this time  Would proceed with induction if signs of infection.  Would allow to labor if spontaneous  Abx's completed  All of her questions were answered appropriately.    EVIN loera  Heplock IV  FHT Qshift  OK to ambulate  Daily CBC  Was on oral ampicillin. Antibiotic discontinued on 2023, after 14 days    20 weeks gestation of pregnancy  Continue close observation.      Vaginal bleeding in pregnancy, second trimester  PT no longer having any bleeding    History of  premature rupture of membranes (PROM) in previous pregnancy, currently pregnant in second trimester  Discussed with patient management options. Risks and benefits of labor induction vs close observation presented including risks of infection, bleeding and sepsis.  Patient still would like to wait.  Does not want to proceed with delivery/induction  Will follow clinically with CBC/WBC, Temp, and fundal tenderness.  All normal at this time  Would proceed with induction if signs of infection.  Would allow to labor if spontane    Maternal  serum screen positive for trisomy 21  Now with PROM  Discussed with patient management options. Risks and benefits of labor induction vs close observation presented including risks of infection, bleeding and sepsis.  Patient would like to wait.  Does not want to proceed with delivery/induction  Will follow clinically with CBC/WBC, Temp, and fundal tenderness.  Would proceed with induction if signs of infection.  Would allow to labor if spontaneous    All of her questions were answered appropriately.    Multigravida of advanced maternal age in second trimester  Now with PROM  Discussed with patient management options. Risks and benefits of labor induction vs close observation presented including risks of infection, bleeding and sepsis.  Patient still would like to wait.  Does not want to proceed with delivery/induction  Will follow clinically with CBC/WBC, Temp, and fundal tenderness.  All normal at this time  Would proceed with induction if signs of infection.  Would allow to labor if spontaneous  All of her questions were answered appropriately.    EVIN niñoe  Heplock IV  FHT Qshift  OK to ambulate  Daily CBC  Was on oral ampicillin. Antibiotic discontinued on 12/14/2023, after 14 days          Chelo Zuniga MD  Obstetrics  [facility] [place]

## 2023-12-21 PROCEDURE — 99231 SBSQ HOSP IP/OBS SF/LOW 25: CPT | Mod: ,,, | Performed by: STUDENT IN AN ORGANIZED HEALTH CARE EDUCATION/TRAINING PROGRAM

## 2023-12-21 PROCEDURE — 25000003 PHARM REV CODE 250: Performed by: OBSTETRICS & GYNECOLOGY

## 2023-12-21 PROCEDURE — 11000001 HC ACUTE MED/SURG PRIVATE ROOM

## 2023-12-21 RX ADMIN — Medication 324 MG: at 05:12

## 2023-12-21 RX ADMIN — ONDANSETRON 8 MG: 8 TABLET, ORALLY DISINTEGRATING ORAL at 10:12

## 2023-12-21 RX ADMIN — ACETAMINOPHEN 1000 MG: 500 TABLET, FILM COATED ORAL at 05:12

## 2023-12-21 RX ADMIN — PRENATAL VIT W/ FE FUMARATE-FA TAB 27-0.8 MG 1 TABLET: 27-0.8 TAB at 09:12

## 2023-12-21 NOTE — SUBJECTIVE & OBJECTIVE
"Interval History:     She is doing well this morning. She is tolerating a regular diet without nausea or vomiting. She is voiding spontaneously. She is ambulating. She denies fever or chills. Abdominal pain is absent.    Objective:     Vital Signs (Most Recent):  Temp: 98.2 °F (36.8 °C) (12/21/23 0442)  Pulse: 90 (12/20/23 1951)  Resp: 16 (12/20/23 1951)  BP: (!) 91/44 (12/20/23 1951)  SpO2: 99 % (12/20/23 1951) Vital Signs (24h Range):  Temp:  [98.2 °F (36.8 °C)-98.8 °F (37.1 °C)] 98.2 °F (36.8 °C)  Pulse:  [85-90] 90  Resp:  [16] 16  SpO2:  [99 %] 99 %  BP: ()/(44-55) 91/44     Weight: 88 kg (194 lb)  Body mass index is 31.31 kg/m².    No intake or output data in the 24 hours ending 12/21/23 0940      Significant Labs:  Lab Results   Component Value Date    GROUPTRH A POS 12/08/2023    HEPBSAG Non-reactive 10/02/2023    STREPBCULT No Group B Streptococcus isolated 05/20/2019    AFP 80.3 ng/mL 08/11/2010     No results for input(s): "HGB", "HCT" in the last 48 hours.    I have personallly reviewed all pertinent lab results from the last 24 hours.    Physical Exam:   Constitutional: She is oriented to person, place, and time. She appears well-developed and well-nourished.    HENT:   Head: Normocephalic and atraumatic.    Eyes: Conjunctivae and EOM are normal.      Pulmonary/Chest: Effort normal.        Abdominal: Soft. There is no abdominal tenderness.             Musculoskeletal: Normal range of motion.       Neurological: She is alert and oriented to person, place, and time.    Skin: Skin is warm and dry.    Psychiatric: She has a normal mood and affect.       Review of Systems   All other systems reviewed and are negative.    "

## 2023-12-21 NOTE — ASSESSMENT & PLAN NOTE
Discussed with patient management options. Risks and benefits of labor induction vs close observation presented including risks of infection, bleeding and sepsis.  Patient still would like to wait.  Does not want to proceed with delivery/induction  Will follow clinically with CBC/WBC, Temp, and fundal tenderness.  All normal at this time  Would proceed with induction if signs of infection.  Would allow to labor if spontaneous  Abx's completed  All of her questions were answered appropriately.    EVIN loera  Heplock IV  FHT Qshift  OK to ambulate  Was on oral ampicillin. Antibiotic discontinued on 12/14/2023, after 14 days

## 2023-12-21 NOTE — PROGRESS NOTES
[facility] - Labor & Delivery  Obstetrics  Postpartum Progress Note    Patient Name: Gilberto Bueno  MRN: [id number]  Admission Date: 11/29/2023  Hospital Length of Stay: 20 days  Attending Physician: Abimael Henderson MD  Primary Care Provider: Liss Wise MD    Subjective:     Principal Problem:Premature rupture of membranes in second trimester    Hospital Course:  On Labor and Delivery, vitals stable  FHT at 140   Contraction: None  Cervix: 1 cm  Started on antibiotic therapy.  Zithromax and Ampicillin    11/30/2023 No longer with contractions/spotting this morning.  No fundal tenderness.  No leakage of fluid. Does not want to deliver now.  Wants to wait for sealing of the membranes    12/1/2023 No pain.  No leakage. No vaginal bleeding. Good fetal movements  12/2/2023 HD#3/PPROM Day #4. No pain.  No leakage. No vaginal bleeding. Good fetal movements  12/3/2023 HD#4/PPROM Day #5. No pain.  No leakage. No vaginal bleeding. Good fetal movements    12/4/2023 HD#5 PPROM Day #6. No pain.  No leakage. No vaginal bleeding.  Good fetal movements.  Has been ambulating. Bedside ultrasound with no amniotic fluid. Oral ampicillin started.  12/5/2023 HD#6 PPROM Day #7. No pain.  No leakage. No vaginal bleeding.  Good fetal movements.  Has been ambulating to the bathroom.  Still in good spirit.  12/6/2023 HD#7 PPROM Day #8. No pain.  No leakage. No vaginal bleeding.  Good fetal movements.  Has been ambulating to the bathroom.  Still in good spirit. Had a visit with our hospital  last night  12/7/2023 HD#8 PPROM Day #9. No change. No pain.  No leakage. No vaginal bleeding.  Good fetal movements.  Has been ambulating to the bathroom.  Still in good spirit.   12/8/2023 HD#9 PPROM Day #10. No change. No pain.  No leakage. No vaginal bleeding.  Good fetal movements.  Has been ambulating to the bathroom.  Still in good spirit.  12/9/2023 HD#10 PPROM Day #11.  No change. No pain.  No leakage. No vaginal bleeding.  Good  fetal movements.  Has been ambulating to the bathroom.  Still in good spirit.   12/10/2023 HD#11 PPROM Day #12.  No change. No pain.  No leakage. No vaginal bleeding.  Good fetal movements.  Has been ambulating to the bathroom.  Still in good spirit. But has a difficult time sleeping last night     12/11/2023 HD#12 PPROM Day #13. No change. No pain.  No leakage. No vaginal bleeding.  Good fetal movements.  Has been ambulating to the bathroom.  Still in good spirit. Bedside ultrasound by me, 0.5 cm fluid pocket by fetal feet with chord.  FHT at 148   12/12/2023 HD#13 PPROM Day #14. No change. No pain.  No leakage. No vaginal bleeding.  Good fetal movements.  Has been ambulating to the bathroom.  Still in good spirit.   12/13/2023 HD#14 PPROM Day #15. No change. No pain.  No leakage. No vaginal bleeding.  Good fetal movements.  Has been ambulating to the bathroom.  Still in good spirit. Will stop antibiotic therapy after today  12/14/2023 HD#15 PPROM Day #16. No change. No pain.  No leakage. No vaginal bleeding.  Good fetal movements.  Has been ambulating to the bathroom.  Still in good spirit. Will stop antibiotic therapy today  12/15/2023 HD#16 PPROM Day #17. No change. No pain.  No leakage. No vaginal bleeding.  Good fetal movements.  Has been ambulating to the bathroom.  Still in good spirit.  Antibiotic discontinued yesterday.  12/16/2023: HD #17 PPROM Day #18. No change, not in pain. No leakage or change in discharge   12/17/2023: HD #18 PPROM Day #19. No change or abdominal pain, no vaginal bleeding or fluid loss. In good spirits     12/18/2023 HD#19 PPROM Day #20. No change. No pain.  No leakage. No vaginal bleeding.  Good fetal movements.  Has been ambulating to the bathroom.  Still in good spirit. Bedside ultrasound done by me today. Footling breech with minimal fluid.  Good fetal heart tones and movements.     12/19/23:  HD#20 PPROM D#21:  pt denies any c/o + FM, no pain or bleeding    12/20/2023 HD#21  "PPROM D#22:  pt denies any c/o + FM, no pain or bleeding  2023 HD#22 PPROM D#23: pt denies abdominal pain, subjective fever, change in discharge or odor, VB. Noting FM    Interval History:     She is doing well this morning. She is tolerating a regular diet without nausea or vomiting. She is voiding spontaneously. She is ambulating. She denies fever or chills. Abdominal pain is absent.    Objective:     Vital Signs (Most Recent):  Temp: 98.2 °F (36.8 °C) (232)  Pulse: 90 (23)  Resp: 16 (23)  BP: (!) 91/44 (23)  SpO2: 99 % (23) Vital Signs (24h Range):  Temp:  [98.2 °F (36.8 °C)-98.8 °F (37.1 °C)] 98.2 °F (36.8 °C)  Pulse:  [85-90] 90  Resp:  [16] 16  SpO2:  [99 %] 99 %  BP: ()/(44-55) 91/44     Weight: 88 kg (194 lb)  Body mass index is 31.31 kg/m².    No intake or output data in the 24 hours ending 23 0940      Significant Labs:  Lab Results   Component Value Date    GROUPTRH A POS 2023    HEPBSAG Non-reactive 10/02/2023    STREPBCULT No Group B Streptococcus isolated 2019    AFP 80.3 ng/mL 2010     No results for input(s): "HGB", "HCT" in the last 48 hours.    I have personallly reviewed all pertinent lab results from the last 24 hours.    Physical Exam:   Constitutional: She is oriented to person, place, and time. She appears well-developed and well-nourished.    HENT:   Head: Normocephalic and atraumatic.    Eyes: Conjunctivae and EOM are normal.      Pulmonary/Chest: Effort normal.        Abdominal: Soft. There is no abdominal tenderness.             Musculoskeletal: Normal range of motion.       Neurological: She is alert and oriented to person, place, and time.    Skin: Skin is warm and dry.    Psychiatric: She has a normal mood and affect.       Review of Systems   All other systems reviewed and are negative.    Assessment/Plan:     43 y.o. female  for:    * Premature rupture of membranes in second " trimester  Discussed with patient management options. Risks and benefits of labor induction vs close observation presented including risks of infection, bleeding and sepsis.  Patient still would like to wait.  Does not want to proceed with delivery/induction  Will follow clinically with CBC/WBC, Temp, and fundal tenderness.  All normal at this time  Would proceed with induction if signs of infection.  Would allow to labor if spontaneous  Abx's completed  All of her questions were answered appropriately.    EVIN loera  Heplock IV  FHT Qshift  OK to ambulate  Was on oral ampicillin. Antibiotic discontinued on 2023, after 14 days    20 weeks gestation of pregnancy  Continue close observation.      Vaginal bleeding in pregnancy, second trimester  PT no longer having any bleeding    History of  premature rupture of membranes (PROM) in previous pregnancy, currently pregnant in second trimester  Discussed with patient management options. Risks and benefits of labor induction vs close observation presented including risks of infection, bleeding and sepsis.  Patient still would like to wait.  Does not want to proceed with delivery/induction  Will follow clinically with CBC/WBC, Temp, and fundal tenderness.  All normal at this time  Would proceed with induction if signs of infection.  Would allow to labor if spontane    Maternal serum screen positive for trisomy 21  Now with PROM  Discussed with patient management options. Risks and benefits of labor induction vs close observation presented including risks of infection, bleeding and sepsis.  Patient would like to wait.  Does not want to proceed with delivery/induction  Will follow clinically with CBC/WBC, Temp, and fundal tenderness.  Would proceed with induction if signs of infection.  Would allow to labor if spontaneous    All of her questions were answered appropriately.    Multigravida of advanced maternal age in second trimester  Now with PROM  Discussed with  patient management options. Risks and benefits of labor induction vs close observation presented including risks of infection, bleeding and sepsis.  Patient still would like to wait.  Does not want to proceed with delivery/induction  Will follow clinically with CBC/WBC, Temp, and fundal tenderness.  All normal at this time  Would proceed with induction if signs of infection.  Would allow to labor if spontaneous  All of her questions were answered appropriately.    EVIN luz maria  Heplock IV  FHT Qshift  OK to ambulate  Daily CBC  Was on oral ampicillin. Antibiotic discontinued on 12/14/2023, after 14 days        Disposition: Continue inpatient management of previable PROM    Iii Samir Gayle MD  Obstetrics  [facility] - [place] - Labor & Delivery

## 2023-12-21 NOTE — NURSING
Pt assessment completed fhts [id number] active fetus audible and per pt. Pt denies LOF no co pain. Pt affect noted to b flat. Asked pt about her demeanor stated just sad.

## 2023-12-21 NOTE — PROGRESS NOTES
"[facility] - [place] - Labor & Delivery  Adult Nutrition  Progress Note    SUMMARY       Recommendations  Recommendation:   1. Continue regular diet; monitor PO intake at meals and snacks   2. If PO intake falls below 50% recommend Boost Glucose Control BID.   3. Monitor weight and labs    Goals: Pt eating 50-75% of meals by RD follow up    Nutrition Goal Status: new  Communication of RD Recs: other (comment) (POC)    Assessment and Plan  Nutrition Problem  Predicted Inadequate energy intake    Related to (etiology):   Dx related symptoms    Signs and Symptoms (as evidenced by):   19 weeks gestation      Interventions:  Collaboration with other providers    Nutrition Diagnosis Status:   Continues    Malnutrition Assessment  Weight Loss (Malnutrition):  (notes 2.66% wt loss in 5 months (7/23)     Reason for Assessment  Reason For Assessment: RD follow-up  Diagnosis: pregnancy complications  Relevant Medical History: Hx of clamydia, hx of gonorrhea  Interdisciplinary Rounds: did not attend  General Information Comments: Pt on a regular diet. Pt stated her appetite as been fine and that she has had no recent weight loss. Pt denies N/V and constipation/diarrhea. Pt is 18 weeks gestation with some spotting noted 12/19.Adi=22/skin intact. Recent wt loss noted per chart review ~5lbs since 7/23. No meal intake noted per chart review.  Nutrition Discharge Planning: Regular Diet    Nutrition Risk Screen  Nutrition Risk Screen: no indicators present    Nutrition/Diet History  Spiritual, Cultural Beliefs, Orthodox Practices, Values that Affect Care:  (no spiritual or cultural perfs identified at this time)  Food Allergies: NKFA  Factors Affecting Nutritional Intake: None identified at this time    Anthropometrics  Temp: 98.2 °F (36.8 °C)  Height: 5' 6" (167.6 cm)  Height (inches): 66 in  Weight Method: Standard Scale  Weight: 88 kg (194 lb 0.1 oz)  Weight (lb): 194.01 lb  Ideal Body Weight (IBW), Female: 130 lb  % Ideal Body Weight, " Female (lb): 149.24 %  BMI (Calculated): 31.3  BMI Grade: 30 - 34.9- obesity - grade I  Usual Body Weight (UBW), k.4 kg  % Usual Body Weight: 97.55  % Weight Change From Usual Weight: -2.66 %     Lab/Procedures/Meds  Pertinent Labs Reviewed: reviewed  Pertinent Labs Comments: hgb (9.5L), hct (29.2L), BUN (4L)  Pertinent Medications Reviewed: reviewed  Pertinent Medications Comments: prenatal vitamin    Estimated/Assessed Needs  Weight Used For Calorie Calculations: 59 kg (130 lb 1.1 oz)  Energy Calorie Requirements (kcal):  kcals (35kcals/kg)  Energy Need Method: Kcal/kg  Protein Requirements: 70g (1/2g/kg)  Weight Used For Protein Calculations: 59 kg (130 lb 1.1 oz)     Estimated Fluid Requirement Method: RDA Method  RDA Method (mL):      Nutrition Prescription Ordered  Current Diet Order: Regular Diet    Evaluation of Received Nutrient/Fluid Intak  I/O: no I/O per chart review  Energy Calories Required: meeting needs  Protein Required: meeting needs  Fluid Required: meeting needs  Comments: no LBM noted  Tolerance: tolerating  % Intake of Estimated Energy Needs: 75 - 100 %  % Meal Intake: 75 - 100 %    Nutrition Risk  Level of Risk/Frequency of Follow-up: low - moderate (1x/weekly)     Monitor and Evaluation  Food and Nutrient Intake: energy intake, food and beverage intake  Food and Nutrient Adminstration: diet order  Knowledge/Beliefs/Attitudes: food and nutrition knowledge/skill, beliefs and attitudes  Physical Activity and Function: factors affecting access to physical activity  Anthropometric Measurements: weight, weight change, body mass index  Biochemical Data, Medical Tests and Procedures: gastrointestinal profile, electrolyte and renal panel, inflammatory profile, lipid profile  Nutrition-Focused Physical Findings: overall appearance     Nutrition Follow-Up  RD Follow-up?: Yes

## 2023-12-21 NOTE — PLAN OF CARE
Recommendations  Recommendation:   1. Continue regular diet; monitor PO intake at meals and snacks   2. If PO intake falls below 50% recommend Boost Glucose Control BID.   3. Monitor weight and labs    Goals: Pt eating 50-75% of meals by RD follow up    Nutrition Goal Status: new  Communication of RD Recs: other (comment) (POC)

## 2023-12-22 PROCEDURE — 25000003 PHARM REV CODE 250: Performed by: OBSTETRICS & GYNECOLOGY

## 2023-12-22 PROCEDURE — 99231 SBSQ HOSP IP/OBS SF/LOW 25: CPT | Mod: ,,, | Performed by: OBSTETRICS & GYNECOLOGY

## 2023-12-22 PROCEDURE — 11000001 HC ACUTE MED/SURG PRIVATE ROOM

## 2023-12-22 RX ORDER — BUTALBITAL, ACETAMINOPHEN AND CAFFEINE 50; 325; 40 MG/1; MG/1; MG/1
1 TABLET ORAL EVERY 4 HOURS PRN
Status: DISCONTINUED | OUTPATIENT
Start: 2023-12-22 | End: 2023-12-29 | Stop reason: HOSPADM

## 2023-12-22 RX ADMIN — PRENATAL VIT W/ FE FUMARATE-FA TAB 27-0.8 MG 1 TABLET: 27-0.8 TAB at 11:12

## 2023-12-22 RX ADMIN — Medication 324 MG: at 05:12

## 2023-12-22 RX ADMIN — ACETAMINOPHEN 1000 MG: 500 TABLET, FILM COATED ORAL at 05:12

## 2023-12-22 RX ADMIN — BUTALBITAL, ACETAMINOPHEN, AND CAFFEINE 1 TABLET: 325; 50; 40 TABLET ORAL at 12:12

## 2023-12-22 RX ADMIN — LORAZEPAM 0.5 MG: 0.5 TABLET ORAL at 05:12

## 2023-12-22 NOTE — NURSING
1230-RN call to MD Alvarado regarding pt tension headaches and minimal relief with Tylenol. MD gave order for Fiorcet 1 tab PO q4-6hrs PRN HA.

## 2023-12-22 NOTE — PROGRESS NOTES
[facility] [place] & [place]  Obstetrics  Antepartum Progress Note    Patient Name: Gilberto Bueno  MRN: [id number]  Admission Date: 2023  Hospital Length of Stay: 21 days  Attending Physician: Abimael Henderson MD  Primary Care Provider: Liss Wise MD    Subjective:     Principal Problem:Premature rupture of membranes in second trimester    HPI:  44 yo  at 17w4d  Advanced maternal age  Fetus with Down syndrome  Has been with spotting for the past couple of days  Went to our Emergency on 2023 with minimal spotting and occasional abdominal pain.  Ultrasound that day with MARCUS of 5.9 cm  Good fetal movements    Noted with ROM last night at 1800.    Denies fever and chills.  No longer with pain/contractions        Hospital Course:  On Labor and Delivery, vitals stable  FHT at 140   Contraction: None  Cervix: 1 cm  Started on antibiotic therapy.  Zithromax and Ampicillin    2023 No longer with contractions/spotting this morning.  No fundal tenderness.  No leakage of fluid. Does not want to deliver now.  Wants to wait for sealing of the membranes    2023 No pain.  No leakage. No vaginal bleeding. Good fetal movements  2023 HD#3/PPROM Day #4. No pain.  No leakage. No vaginal bleeding. Good fetal movements  12/3/2023 HD#4/PPROM Day #5. No pain.  No leakage. No vaginal bleeding. Good fetal movements    2023 HD#5 PPROM Day #6. No pain.  No leakage. No vaginal bleeding.  Good fetal movements.  Has been ambulating. Bedside ultrasound with no amniotic fluid. Oral ampicillin started.  2023 HD#6 PPROM Day #7. No pain.  No leakage. No vaginal bleeding.  Good fetal movements.  Has been ambulating to the bathroom.  Still in good spirit.  2023 HD#7 PPROM Day #8. No pain.  No leakage. No vaginal bleeding.  Good fetal movements.  Has been ambulating to the bathroom.  Still in good spirit. Had a visit with our hospital  last night  2023 HD#8 PPROM Day #9. No change. No  pain.  No leakage. No vaginal bleeding.  Good fetal movements.  Has been ambulating to the bathroom.  Still in good spirit.   12/8/2023 HD#9 PPROM Day #10. No change. No pain.  No leakage. No vaginal bleeding.  Good fetal movements.  Has been ambulating to the bathroom.  Still in good spirit.  12/9/2023 HD#10 PPROM Day #11.  No change. No pain.  No leakage. No vaginal bleeding.  Good fetal movements.  Has been ambulating to the bathroom.  Still in good spirit.   12/10/2023 HD#11 PPROM Day #12.  No change. No pain.  No leakage. No vaginal bleeding.  Good fetal movements.  Has been ambulating to the bathroom.  Still in good spirit. But has a difficult time sleeping last night     12/11/2023 HD#12 PPROM Day #13. No change. No pain.  No leakage. No vaginal bleeding.  Good fetal movements.  Has been ambulating to the bathroom.  Still in good spirit. Bedside ultrasound by me, 0.5 cm fluid pocket by fetal feet with chord.  FHT at 148   12/12/2023 HD#13 PPROM Day #14. No change. No pain.  No leakage. No vaginal bleeding.  Good fetal movements.  Has been ambulating to the bathroom.  Still in good spirit.   12/13/2023 HD#14 PPROM Day #15. No change. No pain.  No leakage. No vaginal bleeding.  Good fetal movements.  Has been ambulating to the bathroom.  Still in good spirit. Will stop antibiotic therapy after today  12/14/2023 HD#15 PPROM Day #16. No change. No pain.  No leakage. No vaginal bleeding.  Good fetal movements.  Has been ambulating to the bathroom.  Still in good spirit. Will stop antibiotic therapy today  12/15/2023 HD#16 PPROM Day #17. No change. No pain.  No leakage. No vaginal bleeding.  Good fetal movements.  Has been ambulating to the bathroom.  Still in good spirit.  Antibiotic discontinued yesterday.  12/16/2023: HD #17 PPROM Day #18. No change, not in pain. No leakage or change in discharge   12/17/2023: HD #18 PPROM Day #19. No change or abdominal pain, no vaginal bleeding or fluid loss. In good spirits      12/18/2023 HD#19 PPROM Day #20. No change. No pain.  No leakage. No vaginal bleeding.  Good fetal movements.  Has been ambulating to the bathroom.  Still in good spirit. Bedside ultrasound done by me today. Footling breech with minimal fluid.  Good fetal heart tones and movements.     12/19/23:  HD#20 PPROM D#21:  pt denies any c/o + FM, no pain or bleeding    12/20/2023 HD#21 PPROM D#22:  pt denies any c/o + FM, no pain or bleeding  12/21/2023 HD#22 PPROM D#23: pt denies abdominal pain, subjective fever, change in discharge or odor, VB. Noting FM    12/21/2023 HD#23 PPROM D#24: pt denies abdominal pain, subjective fever, change in discharge or odor, VB. Noting FM. In good spirit    Obstetric HPI:  Patient reports no contractions, active fetal movement, absent vaginal bleeding , absent loss of fluid      Objective:     Vital Signs (Most Recent):  Temp: 98 °F (36.7 °C) (12/22/23 0557)  Pulse: 78 (12/21/23 2313)  Resp: 16 (12/21/23 2313)  BP: (!) 101/50 (12/21/23 2313)  SpO2: 99 % (12/21/23 2312) Vital Signs (24h Range):  Temp:  [98 °F (36.7 °C)-99 °F (37.2 °C)] 98 °F (36.7 °C)  Pulse:  [78-82] 78  Resp:  [16] 16  SpO2:  [99 %] 99 %  BP: (101)/(50) 101/50     Weight: 88 kg (194 lb 0.1 oz)  Body mass index is 31.31 kg/m².    FHT: 130s.   TOCO:  none    No intake or output data in the 24 hours ending 12/22/23 1541    Cervical Exam:  Dilation:  deferred     Significant Labs:  Recent Lab Results       None            Physical Exam:   Constitutional: She is oriented to person, place, and time. She appears well-developed and well-nourished.    HENT:   Head: Normocephalic and atraumatic.    Eyes: Pupils are equal, round, and reactive to light. EOM are normal.     Cardiovascular:       Exam reveals no clubbing and no cyanosis.        Pulmonary/Chest: Effort normal.        Abdominal: Soft. She exhibits no mass. There is no rebound and no guarding. No hernia.   No uterine tenderness             Musculoskeletal: Normal range  of motion and moves all extremeties.       Neurological: She is alert and oriented to person, place, and time.    Skin: Skin is warm. No cyanosis. Nails show no clubbing.    Psychiatric: She has a normal mood and affect. Her behavior is normal. Thought content normal.       Review of Systems   Constitutional:  Negative for appetite change, chills and fever.   Respiratory:  Negative for shortness of breath.    Cardiovascular:  Negative for chest pain.   Gastrointestinal:  Negative for abdominal pain, blood in stool, constipation, diarrhea, nausea and vomiting.   Endocrine: Negative for hair loss and hot flashes.   Genitourinary:  Negative for decreased libido, dysmenorrhea, dyspareunia, dysuria, genital sores, menorrhagia, menstrual problem, pelvic pain, vaginal discharge, vaginal pain, urinary incontinence and vaginal odor.   Musculoskeletal:  Negative for back pain.   Integumentary:  Negative for rash, acne, hair changes, breast mass, nipple discharge and breast skin changes.   Breast: Negative for mass, mastodynia, nipple discharge and skin changes    Assessment/Plan:     43 y.o. female  at 20w5d for:    * Premature rupture of membranes in second trimester  Discussed with patient management options. Risks and benefits of labor induction vs close observation presented including risks of infection, bleeding and sepsis.  Patient still would like to wait.  Does not want to proceed with delivery/induction  Will follow clinically with CBC/WBC, Temp, and fundal tenderness.  All normal at this time. CBC q3 days, sooner if indicated.  Would proceed with induction if signs of infection.  Would allow to labor if spontaneous  Abx's completed  All of her questions were answered appropriately.    EVIN Agustin IV  FHT Qshift  OK to ambulate  Was on oral ampicillin. Antibiotic discontinued on 2023, after 14 days    20 weeks gestation of pregnancy  Continue close observation.      Vaginal bleeding in pregnancy,  second trimester  PT no longer having any bleeding    History of  premature rupture of membranes (PROM) in previous pregnancy, currently pregnant in second trimester  Discussed with patient management options. Risks and benefits of labor induction vs close observation presented including risks of infection, bleeding and sepsis.  Patient still would like to wait.  Does not want to proceed with delivery/induction  Will follow clinically with CBC/WBC, Temp, and fundal tenderness.  All normal at this time  Would proceed with induction if signs of infection.  Would allow to labor if spontane    Maternal serum screen positive for trisomy 21  Now with PROM  Discussed with patient management options. Risks and benefits of labor induction vs close observation presented including risks of infection, bleeding and sepsis.  Patient would like to wait.  Does not want to proceed with delivery/induction  Will follow clinically with CBC/WBC, Temp, and fundal tenderness.  Would proceed with induction if signs of infection.  Would allow to labor if spontaneous    All of her questions were answered appropriately.    Multigravida of advanced maternal age in second trimester  Now with PROM  Discussed with patient management options. Risks and benefits of labor induction vs close observation presented including risks of infection, bleeding and sepsis.  Patient still would like to wait.  Does not want to proceed with delivery/induction  Will follow clinically with CBC/WBC, Temp, and fundal tenderness.  All normal at this time  Would proceed with induction if signs of infection.  Would allow to labor if spontaneous  All of her questions were answered appropriately.    EVIN niñoe  Heplock IV  FHT Qshift  OK to ambulate  Daily CBC  Was on oral ampicillin. Antibiotic discontinued on 2023, after 14 days          Tiffanie Alvarado MD  Obstetrics  [facility] - Labor & Delivery

## 2023-12-22 NOTE — ASSESSMENT & PLAN NOTE
Discussed with patient management options. Risks and benefits of labor induction vs close observation presented including risks of infection, bleeding and sepsis.  Patient still would like to wait.  Does not want to proceed with delivery/induction  Will follow clinically with CBC/WBC, Temp, and fundal tenderness.  All normal at this time. CBC q3 days, sooner if indicated.  Would proceed with induction if signs of infection.  Would allow to labor if spontaneous  Abx's completed  All of her questions were answered appropriately.    EVIN loera  Heplock IV  FHT Qshift  OK to ambulate  Was on oral ampicillin. Antibiotic discontinued on 12/14/2023, after 14 days

## 2023-12-22 NOTE — SUBJECTIVE & OBJECTIVE
Obstetric HPI:  Patient reports no contractions, active fetal movement, absent vaginal bleeding , absent loss of fluid      Objective:     Vital Signs (Most Recent):  Temp: 98 °F (36.7 °C) (12/22/23 0557)  Pulse: 78 (12/21/23 2313)  Resp: 16 (12/21/23 2313)  BP: (!) 101/50 (12/21/23 2313)  SpO2: 99 % (12/21/23 2312) Vital Signs (24h Range):  Temp:  [98 °F (36.7 °C)-99 °F (37.2 °C)] 98 °F (36.7 °C)  Pulse:  [78-82] 78  Resp:  [16] 16  SpO2:  [99 %] 99 %  BP: (101)/(50) 101/50     Weight: 88 kg (194 lb 0.1 oz)  Body mass index is 31.31 kg/m².    FHT: 130s.   TOCO:  none    No intake or output data in the 24 hours ending 12/22/23 1541    Cervical Exam:  Dilation:  deferred     Significant Labs:  Recent Lab Results       None            Physical Exam:   Constitutional: She is oriented to person, place, and time. She appears well-developed and well-nourished.    HENT:   Head: Normocephalic and atraumatic.    Eyes: Pupils are equal, round, and reactive to light. EOM are normal.     Cardiovascular:       Exam reveals no clubbing and no cyanosis.        Pulmonary/Chest: Effort normal.        Abdominal: Soft. She exhibits no mass. There is no rebound and no guarding. No hernia.   No uterine tenderness             Musculoskeletal: Normal range of motion and moves all extremeties.       Neurological: She is alert and oriented to person, place, and time.    Skin: Skin is warm. No cyanosis. Nails show no clubbing.    Psychiatric: She has a normal mood and affect. Her behavior is normal. Thought content normal.       Review of Systems   Constitutional:  Negative for appetite change, chills and fever.   Respiratory:  Negative for shortness of breath.    Cardiovascular:  Negative for chest pain.   Gastrointestinal:  Negative for abdominal pain, blood in stool, constipation, diarrhea, nausea and vomiting.   Endocrine: Negative for hair loss and hot flashes.   Genitourinary:  Negative for decreased libido, dysmenorrhea, dyspareunia,  dysuria, genital sores, menorrhagia, menstrual problem, pelvic pain, vaginal discharge, vaginal pain, urinary incontinence and vaginal odor.   Musculoskeletal:  Negative for back pain.   Integumentary:  Negative for rash, acne, hair changes, breast mass, nipple discharge and breast skin changes.   Breast: Negative for mass, mastodynia, nipple discharge and skin changes

## 2023-12-23 LAB
BASOPHILS # BLD AUTO: 0.07 K/UL (ref 0–0.2)
BASOPHILS NFR BLD: 0.6 % (ref 0–1.9)
DIFFERENTIAL METHOD BLD: ABNORMAL
EOSINOPHIL # BLD AUTO: 0.1 K/UL (ref 0–0.5)
EOSINOPHIL NFR BLD: 0.6 % (ref 0–8)
ERYTHROCYTE [DISTWIDTH] IN BLOOD BY AUTOMATED COUNT: 12.7 % (ref 11.5–14.5)
HCT VFR BLD AUTO: 33.7 % (ref 37–48.5)
HGB BLD-MCNC: 11.2 G/DL (ref 12–16)
IMM GRANULOCYTES # BLD AUTO: 0.07 K/UL (ref 0–0.04)
IMM GRANULOCYTES NFR BLD AUTO: 0.6 % (ref 0–0.5)
LYMPHOCYTES # BLD AUTO: 1.9 K/UL (ref 1–4.8)
LYMPHOCYTES NFR BLD: 15.4 % (ref 18–48)
MCH RBC QN AUTO: 30.3 PG (ref 27–31)
MCHC RBC AUTO-ENTMCNC: 33.2 G/DL (ref 32–36)
MCV RBC AUTO: 91 FL (ref 82–98)
MONOCYTES # BLD AUTO: 1 K/UL (ref 0.3–1)
MONOCYTES NFR BLD: 7.9 % (ref 4–15)
NEUTROPHILS # BLD AUTO: 9.3 K/UL (ref 1.8–7.7)
NEUTROPHILS NFR BLD: 74.9 % (ref 38–73)
NRBC BLD-RTO: 0 /100 WBC
PLATELET # BLD AUTO: 156 K/UL (ref 150–450)
PMV BLD AUTO: 10.7 FL (ref 9.2–12.9)
RBC # BLD AUTO: 3.7 M/UL (ref 4–5.4)
WBC # BLD AUTO: 12.38 K/UL (ref 3.9–12.7)

## 2023-12-23 PROCEDURE — 85025 COMPLETE CBC W/AUTO DIFF WBC: CPT | Performed by: OBSTETRICS & GYNECOLOGY

## 2023-12-23 PROCEDURE — 25000003 PHARM REV CODE 250: Performed by: OBSTETRICS & GYNECOLOGY

## 2023-12-23 PROCEDURE — 99232 SBSQ HOSP IP/OBS MODERATE 35: CPT | Mod: ,,, | Performed by: OBSTETRICS & GYNECOLOGY

## 2023-12-23 PROCEDURE — 11000001 HC ACUTE MED/SURG PRIVATE ROOM

## 2023-12-23 PROCEDURE — 36415 COLL VENOUS BLD VENIPUNCTURE: CPT | Performed by: OBSTETRICS & GYNECOLOGY

## 2023-12-23 RX ADMIN — Medication 324 MG: at 04:12

## 2023-12-23 RX ADMIN — PRENATAL VIT W/ FE FUMARATE-FA TAB 27-0.8 MG 1 TABLET: 27-0.8 TAB at 08:12

## 2023-12-23 RX ADMIN — LORAZEPAM 0.5 MG: 0.5 TABLET ORAL at 10:12

## 2023-12-23 RX ADMIN — ZOLPIDEM TARTRATE 5 MG: 5 TABLET ORAL at 01:12

## 2023-12-23 RX ADMIN — LORAZEPAM 0.5 MG: 0.5 TABLET ORAL at 01:12

## 2023-12-23 RX ADMIN — BUTALBITAL, ACETAMINOPHEN, AND CAFFEINE 1 TABLET: 325; 50; 40 TABLET ORAL at 10:12

## 2023-12-23 RX ADMIN — BUTALBITAL, ACETAMINOPHEN, AND CAFFEINE 1 TABLET: 325; 50; 40 TABLET ORAL at 01:12

## 2023-12-23 NOTE — NURSING
Patient agreeable to labs at this time after discussion with RN. RN called lab to come draw labs at this time

## 2023-12-23 NOTE — NURSING
"RN ABS. Patient denies pain, cramping, vaginal bleeding, leaking of fluid or abnormal discharge. States "I just keep peeing myself". Positive fetal movement per patient.     FHTs obtained via ultrasound spot check. 140-145s.     No complaints at this time.  "

## 2023-12-23 NOTE — ASSESSMENT & PLAN NOTE
Discussed with patient management options. Risks and benefits of labor induction vs close observation presented including risks of infection, bleeding and sepsis.  Patient still would like to wait.  Does not want to proceed with delivery/induction  Will follow clinically with CBC/WBC, Temp, and fundal tenderness.  All normal at this time  Would proceed with induction if signs of infection.  Would allow to labor if spontane  Check MARCUS on 12/24/23.  OB US ordered

## 2023-12-23 NOTE — PROGRESS NOTES
[facility] - [place] - Labor & Delivery  Obstetrics  Antepartum Progress Note    Patient Name: Gilberto Bueno  MRN: [id number]  Admission Date: 2023  Hospital Length of Stay: 22 days  Attending Physician: Abimael Henderson MD  Primary Care Provider: Liss Wise MD    Subjective:     Principal Problem:Premature rupture of membranes in second trimester    HPI:  42 yo  at 17w4d  Advanced maternal age  Fetus with Down syndrome  Has been with spotting for the past couple of days  Went to our Emergency on 2023 with minimal spotting and occasional abdominal pain.  Ultrasound that day with MARCUS of 5.9 cm  Good fetal movements    Noted with ROM last night at 1800.    Denies fever and chills.  No longer with pain/contractions        Hospital Course:  On Labor and Delivery, vitals stable  FHT at 140   Contraction: None  Cervix: 1 cm  Started on antibiotic therapy.  Zithromax and Ampicillin    2023 No longer with contractions/spotting this morning.  No fundal tenderness.  No leakage of fluid. Does not want to deliver now.  Wants to wait for sealing of the membranes    2023 No pain.  No leakage. No vaginal bleeding. Good fetal movements  2023 HD#3/PPROM Day #4. No pain.  No leakage. No vaginal bleeding. Good fetal movements  12/3/2023 HD#4/PPROM Day #5. No pain.  No leakage. No vaginal bleeding. Good fetal movements    2023 HD#5 PPROM Day #6. No pain.  No leakage. No vaginal bleeding.  Good fetal movements.  Has been ambulating. Bedside ultrasound with no amniotic fluid. Oral ampicillin started.  2023 HD#6 PPROM Day #7. No pain.  No leakage. No vaginal bleeding.  Good fetal movements.  Has been ambulating to the bathroom.  Still in good spirit.  2023 HD#7 PPROM Day #8. No pain.  No leakage. No vaginal bleeding.  Good fetal movements.  Has been ambulating to the bathroom.  Still in good spirit. Had a visit with our hospital  last night  2023 HD#8 PPROM Day #9. No change. No  pain.  No leakage. No vaginal bleeding.  Good fetal movements.  Has been ambulating to the bathroom.  Still in good spirit.   12/8/2023 HD#9 PPROM Day #10. No change. No pain.  No leakage. No vaginal bleeding.  Good fetal movements.  Has been ambulating to the bathroom.  Still in good spirit.  12/9/2023 HD#10 PPROM Day #11.  No change. No pain.  No leakage. No vaginal bleeding.  Good fetal movements.  Has been ambulating to the bathroom.  Still in good spirit.   12/10/2023 HD#11 PPROM Day #12.  No change. No pain.  No leakage. No vaginal bleeding.  Good fetal movements.  Has been ambulating to the bathroom.  Still in good spirit. But has a difficult time sleeping last night     12/11/2023 HD#12 PPROM Day #13. No change. No pain.  No leakage. No vaginal bleeding.  Good fetal movements.  Has been ambulating to the bathroom.  Still in good spirit. Bedside ultrasound by me, 0.5 cm fluid pocket by fetal feet with chord.  FHT at 148   12/12/2023 HD#13 PPROM Day #14. No change. No pain.  No leakage. No vaginal bleeding.  Good fetal movements.  Has been ambulating to the bathroom.  Still in good spirit.   12/13/2023 HD#14 PPROM Day #15. No change. No pain.  No leakage. No vaginal bleeding.  Good fetal movements.  Has been ambulating to the bathroom.  Still in good spirit. Will stop antibiotic therapy after today  12/14/2023 HD#15 PPROM Day #16. No change. No pain.  No leakage. No vaginal bleeding.  Good fetal movements.  Has been ambulating to the bathroom.  Still in good spirit. Will stop antibiotic therapy today  12/15/2023 HD#16 PPROM Day #17. No change. No pain.  No leakage. No vaginal bleeding.  Good fetal movements.  Has been ambulating to the bathroom.  Still in good spirit.  Antibiotic discontinued yesterday.  12/16/2023: HD #17 PPROM Day #18. No change, not in pain. No leakage or change in discharge   12/17/2023: HD #18 PPROM Day #19. No change or abdominal pain, no vaginal bleeding or fluid loss. In good spirits      12/18/2023 HD#19 PPROM Day #20. No change. No pain.  No leakage. No vaginal bleeding.  Good fetal movements.  Has been ambulating to the bathroom.  Still in good spirit. Bedside ultrasound done by me today. Footling breech with minimal fluid.  Good fetal heart tones and movements.     12/19/23:  HD#20 PPROM D#21:  pt denies any c/o + FM, no pain or bleeding    12/20/2023 HD#21 PPROM D#22:  pt denies any c/o + FM, no pain or bleeding  12/21/2023 HD#22 PPROM D#23: pt denies abdominal pain, subjective fever, change in discharge or odor, VB. Noting FM    12/22/2023 HD#23 PPROM D#24: pt denies abdominal pain, subjective fever, change in discharge or odor, VB. Noting FM. In good spirit    12/23/2023 HD#24 PPROM D#25: pt denies abdominal pain, subjective fever, change in discharge or odor, VB. Noting FM. In good spirit. Refused blood drawn today    Obstetric HPI:  Patient reports None contractions, active fetal movement, absent vaginal bleeding , denies further loss of fluid      Objective:     Vital Signs (Most Recent):  Temp: 98.1 °F (36.7 °C) (12/23/23 0850)  Pulse: 97 (12/23/23 0850)  Resp: 16 (12/23/23 0850)  BP: (!) 95/48 (12/23/23 0850)  SpO2: 98 % (12/23/23 0850) Vital Signs (24h Range):  Temp:  [98 °F (36.7 °C)-98.9 °F (37.2 °C)] 98.1 °F (36.7 °C)  Pulse:  [81-97] 97  Resp:  [16-18] 16  SpO2:  [98 %-100 %] 98 %  BP: (90-95)/(48-55) 95/48     Weight: 88 kg (194 lb 0.1 oz)  Body mass index is 31.31 kg/m².    FHT: 151 bpm    No intake or output data in the 24 hours ending 12/23/23 1001    Cervical Exam:  Dilation:  deferred     Significant Labs:  Recent Lab Results       None            Physical Exam:   Constitutional: She is oriented to person, place, and time. She appears well-developed and well-nourished.    HENT:   Head: Normocephalic and atraumatic.    Eyes: Pupils are equal, round, and reactive to light. EOM are normal.     Cardiovascular:       Exam reveals no clubbing and no cyanosis.         Pulmonary/Chest: Effort normal.        Abdominal: Soft. She exhibits no mass. There is no rebound and no guarding. No hernia.   No tenderness             Musculoskeletal: Normal range of motion and moves all extremeties.       Neurological: She is alert and oriented to person, place, and time.    Skin: Skin is warm. No cyanosis. Nails show no clubbing.    Psychiatric: She has a normal mood and affect. Her behavior is normal. Thought content normal.       Review of Systems   Constitutional:  Negative for appetite change, chills and fever.   Respiratory:  Negative for shortness of breath.    Cardiovascular:  Negative for chest pain.   Gastrointestinal:  Negative for abdominal pain, blood in stool, constipation, diarrhea, nausea and vomiting.   Endocrine: Negative for hair loss and hot flashes.   Genitourinary:  Negative for decreased libido, dysmenorrhea, dyspareunia, dysuria, genital sores, menorrhagia, menstrual problem, pelvic pain, vaginal discharge, vaginal pain, urinary incontinence and vaginal odor.   Musculoskeletal:  Negative for back pain.   Integumentary:  Negative for rash, acne, hair changes, breast mass, nipple discharge and breast skin changes.   Breast: Negative for mass, mastodynia, nipple discharge and skin changes    Assessment/Plan:     43 y.o. female  at 20w6d for:    * Premature rupture of membranes in second trimester  Discussed with patient management options. Risks and benefits of labor induction vs close observation presented including risks of infection, bleeding and sepsis.  Patient still would like to wait.  Does not want to proceed with delivery/induction  Will follow clinically with CBC/WBC, Temp, and fundal tenderness.  All normal at this time. CBC q3 days, sooner if indicated.  Would proceed with induction if signs of infection.  Would allow to labor if spontaneous  Abx's completed  All of her questions were answered appropriately.    EVIN Agustin IV  T Qshift  OK to  ambulate  Was on oral ampicillin. Antibiotic discontinued on 2023, after 14 days    20 weeks gestation of pregnancy  Continue close observation.  PNV daily      Vaginal bleeding in pregnancy, second trimester  PT no longer having any bleeding    History of  premature rupture of membranes (PROM) in previous pregnancy, currently pregnant in second trimester  Discussed with patient management options. Risks and benefits of labor induction vs close observation presented including risks of infection, bleeding and sepsis.  Patient still would like to wait.  Does not want to proceed with delivery/induction  Will follow clinically with CBC/WBC, Temp, and fundal tenderness.  All normal at this time  Would proceed with induction if signs of infection.  Would allow to labor if spontane  Check MARCUS on 23.  OB US ordered    Maternal serum screen positive for trisomy 21  Now with PROM  Discussed with patient management options. Risks and benefits of labor induction vs close observation presented including risks of infection, bleeding and sepsis.  Patient would like to wait.  Does not want to proceed with delivery/induction  Will follow clinically with CBC/WBC, Temp, and fundal tenderness.  Would proceed with induction if signs of infection.  Would allow to labor if spontaneous    All of her questions were answered appropriately.    Multigravida of advanced maternal age in second trimester  Now with PROM  Discussed with patient management options. Risks and benefits of labor induction vs close observation presented including risks of infection, bleeding and sepsis.  Patient still would like to wait.  Does not want to proceed with delivery/induction  Will follow clinically with CBC/WBC, Temp, and fundal tenderness.  All normal at this time  Would proceed with induction if signs of infection.  Would allow to labor if spontaneous  All of her questions were answered appropriately.    EVIN Agustin IV  T  Qshift  OK to ambulate  Daily CBC  Was on oral ampicillin. Antibiotic discontinued on 12/14/2023, after 14 days          Tiffanie Alvarado MD  Obstetrics  [facility] - [place] - Labor & Delivery

## 2023-12-23 NOTE — SUBJECTIVE & OBJECTIVE
Obstetric HPI:  Patient reports None contractions, active fetal movement, absent vaginal bleeding , denies further loss of fluid      Objective:     Vital Signs (Most Recent):  Temp: 98.1 °F (36.7 °C) (12/23/23 0850)  Pulse: 97 (12/23/23 0850)  Resp: 16 (12/23/23 0850)  BP: (!) 95/48 (12/23/23 0850)  SpO2: 98 % (12/23/23 0850) Vital Signs (24h Range):  Temp:  [98 °F (36.7 °C)-98.9 °F (37.2 °C)] 98.1 °F (36.7 °C)  Pulse:  [81-97] 97  Resp:  [16-18] 16  SpO2:  [98 %-100 %] 98 %  BP: (90-95)/(48-55) 95/48     Weight: 88 kg (194 lb 0.1 oz)  Body mass index is 31.31 kg/m².    FHT: 151 bpm    No intake or output data in the 24 hours ending 12/23/23 1001    Cervical Exam:  Dilation:  deferred     Significant Labs:  Recent Lab Results       None            Physical Exam:   Constitutional: She is oriented to person, place, and time. She appears well-developed and well-nourished.    HENT:   Head: Normocephalic and atraumatic.    Eyes: Pupils are equal, round, and reactive to light. EOM are normal.     Cardiovascular:       Exam reveals no clubbing and no cyanosis.        Pulmonary/Chest: Effort normal.        Abdominal: Soft. She exhibits no mass. There is no rebound and no guarding. No hernia.   No tenderness             Musculoskeletal: Normal range of motion and moves all extremeties.       Neurological: She is alert and oriented to person, place, and time.    Skin: Skin is warm. No cyanosis. Nails show no clubbing.    Psychiatric: She has a normal mood and affect. Her behavior is normal. Thought content normal.       Review of Systems   Constitutional:  Negative for appetite change, chills and fever.   Respiratory:  Negative for shortness of breath.    Cardiovascular:  Negative for chest pain.   Gastrointestinal:  Negative for abdominal pain, blood in stool, constipation, diarrhea, nausea and vomiting.   Endocrine: Negative for hair loss and hot flashes.   Genitourinary:  Negative for decreased libido, dysmenorrhea,  dyspareunia, dysuria, genital sores, menorrhagia, menstrual problem, pelvic pain, vaginal discharge, vaginal pain, urinary incontinence and vaginal odor.   Musculoskeletal:  Negative for back pain.   Integumentary:  Negative for rash, acne, hair changes, breast mass, nipple discharge and breast skin changes.   Breast: Negative for mass, mastodynia, nipple discharge and skin changes

## 2023-12-24 ENCOUNTER — ANESTHESIA EVENT (OUTPATIENT)
Dept: OBSTETRICS AND GYNECOLOGY | Facility: HOSPITAL | Age: 43
End: 2023-12-24
Payer: MEDICAID

## 2023-12-24 ENCOUNTER — ANESTHESIA (OUTPATIENT)
Dept: OBSTETRICS AND GYNECOLOGY | Facility: HOSPITAL | Age: 43
End: 2023-12-24
Payer: MEDICAID

## 2023-12-24 PROBLEM — O28.5 MATERNAL SERUM SCREEN POSITIVE FOR TRISOMY 21: Status: RESOLVED | Noted: 2023-11-06 | Resolved: 2023-12-24

## 2023-12-24 PROBLEM — O26.899 VAGINAL DISCHARGE DURING PREGNANCY: Status: ACTIVE | Noted: 2023-12-24

## 2023-12-24 PROBLEM — N89.8 VAGINAL DISCHARGE DURING PREGNANCY: Status: ACTIVE | Noted: 2023-12-24

## 2023-12-24 PROBLEM — O42.912 PREMATURE RUPTURE OF MEMBRANES IN SECOND TRIMESTER: Status: RESOLVED | Noted: 2023-11-30 | Resolved: 2023-12-24

## 2023-12-24 PROBLEM — O26.899 VAGINAL DISCHARGE DURING PREGNANCY: Status: RESOLVED | Noted: 2023-12-24 | Resolved: 2023-12-24

## 2023-12-24 PROBLEM — O09.292 HISTORY OF PRETERM PREMATURE RUPTURE OF MEMBRANES (PROM) IN PREVIOUS PREGNANCY, CURRENTLY PREGNANT IN SECOND TRIMESTER: Status: RESOLVED | Noted: 2023-11-06 | Resolved: 2023-12-24

## 2023-12-24 PROBLEM — Z3A.21 21 WEEKS GESTATION OF PREGNANCY: Status: ACTIVE | Noted: 2023-11-30

## 2023-12-24 PROBLEM — O42.90 PROM (PREMATURE RUPTURE OF MEMBRANES): Status: ACTIVE | Noted: 2023-11-30

## 2023-12-24 PROBLEM — O09.522 MULTIGRAVIDA OF ADVANCED MATERNAL AGE IN SECOND TRIMESTER: Status: RESOLVED | Noted: 2019-01-18 | Resolved: 2023-12-24

## 2023-12-24 PROBLEM — O42.90 PROM (PREMATURE RUPTURE OF MEMBRANES): Status: RESOLVED | Noted: 2023-11-30 | Resolved: 2023-12-24

## 2023-12-24 PROBLEM — N89.8 VAGINAL DISCHARGE DURING PREGNANCY: Status: RESOLVED | Noted: 2023-12-24 | Resolved: 2023-12-24

## 2023-12-24 PROBLEM — Z3A.21 21 WEEKS GESTATION OF PREGNANCY: Status: RESOLVED | Noted: 2023-11-30 | Resolved: 2023-12-24

## 2023-12-24 PROBLEM — O46.92 VAGINAL BLEEDING IN PREGNANCY, SECOND TRIMESTER: Status: RESOLVED | Noted: 2023-11-28 | Resolved: 2023-12-24

## 2023-12-24 PROBLEM — Z98.891 STATUS POST CESAREAN SECTION: Status: ACTIVE | Noted: 2019-06-13

## 2023-12-24 LAB
ABO + RH BLD: NORMAL
BLD GP AB SCN CELLS X3 SERPL QL: NORMAL
SPECIMEN OUTDATE: NORMAL

## 2023-12-24 PROCEDURE — 63600175 PHARM REV CODE 636 W HCPCS: Performed by: NURSE ANESTHETIST, CERTIFIED REGISTERED

## 2023-12-24 PROCEDURE — 63600175 PHARM REV CODE 636 W HCPCS: Performed by: OBSTETRICS & GYNECOLOGY

## 2023-12-24 PROCEDURE — 36004725 HC OB OR TIME LEV III - EA ADD 15 MIN: Performed by: OBSTETRICS & GYNECOLOGY

## 2023-12-24 PROCEDURE — 71000033 HC RECOVERY, INTIAL HOUR: Performed by: OBSTETRICS & GYNECOLOGY

## 2023-12-24 PROCEDURE — 59514 CESAREAN DELIVERY ONLY: CPT | Mod: QY,ANES,, | Performed by: ANESTHESIOLOGY

## 2023-12-24 PROCEDURE — 37000009 HC ANESTHESIA EA ADD 15 MINS: Performed by: OBSTETRICS & GYNECOLOGY

## 2023-12-24 PROCEDURE — 59514 PRA REAN DELIVERY ONLY: ICD-10-PCS | Mod: QX,CRNA,, | Performed by: NURSE ANESTHETIST, CERTIFIED REGISTERED

## 2023-12-24 PROCEDURE — 59514 PRA REAN DELIVERY ONLY: ICD-10-PCS | Mod: QY,ANES,, | Performed by: ANESTHESIOLOGY

## 2023-12-24 PROCEDURE — 11000001 HC ACUTE MED/SURG PRIVATE ROOM

## 2023-12-24 PROCEDURE — 86901 BLOOD TYPING SEROLOGIC RH(D): CPT | Performed by: OBSTETRICS & GYNECOLOGY

## 2023-12-24 PROCEDURE — 63600175 PHARM REV CODE 636 W HCPCS: Performed by: ANESTHESIOLOGY

## 2023-12-24 PROCEDURE — 59514 CESAREAN DELIVERY ONLY: CPT | Mod: QX,CRNA,, | Performed by: NURSE ANESTHETIST, CERTIFIED REGISTERED

## 2023-12-24 PROCEDURE — 59514 CESAREAN DELIVERY ONLY: CPT | Mod: AT,,, | Performed by: OBSTETRICS & GYNECOLOGY

## 2023-12-24 PROCEDURE — 25000003 PHARM REV CODE 250: Performed by: NURSE ANESTHETIST, CERTIFIED REGISTERED

## 2023-12-24 PROCEDURE — 59514 CESAREAN DELIVERY ONLY: CPT | Mod: 80,AT,, | Performed by: OBSTETRICS & GYNECOLOGY

## 2023-12-24 PROCEDURE — 37000008 HC ANESTHESIA 1ST 15 MINUTES: Performed by: OBSTETRICS & GYNECOLOGY

## 2023-12-24 PROCEDURE — 36004724 HC OB OR TIME LEV III - 1ST 15 MIN: Performed by: OBSTETRICS & GYNECOLOGY

## 2023-12-24 PROCEDURE — 36415 COLL VENOUS BLD VENIPUNCTURE: CPT | Performed by: OBSTETRICS & GYNECOLOGY

## 2023-12-24 PROCEDURE — 25000003 PHARM REV CODE 250: Performed by: OBSTETRICS & GYNECOLOGY

## 2023-12-24 RX ORDER — OXYCODONE AND ACETAMINOPHEN 10; 325 MG/1; MG/1
1 TABLET ORAL EVERY 4 HOURS PRN
Status: DISCONTINUED | OUTPATIENT
Start: 2023-12-24 | End: 2023-12-29 | Stop reason: HOSPADM

## 2023-12-24 RX ORDER — OXYTOCIN 10 [USP'U]/ML
INJECTION, SOLUTION INTRAMUSCULAR; INTRAVENOUS
Status: DISCONTINUED | OUTPATIENT
Start: 2023-12-24 | End: 2023-12-24

## 2023-12-24 RX ORDER — METHYLERGONOVINE MALEATE 0.2 MG/ML
200 INJECTION INTRAVENOUS
Status: DISCONTINUED | OUTPATIENT
Start: 2023-12-24 | End: 2023-12-29 | Stop reason: HOSPADM

## 2023-12-24 RX ORDER — NALOXONE HCL 0.4 MG/ML
0.02 VIAL (ML) INJECTION
Status: DISCONTINUED | OUTPATIENT
Start: 2023-12-24 | End: 2023-12-29 | Stop reason: HOSPADM

## 2023-12-24 RX ORDER — PHENYLEPHRINE HYDROCHLORIDE 10 MG/ML
INJECTION INTRAVENOUS
Status: DISCONTINUED | OUTPATIENT
Start: 2023-12-24 | End: 2023-12-24

## 2023-12-24 RX ORDER — ACETAMINOPHEN 10 MG/ML
INJECTION, SOLUTION INTRAVENOUS
Status: DISCONTINUED | OUTPATIENT
Start: 2023-12-24 | End: 2023-12-24

## 2023-12-24 RX ORDER — TRANEXAMIC ACID 10 MG/ML
1000 INJECTION, SOLUTION INTRAVENOUS ONCE AS NEEDED
Status: DISCONTINUED | OUTPATIENT
Start: 2023-12-24 | End: 2023-12-29 | Stop reason: HOSPADM

## 2023-12-24 RX ORDER — PROPOFOL 10 MG/ML
VIAL (ML) INTRAVENOUS
Status: DISCONTINUED | OUTPATIENT
Start: 2023-12-24 | End: 2023-12-24

## 2023-12-24 RX ORDER — PRENATAL WITH FERROUS FUM AND FOLIC ACID 3080; 920; 120; 400; 22; 1.84; 3; 20; 10; 1; 12; 200; 27; 25; 2 [IU]/1; [IU]/1; MG/1; [IU]/1; MG/1; MG/1; MG/1; MG/1; MG/1; MG/1; UG/1; MG/1; MG/1; MG/1; MG/1
1 TABLET ORAL DAILY
Status: DISCONTINUED | OUTPATIENT
Start: 2023-12-25 | End: 2023-12-29 | Stop reason: HOSPADM

## 2023-12-24 RX ORDER — SODIUM CHLORIDE 0.9 % (FLUSH) 0.9 %
10 SYRINGE (ML) INJECTION
Status: DISCONTINUED | OUTPATIENT
Start: 2023-12-24 | End: 2023-12-24

## 2023-12-24 RX ORDER — ONDANSETRON HYDROCHLORIDE 2 MG/ML
INJECTION, SOLUTION INTRAMUSCULAR; INTRAVENOUS
Status: DISCONTINUED | OUTPATIENT
Start: 2023-12-24 | End: 2023-12-24

## 2023-12-24 RX ORDER — LIDOCAINE HYDROCHLORIDE 20 MG/ML
INJECTION INTRAVENOUS
Status: DISCONTINUED | OUTPATIENT
Start: 2023-12-24 | End: 2023-12-24

## 2023-12-24 RX ORDER — MUPIROCIN 20 MG/G
OINTMENT TOPICAL 2 TIMES DAILY
Status: COMPLETED | OUTPATIENT
Start: 2023-12-24 | End: 2023-12-29

## 2023-12-24 RX ORDER — HYDROMORPHONE HCL IN 0.9% NACL 6 MG/30 ML
PATIENT CONTROLLED ANALGESIA SYRINGE INTRAVENOUS CONTINUOUS
Status: DISCONTINUED | OUTPATIENT
Start: 2023-12-24 | End: 2023-12-25

## 2023-12-24 RX ORDER — METOCLOPRAMIDE HYDROCHLORIDE 5 MG/ML
INJECTION INTRAMUSCULAR; INTRAVENOUS
Status: DISCONTINUED | OUTPATIENT
Start: 2023-12-24 | End: 2023-12-24

## 2023-12-24 RX ORDER — MISOPROSTOL 200 UG/1
800 TABLET ORAL ONCE AS NEEDED
Status: DISCONTINUED | OUTPATIENT
Start: 2023-12-24 | End: 2023-12-29 | Stop reason: HOSPADM

## 2023-12-24 RX ORDER — SIMETHICONE 80 MG
1 TABLET,CHEWABLE ORAL EVERY 6 HOURS PRN
Status: DISCONTINUED | OUTPATIENT
Start: 2023-12-24 | End: 2023-12-29 | Stop reason: HOSPADM

## 2023-12-24 RX ORDER — MUPIROCIN 20 MG/G
OINTMENT TOPICAL
Status: DISCONTINUED | OUTPATIENT
Start: 2023-12-24 | End: 2023-12-24

## 2023-12-24 RX ORDER — PROCHLORPERAZINE EDISYLATE 5 MG/ML
5 INJECTION INTRAMUSCULAR; INTRAVENOUS EVERY 6 HOURS PRN
Status: DISCONTINUED | OUTPATIENT
Start: 2023-12-24 | End: 2023-12-29 | Stop reason: HOSPADM

## 2023-12-24 RX ORDER — CARBOPROST TROMETHAMINE 250 UG/ML
250 INJECTION, SOLUTION INTRAMUSCULAR
Status: DISCONTINUED | OUTPATIENT
Start: 2023-12-24 | End: 2023-12-29 | Stop reason: HOSPADM

## 2023-12-24 RX ORDER — ONDANSETRON 2 MG/ML
4 INJECTION INTRAMUSCULAR; INTRAVENOUS DAILY PRN
Status: DISCONTINUED | OUTPATIENT
Start: 2023-12-24 | End: 2023-12-24

## 2023-12-24 RX ORDER — FAMOTIDINE 10 MG/ML
20 INJECTION INTRAVENOUS
Status: DISCONTINUED | OUTPATIENT
Start: 2023-12-24 | End: 2023-12-24

## 2023-12-24 RX ORDER — FACIAL-BODY WIPES
10 EACH TOPICAL ONCE AS NEEDED
Status: DISCONTINUED | OUTPATIENT
Start: 2023-12-24 | End: 2023-12-29 | Stop reason: HOSPADM

## 2023-12-24 RX ORDER — DIPHENHYDRAMINE HCL 25 MG
25 CAPSULE ORAL EVERY 4 HOURS PRN
Status: DISCONTINUED | OUTPATIENT
Start: 2023-12-24 | End: 2023-12-29 | Stop reason: HOSPADM

## 2023-12-24 RX ORDER — SUCCINYLCHOLINE CHLORIDE 20 MG/ML
INJECTION INTRAMUSCULAR; INTRAVENOUS
Status: DISCONTINUED | OUTPATIENT
Start: 2023-12-24 | End: 2023-12-24

## 2023-12-24 RX ORDER — OXYTOCIN 10 [USP'U]/ML
10 INJECTION, SOLUTION INTRAMUSCULAR; INTRAVENOUS ONCE AS NEEDED
Status: DISCONTINUED | OUTPATIENT
Start: 2023-12-24 | End: 2023-12-29 | Stop reason: HOSPADM

## 2023-12-24 RX ORDER — CEFAZOLIN SODIUM 2 G/50ML
2 SOLUTION INTRAVENOUS ONCE AS NEEDED
Status: DISCONTINUED | OUTPATIENT
Start: 2023-12-24 | End: 2023-12-24

## 2023-12-24 RX ORDER — HYDROMORPHONE HYDROCHLORIDE 2 MG/ML
0.2 INJECTION, SOLUTION INTRAMUSCULAR; INTRAVENOUS; SUBCUTANEOUS EVERY 5 MIN PRN
Status: DISCONTINUED | OUTPATIENT
Start: 2023-12-24 | End: 2023-12-24

## 2023-12-24 RX ORDER — FENTANYL CITRATE 50 UG/ML
INJECTION, SOLUTION INTRAMUSCULAR; INTRAVENOUS
Status: DISCONTINUED | OUTPATIENT
Start: 2023-12-24 | End: 2023-12-24

## 2023-12-24 RX ORDER — OXYTOCIN/RINGER'S LACTATE 30/500 ML
95 PLASTIC BAG, INJECTION (ML) INTRAVENOUS ONCE
Status: DISCONTINUED | OUTPATIENT
Start: 2023-12-24 | End: 2023-12-29 | Stop reason: HOSPADM

## 2023-12-24 RX ORDER — OXYTOCIN/RINGER'S LACTATE 30/500 ML
95 PLASTIC BAG, INJECTION (ML) INTRAVENOUS ONCE
Status: COMPLETED | OUTPATIENT
Start: 2023-12-24 | End: 2023-12-24

## 2023-12-24 RX ORDER — FAMOTIDINE 10 MG/ML
INJECTION INTRAVENOUS
Status: DISCONTINUED | OUTPATIENT
Start: 2023-12-24 | End: 2023-12-24

## 2023-12-24 RX ORDER — CARBOPROST TROMETHAMINE 250 UG/ML
250 INJECTION, SOLUTION INTRAMUSCULAR
Status: DISCONTINUED | OUTPATIENT
Start: 2023-12-24 | End: 2023-12-24

## 2023-12-24 RX ORDER — OXYTOCIN/RINGER'S LACTATE 30/500 ML
95 PLASTIC BAG, INJECTION (ML) INTRAVENOUS ONCE AS NEEDED
Status: DISCONTINUED | OUTPATIENT
Start: 2023-12-24 | End: 2023-12-29 | Stop reason: HOSPADM

## 2023-12-24 RX ORDER — DOCUSATE SODIUM 100 MG/1
200 CAPSULE, LIQUID FILLED ORAL 2 TIMES DAILY
Status: DISCONTINUED | OUTPATIENT
Start: 2023-12-24 | End: 2023-12-29 | Stop reason: HOSPADM

## 2023-12-24 RX ORDER — DEXAMETHASONE SODIUM PHOSPHATE 4 MG/ML
INJECTION, SOLUTION INTRA-ARTICULAR; INTRALESIONAL; INTRAMUSCULAR; INTRAVENOUS; SOFT TISSUE
Status: DISCONTINUED | OUTPATIENT
Start: 2023-12-24 | End: 2023-12-24

## 2023-12-24 RX ORDER — DEXTROSE, SODIUM CHLORIDE, SODIUM LACTATE, POTASSIUM CHLORIDE, AND CALCIUM CHLORIDE 5; .6; .31; .03; .02 G/100ML; G/100ML; G/100ML; G/100ML; G/100ML
INJECTION, SOLUTION INTRAVENOUS CONTINUOUS PRN
Status: DISCONTINUED | OUTPATIENT
Start: 2023-12-24 | End: 2023-12-24

## 2023-12-24 RX ORDER — OXYCODONE AND ACETAMINOPHEN 5; 325 MG/1; MG/1
1 TABLET ORAL EVERY 4 HOURS PRN
Status: DISCONTINUED | OUTPATIENT
Start: 2023-12-24 | End: 2023-12-29 | Stop reason: HOSPADM

## 2023-12-24 RX ORDER — AMOXICILLIN 250 MG
1 CAPSULE ORAL NIGHTLY PRN
Status: DISCONTINUED | OUTPATIENT
Start: 2023-12-24 | End: 2023-12-29 | Stop reason: HOSPADM

## 2023-12-24 RX ORDER — MISOPROSTOL 200 UG/1
800 TABLET ORAL
Status: DISCONTINUED | OUTPATIENT
Start: 2023-12-24 | End: 2023-12-24

## 2023-12-24 RX ORDER — ONDANSETRON 8 MG/1
8 TABLET, ORALLY DISINTEGRATING ORAL EVERY 8 HOURS PRN
Status: DISCONTINUED | OUTPATIENT
Start: 2023-12-24 | End: 2023-12-29 | Stop reason: HOSPADM

## 2023-12-24 RX ORDER — SODIUM CHLORIDE, SODIUM LACTATE, POTASSIUM CHLORIDE, CALCIUM CHLORIDE 600; 310; 30; 20 MG/100ML; MG/100ML; MG/100ML; MG/100ML
INJECTION, SOLUTION INTRAVENOUS CONTINUOUS
Status: DISCONTINUED | OUTPATIENT
Start: 2023-12-24 | End: 2023-12-24

## 2023-12-24 RX ORDER — OXYTOCIN/RINGER'S LACTATE 30/500 ML
PLASTIC BAG, INJECTION (ML) INTRAVENOUS CONTINUOUS PRN
Status: DISCONTINUED | OUTPATIENT
Start: 2023-12-24 | End: 2023-12-24

## 2023-12-24 RX ORDER — OXYTOCIN/RINGER'S LACTATE 30/500 ML
334 PLASTIC BAG, INJECTION (ML) INTRAVENOUS ONCE
Status: DISCONTINUED | OUTPATIENT
Start: 2023-12-24 | End: 2023-12-29 | Stop reason: HOSPADM

## 2023-12-24 RX ORDER — MIDAZOLAM HYDROCHLORIDE 1 MG/ML
INJECTION INTRAMUSCULAR; INTRAVENOUS
Status: DISCONTINUED | OUTPATIENT
Start: 2023-12-24 | End: 2023-12-24

## 2023-12-24 RX ORDER — KETOROLAC TROMETHAMINE 30 MG/ML
INJECTION, SOLUTION INTRAMUSCULAR; INTRAVENOUS
Status: SHIPPED | OUTPATIENT
Start: 2023-12-24 | End: 2023-12-24

## 2023-12-24 RX ORDER — OXYTOCIN/RINGER'S LACTATE 30/500 ML
334 PLASTIC BAG, INJECTION (ML) INTRAVENOUS ONCE AS NEEDED
Status: DISCONTINUED | OUTPATIENT
Start: 2023-12-24 | End: 2023-12-29 | Stop reason: HOSPADM

## 2023-12-24 RX ORDER — ADHESIVE BANDAGE
30 BANDAGE TOPICAL 2 TIMES DAILY PRN
Status: DISCONTINUED | OUTPATIENT
Start: 2023-12-25 | End: 2023-12-29 | Stop reason: HOSPADM

## 2023-12-24 RX ORDER — SODIUM CITRATE AND CITRIC ACID MONOHYDRATE 334; 500 MG/5ML; MG/5ML
30 SOLUTION ORAL
Status: DISCONTINUED | OUTPATIENT
Start: 2023-12-24 | End: 2023-12-24

## 2023-12-24 RX ORDER — IBUPROFEN 600 MG/1
600 TABLET ORAL EVERY 6 HOURS
Status: DISCONTINUED | OUTPATIENT
Start: 2023-12-24 | End: 2023-12-29 | Stop reason: HOSPADM

## 2023-12-24 RX ORDER — METHYLERGONOVINE MALEATE 0.2 MG/ML
200 INJECTION INTRAVENOUS
Status: DISCONTINUED | OUTPATIENT
Start: 2023-12-24 | End: 2023-12-24

## 2023-12-24 RX ADMIN — FENTANYL CITRATE 100 MCG: 0.05 INJECTION, SOLUTION INTRAMUSCULAR; INTRAVENOUS at 02:12

## 2023-12-24 RX ADMIN — FENTANYL CITRATE 100 MCG: 0.05 INJECTION, SOLUTION INTRAMUSCULAR; INTRAVENOUS at 01:12

## 2023-12-24 RX ADMIN — DEXTROSE, SODIUM CHLORIDE, SODIUM LACTATE, POTASSIUM CHLORIDE, AND CALCIUM CHLORIDE: 5; .6; .31; .03; .02 INJECTION, SOLUTION INTRAVENOUS at 01:12

## 2023-12-24 RX ADMIN — Medication 40 MG: at 01:12

## 2023-12-24 RX ADMIN — MUPIROCIN: 20 OINTMENT TOPICAL at 09:12

## 2023-12-24 RX ADMIN — METOCLOPRAMIDE 10 MG: 5 INJECTION, SOLUTION INTRAMUSCULAR; INTRAVENOUS at 01:12

## 2023-12-24 RX ADMIN — Medication 334 ML/HR: at 02:12

## 2023-12-24 RX ADMIN — SUCCINYLCHOLINE CHLORIDE 140 MG: 20 INJECTION, SOLUTION INTRAMUSCULAR; INTRAVENOUS at 01:12

## 2023-12-24 RX ADMIN — HYDROMORPHONE HYDROCHLORIDE 0.2 MG: 2 INJECTION INTRAMUSCULAR; INTRAVENOUS; SUBCUTANEOUS at 02:12

## 2023-12-24 RX ADMIN — PRENATAL VIT W/ FE FUMARATE-FA TAB 27-0.8 MG 1 TABLET: 27-0.8 TAB at 08:12

## 2023-12-24 RX ADMIN — KETOROLAC TROMETHAMINE 30 MG: 30 INJECTION, SOLUTION INTRAMUSCULAR at 02:12

## 2023-12-24 RX ADMIN — FAMOTIDINE 20 MG: 10 INJECTION, SOLUTION INTRAVENOUS at 01:12

## 2023-12-24 RX ADMIN — MIDAZOLAM HYDROCHLORIDE 2 MG: 1 INJECTION, SOLUTION INTRAMUSCULAR; INTRAVENOUS at 01:12

## 2023-12-24 RX ADMIN — Medication: at 09:12

## 2023-12-24 RX ADMIN — OXYCODONE AND ACETAMINOPHEN 1 TABLET: 10; 325 TABLET ORAL at 05:12

## 2023-12-24 RX ADMIN — ACETAMINOPHEN 1000 MG: 10 INJECTION, SOLUTION INTRAVENOUS at 02:12

## 2023-12-24 RX ADMIN — OXYTOCIN 30 UNITS: 10 INJECTION, SOLUTION INTRAMUSCULAR; INTRAVENOUS at 01:12

## 2023-12-24 RX ADMIN — ONDANSETRON 4 MG: 2 INJECTION INTRAMUSCULAR; INTRAVENOUS at 01:12

## 2023-12-24 RX ADMIN — HYDROMORPHONE HYDROCHLORIDE 0.2 MG: 2 INJECTION INTRAMUSCULAR; INTRAVENOUS; SUBCUTANEOUS at 03:12

## 2023-12-24 RX ADMIN — DEXAMETHASONE SODIUM PHOSPHATE 4 MG: 4 INJECTION, SOLUTION INTRA-ARTICULAR; INTRALESIONAL; INTRAMUSCULAR; INTRAVENOUS; SOFT TISSUE at 01:12

## 2023-12-24 RX ADMIN — Medication 160 MG: at 01:12

## 2023-12-24 RX ADMIN — SODIUM CHLORIDE, SODIUM LACTATE, POTASSIUM CHLORIDE, AND CALCIUM CHLORIDE: .6; .31; .03; .02 INJECTION, SOLUTION INTRAVENOUS at 01:12

## 2023-12-24 RX ADMIN — ZOLPIDEM TARTRATE 5 MG: 5 TABLET ORAL at 02:12

## 2023-12-24 RX ADMIN — LIDOCAINE HYDROCHLORIDE 100 MG: 20 INJECTION, SOLUTION INTRAVENOUS at 01:12

## 2023-12-24 RX ADMIN — PHENYLEPHRINE HYDROCHLORIDE 100 MCG: 10 INJECTION INTRAVENOUS at 01:12

## 2023-12-24 RX ADMIN — LORAZEPAM 0.5 MG: 0.5 TABLET ORAL at 05:12

## 2023-12-24 RX ADMIN — Medication 95 MILLI-UNITS/MIN: at 03:12

## 2023-12-24 NOTE — NURSING
Pt states she is feeling tightening in her belly. Pt states she does not want monitor parts on at this time. Will call when ready.

## 2023-12-24 NOTE — L&D DELIVERY NOTE
"[facility] - [place] - Labor & Delivery   Section   Operative Note    SUMMARY     Date of Procedure: 2023       PREOPERATIVE DIAGNOSES:  1.  Intrauterine pregnancy at 21w0.  2.   premature rupture of membranes since 17w4d, or 25 days ago  3.  Active labor  4.  Prolapsed cord     POSTOPERATIVE DIAGNOSES:  As above  5.  Small uterine fibroid     PROCEDURE:  Primary low transverse  section.     SURGEON:  Abimael Henderson M.D.     ASSISTANT:  Tiffanie Alvarado M.D.     ANESTHESIA:  GETA by Mario Brennan MD     BLOOD LOSS:  About 500 mL.     URINE:  Clear.     FINDINGS:  Non-viable female infant delivered from footling breech position at 1338 on 23.  Weight is 15.5 oz or 440 gm.  Apgar was 3 at 1 minute, 2 at 5 minutes, and 1 at 10 minutes.     OTHER FINDINGS:  Include normal tubes and ovaries bilaterally.  Very small uterine fibroid at the uterine incision.     COMPLICATIONS:  None.     SPECIMEN:  None.     HISTORY:  The patient is a 43 year-old  at 21w0d consistent with early ultrasound, fetus with Down syndrome.  PPROM since 17w4d.  Went into labor today, 23 with contractions and vaginal bleeding.  Exam with cord prolapse and cervix at 1.5 cm.  Patient expressed the desire for "do everything to save the baby."  We then discussed  delivery.  Risks and benefits discussed.     PROCEDURE IN DETAIL:  After confirming appropriate consent was signed in chart, the patient was then transported to the OR.  General anesthesia per Anesthesia.  The patient was then put in a supine position, prepped and draped.  Adequate anesthesia was verified with negative Allis test to the umbilicus.  A Pfannenstiel skin incision was then made with the scalpel, extended down to the fascia.  Fascia was then entered sharply and extended bilaterally sharply.  Peritoneum was then identified and entered bluntly and sharply.  The lower uterine segment was then identified.  Hysterotomy was performed using the scalpel " transversely and amniotomy performed bluntly without any difficulty.  No fluid noted.  The uterine incision was then extended bluntly using the surgeon's finger.  A non-viable male infant delivered from vertex position without any difficulty, and handed off to the nurse practitioner in attendance.  Birth time was 1338 on 2023.  Weight is 15.5 oz or 440 gm.  Apgar is 3 at 1 minute and 2 at 5 minutes.  It was 1 at 10 minutes.  Placenta was then manually extracted intact.  Uterus was then delivered into the abdominal incision.  Endometrial cavity was then wiped clean with wet laps.  Uterine incision was then repaired in 2 layers using #0 Monocryl in a running interlocking fashion with the second layer in an imbricating manner.  Good hemostasis was noted.  Again, normal tubes and ovaries bilaterally.  Uterus was then replaced back to the abdomen.  Good hemostasis was noted.  Peritoneum was then closed using #3-0 Vicryl, fascia was then closed using #1 Vicryl in a running nonlocking fashion.  Subcutaneous tissue was then noted to be in good hemostasis.  It was then reapproximated using #3-0 chromic and then the skin was then reapproximated EnSorb absorbable stapels and a pressure bandage applied with the Aquacel dressing.  The patient was then transferred to the Recovery Room in stable condition.         Specimens:   Specimen (24h ago, onward)      None            Condition: Good    VTE Risk Mitigation (From admission, onward)           Ordered     IP VTE HIGH RISK PATIENT  Once         23 1320     Place sequential compression device  Until discontinued         23 1320     Place JOO hose  Until discontinued         23 1320     Place JOO hose  Until discontinued         23 0720                    Disposition: PACU - hemodynamically stable.    Attestation: Good         Delivery Information for SUSY George Elizabethfred Myles    Birth information:  YOB: 2023   Time of birth: 1:38 PM    Sex: male   Head Delivery Date/Time: 2023  1:38 PM   Delivery type:    Gestational Age: 21w0d        Delivery Providers    Delivering clinician: Tiffanie Alvarado MD   Provider Role    Abimael Henderson MD Brou, Tina L, Ema Goldman, Rad Yao, NNP     Poncho Engel, Camryn Ferguson RN               Measurements    Weight: 440 g  Weight (lbs): 15.5 oz  Length:          Apgars    Living status: Living  Apgar Component Scores:  1 min.:  5 min.:  10 min.:  15 min.:  20 min.:    Skin color:  0        Heart rate:  2  1       Reflex irritability:  0        Muscle tone:  0        Respiratory effort:  1  1       Total:  3                 Operative Delivery    Forceps attempted?: No  Vacuum extractor attempted?: No         Shoulder Dystocia    Shoulder dystocia present?: No           Presentation    Presentation: Footling Breech           Interventions/Resuscitation    Method: Bulb Suctioning       Cord    Vessels: 3 vessels  Complications: None  Delayed Cord Clamping?: No  Cord Clamped Date/Time: 2023  1:38 PM  Gases Sent?: No  Stem Cell Collection (by MD): No       Placenta    Placenta delivery date/time: 2023 1339  Placenta removal: Manual removal  Placenta appearance: Intact  Placenta disposition: Discarded           Labor Events:       labor: No     Labor Onset Date/Time:         Dilation Complete Date/Time:         Start Pushing Date/Time:         Start Pushing Date/Time:       Rupture Date/Time: 23  1800         Rupture type: PPROM (Premature Prolonged Rupture)         Fluid Amount:       Fluid Color: Clear               steroids: None     Antibiotics given for GBS: No     Induction:       Indications for induction:        Augmentation:       Indications for augmentation:       Labor complications: Cord Prolapse     Additional complications:          Cervical ripening:                     Delivery:      Episiotomy: None      Indication for Episiotomy:       Perineal Lacerations:   Repaired:      Periurethral Laceration:   Repaired:     Labial Laceration:   Repaired:     Sulcus Laceration:   Repaired:     Vaginal Laceration:   Repaired:     Cervical Laceration:   Repaired:     Repair suture:       Repair # of packets:       Last Value - EBL - Nursing (mL):       Sum - EBL - Nursing (mL): 0     Last Value - EBL - Anesthesia (mL):      Calculated QBL (mL): 377      Vaginal Sweep Performed:       Surgicount Correct:       Vaginal Packing:   Quantity:       Other providers:            Details (if applicable):  Trial of Labor      Categorization:      Priority:     Indications for :     Incision Type:       Additional  information:  Forceps:    Vacuum:    Breech:    Observed anomalies    Other (Comments):

## 2023-12-24 NOTE — TRANSFER OF CARE
"Anesthesia Transfer of Care Note    Patient: Gilberto Bueno    Procedure(s) Performed: Procedure(s) (LRB):   SECTION (N/A)    Patient location: Labor and Delivery    Anesthesia Type: general    Transport from OR: Transported from OR on room air with adequate spontaneous ventilation    Post pain: adequate analgesia    Post assessment: no apparent anesthetic complications and tolerated procedure well    Post vital signs: stable    Level of consciousness: awake, alert and oriented    Nausea/Vomiting: no nausea/vomiting    Complications: none    Transfer of care protocol was followed      Last vitals: Visit Vitals  BP (!) 110/56   Pulse 95   Temp 36.6 °C (97.9 °F)   Resp 17   Ht 5' 6" (1.676 m)   Wt 88 kg (194 lb 0.1 oz)   LMP 2023 (Exact Date)   SpO2 (!) 94%   Breastfeeding No   BMI 31.31 kg/m²     "

## 2023-12-24 NOTE — NURSING
POC discussed with pt, pt will call this RN when she is ready for vital signs and FHT. Pt verbalized understanding

## 2023-12-24 NOTE — ASSESSMENT & PLAN NOTE
Discussed with patient management options. Risks and benefits of labor induction vs close observation presented including risks of infection, bleeding and sepsis.  Patient still would like to wait.  Does not want to proceed with delivery/induction  Will follow clinically with CBC/WBC, Temp, and fundal tenderness.  All normal at this time  Would proceed with induction if signs of infection.  Would allow to labor if spontane    Spotting overnight x 1 episode: US done for MARCUS and r/o abruption

## 2023-12-24 NOTE — ANESTHESIA PROCEDURE NOTES
Intubation    Date/Time: 12/24/2023 1:35 PM    Performed by: Jabari Reveles CRNA  Authorized by: Jabari Reveles CRNA    Intubation:     Induction:  Intravenous    Intubated:  Postinduction    Mask Ventilation:  N/a    Attempts:  1    Attempted By:  CRNA    Method of Intubation:  Video laryngoscopy    Blade:  Jung 3    Laryngeal View Grade: Grade I - full view of cords      Difficult Airway Encountered?: No      Complications:  None    Airway Device:  Oral endotracheal tube    Airway Device Size:  7.0    Style/Cuff Inflation:  Cuffed    Inflation Amount (mL):  5    Tube secured:  21    Secured at:  The lips    Placement Verified By:  Capnometry    Complicating Factors:  None    Findings Post-Intubation:  BS equal bilateral

## 2023-12-24 NOTE — ANESTHESIA PREPROCEDURE EVALUATION
12/24/2023  Gilberto Bueno is a 43 y.o., female.      Pre-op Assessment    I have reviewed the Patient Summary Reports.     I have reviewed the Nursing Notes.       Review of Systems  Anesthesia Hx:  No previous Anesthesia                Social:  Non-Smoker       Cardiovascular:  Exercise tolerance: good    Denies Hypertension.                                        Pulmonary:  Pulmonary Normal                       Renal/:  Renal/ Normal                 Hepatic/GI:  Hepatic/GI Normal                 OB/GYN/PEDS:  PPROM; cord prolapse           Neurological:  Neurology Normal                                      Endocrine:  Endocrine Normal                Physical Exam  General: Well nourished, Cooperative, Alert and Oriented    Airway:  Mallampati: II   Mouth Opening: Normal  TM Distance: 4 - 6 cm  Tongue: Normal    Dental:  No teeth on top    Wt Readings from Last 3 Encounters:   12/21/23 88 kg (194 lb 0.1 oz)   11/28/23 88 kg (194 lb)   11/20/23 87.8 kg (193 lb 9 oz)     Temp Readings from Last 3 Encounters:   12/24/23 36.7 °C (98.1 °F)   11/28/23 37.2 °C (98.9 °F) (Oral)   07/23/23 35.7 °C (96.3 °F) (Oral)     BP Readings from Last 3 Encounters:   12/23/23 (!) 99/50   11/28/23 110/68   11/20/23 100/62     Pulse Readings from Last 3 Encounters:   12/23/23 90   11/28/23 86   07/23/23 78     Lab Results   Component Value Date    WBC 12.38 12/23/2023    HGB 11.2 (L) 12/23/2023    HCT 33.7 (L) 12/23/2023    MCV 91 12/23/2023     12/23/2023           Anesthesia Plan  Type of Anesthesia, risks & benefits discussed:    Anesthesia Type: Gen ETT  Intra-op Monitoring Plan: Standard ASA Monitors  Post Op Pain Control Plan: multimodal analgesia and IV/PO Opioids PRN  Induction:  IV  Informed Consent: Informed consent signed with the Patient and all parties understand the risks and agree with  anesthesia plan.  All questions answered.   ASA Score: 2 Emergent  Day of Surgery Review of History & Physical: H&P Update referred to the surgeon/provider.  Anesthesia Plan Notes: Emergent  due to cord prolapse.     Ready For Surgery From Anesthesia Perspective.     .

## 2023-12-24 NOTE — PROGRESS NOTES
[facility] - [place] - Labor & Delivery  Obstetrics  Antepartum Progress Note    Patient Name: Gilberto Bueno  MRN: [id number]  Admission Date: 2023  Hospital Length of Stay: 23 days  Attending Physician: Abimael Henderson MD  Primary Care Provider: Liss Wise MD    Subjective:     Principal Problem:Premature rupture of membranes in second trimester    HPI:  44 yo  at 17w4d  Advanced maternal age  Fetus with Down syndrome  Has been with spotting for the past couple of days  Went to our Emergency on 2023 with minimal spotting and occasional abdominal pain.  Ultrasound that day with MARCUS of 5.9 cm  Good fetal movements    Noted with ROM last night at 1800.    Denies fever and chills.  No longer with pain/contractions        Hospital Course:  On Labor and Delivery, vitals stable  FHT at 140   Contraction: None  Cervix: 1 cm  Started on antibiotic therapy.  Zithromax and Ampicillin    2023 No longer with contractions/spotting this morning.  No fundal tenderness.  No leakage of fluid. Does not want to deliver now.  Wants to wait for sealing of the membranes    2023 No pain.  No leakage. No vaginal bleeding. Good fetal movements  2023 HD#3/PPROM Day #4. No pain.  No leakage. No vaginal bleeding. Good fetal movements  12/3/2023 HD#4/PPROM Day #5. No pain.  No leakage. No vaginal bleeding. Good fetal movements    2023 HD#5 PPROM Day #6. No pain.  No leakage. No vaginal bleeding.  Good fetal movements.  Has been ambulating. Bedside ultrasound with no amniotic fluid. Oral ampicillin started.  2023 HD#6 PPROM Day #7. No pain.  No leakage. No vaginal bleeding.  Good fetal movements.  Has been ambulating to the bathroom.  Still in good spirit.  2023 HD#7 PPROM Day #8. No pain.  No leakage. No vaginal bleeding.  Good fetal movements.  Has been ambulating to the bathroom.  Still in good spirit. Had a visit with our hospital  last night  2023 HD#8 PPROM Day #9. No change. No  pain.  No leakage. No vaginal bleeding.  Good fetal movements.  Has been ambulating to the bathroom.  Still in good spirit.   12/8/2023 HD#9 PPROM Day #10. No change. No pain.  No leakage. No vaginal bleeding.  Good fetal movements.  Has been ambulating to the bathroom.  Still in good spirit.  12/9/2023 HD#10 PPROM Day #11.  No change. No pain.  No leakage. No vaginal bleeding.  Good fetal movements.  Has been ambulating to the bathroom.  Still in good spirit.   12/10/2023 HD#11 PPROM Day #12.  No change. No pain.  No leakage. No vaginal bleeding.  Good fetal movements.  Has been ambulating to the bathroom.  Still in good spirit. But has a difficult time sleeping last night     12/11/2023 HD#12 PPROM Day #13. No change. No pain.  No leakage. No vaginal bleeding.  Good fetal movements.  Has been ambulating to the bathroom.  Still in good spirit. Bedside ultrasound by me, 0.5 cm fluid pocket by fetal feet with chord.  FHT at 148   12/12/2023 HD#13 PPROM Day #14. No change. No pain.  No leakage. No vaginal bleeding.  Good fetal movements.  Has been ambulating to the bathroom.  Still in good spirit.   12/13/2023 HD#14 PPROM Day #15. No change. No pain.  No leakage. No vaginal bleeding.  Good fetal movements.  Has been ambulating to the bathroom.  Still in good spirit. Will stop antibiotic therapy after today  12/14/2023 HD#15 PPROM Day #16. No change. No pain.  No leakage. No vaginal bleeding.  Good fetal movements.  Has been ambulating to the bathroom.  Still in good spirit. Will stop antibiotic therapy today  12/15/2023 HD#16 PPROM Day #17. No change. No pain.  No leakage. No vaginal bleeding.  Good fetal movements.  Has been ambulating to the bathroom.  Still in good spirit.  Antibiotic discontinued yesterday.  12/16/2023: HD #17 PPROM Day #18. No change, not in pain. No leakage or change in discharge   12/17/2023: HD #18 PPROM Day #19. No change or abdominal pain, no vaginal bleeding or fluid loss. In good spirits      12/18/2023 HD#19 PPROM Day #20. No change. No pain.  No leakage. No vaginal bleeding.  Good fetal movements.  Has been ambulating to the bathroom.  Still in good spirit. Bedside ultrasound done by me today. Footling breech with minimal fluid.  Good fetal heart tones and movements.     12/19/23:  HD#20 PPROM D#21:  pt denies any c/o + FM, no pain or bleeding    12/20/2023 HD#21 PPROM D#22:  pt denies any c/o + FM, no pain or bleeding  12/21/2023 HD#22 PPROM D#23: pt denies abdominal pain, subjective fever, change in discharge or odor, VB. Noting FM    12/22/2023 HD#23 PPROM D#24: pt denies abdominal pain, subjective fever, change in discharge or odor, VB. Noting FM. In good spirit    12/23/2023 HD#24 PPROM D#25: pt denies abdominal pain, subjective fever, change in discharge or odor, VB. Noting FM. In good spirit. Refused blood drawn today    12/24/2023 HD#25 PPROM D#26: pt denies abdominal pain, subjective fever, change in discharge or odor, Noting FM.  Had one episode of vaginal spotting without mucous last night.  None since then.  She had an argument with FOB during that time. US ordered today for MARCUS, r/o abruption    Obstetric HPI:  Patient reports no contractions, active fetal movement, vaginal spotting x once , present loss of fluid on admission     Objective:     Vital Signs (Most Recent):  Temp: 98.1 °F (36.7 °C) (12/24/23 0549)  Pulse: 90 (12/23/23 2134)  Resp: 20 (12/23/23 2133)  BP: (!) 99/50 (12/23/23 2134)  SpO2: 97 % (12/23/23 2133) Vital Signs (24h Range):  Temp:  [97.9 °F (36.6 °C)-98.5 °F (36.9 °C)] 98.1 °F (36.7 °C)  Pulse:  [87-98] 90  Resp:  [16-20] 20  SpO2:  [97 %-98 %] 97 %  BP: ()/(48-61) 99/50     Weight: 88 kg (194 lb 0.1 oz)  Body mass index is 31.31 kg/m².    FHT: 150 bpm  TOCO:  No ctx    No intake or output data in the 24 hours ending 12/24/23 0838    Cervical Exam:  Dilation:  deferred     Significant Labs:  Recent Lab Results         12/23/23  1343        Baso # 0.07        Basophil % 0.6       Differential Method Automated       Eos # 0.1       Eosinophil % 0.6       Gran # (ANC) 9.3       Gran % 74.9       Hematocrit 33.7       Hemoglobin 11.2       Immature Grans (Abs) 0.07  Comment: Mild elevation in immature granulocytes is non specific and   can be seen in a variety of conditions including stress response,   acute inflammation, trauma and pregnancy. Correlation with other   laboratory and clinical findings is essential.         Immature Granulocytes 0.6       Lymph # 1.9       Lymph % 15.4       MCH 30.3       MCHC 33.2       MCV 91       Mono # 1.0       Mono % 7.9       MPV 10.7       nRBC 0       Platelet Count 156       RBC 3.70       RDW 12.7       WBC 12.38               Physical Exam:   Constitutional: She is oriented to person, place, and time. She appears well-developed and well-nourished.    HENT:   Head: Normocephalic and atraumatic.    Eyes: Pupils are equal, round, and reactive to light. EOM are normal.     Cardiovascular:       Exam reveals no clubbing and no cyanosis.        Pulmonary/Chest: Effort normal.        Abdominal: Soft. She exhibits no mass. There is no rebound and no guarding. No hernia.             Musculoskeletal: Normal range of motion and moves all extremeties.       Neurological: She is alert and oriented to person, place, and time.    Skin: Skin is warm. No cyanosis. Nails show no clubbing.    Psychiatric: She has a normal mood and affect. Her behavior is normal. Thought content normal.       Review of Systems   Constitutional:  Negative for appetite change, chills and fever.   Respiratory:  Negative for shortness of breath.    Cardiovascular:  Negative for chest pain.   Gastrointestinal:  Negative for abdominal pain, blood in stool, constipation, diarrhea, nausea and vomiting.   Endocrine: Negative for hair loss and hot flashes.   Genitourinary:  Negative for decreased libido, dysmenorrhea, dyspareunia, dysuria, genital sores, menorrhagia,  menstrual problem, pelvic pain, vaginal discharge, vaginal pain, urinary incontinence and vaginal odor.   Musculoskeletal:  Negative for back pain.   Integumentary:  Negative for rash, acne, hair changes, breast mass, nipple discharge and breast skin changes.   Breast: Negative for mass, mastodynia, nipple discharge and skin changes    Assessment/Plan:     43 y.o. female  at 21w0d for:    * Premature rupture of membranes in second trimester  Discussed with patient management options. Risks and benefits of labor induction vs close observation presented including risks of infection, bleeding and sepsis.  Patient still would like to wait.  Does not want to proceed with delivery/induction  Will follow clinically with CBC/WBC, Temp, and fundal tenderness.  All normal at this time. CBC q3 days, sooner if indicated.  Would proceed with induction if signs of infection.  Would allow to labor if spontaneous  Abx's completed  All of her questions were answered appropriately.    EVIN loera  Heplock IV  FHT Qshift  OK to ambulate  Was on oral ampicillin. Antibiotic discontinued on 2023, after 14 days    21 weeks gestation of pregnancy  Continue close observation.  PNV daily  BMZ @ 23 week for FLM      History of  premature rupture of membranes (PROM) in previous pregnancy, currently pregnant in second trimester  Discussed with patient management options. Risks and benefits of labor induction vs close observation presented including risks of infection, bleeding and sepsis.  Patient still would like to wait.  Does not want to proceed with delivery/induction  Will follow clinically with CBC/WBC, Temp, and fundal tenderness.  All normal at this time  Would proceed with induction if signs of infection.  Would allow to labor if spontane    Spotting overnight x 1 episode: US done for MARCUS and r/o abruption    Maternal serum screen positive for trisomy 21  Now with PROM  Discussed with patient management options. Risks  and benefits of labor induction vs close observation presented including risks of infection, bleeding and sepsis.  Patient would like to wait.  Does not want to proceed with delivery/induction  Will follow clinically with CBC/WBC, Temp, and fundal tenderness.  Would proceed with induction if signs of infection.  Would allow to labor if spontaneous    All of her questions were answered appropriately.    Multigravida of advanced maternal age in second trimester  Now with PROM  Discussed with patient management options. Risks and benefits of labor induction vs close observation presented including risks of infection, bleeding and sepsis.  Patient still would like to wait.  Does not want to proceed with delivery/induction  Will follow clinically with CBC/WBC, Temp, and fundal tenderness.  All normal at this time  Would proceed with induction if signs of infection.  Would allow to labor if spontaneous  All of her questions were answered appropriately.    EVIN loera  Heplock IV  FHT Qshift  OK to ambulate  Daily CBC  Was on oral ampicillin. Antibiotic discontinued on 12/14/2023, after 14 days          Tiffanie Alvarado MD  Obstetrics  [facility] - [place] - Labor & Delivery

## 2023-12-24 NOTE — PLAN OF CARE
Pt called out stating her baby may be coming out  Umbilical cord noted on perineum, pelvic exam done with cord coiled up in the vagina. Cervix is 1.5 cm/50/-3 station with feet palpable.    [place] was done.  Unable to replace cord.  Erin Bailey, RN and I switched hand.  I performed Bedside US with FHT 150s.    Discussed with pt of cord prolapsed.  Given pt at 21 week, poor prognosis for fetal resuscitation due to pre-viable. Option of expectant management to deliver vaginally or intervene with emergent .   Even with emergent , may not be able to resuscitate baby.  Pt desired everything done for baby as possible and elected to proceed with primary .    All questions answered to her satisfaction.    Dr. Henderson was notified.

## 2023-12-24 NOTE — NURSING
Pt called out for vital signs. Reports a small clot when wiping after using bathroom. TOCO placed. 's. Pt reports zero pain.

## 2023-12-24 NOTE — ANESTHESIA POSTPROCEDURE EVALUATION
Anesthesia Post Evaluation    Patient: Gilberto Bueno    Procedure(s) Performed: Procedure(s) (LRB):   SECTION (N/A)    Final Anesthesia Type: general      Patient location during evaluation: PACU  Patient participation: Yes- Able to Participate  Level of consciousness: awake and alert  Post-procedure vital signs: reviewed and stable  Pain management: adequate  Airway patency: patent    PONV status at discharge: No PONV  Anesthetic complications: no      Cardiovascular status: hemodynamically stable  Respiratory status: unassisted and spontaneous ventilation  Hydration status: euvolemic  Follow-up not needed.        Pt did well during recovery. No surgical recall. No further questions/concerns regarding anesthesia.       Vitals Value Taken Time   /61 23 1517   Temp 36.6 °C (97.9 °F) 23 1439   Pulse 76 23 1525   Resp 17 23 1525   SpO2 97 % 23 1525   Vitals shown include unvalidated device data.      Event Time   Out of Recovery 2023 15:28:14         Pain/Stanford Score: Pain Rating Prior to Med Admin: 10 (2023  3:22 PM)  Pain Rating Post Med Admin: 10 (2023  3:05 PM)  Stanford Score: 10 (2023  3:05 PM)

## 2023-12-24 NOTE — SUBJECTIVE & OBJECTIVE
Obstetric HPI:  Patient reports no contractions, active fetal movement, vaginal spotting x once , present loss of fluid on admission     Objective:     Vital Signs (Most Recent):  Temp: 98.1 °F (36.7 °C) (12/24/23 0549)  Pulse: 90 (12/23/23 2134)  Resp: 20 (12/23/23 2133)  BP: (!) 99/50 (12/23/23 2134)  SpO2: 97 % (12/23/23 2133) Vital Signs (24h Range):  Temp:  [97.9 °F (36.6 °C)-98.5 °F (36.9 °C)] 98.1 °F (36.7 °C)  Pulse:  [87-98] 90  Resp:  [16-20] 20  SpO2:  [97 %-98 %] 97 %  BP: ()/(48-61) 99/50     Weight: 88 kg (194 lb 0.1 oz)  Body mass index is 31.31 kg/m².    FHT: 150 bpm  TOCO:  No ctx    No intake or output data in the 24 hours ending 12/24/23 0838    Cervical Exam:  Dilation:  deferred     Significant Labs:  Recent Lab Results         12/23/23  1343        Baso # 0.07       Basophil % 0.6       Differential Method Automated       Eos # 0.1       Eosinophil % 0.6       Gran # (ANC) 9.3       Gran % 74.9       Hematocrit 33.7       Hemoglobin 11.2       Immature Grans (Abs) 0.07  Comment: Mild elevation in immature granulocytes is non specific and   can be seen in a variety of conditions including stress response,   acute inflammation, trauma and pregnancy. Correlation with other   laboratory and clinical findings is essential.         Immature Granulocytes 0.6       Lymph # 1.9       Lymph % 15.4       MCH 30.3       MCHC 33.2       MCV 91       Mono # 1.0       Mono % 7.9       MPV 10.7       nRBC 0       Platelet Count 156       RBC 3.70       RDW 12.7       WBC 12.38               Physical Exam:   Constitutional: She is oriented to person, place, and time. She appears well-developed and well-nourished.    HENT:   Head: Normocephalic and atraumatic.    Eyes: Pupils are equal, round, and reactive to light. EOM are normal.     Cardiovascular:       Exam reveals no clubbing and no cyanosis.        Pulmonary/Chest: Effort normal.        Abdominal: Soft. She exhibits no mass. There is no rebound and  no guarding. No hernia.             Musculoskeletal: Normal range of motion and moves all extremeties.       Neurological: She is alert and oriented to person, place, and time.    Skin: Skin is warm. No cyanosis. Nails show no clubbing.    Psychiatric: She has a normal mood and affect. Her behavior is normal. Thought content normal.       Review of Systems   Constitutional:  Negative for appetite change, chills and fever.   Respiratory:  Negative for shortness of breath.    Cardiovascular:  Negative for chest pain.   Gastrointestinal:  Negative for abdominal pain, blood in stool, constipation, diarrhea, nausea and vomiting.   Endocrine: Negative for hair loss and hot flashes.   Genitourinary:  Negative for decreased libido, dysmenorrhea, dyspareunia, dysuria, genital sores, menorrhagia, menstrual problem, pelvic pain, vaginal discharge, vaginal pain, urinary incontinence and vaginal odor.   Musculoskeletal:  Negative for back pain.   Integumentary:  Negative for rash, acne, hair changes, breast mass, nipple discharge and breast skin changes.   Breast: Negative for mass, mastodynia, nipple discharge and skin changes

## 2023-12-25 LAB
BASOPHILS # BLD AUTO: 0.04 K/UL (ref 0–0.2)
BASOPHILS NFR BLD: 0.4 % (ref 0–1.9)
DIFFERENTIAL METHOD BLD: ABNORMAL
EOSINOPHIL # BLD AUTO: 0.1 K/UL (ref 0–0.5)
EOSINOPHIL NFR BLD: 0.8 % (ref 0–8)
ERYTHROCYTE [DISTWIDTH] IN BLOOD BY AUTOMATED COUNT: 12.8 % (ref 11.5–14.5)
HCT VFR BLD AUTO: 29.1 % (ref 37–48.5)
HGB BLD-MCNC: 9.3 G/DL (ref 12–16)
IMM GRANULOCYTES # BLD AUTO: 0.05 K/UL (ref 0–0.04)
IMM GRANULOCYTES NFR BLD AUTO: 0.5 % (ref 0–0.5)
LYMPHOCYTES # BLD AUTO: 1.7 K/UL (ref 1–4.8)
LYMPHOCYTES NFR BLD: 16.9 % (ref 18–48)
MCH RBC QN AUTO: 30 PG (ref 27–31)
MCHC RBC AUTO-ENTMCNC: 32 G/DL (ref 32–36)
MCV RBC AUTO: 94 FL (ref 82–98)
MONOCYTES # BLD AUTO: 0.7 K/UL (ref 0.3–1)
MONOCYTES NFR BLD: 6.8 % (ref 4–15)
NEUTROPHILS # BLD AUTO: 7.7 K/UL (ref 1.8–7.7)
NEUTROPHILS NFR BLD: 74.6 % (ref 38–73)
NRBC BLD-RTO: 0 /100 WBC
PLATELET # BLD AUTO: 204 K/UL (ref 150–450)
PMV BLD AUTO: 10 FL (ref 9.2–12.9)
RBC # BLD AUTO: 3.1 M/UL (ref 4–5.4)
WBC # BLD AUTO: 10.28 K/UL (ref 3.9–12.7)

## 2023-12-25 PROCEDURE — 85025 COMPLETE CBC W/AUTO DIFF WBC: CPT | Performed by: OBSTETRICS & GYNECOLOGY

## 2023-12-25 PROCEDURE — 25000003 PHARM REV CODE 250: Performed by: OBSTETRICS & GYNECOLOGY

## 2023-12-25 PROCEDURE — 36415 COLL VENOUS BLD VENIPUNCTURE: CPT | Performed by: OBSTETRICS & GYNECOLOGY

## 2023-12-25 PROCEDURE — 99231 SBSQ HOSP IP/OBS SF/LOW 25: CPT | Mod: ,,, | Performed by: OBSTETRICS & GYNECOLOGY

## 2023-12-25 PROCEDURE — 63600175 PHARM REV CODE 636 W HCPCS: Performed by: OBSTETRICS & GYNECOLOGY

## 2023-12-25 PROCEDURE — 11000001 HC ACUTE MED/SURG PRIVATE ROOM

## 2023-12-25 RX ORDER — KETOROLAC TROMETHAMINE 30 MG/ML
30 INJECTION, SOLUTION INTRAMUSCULAR; INTRAVENOUS EVERY 6 HOURS PRN
Status: COMPLETED | OUTPATIENT
Start: 2023-12-25 | End: 2023-12-26

## 2023-12-25 RX ADMIN — BUTALBITAL, ACETAMINOPHEN, AND CAFFEINE 1 TABLET: 325; 50; 40 TABLET ORAL at 10:12

## 2023-12-25 RX ADMIN — OXYCODONE AND ACETAMINOPHEN 1 TABLET: 10; 325 TABLET ORAL at 08:12

## 2023-12-25 RX ADMIN — MUPIROCIN: 20 OINTMENT TOPICAL at 08:12

## 2023-12-25 RX ADMIN — KETOROLAC TROMETHAMINE 30 MG: 30 INJECTION, SOLUTION INTRAMUSCULAR; INTRAVENOUS at 05:12

## 2023-12-25 RX ADMIN — PRENATAL VIT W/ FE FUMARATE-FA TAB 27-0.8 MG 1 TABLET: 27-0.8 TAB at 08:12

## 2023-12-25 RX ADMIN — IBUPROFEN 600 MG: 600 TABLET ORAL at 11:12

## 2023-12-25 RX ADMIN — DOCUSATE SODIUM 200 MG: 100 CAPSULE, LIQUID FILLED ORAL at 08:12

## 2023-12-25 RX ADMIN — LORAZEPAM 0.5 MG: 0.5 TABLET ORAL at 10:12

## 2023-12-25 RX ADMIN — IBUPROFEN 600 MG: 600 TABLET ORAL at 12:12

## 2023-12-25 RX ADMIN — LORAZEPAM 0.5 MG: 0.5 TABLET ORAL at 02:12

## 2023-12-25 RX ADMIN — BUTALBITAL, ACETAMINOPHEN, AND CAFFEINE 1 TABLET: 325; 50; 40 TABLET ORAL at 02:12

## 2023-12-25 RX ADMIN — OXYCODONE AND ACETAMINOPHEN 1 TABLET: 10; 325 TABLET ORAL at 03:12

## 2023-12-25 RX ADMIN — IBUPROFEN 600 MG: 600 TABLET ORAL at 06:12

## 2023-12-25 RX ADMIN — KETOROLAC TROMETHAMINE 30 MG: 30 INJECTION, SOLUTION INTRAMUSCULAR; INTRAVENOUS at 11:12

## 2023-12-25 NOTE — ASSESSMENT & PLAN NOTE
S/p emergent CD for cord prolapse at 21 week  Fetus passed away soon after delivery   Routine pp care  Social work consult   consult  Hgb 11.2 --> 9.3

## 2023-12-25 NOTE — SUBJECTIVE & OBJECTIVE
Interval History:     She is doing well this morning. She is tolerating a regular diet without nausea or vomiting. She is voiding spontaneously. She is ambulating. She has passed flatus, and has not a BM. Vaginal bleeding is mild. She denies fever or chills. Abdominal pain is mild and controlled with oral medications. She Is breastfeeding. She desires circumcision for her male baby: yes    Objective:     Vital Signs (Most Recent):  Temp: 97.7 °F (36.5 °C) (12/25/23 0839)  Pulse: 71 (12/25/23 0839)  Resp: 18 (12/25/23 1020)  BP: (!) 87/53 (12/25/23 0839)  SpO2: 96 % (12/25/23 0839) Vital Signs (24h Range):  Temp:  [97.7 °F (36.5 °C)-98 °F (36.7 °C)] 97.7 °F (36.5 °C)  Pulse:  [71-97] 71  Resp:  [16-24] 18  SpO2:  [93 %-99 %] 96 %  BP: ()/(52-65) 87/53     Weight: 88 kg (194 lb 0.1 oz)  Body mass index is 31.31 kg/m².      Intake/Output Summary (Last 24 hours) at 12/25/2023 1201  Last data filed at 12/25/2023 0940  Gross per 24 hour   Intake 1100 ml   Output 1777 ml   Net -677 ml         Significant Labs:  Lab Results   Component Value Date    GROUPTRH A POS 12/24/2023    HEPBSAG Non-reactive 10/02/2023    STREPBCULT No Group B Streptococcus isolated 05/20/2019    AFP 80.3 ng/mL 08/11/2010     Recent Labs   Lab 12/25/23  0801   HGB 9.3*   HCT 29.1*       I have personallly reviewed all pertinent lab results from the last 24 hours.  Recent Lab Results         12/25/23  0801   12/24/23  1636        Baso # 0.04         Basophil % 0.4         Differential Method Automated         Eos # 0.1         Eosinophil % 0.8         Gran # (ANC) 7.7         Gran % 74.6         Group & Rh   A POS       Hematocrit 29.1         Hemoglobin 9.3         Immature Grans (Abs) 0.05  Comment: Mild elevation in immature granulocytes is non specific and   can be seen in a variety of conditions including stress response,   acute inflammation, trauma and pregnancy. Correlation with other   laboratory and clinical findings is essential.            Immature Granulocytes 0.5         INDIRECT LOLLY   NEG       Lymph # 1.7         Lymph % 16.9         MCH 30.0         MCHC 32.0         MCV 94         Mono # 0.7         Mono % 6.8         MPV 10.0         nRBC 0         Platelet Count 204         RBC 3.10         RDW 12.8         Specimen Outdate   12/27/2023 23:59       WBC 10.28                 Physical Exam:   Constitutional: She is oriented to person, place, and time. She appears well-developed and well-nourished.    HENT:   Head: Normocephalic and atraumatic.    Eyes: Pupils are equal, round, and reactive to light. EOM are normal.     Cardiovascular:       Exam reveals no clubbing and no cyanosis.        Pulmonary/Chest: Effort normal.        Abdominal: Soft. She exhibits no mass. There is no rebound and no guarding. No hernia.   Dressing d/c/i             Musculoskeletal: Normal range of motion and moves all extremeties.       Neurological: She is alert and oriented to person, place, and time.    Skin: Skin is warm. No cyanosis. Nails show no clubbing.    Psychiatric: She has a normal mood and affect. Her behavior is normal. Thought content normal.       Review of Systems   Constitutional:  Negative for appetite change, chills and fever.   Respiratory:  Negative for shortness of breath.    Cardiovascular:  Negative for chest pain.   Gastrointestinal:  Negative for abdominal pain, blood in stool, constipation, diarrhea, nausea and vomiting.   Endocrine: Negative for hair loss and hot flashes.   Genitourinary:  Negative for decreased libido, dysmenorrhea, dyspareunia, dysuria, genital sores, menorrhagia, menstrual problem, pelvic pain, vaginal discharge, vaginal pain, urinary incontinence and vaginal odor.   Musculoskeletal:  Negative for back pain.   Integumentary:  Negative for rash, acne, hair changes, breast mass, nipple discharge and breast skin changes.   Breast: Negative for mass, mastodynia, nipple discharge and skin changes

## 2023-12-25 NOTE — PLAN OF CARE
Problem:  Fall Injury Risk  Goal: Absence of Fall, Infant Drop and Related Injury  Outcome: Adequate for Care Transition     Problem: Adult Inpatient Plan of Care  Goal: Plan of Care Review  Flowsheets (Taken 2023 0433)  Plan of Care Reviewed With: patient     Problem: Adult Inpatient Plan of Care  Goal: Patient-Specific Goal (Individualized)  Flowsheets (Taken 2023 0433)  Anxieties, Fears or Concerns: Pain control  Individualized Care Needs: Discharge to home on Tuesday     Problem: Adult Inpatient Plan of Care  Goal: Absence of Hospital-Acquired Illness or Injury  Outcome: Adequate for Care Transition     Problem: Adult Inpatient Plan of Care  Goal: Optimal Comfort and Wellbeing  Outcome: Adequate for Care Transition     Problem: Grieving  Goal: Expression of Grief  Outcome: Ongoing, Progressing     Problem:  Loss  Goal: Optimal Adjustment to Loss  Outcome: Ongoing, Progressing     Problem: Bleeding ( Delivery)  Goal: Bleeding is Controlled  Outcome: Adequate for Care Transition     Problem: Infection ( Delivery)  Goal: Absence of Infection Signs and Symptoms  Outcome: Adequate for Care Transition     Problem: Pain Acute  Goal: Acceptable Pain Control and Functional Ability  Outcome: Ongoing, Progressing

## 2023-12-25 NOTE — PROGRESS NOTES
Dr. Alvarado notified of patient's BP at 0839 - 87/53 (also notified at 0917) and BP at 1200 94/55.  No new orders given.

## 2023-12-25 NOTE — NURSING
Called to bedside pt stated something is coming out of my encouraged pt to quickly get in bed. Cord noted to be hanging out dr calvo at bedside explaining to pt her options pt wante dto try and save her baby.  Switched places with dr calvo to proceed to c/s. Cs tracer sheet started

## 2023-12-25 NOTE — PROGRESS NOTES
[facility] - Labor & Delivery  Obstetrics  Postpartum Progress Note    Patient Name: Gilberto Bueno  MRN: [id number]  Admission Date: 2023  Hospital Length of Stay: 24 days  Attending Physician: Abimael Henderson MD  Primary Care Provider: Liss Wise MD    Subjective:     Principal Problem:Status post  section    Hospital Course:  On Labor and Delivery, vitals stable  FHT at 140   Contraction: None  Cervix: 1 cm  Started on antibiotic therapy.  Zithromax and Ampicillin    2023 No longer with contractions/spotting this morning.  No fundal tenderness.  No leakage of fluid. Does not want to deliver now.  Wants to wait for sealing of the membranes    2023 No pain.  No leakage. No vaginal bleeding. Good fetal movements  2023 HD#3/PPROM Day #4. No pain.  No leakage. No vaginal bleeding. Good fetal movements  12/3/2023 HD#4/PPROM Day #5. No pain.  No leakage. No vaginal bleeding. Good fetal movements    2023 HD#5 PPROM Day #6. No pain.  No leakage. No vaginal bleeding.  Good fetal movements.  Has been ambulating. Bedside ultrasound with no amniotic fluid. Oral ampicillin started.  2023 HD#6 PPROM Day #7. No pain.  No leakage. No vaginal bleeding.  Good fetal movements.  Has been ambulating to the bathroom.  Still in good spirit.  2023 HD#7 PPROM Day #8. No pain.  No leakage. No vaginal bleeding.  Good fetal movements.  Has been ambulating to the bathroom.  Still in good spirit. Had a visit with our hospital  last night  2023 HD#8 PPROM Day #9. No change. No pain.  No leakage. No vaginal bleeding.  Good fetal movements.  Has been ambulating to the bathroom.  Still in good spirit.   2023 HD#9 PPROM Day #10. No change. No pain.  No leakage. No vaginal bleeding.  Good fetal movements.  Has been ambulating to the bathroom.  Still in good spirit.  2023 HD#10 PPROM Day #11.  No change. No pain.  No leakage. No vaginal bleeding.  Good fetal movements.  Has  been ambulating to the bathroom.  Still in good spirit.   12/10/2023 HD#11 PPROM Day #12.  No change. No pain.  No leakage. No vaginal bleeding.  Good fetal movements.  Has been ambulating to the bathroom.  Still in good spirit. But has a difficult time sleeping last night     12/11/2023 HD#12 PPROM Day #13. No change. No pain.  No leakage. No vaginal bleeding.  Good fetal movements.  Has been ambulating to the bathroom.  Still in good spirit. Bedside ultrasound by me, 0.5 cm fluid pocket by fetal feet with chord.  FHT at 148   12/12/2023 HD#13 PPROM Day #14. No change. No pain.  No leakage. No vaginal bleeding.  Good fetal movements.  Has been ambulating to the bathroom.  Still in good spirit.   12/13/2023 HD#14 PPROM Day #15. No change. No pain.  No leakage. No vaginal bleeding.  Good fetal movements.  Has been ambulating to the bathroom.  Still in good spirit. Will stop antibiotic therapy after today  12/14/2023 HD#15 PPROM Day #16. No change. No pain.  No leakage. No vaginal bleeding.  Good fetal movements.  Has been ambulating to the bathroom.  Still in good spirit. Will stop antibiotic therapy today  12/15/2023 HD#16 PPROM Day #17. No change. No pain.  No leakage. No vaginal bleeding.  Good fetal movements.  Has been ambulating to the bathroom.  Still in good spirit.  Antibiotic discontinued yesterday.  12/16/2023: HD #17 PPROM Day #18. No change, not in pain. No leakage or change in discharge   12/17/2023: HD #18 PPROM Day #19. No change or abdominal pain, no vaginal bleeding or fluid loss. In good spirits     12/18/2023 HD#19 PPROM Day #20. No change. No pain.  No leakage. No vaginal bleeding.  Good fetal movements.  Has been ambulating to the bathroom.  Still in good spirit. Bedside ultrasound done by me today. Footling breech with minimal fluid.  Good fetal heart tones and movements.   12/19/23:  HD#20 PPROM D#21:  pt denies any c/o + FM, no pain or bleeding  12/20/2023 HD#21 PPROM D#22:  pt denies any c/o  + FM, no pain or bleeding  2023 HD#22 PPROM D#23: pt denies abdominal pain, subjective fever, change in discharge or odor, VB. Noting FM  2023 HD#23 PPROM D#24: pt denies abdominal pain, subjective fever, change in discharge or odor, VB. Noting FM. In good spirit  2023 HD#24 PPROM D#25: pt denies abdominal pain, subjective fever, change in discharge or odor, VB. Noting FM. In good spirit. Refused blood drawn today  2023 HD#25 PPROM D#26: pt denies abdominal pain, subjective fever, change in discharge or odor, Noting FM.  Had one episode of vaginal spotting without mucous last night.  None since then.  She had an argument with FOB during that time. US ordered today for MARCUS, r/o abruption    Patient called out.  Re.  She thought baby was coming out.  Exam by our staff and MD on call, Dr. Alvarado.  Prolapsed cord visible at perineum.  Ultrasound with footling breech.  Discussed with patient regarding viability status of her fetus.  She would like for us to do everything possible for her fetus.  We discussed emergency/urgent  section.  Risks and benefits discussed.    We then proceeded with primary low transverse  section.    Primary low transverse  section  Surgeon: Abimael Henderson MD  Assistant: Tiffanie Alvarado MD  Anesthesia: GETA by Mario Brennan MD and Jabari Reveles CRNA  Non-viable male infant at 1338 2023  Weight: 440 gm or 15.5 oz  Apgar: 3/  Infant with signs of Down syndrome  Placenta: manually extracted intact with three vessels  Normal uterus, tubes and ovaries  Very small uterine fibroid at uterine incision.  Removed and sent to Path  EBL: 500 cc  No complication  No specimen    Abimael Henderson MD    23.  PPD # 1, doing well, baby on her chest      Interval History:     She is doing well this morning. She is tolerating a regular diet without nausea or vomiting. She is voiding spontaneously. She is ambulating. She has passed flatus, and has not a BM. Vaginal bleeding  is mild. She denies fever or chills. Abdominal pain is mild and controlled with oral medications. She Is breastfeeding. She desires circumcision for her male baby: yes    Objective:     Vital Signs (Most Recent):  Temp: 97.7 °F (36.5 °C) (12/25/23 0839)  Pulse: 71 (12/25/23 0839)  Resp: 18 (12/25/23 1020)  BP: (!) 87/53 (12/25/23 0839)  SpO2: 96 % (12/25/23 0839) Vital Signs (24h Range):  Temp:  [97.7 °F (36.5 °C)-98 °F (36.7 °C)] 97.7 °F (36.5 °C)  Pulse:  [71-97] 71  Resp:  [16-24] 18  SpO2:  [93 %-99 %] 96 %  BP: ()/(52-65) 87/53     Weight: 88 kg (194 lb 0.1 oz)  Body mass index is 31.31 kg/m².      Intake/Output Summary (Last 24 hours) at 12/25/2023 1201  Last data filed at 12/25/2023 0940  Gross per 24 hour   Intake 1100 ml   Output 1777 ml   Net -677 ml         Significant Labs:  Lab Results   Component Value Date    GROUPTRH A POS 12/24/2023    HEPBSAG Non-reactive 10/02/2023    STREPBCULT No Group B Streptococcus isolated 05/20/2019    AFP 80.3 ng/mL 08/11/2010     Recent Labs   Lab 12/25/23  0801   HGB 9.3*   HCT 29.1*       I have personallly reviewed all pertinent lab results from the last 24 hours.  Recent Lab Results         12/25/23  0801   12/24/23  1636        Baso # 0.04         Basophil % 0.4         Differential Method Automated         Eos # 0.1         Eosinophil % 0.8         Gran # (ANC) 7.7         Gran % 74.6         Group & Rh   A POS       Hematocrit 29.1         Hemoglobin 9.3         Immature Grans (Abs) 0.05  Comment: Mild elevation in immature granulocytes is non specific and   can be seen in a variety of conditions including stress response,   acute inflammation, trauma and pregnancy. Correlation with other   laboratory and clinical findings is essential.           Immature Granulocytes 0.5         INDIRECT LOLLY   NEG       Lymph # 1.7         Lymph % 16.9         MCH 30.0         MCHC 32.0         MCV 94         Mono # 0.7         Mono % 6.8         MPV 10.0         nRBC 0          Platelet Count 204         RBC 3.10         RDW 12.8         Specimen Outdate   2023 23:59       WBC 10.28                 Physical Exam:   Constitutional: She is oriented to person, place, and time. She appears well-developed and well-nourished.    HENT:   Head: Normocephalic and atraumatic.    Eyes: Pupils are equal, round, and reactive to light. EOM are normal.     Cardiovascular:       Exam reveals no clubbing and no cyanosis.        Pulmonary/Chest: Effort normal.        Abdominal: Soft. She exhibits no mass. There is no rebound and no guarding. No hernia.   Dressing d/c/i             Musculoskeletal: Normal range of motion and moves all extremeties.       Neurological: She is alert and oriented to person, place, and time.    Skin: Skin is warm. No cyanosis. Nails show no clubbing.    Psychiatric: She has a normal mood and affect. Her behavior is normal. Thought content normal.       Review of Systems   Constitutional:  Negative for appetite change, chills and fever.   Respiratory:  Negative for shortness of breath.    Cardiovascular:  Negative for chest pain.   Gastrointestinal:  Negative for abdominal pain, blood in stool, constipation, diarrhea, nausea and vomiting.   Endocrine: Negative for hair loss and hot flashes.   Genitourinary:  Negative for decreased libido, dysmenorrhea, dyspareunia, dysuria, genital sores, menorrhagia, menstrual problem, pelvic pain, vaginal discharge, vaginal pain, urinary incontinence and vaginal odor.   Musculoskeletal:  Negative for back pain.   Integumentary:  Negative for rash, acne, hair changes, breast mass, nipple discharge and breast skin changes.   Breast: Negative for mass, mastodynia, nipple discharge and skin changes    Assessment/Plan:     43 y.o. female  for:    * Status post  section  S/p emergent CD for cord prolapse at 21 week  Fetus passed away soon after delivery   Routine pp care  Social work consult   consult  Hgb 11.2 -->  9.3        Disposition: As patient meets milestones, will plan to discharge .    Tiffanie Alvarado MD  Obstetrics  [facility] - Labor & Delivery

## 2023-12-26 LAB
ANION GAP SERPL CALC-SCNC: 10 MMOL/L (ref 8–16)
BUN SERPL-MCNC: 5 MG/DL (ref 6–20)
CALCIUM SERPL-MCNC: 8.5 MG/DL (ref 8.7–10.5)
CHLORIDE SERPL-SCNC: 108 MMOL/L (ref 95–110)
CO2 SERPL-SCNC: 19 MMOL/L (ref 23–29)
CREAT SERPL-MCNC: 0.6 MG/DL (ref 0.5–1.4)
EST. GFR  (NO RACE VARIABLE): >60 ML/MIN/1.73 M^2
GENTAMICIN SERPL-MCNC: <0.5 UG/ML
GLUCOSE SERPL-MCNC: 103 MG/DL (ref 70–110)
POTASSIUM SERPL-SCNC: 3.2 MMOL/L (ref 3.5–5.1)
SODIUM SERPL-SCNC: 137 MMOL/L (ref 136–145)

## 2023-12-26 PROCEDURE — 80170 ASSAY OF GENTAMICIN: CPT | Performed by: OBSTETRICS & GYNECOLOGY

## 2023-12-26 PROCEDURE — 02HV33Z INSERTION OF INFUSION DEVICE INTO SUPERIOR VENA CAVA, PERCUTANEOUS APPROACH: ICD-10-PCS | Performed by: OBSTETRICS & GYNECOLOGY

## 2023-12-26 PROCEDURE — C1751 CATH, INF, PER/CENT/MIDLINE: HCPCS

## 2023-12-26 PROCEDURE — 36415 COLL VENOUS BLD VENIPUNCTURE: CPT | Performed by: OBSTETRICS & GYNECOLOGY

## 2023-12-26 PROCEDURE — 25000003 PHARM REV CODE 250: Performed by: OBSTETRICS & GYNECOLOGY

## 2023-12-26 PROCEDURE — 63600175 PHARM REV CODE 636 W HCPCS: Performed by: OBSTETRICS & GYNECOLOGY

## 2023-12-26 PROCEDURE — 99232 SBSQ HOSP IP/OBS MODERATE 35: CPT | Mod: ,,, | Performed by: OBSTETRICS & GYNECOLOGY

## 2023-12-26 PROCEDURE — 36569 INSJ PICC 5 YR+ W/O IMAGING: CPT

## 2023-12-26 PROCEDURE — 11000001 HC ACUTE MED/SURG PRIVATE ROOM

## 2023-12-26 PROCEDURE — 80048 BASIC METABOLIC PNL TOTAL CA: CPT | Performed by: OBSTETRICS & GYNECOLOGY

## 2023-12-26 PROCEDURE — A4216 STERILE WATER/SALINE, 10 ML: HCPCS | Performed by: OBSTETRICS & GYNECOLOGY

## 2023-12-26 RX ORDER — SODIUM CHLORIDE 0.9 % (FLUSH) 0.9 %
10 SYRINGE (ML) INJECTION EVERY 6 HOURS
Status: DISCONTINUED | OUTPATIENT
Start: 2023-12-26 | End: 2023-12-29 | Stop reason: HOSPADM

## 2023-12-26 RX ORDER — GENTAMICIN SULFATE 80 MG/100ML
80 INJECTION, SOLUTION INTRAVENOUS
Status: DISCONTINUED | OUTPATIENT
Start: 2023-12-26 | End: 2023-12-26

## 2023-12-26 RX ORDER — SODIUM CHLORIDE 0.9 % (FLUSH) 0.9 %
10 SYRINGE (ML) INJECTION
Status: DISCONTINUED | OUTPATIENT
Start: 2023-12-26 | End: 2023-12-29 | Stop reason: HOSPADM

## 2023-12-26 RX ORDER — METRONIDAZOLE 500 MG/1
500 TABLET ORAL EVERY 8 HOURS
Status: DISCONTINUED | OUTPATIENT
Start: 2023-12-26 | End: 2023-12-29 | Stop reason: HOSPADM

## 2023-12-26 RX ORDER — CLINDAMYCIN PHOSPHATE 900 MG/50ML
900 INJECTION, SOLUTION INTRAVENOUS
Status: DISCONTINUED | OUTPATIENT
Start: 2023-12-26 | End: 2023-12-29 | Stop reason: HOSPADM

## 2023-12-26 RX ORDER — GENTAMICIN SULFATE 60 MG/50ML
120 INJECTION, SOLUTION INTRAVENOUS ONCE
Status: DISCONTINUED | OUTPATIENT
Start: 2023-12-26 | End: 2023-12-26 | Stop reason: DRUGHIGH

## 2023-12-26 RX ORDER — CLINDAMYCIN PHOSPHATE 900 MG/50ML
900 INJECTION, SOLUTION INTRAVENOUS
Status: DISCONTINUED | OUTPATIENT
Start: 2023-12-26 | End: 2023-12-26 | Stop reason: SDUPTHER

## 2023-12-26 RX ORDER — CLINDAMYCIN PHOSPHATE 900 MG/50ML
900 INJECTION, SOLUTION INTRAVENOUS
Status: DISCONTINUED | OUTPATIENT
Start: 2023-12-26 | End: 2023-12-26

## 2023-12-26 RX ORDER — CLINDAMYCIN HYDROCHLORIDE 150 MG/1
450 CAPSULE ORAL EVERY 6 HOURS
Status: DISCONTINUED | OUTPATIENT
Start: 2023-12-26 | End: 2023-12-26

## 2023-12-26 RX ORDER — GENTAMICIN SULFATE 60 MG/50ML
120 INJECTION, SOLUTION INTRAVENOUS ONCE
Status: COMPLETED | OUTPATIENT
Start: 2023-12-26 | End: 2023-12-26

## 2023-12-26 RX ADMIN — Medication 10 ML: at 05:12

## 2023-12-26 RX ADMIN — GENTAMICIN SULFATE 120 MG: 60 INJECTION, SOLUTION INTRAVENOUS at 03:12

## 2023-12-26 RX ADMIN — CLINDAMYCIN PHOSPHATE 900 MG: 900 INJECTION, SOLUTION INTRAVENOUS at 02:12

## 2023-12-26 RX ADMIN — MUPIROCIN: 20 OINTMENT TOPICAL at 08:12

## 2023-12-26 RX ADMIN — IBUPROFEN 600 MG: 600 TABLET ORAL at 12:12

## 2023-12-26 RX ADMIN — IBUPROFEN 600 MG: 600 TABLET ORAL at 11:12

## 2023-12-26 RX ADMIN — PRENATAL VIT W/ FE FUMARATE-FA TAB 27-0.8 MG 1 TABLET: 27-0.8 TAB at 08:12

## 2023-12-26 RX ADMIN — BUTALBITAL, ACETAMINOPHEN, AND CAFFEINE 1 TABLET: 325; 50; 40 TABLET ORAL at 03:12

## 2023-12-26 RX ADMIN — KETOROLAC TROMETHAMINE 30 MG: 30 INJECTION, SOLUTION INTRAMUSCULAR; INTRAVENOUS at 05:12

## 2023-12-26 RX ADMIN — OXYCODONE AND ACETAMINOPHEN 1 TABLET: 10; 325 TABLET ORAL at 01:12

## 2023-12-26 RX ADMIN — OXYCODONE AND ACETAMINOPHEN 1 TABLET: 10; 325 TABLET ORAL at 12:12

## 2023-12-26 RX ADMIN — DOCUSATE SODIUM 200 MG: 100 CAPSULE, LIQUID FILLED ORAL at 08:12

## 2023-12-26 RX ADMIN — METRONIDAZOLE 500 MG: 500 TABLET ORAL at 09:12

## 2023-12-26 RX ADMIN — MUPIROCIN: 20 OINTMENT TOPICAL at 10:12

## 2023-12-26 RX ADMIN — CLINDAMYCIN PHOSPHATE 900 MG: 900 INJECTION, SOLUTION INTRAVENOUS at 09:12

## 2023-12-26 RX ADMIN — METRONIDAZOLE 500 MG: 500 TABLET ORAL at 01:12

## 2023-12-26 RX ADMIN — LORAZEPAM 0.5 MG: 0.5 TABLET ORAL at 01:12

## 2023-12-26 RX ADMIN — DOCUSATE SODIUM 200 MG: 100 CAPSULE, LIQUID FILLED ORAL at 09:12

## 2023-12-26 RX ADMIN — OXYCODONE AND ACETAMINOPHEN 1 TABLET: 10; 325 TABLET ORAL at 09:12

## 2023-12-26 RX ADMIN — GENTAMICIN SULFATE 354 MG: 40 INJECTION, SOLUTION INTRAMUSCULAR; INTRAVENOUS at 05:12

## 2023-12-26 RX ADMIN — CLINDAMYCIN PHOSPHATE 900 MG: 900 INJECTION, SOLUTION INTRAVENOUS at 07:12

## 2023-12-26 NOTE — PROGRESS NOTES
Notified Dr. Henderson of temp of 100.1, HR of 125, and pulse ox of 91 %. MD states he will place new orders.

## 2023-12-26 NOTE — PROGRESS NOTES
"Pharmacokinetic Follow Up: Gentamicin    Assessment of levels:   Assessment of levels using CF once daily dosing    Regimen Plan:     Change dosing regimen to: Gentamicin 354 mg (5mg/kg) IV every 24 hours, with a trough level to be drawn 60min prior to 2nd dose on 12/27/23 at 17:00.  Drug levels (last 3 results):  No results for input(s): "AMIKACINPEAK", "AMIKACINTROU", "AMIKACINRAND", "AMIKACIN" in the last 72 hours.    Recent Labs   Lab Result Units 12/26/23  1518   Gentamicin, Random ug/mL <0.5       No results for input(s): "TOBRA8", "TOBRA10", "TOBRA12", "TOBRARND", "TOBRAMYCIN", "TOBRAPEAK", "TOBRATROUGH", "TOBRAMYCINPE", "TOBRAMYCINRA", "TOBRAMYCINTR" in the last 72 hours.    Pharmacy will continue to monitor.    Please contact pharmacy at extension [phone] with any questions regarding this assessment.    Thank you for the consult,   Alexus Anderson      Patient brief summary:  Gilberto Bueno is a 43 y.o. female initiated on aminoglycoside therapy for treatment of bacteremia    Drug Allergies:   Review of patient's allergies indicates:  No Known Allergies    Actual Body Weight:   88 kg    Adjust Body Weight:   70.8 kg    Ideal Body Weight:  59.3 kg    Renal Function:   Estimated Creatinine Clearance: 135.1 mL/min (based on SCr of 0.6 mg/dL).,     Dialysis Method (if applicable):  N/A    CBC (last 72 hours):  Recent Labs   Lab Result Units 12/25/23  0801   WBC K/uL 10.28   Hemoglobin g/dL 9.3*   Hematocrit % 29.1*   Platelets K/uL 204   Gran % % 74.6*   Lymph % % 16.9*   Mono % % 6.8   Eosinophil % % 0.8   Basophil % % 0.4   Differential Method  Automated       Metabolic Panel (last 72 hours):  Recent Labs   Lab Result Units 12/26/23  1518   Sodium mmol/L 137   Potassium mmol/L 3.2*   Chloride mmol/L 108   CO2 mmol/L 19*   Glucose mg/dL 103   BUN mg/dL 5*   Creatinine mg/dL 0.6       Aminoglycoside Administrations:  aminoglycosides given in last 96 hours                     gentamicin in NaCl (iso-osm) 120 " mg/100 mL IVPB 120 mg (mg) 120 mg New Bag 12/26/23 0300                    Microbiologic Results:  Microbiology Results (last 7 days)       ** No results found for the last 168 hours. **

## 2023-12-26 NOTE — PLAN OF CARE
[facility] - Labor & Delivery  Discharge Reassessment    Primary Care Provider: Liss Wise MD    Expected Discharge Date: 12/26/2023    Reassessment (most recent)       Discharge Reassessment - 12/26/23 1729          Discharge Reassessment    Assessment Type Discharge Planning Reassessment     Did the patient's condition or plan change since previous assessment? Yes     Communicated NOLAN with patient/caregiver Other (see comments)   s/p fetal demise 12/24/2023.    Discharge Plan A Home     Discharge Plan B Home     DME Needed Upon Discharge  none     Transition of Care Barriers None     Why the patient remains in the hospital Requires continued medical care        Post-Acute Status    Discharge Delays None known at this time

## 2023-12-26 NOTE — ASSESSMENT & PLAN NOTE
S/p emergent CD for cord prolapse at 21 week  Fetus passed away soon after delivery   Signs and symptoms of depression. But does not want help.  Routine pp care  Social work consult   consult    Will continue to monitor vitals signs.  Doubt if septic

## 2023-12-26 NOTE — PROGRESS NOTES
[facility] [place] - Labor & Delivery  Obstetrics  Postpartum Progress Note    Patient Name: Gilberto Bueno  MRN: [id number]  Admission Date: 2023  Hospital Length of Stay: 25 days  Attending Physician: Abimael Henderson MD  Primary Care Provider: Liss Wise MD    Subjective:     Principal Problem:Status post  section    Hospital Course:  On Labor and Delivery, vitals stable  FHT at 140   Contraction: None  Cervix: 1 cm  Started on antibiotic therapy.  Zithromax and Ampicillin    2023 No longer with contractions/spotting this morning.  No fundal tenderness.  No leakage of fluid. Does not want to deliver now.  Wants to wait for sealing of the membranes    2023 No pain.  No leakage. No vaginal bleeding. Good fetal movements  2023 HD#3/PPROM Day #4. No pain.  No leakage. No vaginal bleeding. Good fetal movements  12/3/2023 HD#4/PPROM Day #5. No pain.  No leakage. No vaginal bleeding. Good fetal movements    2023 HD#5 PPROM Day #6. No pain.  No leakage. No vaginal bleeding.  Good fetal movements.  Has been ambulating. Bedside ultrasound with no amniotic fluid. Oral ampicillin started.  2023 HD#6 PPROM Day #7. No pain.  No leakage. No vaginal bleeding.  Good fetal movements.  Has been ambulating to the bathroom.  Still in good spirit.  2023 HD#7 PPROM Day #8. No pain.  No leakage. No vaginal bleeding.  Good fetal movements.  Has been ambulating to the bathroom.  Still in good spirit. Had a visit with our hospital  last night  2023 HD#8 PPROM Day #9. No change. No pain.  No leakage. No vaginal bleeding.  Good fetal movements.  Has been ambulating to the bathroom.  Still in good spirit.   2023 HD#9 PPROM Day #10. No change. No pain.  No leakage. No vaginal bleeding.  Good fetal movements.  Has been ambulating to the bathroom.  Still in good spirit.  2023 HD#10 PPROM Day #11.  No change. No pain.  No leakage. No vaginal bleeding.  Good fetal movements.  Has  been ambulating to the bathroom.  Still in good spirit.   12/10/2023 HD#11 PPROM Day #12.  No change. No pain.  No leakage. No vaginal bleeding.  Good fetal movements.  Has been ambulating to the bathroom.  Still in good spirit. But has a difficult time sleeping last night     12/11/2023 HD#12 PPROM Day #13. No change. No pain.  No leakage. No vaginal bleeding.  Good fetal movements.  Has been ambulating to the bathroom.  Still in good spirit. Bedside ultrasound by me, 0.5 cm fluid pocket by fetal feet with chord.  FHT at 148   12/12/2023 HD#13 PPROM Day #14. No change. No pain.  No leakage. No vaginal bleeding.  Good fetal movements.  Has been ambulating to the bathroom.  Still in good spirit.   12/13/2023 HD#14 PPROM Day #15. No change. No pain.  No leakage. No vaginal bleeding.  Good fetal movements.  Has been ambulating to the bathroom.  Still in good spirit. Will stop antibiotic therapy after today  12/14/2023 HD#15 PPROM Day #16. No change. No pain.  No leakage. No vaginal bleeding.  Good fetal movements.  Has been ambulating to the bathroom.  Still in good spirit. Will stop antibiotic therapy today  12/15/2023 HD#16 PPROM Day #17. No change. No pain.  No leakage. No vaginal bleeding.  Good fetal movements.  Has been ambulating to the bathroom.  Still in good spirit.  Antibiotic discontinued yesterday.  12/16/2023: HD #17 PPROM Day #18. No change, not in pain. No leakage or change in discharge   12/17/2023: HD #18 PPROM Day #19. No change or abdominal pain, no vaginal bleeding or fluid loss. In good spirits     12/18/2023 HD#19 PPROM Day #20. No change. No pain.  No leakage. No vaginal bleeding.  Good fetal movements.  Has been ambulating to the bathroom.  Still in good spirit. Bedside ultrasound done by me today. Footling breech with minimal fluid.  Good fetal heart tones and movements.   12/19/23:  HD#20 PPROM D#21:  pt denies any c/o + FM, no pain or bleeding  12/20/2023 HD#21 PPROM D#22:  pt denies any c/o  + FM, no pain or bleeding  2023 HD#22 PPROM D#23: pt denies abdominal pain, subjective fever, change in discharge or odor, VB. Noting FM  2023 HD#23 PPROM D#24: pt denies abdominal pain, subjective fever, change in discharge or odor, VB. Noting FM. In good spirit  2023 HD#24 PPROM D#25: pt denies abdominal pain, subjective fever, change in discharge or odor, VB. Noting FM. In good spirit. Refused blood drawn today  2023 HD#25 PPROM D#26: pt denies abdominal pain, subjective fever, change in discharge or odor, Noting FM.  Had one episode of vaginal spotting without mucous last night.  None since then.  She had an argument with FOB during that time. US ordered today for MARCUS, r/o abruption    Patient called out.  Re.  She thought baby was coming out.  Exam by our staff and MD on call, Dr. Alvarado.  Prolapsed cord visible at perineum.  Ultrasound with footling breech.  Discussed with patient regarding viability status of her fetus.  She would like for us to do everything possible for her fetus.  We discussed emergency/urgent  section.  Risks and benefits discussed.    We then proceeded with primary low transverse  section.    Primary low transverse  section  Surgeon: Abimael Henderson MD  Assistant: Tiffanie Alvarado MD  Anesthesia: GETA by Mario Brennan MD and Jabari Reveles CRNA  Non-viable male infant at 1338 2023  Weight: 440 gm or 15.5 oz  Apgar: 3/  Infant with signs of Down syndrome  Placenta: manually extracted intact with three vessels  Normal uterus, tubes and ovaries  Very small uterine fibroid at uterine incision.  Removed and sent to Path  EBL: 500 cc  No complication  No specimen    Abimael Henderson MD    23.  PPD # 1, doing well, baby on her chest  23  PPD#2.  Doing well. No complaint.  Vitals with tachycardia.  Baby next to her in bed.  Does not want to see the .      Interval History:   Status post primary low transverse  section on  12/24/23.    She is doing well this morning. She is tolerating a regular diet without nausea or vomiting. She is voiding spontaneously. She is ambulating. She has passed flatus, and has not a BM. Vaginal bleeding is mild. She denies fever or chills. Abdominal pain is moderate and controlled with oral medications. She Is not breastfeeding. She desires circumcision for her male baby: not applicable.    Objective:     Vital Signs (Most Recent):  Temp: 100.1 °F (37.8 °C) (12/26/23 0727)  Pulse: (!) 125 (12/26/23 0727)  Resp: 20 (12/26/23 0727)  BP: (!) 94/53 (12/26/23 0727)  SpO2: (!) 91 % (12/26/23 0727) Vital Signs (24h Range):  Temp:  [97.7 °F (36.5 °C)-102 °F (38.9 °C)] 100.1 °F (37.8 °C)  Pulse:  [] 125  Resp:  [18-20] 20  SpO2:  [91 %-98 %] 91 %  BP: ()/(53-61) 94/53     Weight: 88 kg (194 lb 0.1 oz)  Body mass index is 31.31 kg/m².      Intake/Output Summary (Last 24 hours) at 12/26/2023 0807  Last data filed at 12/25/2023 1335  Gross per 24 hour   Intake --   Output 1200 ml   Net -1200 ml         Significant Labs:  Lab Results   Component Value Date    GROUPTRH A POS 12/24/2023    HEPBSAG Non-reactive 10/02/2023    STREPBCULT No Group B Streptococcus isolated 05/20/2019    AFP 80.3 ng/mL 08/11/2010     Recent Labs   Lab 12/25/23  0801   HGB 9.3*   HCT 29.1*       CBC:   Recent Labs   Lab 12/25/23  0801   WBC 10.28   RBC 3.10*   HGB 9.3*   HCT 29.1*      MCV 94   MCH 30.0   MCHC 32.0     I have personallly reviewed all pertinent lab results from the last 24 hours.    Physical Exam:   Constitutional: She appears well-developed and well-nourished. No distress.    HENT:   Head: Normocephalic and atraumatic.    Eyes: EOM are normal.     Cardiovascular:  Normal rate.             Pulmonary/Chest: Effort normal. No respiratory distress.        Abdominal: Soft. She exhibits distension. There is abdominal tenderness. There is no rebound and no guarding.   Pfannenstiel incision in AquaCel dressing.  No  evidence of active bleeding.             Musculoskeletal: Normal range of motion.       Neurological: She is alert.    Skin: Skin is warm and dry.    Psychiatric: She has a normal mood and affect.       Review of Systems  Assessment/Plan:     43 y.o. female  for:    * Status post  section  S/p emergent CD for cord prolapse at 21 week  Fetus passed away soon after delivery   Signs and symptoms of depression. But does not want help.  Routine pp care  Social work consult   consult    Will continue to monitor vitals signs.  Doubt if septic          Disposition: As patient meets milestones, will plan to discharge home.    Abimael Henderson MD  Obstetrics  [facility] [place] - Labor & Delivery

## 2023-12-26 NOTE — PROGRESS NOTES
"Pharmacokinetic Initial Assessment: Gentamicin    Assessment:  Weight utilized for dose calculation: Adjusted Body Weight  Dosing method utilized: Dosing by random level    Plan: Gentamicin 120 mg IV once (given already this am), followed by a random level to be drawn on 12/26 at 0130, 8-12 hours after the first dose.    Pharmacy will continue to monitor.    Please contact pharmacy at extension [phone] with any questions regarding this assessment.    Thank you for the consult,    Rambo Steel       Patient brief summary:  Gilberto Bueno is a 43 y.o. female initiated on aminoglycoside therapy for treatment of suspected bacteremia    Drug Allergies:   Review of patient's allergies indicates:  No Known Allergies    Actual Body Weight:   88 kg    Adjust Body Weight:   70.8 kg    Ideal Body Weight:  59.3 kg    Renal Function:   CrCl cannot be calculated (Patient's most recent lab result is older than the maximum 7 days allowed.).,     Dialysis Method (if applicable):  N/A    CBC (last 72 hours):  Recent Labs   Lab Result Units 12/23/23  1343 12/25/23  0801   WBC K/uL 12.38 10.28   Hemoglobin g/dL 11.2* 9.3*   Hematocrit % 33.7* 29.1*   Platelets K/uL 156 204   Gran % % 74.9* 74.6*   Lymph % % 15.4* 16.9*   Mono % % 7.9 6.8   Eosinophil % % 0.6 0.8   Basophil % % 0.6 0.4   Differential Method  Automated Automated       Metabolic Panel (last 72 hours):  No results for input(s): "SODIUM", "POTASSIUM", "CHLORIDE", "CO2", "GLUCOSE", "BUN BLD", "CREATININE", "ALBUMIN", "BILIRUBIN TOTAL", "ALK PHOS", "AST", "ALT", "MAGNESIUM", "PHOSPHORUS" in the last 72 hours.    Microbiologic Results:  Microbiology Results (last 7 days)       ** No results found for the last 168 hours. **          "

## 2023-12-26 NOTE — SUBJECTIVE & OBJECTIVE
Interval History:   Status post primary low transverse  section on 23.    She is doing well this morning. She is tolerating a regular diet without nausea or vomiting. She is voiding spontaneously. She is ambulating. She has passed flatus, and has not a BM. Vaginal bleeding is mild. She denies fever or chills. Abdominal pain is moderate and controlled with oral medications. She Is not breastfeeding. She desires circumcision for her male baby: not applicable.    Objective:     Vital Signs (Most Recent):  Temp: 100.1 °F (37.8 °C) (23)  Pulse: (!) 125 (23)  Resp: 20 (23)  BP: (!) 94/53 (23)  SpO2: (!) 91 % (23) Vital Signs (24h Range):  Temp:  [97.7 °F (36.5 °C)-102 °F (38.9 °C)] 100.1 °F (37.8 °C)  Pulse:  [] 125  Resp:  [18-20] 20  SpO2:  [91 %-98 %] 91 %  BP: ()/(53-61) 94/53     Weight: 88 kg (194 lb 0.1 oz)  Body mass index is 31.31 kg/m².      Intake/Output Summary (Last 24 hours) at 2023 0807  Last data filed at 2023 1335  Gross per 24 hour   Intake --   Output 1200 ml   Net -1200 ml         Significant Labs:  Lab Results   Component Value Date    GROUPTRH A POS 2023    HEPBSAG Non-reactive 10/02/2023    STREPBCULT No Group B Streptococcus isolated 2019    AFP 80.3 ng/mL 2010     Recent Labs   Lab 23  0801   HGB 9.3*   HCT 29.1*       CBC:   Recent Labs   Lab 23  0801   WBC 10.28   RBC 3.10*   HGB 9.3*   HCT 29.1*      MCV 94   MCH 30.0   MCHC 32.0     I have personallly reviewed all pertinent lab results from the last 24 hours.    Physical Exam:   Constitutional: She appears well-developed and well-nourished. No distress.    HENT:   Head: Normocephalic and atraumatic.    Eyes: EOM are normal.     Cardiovascular:  Normal rate.             Pulmonary/Chest: Effort normal. No respiratory distress.        Abdominal: Soft. She exhibits distension. There is abdominal tenderness. There is no  rebound and no guarding.   Pfannenstiel incision in AquaCel dressing.  No evidence of active bleeding.             Musculoskeletal: Normal range of motion.       Neurological: She is alert.    Skin: Skin is warm and dry.    Psychiatric: She has a normal mood and affect.       Review of Systems

## 2023-12-26 NOTE — PROCEDURES
"Gilberto Bueno is a 43 y.o. female patient.    Temp: (!) 102.5 °F (39.2 °C) (12/26/23 1120)  Pulse: (!) 125 (12/26/23 1120)  Resp: 20 (12/26/23 1255)  BP: 125/63 (12/26/23 1120)  SpO2: (!) 91 % (12/26/23 0727)  Weight: 88 kg (194 lb 0.1 oz) (12/21/23 1023)  Height: 5' 6" (167.6 cm) (12/21/23 1023)    PICC  Date/Time: 12/26/2023 2:50 PM  Performed by: Abdias Can RN  Consent Done: Yes  Time out: Immediately prior to procedure a time out was called to verify the correct patient, procedure, equipment, support staff and site/side marked as required  Indications: med administration and vascular access  Anesthesia: local infiltration  Local anesthetic: lidocaine 1% without epinephrine  Anesthetic Total (mL): 2  Preparation: skin prepped with ChloraPrep  Skin prep agent dried: skin prep agent completely dried prior to procedure  Sterile barriers: all five maximum sterile barriers used - cap, mask, sterile gown, sterile gloves, and large sterile sheet  Hand hygiene: hand hygiene performed prior to central venous catheter insertion  Location details: left basilic  Catheter type: double lumen  Catheter size: 5 Fr  Catheter Length: 41cm    Ultrasound guidance: yes  Vessel Caliber: medium and patent, compressibility normal  Vascular Doppler: not done  Needle advanced into vessel with real time Ultrasound guidance.  Guidewire confirmed in vessel.  Sterile sheath used.  no esophageal manometryNumber of attempts: 1  Post-procedure: blood return through all ports, chlorhexidine patch and sterile dressing applied  Estimated blood loss (mL): 0            Name Abdias Can RN  12/26/2023    "

## 2023-12-26 NOTE — PLAN OF CARE
VSS, ambulating and voiding without difficulty, tolerating regular diet.  Pain controlled, will continue to monitor.

## 2023-12-26 NOTE — PROGRESS NOTES
1143 - Rambo at nurse's station and stated he couldn't get an IV started on patient and to contact Anesthesia.    1144 - Spoke with Jabari with Anesthesia and he stated Dr. Cabral stated to get a PICC line on the patient.

## 2023-12-27 PROBLEM — F43.21 GRIEVING: Status: ACTIVE | Noted: 2023-12-27

## 2023-12-27 PROBLEM — N71.9 ENDOMETRITIS: Status: ACTIVE | Noted: 2023-12-27

## 2023-12-27 LAB — GENTAMICIN TROUGH SERPL-MCNC: <0.5 UG/ML (ref 0–2)

## 2023-12-27 PROCEDURE — 80170 ASSAY OF GENTAMICIN: CPT | Performed by: OBSTETRICS & GYNECOLOGY

## 2023-12-27 PROCEDURE — 63600175 PHARM REV CODE 636 W HCPCS: Performed by: OBSTETRICS & GYNECOLOGY

## 2023-12-27 PROCEDURE — 25000003 PHARM REV CODE 250: Performed by: OBSTETRICS & GYNECOLOGY

## 2023-12-27 PROCEDURE — 11000001 HC ACUTE MED/SURG PRIVATE ROOM

## 2023-12-27 PROCEDURE — 99232 SBSQ HOSP IP/OBS MODERATE 35: CPT | Mod: ,,, | Performed by: OBSTETRICS & GYNECOLOGY

## 2023-12-27 PROCEDURE — A4216 STERILE WATER/SALINE, 10 ML: HCPCS | Performed by: OBSTETRICS & GYNECOLOGY

## 2023-12-27 RX ORDER — SERTRALINE HYDROCHLORIDE 25 MG/1
25 TABLET, FILM COATED ORAL DAILY
Status: DISCONTINUED | OUTPATIENT
Start: 2023-12-27 | End: 2023-12-29 | Stop reason: HOSPADM

## 2023-12-27 RX ADMIN — CLINDAMYCIN PHOSPHATE 900 MG: 900 INJECTION, SOLUTION INTRAVENOUS at 03:12

## 2023-12-27 RX ADMIN — LORAZEPAM 0.5 MG: 0.5 TABLET ORAL at 12:12

## 2023-12-27 RX ADMIN — IBUPROFEN 600 MG: 600 TABLET ORAL at 11:12

## 2023-12-27 RX ADMIN — OXYCODONE AND ACETAMINOPHEN 1 TABLET: 10; 325 TABLET ORAL at 09:12

## 2023-12-27 RX ADMIN — Medication 10 ML: at 05:12

## 2023-12-27 RX ADMIN — IBUPROFEN 600 MG: 600 TABLET ORAL at 05:12

## 2023-12-27 RX ADMIN — IBUPROFEN 600 MG: 600 TABLET ORAL at 06:12

## 2023-12-27 RX ADMIN — GENTAMICIN SULFATE 354 MG: 40 INJECTION, SOLUTION INTRAMUSCULAR; INTRAVENOUS at 06:12

## 2023-12-27 RX ADMIN — DOCUSATE SODIUM 200 MG: 100 CAPSULE, LIQUID FILLED ORAL at 09:12

## 2023-12-27 RX ADMIN — METRONIDAZOLE 500 MG: 500 TABLET ORAL at 02:12

## 2023-12-27 RX ADMIN — OXYCODONE AND ACETAMINOPHEN 1 TABLET: 10; 325 TABLET ORAL at 03:12

## 2023-12-27 RX ADMIN — METRONIDAZOLE 500 MG: 500 TABLET ORAL at 10:12

## 2023-12-27 RX ADMIN — LORAZEPAM 0.5 MG: 0.5 TABLET ORAL at 11:12

## 2023-12-27 RX ADMIN — METRONIDAZOLE 500 MG: 500 TABLET ORAL at 05:12

## 2023-12-27 RX ADMIN — CLINDAMYCIN PHOSPHATE 900 MG: 900 INJECTION, SOLUTION INTRAVENOUS at 08:12

## 2023-12-27 RX ADMIN — Medication 10 ML: at 09:12

## 2023-12-27 RX ADMIN — SERTRALINE HYDROCHLORIDE 25 MG: 25 TABLET ORAL at 06:12

## 2023-12-27 RX ADMIN — Medication 10 ML: at 12:12

## 2023-12-27 RX ADMIN — MUPIROCIN: 20 OINTMENT TOPICAL at 09:12

## 2023-12-27 RX ADMIN — OXYCODONE AND ACETAMINOPHEN 1 TABLET: 10; 325 TABLET ORAL at 02:12

## 2023-12-27 RX ADMIN — OXYCODONE AND ACETAMINOPHEN 1 TABLET: 10; 325 TABLET ORAL at 08:12

## 2023-12-27 RX ADMIN — PRENATAL VIT W/ FE FUMARATE-FA TAB 27-0.8 MG 1 TABLET: 27-0.8 TAB at 09:12

## 2023-12-27 RX ADMIN — OXYCODONE AND ACETAMINOPHEN 1 TABLET: 10; 325 TABLET ORAL at 11:12

## 2023-12-27 RX ADMIN — Medication 10 ML: at 11:12

## 2023-12-27 RX ADMIN — CLINDAMYCIN PHOSPHATE 900 MG: 900 INJECTION, SOLUTION INTRAVENOUS at 11:12

## 2023-12-27 RX ADMIN — LORAZEPAM 0.5 MG: 0.5 TABLET ORAL at 09:12

## 2023-12-27 NOTE — PROGRESS NOTES
[facility] - Labor & Delivery  Obstetrics  Postpartum Progress Note    Patient Name: Gilberto Bueno  MRN: [id number]  Admission Date: 2023  Hospital Length of Stay: 26 days  Attending Physician: Abimael Henderson MD  Primary Care Provider: Liss Wise MD    Subjective:     Principal Problem:Status post  section    Hospital Course:  On Labor and Delivery, vitals stable  FHT at 140   Contraction: None  Cervix: 1 cm  Started on antibiotic therapy.  Zithromax and Ampicillin    2023 No longer with contractions/spotting this morning.  No fundal tenderness.  No leakage of fluid. Does not want to deliver now.  Wants to wait for sealing of the membranes    2023 No pain.  No leakage. No vaginal bleeding. Good fetal movements  2023 HD#3/PPROM Day #4. No pain.  No leakage. No vaginal bleeding. Good fetal movements  12/3/2023 HD#4/PPROM Day #5. No pain.  No leakage. No vaginal bleeding. Good fetal movements    2023 HD#5 PPROM Day #6. No pain.  No leakage. No vaginal bleeding.  Good fetal movements.  Has been ambulating. Bedside ultrasound with no amniotic fluid. Oral ampicillin started.  2023 HD#6 PPROM Day #7. No pain.  No leakage. No vaginal bleeding.  Good fetal movements.  Has been ambulating to the bathroom.  Still in good spirit.  2023 HD#7 PPROM Day #8. No pain.  No leakage. No vaginal bleeding.  Good fetal movements.  Has been ambulating to the bathroom.  Still in good spirit. Had a visit with our hospital  last night  2023 HD#8 PPROM Day #9. No change. No pain.  No leakage. No vaginal bleeding.  Good fetal movements.  Has been ambulating to the bathroom.  Still in good spirit.   2023 HD#9 PPROM Day #10. No change. No pain.  No leakage. No vaginal bleeding.  Good fetal movements.  Has been ambulating to the bathroom.  Still in good spirit.  2023 HD#10 PPROM Day #11.  No change. No pain.  No leakage. No vaginal bleeding.  Good fetal movements.  Has  been ambulating to the bathroom.  Still in good spirit.   12/10/2023 HD#11 PPROM Day #12.  No change. No pain.  No leakage. No vaginal bleeding.  Good fetal movements.  Has been ambulating to the bathroom.  Still in good spirit. But has a difficult time sleeping last night     12/11/2023 HD#12 PPROM Day #13. No change. No pain.  No leakage. No vaginal bleeding.  Good fetal movements.  Has been ambulating to the bathroom.  Still in good spirit. Bedside ultrasound by me, 0.5 cm fluid pocket by fetal feet with chord.  FHT at 148   12/12/2023 HD#13 PPROM Day #14. No change. No pain.  No leakage. No vaginal bleeding.  Good fetal movements.  Has been ambulating to the bathroom.  Still in good spirit.   12/13/2023 HD#14 PPROM Day #15. No change. No pain.  No leakage. No vaginal bleeding.  Good fetal movements.  Has been ambulating to the bathroom.  Still in good spirit. Will stop antibiotic therapy after today  12/14/2023 HD#15 PPROM Day #16. No change. No pain.  No leakage. No vaginal bleeding.  Good fetal movements.  Has been ambulating to the bathroom.  Still in good spirit. Will stop antibiotic therapy today  12/15/2023 HD#16 PPROM Day #17. No change. No pain.  No leakage. No vaginal bleeding.  Good fetal movements.  Has been ambulating to the bathroom.  Still in good spirit.  Antibiotic discontinued yesterday.  12/16/2023: HD #17 PPROM Day #18. No change, not in pain. No leakage or change in discharge   12/17/2023: HD #18 PPROM Day #19. No change or abdominal pain, no vaginal bleeding or fluid loss. In good spirits     12/18/2023 HD#19 PPROM Day #20. No change. No pain.  No leakage. No vaginal bleeding.  Good fetal movements.  Has been ambulating to the bathroom.  Still in good spirit. Bedside ultrasound done by me today. Footling breech with minimal fluid.  Good fetal heart tones and movements.   12/19/23:  HD#20 PPROM D#21:  pt denies any c/o + FM, no pain or bleeding  12/20/2023 HD#21 PPROM D#22:  pt denies any c/o  + FM, no pain or bleeding  2023 HD#22 PPROM D#23: pt denies abdominal pain, subjective fever, change in discharge or odor, VB. Noting FM  2023 HD#23 PPROM D#24: pt denies abdominal pain, subjective fever, change in discharge or odor, VB. Noting FM. In good spirit  2023 HD#24 PPROM D#25: pt denies abdominal pain, subjective fever, change in discharge or odor, VB. Noting FM. In good spirit. Refused blood drawn today  2023 HD#25 PPROM D#26: pt denies abdominal pain, subjective fever, change in discharge or odor, Noting FM.  Had one episode of vaginal spotting without mucous last night.  None since then.  She had an argument with FOB during that time. US ordered today for MARCUS, r/o abruption    Patient called out.  Re.  She thought baby was coming out.  Exam by our staff and MD on call, Dr. Alvarado.  Prolapsed cord visible at perineum.  Ultrasound with footling breech.  Discussed with patient regarding viability status of her fetus.  She would like for us to do everything possible for her fetus.  We discussed emergency/urgent  section.  Risks and benefits discussed.    We then proceeded with primary low transverse  section.    Primary low transverse  section  Surgeon: Abimael Henderson MD  Assistant: Tiffanie Alvarado MD  Anesthesia: GETA by Mario Brennan MD and Jabari Reveles CRNA  Non-viable male infant at 1338 2023  Weight: 440 gm or 15.5 oz  Apgar: 3/  Infant with signs of Down syndrome  Placenta: manually extracted intact with three vessels  Normal uterus, tubes and ovaries  Very small uterine fibroid at uterine incision.  Removed and sent to Path  EBL: 500 cc  No complication  No specimen    Abimael Henderson MD    23.  PPD # 1, doing well, baby on her chest  23  PPD#2.  Doing well. No complaint.  Vitals with tachycardia.  Baby next to her in bed.  Does not want to see the . Tm 102 at 2330 2023 PPD#3.  Tm up to 102.5 at 1120 on 2023.  Now on  Gent and Clinda after PICC placed by our IV team.  Feeling better this morning.  Still with uterine tenderness      Interval History:   Status post primary low transverse  section for cord prolapse on 23.  Infant did not survive    She is doing well this morning. She is tolerating a regular diet without nausea or vomiting. She is voiding spontaneously. She is ambulating. She has passed flatus, and has not a BM. Vaginal bleeding is mild. She reports fever or chills. Abdominal pain is moderate and controlled with oral medications. She Is not breastfeeding. She desires circumcision for her male baby: not applicable.    Objective:     Vital Signs (Most Recent):  Temp: 99.1 °F (37.3 °C) (23 0410)  Pulse: 86 (23 0410)  Resp: 18 (23 041)  BP: (!) 91/52 (230)  SpO2: 96 % (23) Vital Signs (24h Range):  Temp:  [98.6 °F (37 °C)-102.5 °F (39.2 °C)] 99.1 °F (37.3 °C)  Pulse:  [] 86  Resp:  [18-20] 18  SpO2:  [91 %-100 %] 96 %  BP: ()/(50-63) 91/52     Weight: 88 kg (194 lb 0.1 oz)  Body mass index is 31.31 kg/m².    No intake or output data in the 24 hours ending 23 0722      Significant Labs:  Lab Results   Component Value Date    GROUPTRH A POS 2023    HEPBSAG Non-reactive 10/02/2023    STREPBCULT No Group B Streptococcus isolated 2019    AFP 80.3 ng/mL 2010     Recent Labs   Lab 23  0801   HGB 9.3*   HCT 29.1*       I have personallly reviewed all pertinent lab results from the last 24 hours.    Physical Exam:   Constitutional: She appears well-developed and well-nourished. No distress.    HENT:   Head: Normocephalic and atraumatic.    Eyes: EOM are normal.     Cardiovascular:  Normal rate.             Pulmonary/Chest: Effort normal. No respiratory distress.        Abdominal: Soft. She exhibits no distension. There is abdominal tenderness. There is no rebound and no guarding.   Fundal tenderness             Musculoskeletal: Normal  range of motion.       Neurological: She is alert.    Skin: Skin is warm and dry.    Psychiatric: She has a normal mood and affect.       Review of Systems  Assessment/Plan:     43 y.o. female  for:    * Status post  section  S/p emergent CD for cord prolapse at 21 week  Fetus passed away soon after delivery   Signs and symptoms of depression. But does not want help.  Now with endometritis.  On Gent and clindamycin IV.  Responding.  Will watch temperature.  Otherwise routine pp care  Social work consult   consult refused          Grieving  Still dealing with her loss though she has been in the hospital for 28 days with a non-viable fetus  Currently refusing psychiatric evaluation and care  Does not want to speak to our .  Social Service came yesterday    Will continue to monitor    Endometritis  Now on gentamicin and clindamycin IV  Responding so far  Will continue IV antibiotics for 48 hours after last spike prior to discharge          Disposition: As patient meets milestones, will plan to discharge home.    Abimael Henderson MD  Obstetrics  [facility] - Labor & Delivery

## 2023-12-27 NOTE — PLAN OF CARE
Problem:  Fall Injury Risk  Goal: Absence of Fall, Infant Drop and Related Injury  Outcome: Ongoing, Progressing     Problem: Adult Inpatient Plan of Care  Goal: Plan of Care Review  Outcome: Ongoing, Progressing  Goal: Patient-Specific Goal (Individualized)  Outcome: Ongoing, Progressing  Goal: Absence of Hospital-Acquired Illness or Injury  Outcome: Ongoing, Progressing  Goal: Optimal Comfort and Wellbeing  Outcome: Ongoing, Progressing     Problem: Grieving  Goal: Expression of Grief  Outcome: Ongoing, Progressing     Problem:  Loss  Goal: Optimal Adjustment to Loss  Outcome: Ongoing, Progressing     Problem: Bleeding ( Delivery)  Goal: Bleeding is Controlled  Outcome: Ongoing, Progressing     Problem: Infection ( Delivery)  Goal: Absence of Infection Signs and Symptoms  Outcome: Ongoing, Progressing     Problem: Pain Acute  Goal: Acceptable Pain Control and Functional Ability  Outcome: Ongoing, Progressing      Detail Level: Detailed Detail Level: Generalized Detail Level: Simple Detail Level: Zone

## 2023-12-27 NOTE — ASSESSMENT & PLAN NOTE
Still dealing with her loss though she has been in the hospital for 28 days with a non-viable fetus  Currently refusing psychiatric evaluation and care  Does not want to speak to our .  Social Service came yesterday    Will continue to monitor

## 2023-12-27 NOTE — PLAN OF CARE
Problem:  Fall Injury Risk  Goal: Absence of Fall, Infant Drop and Related Injury  Outcome: Unable to Meet, Plan Revised     Problem: Adult Inpatient Plan of Care  Goal: Plan of Care Review  Outcome: Ongoing, Progressing  Goal: Patient-Specific Goal (Individualized)  Outcome: Ongoing, Progressing  Goal: Absence of Hospital-Acquired Illness or Injury  Outcome: Ongoing, Progressing     Problem: Grieving  Goal: Expression of Grief  Outcome: Ongoing, Progressing     Problem:  Loss  Goal: Optimal Adjustment to Loss  Outcome: Ongoing, Progressing     Problem: Bleeding ( Delivery)  Goal: Bleeding is Controlled  Outcome: Met     Problem: Infection ( Delivery)  Goal: Absence of Infection Signs and Symptoms  Outcome: Met     Problem: Pain Acute  Goal: Acceptable Pain Control and Functional Ability  Outcome: Ongoing, Progressing     Problem: Adjustment to Role Transition (Postpartum  Delivery)  Goal: Successful Maternal Role Transition  Outcome: Ongoing, Progressing     Problem: Bleeding (Postpartum  Delivery)  Goal: Hemostasis  Outcome: Ongoing, Progressing     Problem: Infection (Postpartum  Delivery)  Goal: Absence of Infection Signs and Symptoms  Outcome: Ongoing, Progressing     Problem: Pain (Postpartum  Delivery)  Goal: Acceptable Pain Control  Outcome: Ongoing, Progressing     Problem: Postoperative Nausea and Vomiting (Postpartum  Delivery)  Goal: Nausea and Vomiting Relief  Outcome: Met     Problem: Postoperative Urinary Retention (Postpartum  Delivery)  Goal: Effective Urinary Elimination  Outcome: Met

## 2023-12-27 NOTE — SUBJECTIVE & OBJECTIVE
Interval History:   Status post primary low transverse  section for cord prolapse on 23.  Infant did not survive    She is doing well this morning. She is tolerating a regular diet without nausea or vomiting. She is voiding spontaneously. She is ambulating. She has passed flatus, and has not a BM. Vaginal bleeding is mild. She reports fever or chills. Abdominal pain is moderate and controlled with oral medications. She Is not breastfeeding. She desires circumcision for her male baby: not applicable.    Objective:     Vital Signs (Most Recent):  Temp: 99.1 °F (37.3 °C) (23 0410)  Pulse: 86 (23 0410)  Resp: 18 (23)  BP: (!) 91/52 (23)  SpO2: 96 % (23) Vital Signs (24h Range):  Temp:  [98.6 °F (37 °C)-102.5 °F (39.2 °C)] 99.1 °F (37.3 °C)  Pulse:  [] 86  Resp:  [18-20] 18  SpO2:  [91 %-100 %] 96 %  BP: ()/(50-63) 91/52     Weight: 88 kg (194 lb 0.1 oz)  Body mass index is 31.31 kg/m².    No intake or output data in the 24 hours ending 23 0722      Significant Labs:  Lab Results   Component Value Date    GROUPTRH A POS 2023    HEPBSAG Non-reactive 10/02/2023    STREPBCULT No Group B Streptococcus isolated 2019    AFP 80.3 ng/mL 2010     Recent Labs   Lab 23  0801   HGB 9.3*   HCT 29.1*       I have personallly reviewed all pertinent lab results from the last 24 hours.    Physical Exam:   Constitutional: She appears well-developed and well-nourished. No distress.    HENT:   Head: Normocephalic and atraumatic.    Eyes: EOM are normal.     Cardiovascular:  Normal rate.             Pulmonary/Chest: Effort normal. No respiratory distress.        Abdominal: Soft. She exhibits no distension. There is abdominal tenderness. There is no rebound and no guarding.   Fundal tenderness             Musculoskeletal: Normal range of motion.       Neurological: She is alert.    Skin: Skin is warm and dry.    Psychiatric: She has a normal  mood and affect.       Review of Systems

## 2023-12-27 NOTE — ASSESSMENT & PLAN NOTE
S/p emergent CD for cord prolapse at 21 week  Fetus passed away soon after delivery   Signs and symptoms of depression. But does not want help.  Now with endometritis.  On Gent and clindamycin IV.  Responding.  Will watch temperature.  Otherwise routine pp care  Social work consult   consult refused

## 2023-12-27 NOTE — ASSESSMENT & PLAN NOTE
Now on gentamicin and clindamycin IV  Responding so far  Will continue IV antibiotics for 48 hours after last spike prior to discharge

## 2023-12-28 PROCEDURE — 25000003 PHARM REV CODE 250: Performed by: OBSTETRICS & GYNECOLOGY

## 2023-12-28 PROCEDURE — 63600175 PHARM REV CODE 636 W HCPCS: Performed by: OBSTETRICS & GYNECOLOGY

## 2023-12-28 PROCEDURE — 99232 SBSQ HOSP IP/OBS MODERATE 35: CPT | Mod: ,,, | Performed by: OBSTETRICS & GYNECOLOGY

## 2023-12-28 PROCEDURE — A4216 STERILE WATER/SALINE, 10 ML: HCPCS | Performed by: OBSTETRICS & GYNECOLOGY

## 2023-12-28 PROCEDURE — 11000001 HC ACUTE MED/SURG PRIVATE ROOM

## 2023-12-28 RX ADMIN — OXYCODONE AND ACETAMINOPHEN 1 TABLET: 10; 325 TABLET ORAL at 06:12

## 2023-12-28 RX ADMIN — IBUPROFEN 600 MG: 600 TABLET ORAL at 11:12

## 2023-12-28 RX ADMIN — Medication 10 ML: at 04:12

## 2023-12-28 RX ADMIN — DOCUSATE SODIUM 200 MG: 100 CAPSULE, LIQUID FILLED ORAL at 09:12

## 2023-12-28 RX ADMIN — METRONIDAZOLE 500 MG: 500 TABLET ORAL at 06:12

## 2023-12-28 RX ADMIN — LORAZEPAM 0.5 MG: 0.5 TABLET ORAL at 06:12

## 2023-12-28 RX ADMIN — MUPIROCIN: 20 OINTMENT TOPICAL at 09:12

## 2023-12-28 RX ADMIN — IBUPROFEN 600 MG: 600 TABLET ORAL at 06:12

## 2023-12-28 RX ADMIN — OXYCODONE AND ACETAMINOPHEN 1 TABLET: 10; 325 TABLET ORAL at 02:12

## 2023-12-28 RX ADMIN — DOCUSATE SODIUM 200 MG: 100 CAPSULE, LIQUID FILLED ORAL at 08:12

## 2023-12-28 RX ADMIN — PRENATAL VIT W/ FE FUMARATE-FA TAB 27-0.8 MG 1 TABLET: 27-0.8 TAB at 09:12

## 2023-12-28 RX ADMIN — METRONIDAZOLE 500 MG: 500 TABLET ORAL at 09:12

## 2023-12-28 RX ADMIN — OXYCODONE AND ACETAMINOPHEN 1 TABLET: 10; 325 TABLET ORAL at 11:12

## 2023-12-28 RX ADMIN — Medication 10 ML: at 11:12

## 2023-12-28 RX ADMIN — CLINDAMYCIN PHOSPHATE 900 MG: 900 INJECTION, SOLUTION INTRAVENOUS at 04:12

## 2023-12-28 RX ADMIN — GENTAMICIN SULFATE 354 MG: 40 INJECTION, SOLUTION INTRAMUSCULAR; INTRAVENOUS at 06:12

## 2023-12-28 RX ADMIN — OXYCODONE AND ACETAMINOPHEN 1 TABLET: 10; 325 TABLET ORAL at 10:12

## 2023-12-28 RX ADMIN — METRONIDAZOLE 500 MG: 500 TABLET ORAL at 02:12

## 2023-12-28 RX ADMIN — MUPIROCIN: 20 OINTMENT TOPICAL at 08:12

## 2023-12-28 RX ADMIN — Medication 10 ML: at 06:12

## 2023-12-28 RX ADMIN — SERTRALINE HYDROCHLORIDE 25 MG: 25 TABLET ORAL at 09:12

## 2023-12-28 RX ADMIN — CLINDAMYCIN PHOSPHATE 900 MG: 900 INJECTION, SOLUTION INTRAVENOUS at 11:12

## 2023-12-28 RX ADMIN — CLINDAMYCIN PHOSPHATE 900 MG: 900 INJECTION, SOLUTION INTRAVENOUS at 08:12

## 2023-12-28 NOTE — ASSESSMENT & PLAN NOTE
Still dealing with her loss though she has been in the hospital for 29 days with a non-viable fetus  Currently refusing psychiatric evaluation and care  Does not want to speak to our .  Social Service eval a couple of days ago  Sertraline started yesterday.  Not feeling much better    Will continue to monitor  Would discharge home with sertraline and possibly a few Ambien

## 2023-12-28 NOTE — PROGRESS NOTES
"Pharmacokinetic Follow Up: Gentamicin    Assessment of levels:   Assessment of levels using CF once daily dosing    Regimen Plan:   Will monitor troughs every 3 days x 2, then weekly thereafter unless acute change in renal function or the trough goal exceeds the goal concentration; Next trough concentration due on 12/30/23 at 17:00.    Drug levels (last 3 results):  No results for input(s): "AMIKACINPEAK", "AMIKACINTROU", "AMIKACINRAND", "AMIKACIN" in the last 72 hours.    Recent Labs   Lab Result Units 12/26/23  1518 12/27/23  1706   Gentamicin, Random ug/mL <0.5  --    Gentamicin, Trough ug/mL  --  <0.5       No results for input(s): "TOBRA8", "TOBRA10", "TOBRA12", "TOBRARND", "TOBRAMYCIN", "TOBRAPEAK", "TOBRATROUGH", "TOBRAMYCINPE", "TOBRAMYCINRA", "TOBRAMYCINTR" in the last 72 hours.    Pharmacy will continue to monitor.    Please contact pharmacy at extension [phone] with any questions regarding this assessment.    Thank you for the consult,   John Varghese      Patient brief summary:  Gilberto Bueno is a 43 y.o. female initiated on aminoglycoside therapy for treatment of CF exacerbation    Drug Allergies:   Review of patient's allergies indicates:  No Known Allergies    Actual Body Weight:   88 kg    Adjust Body Weight:   70.8 kg    Ideal Body Weight:  59.3 kg    Renal Function:   Estimated Creatinine Clearance: 135.1 mL/min (based on SCr of 0.6 mg/dL).,     Dialysis Method (if applicable):  N/A    CBC (last 72 hours):  Recent Labs   Lab Result Units 12/25/23  0801   WBC K/uL 10.28   Hemoglobin g/dL 9.3*   Hematocrit % 29.1*   Platelets K/uL 204   Gran % % 74.6*   Lymph % % 16.9*   Mono % % 6.8   Eosinophil % % 0.8   Basophil % % 0.4   Differential Method  Automated       Metabolic Panel (last 72 hours):  Recent Labs   Lab Result Units 12/26/23  1518   Sodium mmol/L 137   Potassium mmol/L 3.2*   Chloride mmol/L 108   CO2 mmol/L 19*   Glucose mg/dL 103   BUN mg/dL 5*   Creatinine mg/dL 0.6       Aminoglycoside " Administrations:  aminoglycosides given in last 96 hours                     gentamicin (GARAMYCIN) 354 mg in sodium chloride 0.9% 100 mL IVPB (mg) 354 mg New Bag 12/26/23 1735    gentamicin in NaCl (iso-osm) 120 mg/100 mL IVPB 120 mg (mg) 120 mg New Bag 12/26/23 0300                    Microbiologic Results:  Microbiology Results (last 7 days)       ** No results found for the last 168 hours. **

## 2023-12-28 NOTE — PLAN OF CARE
VSS. Afebrile.  Tolerating IV antibiotics.    Ambulating in room without difficulty.   LTV incision, with Aquacel dressing, clean dry and intact.   Tolerating regular diet without any GI symptoms voiced.  Voiding without difficulty. Passing flatus. Pt denies BM.  Pain controlled with scheduled and PRN meds.  Plan of care reviewed with patient. All questions answered.

## 2023-12-28 NOTE — PROGRESS NOTES
[facility] - Labor & Delivery  Obstetrics  Postpartum Progress Note    Patient Name: Gilberto Bueno  MRN: [id number]  Admission Date: 2023  Hospital Length of Stay: 27 days  Attending Physician: Abimael Henderson MD  Primary Care Provider: Liss Wise MD    Subjective:     Principal Problem:Status post  section    Hospital Course:  On Labor and Delivery, vitals stable  FHT at 140   Contraction: None  Cervix: 1 cm  Started on antibiotic therapy.  Zithromax and Ampicillin    2023 No longer with contractions/spotting this morning.  No fundal tenderness.  No leakage of fluid. Does not want to deliver now.  Wants to wait for sealing of the membranes    2023 No pain.  No leakage. No vaginal bleeding. Good fetal movements  2023 HD#3/PPROM Day #4. No pain.  No leakage. No vaginal bleeding. Good fetal movements  12/3/2023 HD#4/PPROM Day #5. No pain.  No leakage. No vaginal bleeding. Good fetal movements    2023 HD#5 PPROM Day #6. No pain.  No leakage. No vaginal bleeding.  Good fetal movements.  Has been ambulating. Bedside ultrasound with no amniotic fluid. Oral ampicillin started.  2023 HD#6 PPROM Day #7. No pain.  No leakage. No vaginal bleeding.  Good fetal movements.  Has been ambulating to the bathroom.  Still in good spirit.  2023 HD#7 PPROM Day #8. No pain.  No leakage. No vaginal bleeding.  Good fetal movements.  Has been ambulating to the bathroom.  Still in good spirit. Had a visit with our hospital  last night  2023 HD#8 PPROM Day #9. No change. No pain.  No leakage. No vaginal bleeding.  Good fetal movements.  Has been ambulating to the bathroom.  Still in good spirit.   2023 HD#9 PPROM Day #10. No change. No pain.  No leakage. No vaginal bleeding.  Good fetal movements.  Has been ambulating to the bathroom.  Still in good spirit.  2023 HD#10 PPROM Day #11.  No change. No pain.  No leakage. No vaginal bleeding.  Good fetal movements.  Has  been ambulating to the bathroom.  Still in good spirit.   12/10/2023 HD#11 PPROM Day #12.  No change. No pain.  No leakage. No vaginal bleeding.  Good fetal movements.  Has been ambulating to the bathroom.  Still in good spirit. But has a difficult time sleeping last night     12/11/2023 HD#12 PPROM Day #13. No change. No pain.  No leakage. No vaginal bleeding.  Good fetal movements.  Has been ambulating to the bathroom.  Still in good spirit. Bedside ultrasound by me, 0.5 cm fluid pocket by fetal feet with chord.  FHT at 148   12/12/2023 HD#13 PPROM Day #14. No change. No pain.  No leakage. No vaginal bleeding.  Good fetal movements.  Has been ambulating to the bathroom.  Still in good spirit.   12/13/2023 HD#14 PPROM Day #15. No change. No pain.  No leakage. No vaginal bleeding.  Good fetal movements.  Has been ambulating to the bathroom.  Still in good spirit. Will stop antibiotic therapy after today  12/14/2023 HD#15 PPROM Day #16. No change. No pain.  No leakage. No vaginal bleeding.  Good fetal movements.  Has been ambulating to the bathroom.  Still in good spirit. Will stop antibiotic therapy today  12/15/2023 HD#16 PPROM Day #17. No change. No pain.  No leakage. No vaginal bleeding.  Good fetal movements.  Has been ambulating to the bathroom.  Still in good spirit.  Antibiotic discontinued yesterday.  12/16/2023: HD #17 PPROM Day #18. No change, not in pain. No leakage or change in discharge   12/17/2023: HD #18 PPROM Day #19. No change or abdominal pain, no vaginal bleeding or fluid loss. In good spirits     12/18/2023 HD#19 PPROM Day #20. No change. No pain.  No leakage. No vaginal bleeding.  Good fetal movements.  Has been ambulating to the bathroom.  Still in good spirit. Bedside ultrasound done by me today. Footling breech with minimal fluid.  Good fetal heart tones and movements.   12/19/23:  HD#20 PPROM D#21:  pt denies any c/o + FM, no pain or bleeding  12/20/2023 HD#21 PPROM D#22:  pt denies any c/o  + FM, no pain or bleeding  2023 HD#22 PPROM D#23: pt denies abdominal pain, subjective fever, change in discharge or odor, VB. Noting FM  2023 HD#23 PPROM D#24: pt denies abdominal pain, subjective fever, change in discharge or odor, VB. Noting FM. In good spirit  2023 HD#24 PPROM D#25: pt denies abdominal pain, subjective fever, change in discharge or odor, VB. Noting FM. In good spirit. Refused blood drawn today  2023 HD#25 PPROM D#26: pt denies abdominal pain, subjective fever, change in discharge or odor, Noting FM.  Had one episode of vaginal spotting without mucous last night.  None since then.  She had an argument with FOB during that time. US ordered today for MARCUS, r/o abruption    Patient called out.  Re.  She thought baby was coming out.  Exam by our staff and MD on call, Dr. Alvarado.  Prolapsed cord visible at perineum.  Ultrasound with footling breech.  Discussed with patient regarding viability status of her fetus.  She would like for us to do everything possible for her fetus.  We discussed emergency/urgent  section.  Risks and benefits discussed.    We then proceeded with primary low transverse  section.    Primary low transverse  section  Surgeon: Abimael Henderson MD  Assistant: Tiffanie Alvarado MD  Anesthesia: GETA by Mario Brennan MD and Jabari Reveles CRNA  Non-viable male infant at 1338 2023  Weight: 440 gm or 15.5 oz  Apgar: 3/  Infant with signs of Down syndrome  Placenta: manually extracted intact with three vessels  Normal uterus, tubes and ovaries  Very small uterine fibroid at uterine incision.  Removed and sent to Path  EBL: 500 cc  No complication  No specimen    Abimael Henderson MD    23.  PPD # 1, doing well, baby on her chest  23  PPD#2.  Doing well. No complaint.  Vitals with tachycardia.  Baby next to her in bed.  Does not want to see the . Tm 102 at 2330 2023 PPD#3.  Tm up to 102.5 at 1120 on 2023.  Now on  "Gent and Clinda after PICC placed by our IV team.  Feeling better this morning.  Still with uterine tenderness.    171 Came to see patient.  Sitting in a chair.  Still in pain.  Still not moving much.    We discussed depression and treatment.  We discussed discharge home in the morning  Zoloft 25 mg tonight.  Consider sending home with some Ambien and Zoloft tomorrow  2023 Remains afebrile.  Still with pain.  On Gent/Clinda day #2.  Sertraline started yesterday.          Interval History:   Status post primary low transverse  section    She is doing better this morning. She is tolerating a regular diet without nausea or vomiting. She is voiding spontaneously. She is ambulating. She has passed flatus, and has a BM. Vaginal bleeding is mild. She denies fever or chills. Abdominal pain is moderate and controlled with oral medications. She Is not breastfeeding. She desires circumcision for her male baby: not applicable.    Has been refusing blood draw  Depressed.  Sertraline started  yesterday  Still on Ambien to sleep at night.      Objective:     Vital Signs (Most Recent):  Temp: 98.2 °F (36.8 °C) (23 040)  Pulse: 85 (23 040)  Resp: 17 (23 0608)  BP: (!) 104/59 (23 040)  SpO2: 95 % (23) Vital Signs (24h Range):  Temp:  [97.7 °F (36.5 °C)-98.2 °F (36.8 °C)] 98.2 °F (36.8 °C)  Pulse:  [76-96] 85  Resp:  [16-20] 17  SpO2:  [95 %-100 %] 95 %  BP: (102-121)/(51-68) 104/59     Weight: 88 kg (194 lb 0.1 oz)  Body mass index is 31.31 kg/m².      Intake/Output Summary (Last 24 hours) at 2023 0718  Last data filed at 2023 2336  Gross per 24 hour   Intake 10 ml   Output --   Net 10 ml         Significant Labs:  Lab Results   Component Value Date    GROUPTRH A POS 2023    HEPBSAG Non-reactive 10/02/2023    STREPBCULT No Group B Streptococcus isolated 2019    AFP 80.3 ng/mL 2010     No results for input(s): "HGB", "HCT" in the last 48 hours.    I " have personallly reviewed all pertinent lab results from the last 24 hours.    Physical Exam:   Constitutional: She appears well-developed and well-nourished. No distress.    HENT:   Head: Normocephalic and atraumatic.    Eyes: EOM are normal.     Cardiovascular:  Normal rate.             Pulmonary/Chest: Effort normal. No respiratory distress.        Abdominal: Soft. She exhibits no distension. There is abdominal tenderness. There is no rebound and no guarding.   Fundal tenderness             Musculoskeletal: Normal range of motion.       Neurological: She is alert.    Skin: Skin is warm and dry.    Psychiatric: She has a normal mood and affect.       Review of Systems  Assessment/Plan:     43 y.o. female  for:    * Status post  section  S/p emergent CD for cord prolapse at 21 week  Fetus passed away soon after delivery   Signs and symptoms of depression. But does not want help.  Agree to sertraline yesterday  Now with endometritis.  On Gent and clindamycin IV.  Responding.  Will watch temperature.  Otherwise routine pp care  Social work consult done   consult refused  Discharge home tomorrow, 2023          Grieving  Still dealing with her loss though she has been in the hospital for 29 days with a non-viable fetus  Currently refusing psychiatric evaluation and care  Does not want to speak to our .  Social Service eval a couple of days ago  Sertraline started yesterday.  Not feeling much better    Will continue to monitor  Would discharge home with sertraline and possibly a few Ambien     Endometritis  Now on gentamicin and clindamycin IV  Responding well so far  Will continue IV antibiotics for 48 hours after last spike prior to discharge  Consider discharge home tomorrow.          Disposition: As patient meets milestones, will plan to discharge home.    Abimael Henderson MD  Obstetrics  [facility] - [place] - Labor & Delivery

## 2023-12-28 NOTE — SUBJECTIVE & OBJECTIVE
"Interval History:   Status post primary low transverse  section    She is doing better this morning. She is tolerating a regular diet without nausea or vomiting. She is voiding spontaneously. She is ambulating. She has passed flatus, and has a BM. Vaginal bleeding is mild. She denies fever or chills. Abdominal pain is moderate and controlled with oral medications. She Is not breastfeeding. She desires circumcision for her male baby: not applicable.    Has been refusing blood draw  Depressed.  Sertraline started  yesterday  Still on Ambien to sleep at night.      Objective:     Vital Signs (Most Recent):  Temp: 98.2 °F (36.8 °C) (23 040)  Pulse: 85 (23)  Resp: 17 (23 06)  BP: (!) 104/59 (23)  SpO2: 95 % (23) Vital Signs (24h Range):  Temp:  [97.7 °F (36.5 °C)-98.2 °F (36.8 °C)] 98.2 °F (36.8 °C)  Pulse:  [76-96] 85  Resp:  [16-20] 17  SpO2:  [95 %-100 %] 95 %  BP: (102-121)/(51-68) 104/59     Weight: 88 kg (194 lb 0.1 oz)  Body mass index is 31.31 kg/m².      Intake/Output Summary (Last 24 hours) at 2023 0718  Last data filed at 2023 2336  Gross per 24 hour   Intake 10 ml   Output --   Net 10 ml         Significant Labs:  Lab Results   Component Value Date    GROUPTRH A POS 2023    HEPBSAG Non-reactive 10/02/2023    STREPBCULT No Group B Streptococcus isolated 2019    AFP 80.3 ng/mL 2010     No results for input(s): "HGB", "HCT" in the last 48 hours.    I have personallly reviewed all pertinent lab results from the last 24 hours.    Physical Exam:   Constitutional: She appears well-developed and well-nourished. No distress.    HENT:   Head: Normocephalic and atraumatic.    Eyes: EOM are normal.     Cardiovascular:  Normal rate.             Pulmonary/Chest: Effort normal. No respiratory distress.        Abdominal: Soft. She exhibits no distension. There is abdominal tenderness. There is no rebound and no guarding.   Fundal tenderness "             Musculoskeletal: Normal range of motion.       Neurological: She is alert.    Skin: Skin is warm and dry.    Psychiatric: She has a normal mood and affect.       Review of Systems

## 2023-12-28 NOTE — ASSESSMENT & PLAN NOTE
S/p emergent CD for cord prolapse at 21 week  Fetus passed away soon after delivery   Signs and symptoms of depression. But does not want help.  Agree to sertraline yesterday  Now with endometritis.  On Gent and clindamycin IV.  Responding.  Will watch temperature.  Otherwise routine pp care  Social work consult done   consult refused  Discharge home tomorrow, 12/29/2023

## 2023-12-28 NOTE — PROGRESS NOTES
Gentamicin Consult Follow-Up:  Patient reviewed, no changes in renal function. No new labs. Continue current therapy. Next Gentamicin level due 12/30 @ 17:00

## 2023-12-28 NOTE — PLAN OF CARE
Problem:  Fall Injury Risk  Goal: Absence of Fall, Infant Drop and Related Injury  Outcome: Unable to Meet, Plan Revised     Problem: Adult Inpatient Plan of Care  Goal: Plan of Care Review  Outcome: Ongoing, Progressing  Goal: Patient-Specific Goal (Individualized)  Outcome: Ongoing, Progressing  Goal: Absence of Hospital-Acquired Illness or Injury  Outcome: Ongoing, Progressing  Goal: Optimal Comfort and Wellbeing  Outcome: Ongoing, Progressing     Problem: Grieving  Goal: Expression of Grief  Outcome: Ongoing, Progressing     Problem:  Loss  Goal: Optimal Adjustment to Loss  Outcome: Ongoing, Progressing     Problem: Pain Acute  Goal: Acceptable Pain Control and Functional Ability  Outcome: Ongoing, Progressing     Problem: Adjustment to Role Transition (Postpartum  Delivery)  Goal: Successful Maternal Role Transition  Outcome: Ongoing, Progressing     Problem: Bleeding (Postpartum  Delivery)  Goal: Hemostasis  Outcome: Ongoing, Progressing     Problem: Bleeding (Postpartum  Delivery)  Goal: Hemostasis  Outcome: Ongoing, Progressing     Problem: Infection (Postpartum  Delivery)  Goal: Absence of Infection Signs and Symptoms  Outcome: Ongoing, Progressing     Problem: Pain (Postpartum  Delivery)  Goal: Acceptable Pain Control  Outcome: Ongoing, Progressing

## 2023-12-28 NOTE — ASSESSMENT & PLAN NOTE
Now on gentamicin and clindamycin IV  Responding well so far  Will continue IV antibiotics for 48 hours after last spike prior to discharge  Consider discharge home tomorrow.

## 2023-12-29 VITALS
WEIGHT: 194 LBS | HEART RATE: 78 BPM | OXYGEN SATURATION: 98 % | HEIGHT: 66 IN | TEMPERATURE: 98 F | DIASTOLIC BLOOD PRESSURE: 60 MMHG | SYSTOLIC BLOOD PRESSURE: 112 MMHG | BODY MASS INDEX: 31.18 KG/M2 | RESPIRATION RATE: 20 BRPM

## 2023-12-29 PROCEDURE — 99239 HOSP IP/OBS DSCHRG MGMT >30: CPT | Mod: ,,, | Performed by: OBSTETRICS & GYNECOLOGY

## 2023-12-29 PROCEDURE — A4216 STERILE WATER/SALINE, 10 ML: HCPCS | Performed by: OBSTETRICS & GYNECOLOGY

## 2023-12-29 PROCEDURE — 25000003 PHARM REV CODE 250: Performed by: OBSTETRICS & GYNECOLOGY

## 2023-12-29 PROCEDURE — 63600175 PHARM REV CODE 636 W HCPCS: Performed by: OBSTETRICS & GYNECOLOGY

## 2023-12-29 RX ORDER — IBUPROFEN 600 MG/1
600 TABLET ORAL EVERY 6 HOURS
Qty: 40 TABLET | Refills: 1 | Status: SHIPPED | OUTPATIENT
Start: 2023-12-29

## 2023-12-29 RX ORDER — ZOLPIDEM TARTRATE 5 MG/1
5 TABLET ORAL NIGHTLY PRN
Qty: 12 TABLET | Refills: 0 | Status: SHIPPED | OUTPATIENT
Start: 2023-12-29

## 2023-12-29 RX ORDER — OXYCODONE AND ACETAMINOPHEN 5; 325 MG/1; MG/1
1 TABLET ORAL EVERY 4 HOURS PRN
Qty: 12 TABLET | Refills: 0 | Status: SHIPPED | OUTPATIENT
Start: 2023-12-29

## 2023-12-29 RX ORDER — SERTRALINE HYDROCHLORIDE 25 MG/1
25 TABLET, FILM COATED ORAL DAILY
Qty: 30 TABLET | Refills: 11 | Status: SHIPPED | OUTPATIENT
Start: 2023-12-29 | End: 2024-01-04

## 2023-12-29 RX ADMIN — OXYCODONE AND ACETAMINOPHEN 1 TABLET: 10; 325 TABLET ORAL at 09:12

## 2023-12-29 RX ADMIN — Medication 10 ML: at 05:12

## 2023-12-29 RX ADMIN — PRENATAL VIT W/ FE FUMARATE-FA TAB 27-0.8 MG 1 TABLET: 27-0.8 TAB at 08:12

## 2023-12-29 RX ADMIN — IBUPROFEN 600 MG: 600 TABLET ORAL at 12:12

## 2023-12-29 RX ADMIN — DOCUSATE SODIUM 200 MG: 100 CAPSULE, LIQUID FILLED ORAL at 08:12

## 2023-12-29 RX ADMIN — IBUPROFEN 600 MG: 600 TABLET ORAL at 05:12

## 2023-12-29 RX ADMIN — OXYCODONE AND ACETAMINOPHEN 1 TABLET: 10; 325 TABLET ORAL at 04:12

## 2023-12-29 RX ADMIN — MUPIROCIN: 20 OINTMENT TOPICAL at 08:12

## 2023-12-29 RX ADMIN — IBUPROFEN 600 MG: 600 TABLET ORAL at 06:12

## 2023-12-29 RX ADMIN — METRONIDAZOLE 500 MG: 500 TABLET ORAL at 05:12

## 2023-12-29 RX ADMIN — METRONIDAZOLE 500 MG: 500 TABLET ORAL at 02:12

## 2023-12-29 RX ADMIN — SERTRALINE HYDROCHLORIDE 25 MG: 25 TABLET ORAL at 09:12

## 2023-12-29 RX ADMIN — CLINDAMYCIN PHOSPHATE 900 MG: 900 INJECTION, SOLUTION INTRAVENOUS at 04:12

## 2023-12-29 NOTE — PROGRESS NOTES
Call placed to House Supervisor, informed that patient is requesting [facility] to be granted permission for baby boy remains to be picked up for cremation. House Supervisor states will notify Laisha, offered to call laisha and states he will call and provide the information.

## 2023-12-29 NOTE — PROGRESS NOTES
Patient remained in flat/supine position. Requesting to sit up. Encouraged patient to select button to raise head for personal comfort. Patient declined and requested writing nurse to leave the room. PCT was with nurse and remained. In room, PCT had to push the button for the patient to raise her head because patient declined to perform herself. Patient requested PCT to return in 10 minutes to help her get up. Patient is 5 days post -op and being encouraged to perform activities independently d/t being discharged today.

## 2023-12-29 NOTE — PROGRESS NOTES
Patient placed in supine/flat position for PICC line removal. PICC line removed via aseptic technique. Pressure held for 5 minutes with gauze dressing. Occlusive gauze dressing applied and patient instructed to remain flat for 30 minutes and to keep dressing in place for 24 hours, stated an understanding.

## 2023-12-29 NOTE — PLAN OF CARE
Problem: Adult Inpatient Plan of Care  Goal: Optimal Comfort and Wellbeing  Outcome: Ongoing, Progressing     Problem: Bleeding (Postpartum  Delivery)  Goal: Hemostasis  Outcome: Ongoing, Progressing     Problem: Pain (Postpartum  Delivery)  Goal: Acceptable Pain Control  Outcome: Ongoing, Progressing

## 2023-12-29 NOTE — PLAN OF CARE
ADE reached out to [facility] to determine if the organization would be able to assist patient with cremation costs.  A response was reeived from Doris with [facility] advising that they would be able to assist the patient.  ADE spoke with patient to obtain her permission to share information with [facility] representative.  Permission given verbally.  SW provided the following to [facility]:   Mother's name, address, and contact number, child's date of birth and date of death, and number of weeks gestation.  Patient provided with a copy of the E-Mail.

## 2023-12-29 NOTE — DISCHARGE SUMMARY
[facility] - [place] - Labor & Delivery  Obstetrics  Discharge Summary      Patient Name: Gilberto Bueno  MRN: [id number]  Admission Date: 2023  Hospital Length of Stay: 28 days  Discharge Date and Time:  2023 7:26 AM  Attending Physician: Abimael Henderson MD   Discharging Provider: Abimael Henderson MD   Primary Care Provider: Liss Wise MD    HPI: 42 yo  at 17w4d  Advanced maternal age  Fetus with Down syndrome  Has been with spotting for the past couple of days  Went to our Emergency on 2023 with minimal spotting and occasional abdominal pain.  Ultrasound that day with MARCUS of 5.9 cm  Good fetal movements    Noted with ROM last night at 1800.    Denies fever and chills.  No longer with pain/contractions            Procedure(s) (LRB):   SECTION (N/A)     Hospital Course:   On Labor and Delivery, vitals stable  FHT at 140   Contraction: None  Cervix: 1 cm  Started on antibiotic therapy.  Zithromax and Ampicillin    2023 No longer with contractions/spotting this morning.  No fundal tenderness.  No leakage of fluid. Does not want to deliver now.  Wants to wait for sealing of the membranes    2023 No pain.  No leakage. No vaginal bleeding. Good fetal movements  2023 HD#3/PPROM Day #4. No pain.  No leakage. No vaginal bleeding. Good fetal movements  12/3/2023 HD#4/PPROM Day #5. No pain.  No leakage. No vaginal bleeding. Good fetal movements    2023 HD#5 PPROM Day #6. No pain.  No leakage. No vaginal bleeding.  Good fetal movements.  Has been ambulating. Bedside ultrasound with no amniotic fluid. Oral ampicillin started.  2023 HD#6 PPROM Day #7. No pain.  No leakage. No vaginal bleeding.  Good fetal movements.  Has been ambulating to the bathroom.  Still in good spirit.  2023 HD#7 PPROM Day #8. No pain.  No leakage. No vaginal bleeding.  Good fetal movements.  Has been ambulating to the bathroom.  Still in good spirit. Had a visit with our hospital  last  night  12/7/2023 HD#8 PPROM Day #9. No change. No pain.  No leakage. No vaginal bleeding.  Good fetal movements.  Has been ambulating to the bathroom.  Still in good spirit.   12/8/2023 HD#9 PPROM Day #10. No change. No pain.  No leakage. No vaginal bleeding.  Good fetal movements.  Has been ambulating to the bathroom.  Still in good spirit.  12/9/2023 HD#10 PPROM Day #11.  No change. No pain.  No leakage. No vaginal bleeding.  Good fetal movements.  Has been ambulating to the bathroom.  Still in good spirit.   12/10/2023 HD#11 PPROM Day #12.  No change. No pain.  No leakage. No vaginal bleeding.  Good fetal movements.  Has been ambulating to the bathroom.  Still in good spirit. But has a difficult time sleeping last night     12/11/2023 HD#12 PPROM Day #13. No change. No pain.  No leakage. No vaginal bleeding.  Good fetal movements.  Has been ambulating to the bathroom.  Still in good spirit. Bedside ultrasound by me, 0.5 cm fluid pocket by fetal feet with chord.  FHT at 148   12/12/2023 HD#13 PPROM Day #14. No change. No pain.  No leakage. No vaginal bleeding.  Good fetal movements.  Has been ambulating to the bathroom.  Still in good spirit.   12/13/2023 HD#14 PPROM Day #15. No change. No pain.  No leakage. No vaginal bleeding.  Good fetal movements.  Has been ambulating to the bathroom.  Still in good spirit. Will stop antibiotic therapy after today  12/14/2023 HD#15 PPROM Day #16. No change. No pain.  No leakage. No vaginal bleeding.  Good fetal movements.  Has been ambulating to the bathroom.  Still in good spirit. Will stop antibiotic therapy today  12/15/2023 HD#16 PPROM Day #17. No change. No pain.  No leakage. No vaginal bleeding.  Good fetal movements.  Has been ambulating to the bathroom.  Still in good spirit.  Antibiotic discontinued yesterday.  12/16/2023: HD #17 PPROM Day #18. No change, not in pain. No leakage or change in discharge   12/17/2023: HD #18 PPROM Day #19. No change or abdominal pain, no  vaginal bleeding or fluid loss. In good spirits     2023 HD#19 PPROM Day #20. No change. No pain.  No leakage. No vaginal bleeding.  Good fetal movements.  Has been ambulating to the bathroom.  Still in good spirit. Bedside ultrasound done by me today. Footling breech with minimal fluid.  Good fetal heart tones and movements.   23:  HD#20 PPROM D#21:  pt denies any c/o + FM, no pain or bleeding  2023 HD#21 PPROM D#22:  pt denies any c/o + FM, no pain or bleeding  2023 HD#22 PPROM D#23: pt denies abdominal pain, subjective fever, change in discharge or odor, VB. Noting FM  2023 HD#23 PPROM D#24: pt denies abdominal pain, subjective fever, change in discharge or odor, VB. Noting FM. In good spirit  2023 HD#24 PPROM D#25: pt denies abdominal pain, subjective fever, change in discharge or odor, VB. Noting FM. In good spirit. Refused blood drawn today  2023 HD#25 PPROM D#26: pt denies abdominal pain, subjective fever, change in discharge or odor, Noting FM.  Had one episode of vaginal spotting without mucous last night.  None since then.  She had an argument with FOB during that time. US ordered today for MARCUS, r/o abruption    Patient called out.  Re.  She thought baby was coming out.  Exam by our staff and MD on call, Dr. Alvarado.  Prolapsed cord visible at perineum.  Ultrasound with footling breech.  Discussed with patient regarding viability status of her fetus.  She would like for us to do everything possible for her fetus.  We discussed emergency/urgent  section.  Risks and benefits discussed.    We then proceeded with primary low transverse  section.    Primary low transverse  section  Surgeon: Abimael Henderson MD  Assistant: Tiffanie Alvarado MD  Anesthesia: GETA by Mario Brennan MD and Jabari Reveles CRNA  Non-viable male infant at 1338 2023  Weight: 440 gm or 15.5 oz  Apgar: 3/2/1  Infant with signs of Down syndrome  Placenta: manually extracted intact with  three vessels  Normal uterus, tubes and ovaries  Very small uterine fibroid at uterine incision.  Removed and sent to Path  EBL: 500 cc  No complication  No specimen    Abimael Kay MD    23.  PPD # 1, doing well, baby on her chest  23  PPD#2.  Doing well. No complaint.  Vitals with tachycardia.  Baby next to her in bed.  Does not want to see the . Tm 102 at 2330 2023 PPD#3.  Tm up to 102.5 at 1120 on 2023.  Now on Gent and Clinda after PICC placed by our IV team.  Feeling better this morning.  Still with uterine tenderness.     Came to see patient.  Sitting in a chair.  Still in pain.  Still not moving much.    We discussed depression and treatment.  We discussed discharge home in the morning  Zoloft 25 mg tonight.  Consider sending home with some Ambien and Zoloft tomorrow  2023 PPD#4 Remains afebrile.  Still with pain.  On Gent/Clinda day #2.  Sertraline started yesterday.      2023  Postpartum course was significant for postop endometritis and depression  Problems dealing with her loss and  arrangement.  No help at home  Social Service consult requested.  Patient refused psych eval and visit with our .  Exam was benign with patient afebrile, vitals stable, and minimal bleeding.  Normal activities.  Patient discharged home on postpartum day #5, 2023   Discharge medications include Percocet, Motrin, prenatal vitamins, iron supplement, sertraline, and Ambien.  Follow-up with me next week for dressing removal and postoperative follow-up.    Abimael Kay MD.           Consults (From admission, onward)          Status Ordering Provider     Inpatient consult to Social Work  Once        Provider:  (Not yet assigned)    Acknowledged ABIMAEL KAY     [facility]  [facility]        Provider:  (Not yet assigned)    Acknowledged ABIMAEL KAY.     Pharmacy to dose Aminoglycosides consult  Once        Provider:  (Not yet assigned)    See Hyperspace for full Linked Orders Report.    Acknowledged ABIMAEL KAY     Inpatient consult to [facility]  Once        Provider:  (Not yet assigned)    Completed ABIMAEL KAY            Final Active Diagnoses:    Diagnosis Date Noted POA    PRINCIPAL PROBLEM:  Status post  section [Z98.891] 2019 Not Applicable    Endometritis [N71.9] 2023 No    Grieving [F43.21] 2023 No      Problems Resolved During this Admission:    Diagnosis Date Noted Date Resolved POA    Vaginal discharge during pregnancy [O26.899, N89.8] 2023 Yes    21 weeks gestation of pregnancy [Z3A.21] 2023 Not Applicable    PROM (premature rupture of membranes) [O42.90] 2023 Yes    Vaginal bleeding in pregnancy, second trimester [O46.92] 2023 Yes    History of  premature rupture of membranes (PROM) in previous pregnancy, currently pregnant in second trimester [O09.292] 2023 Not Applicable    Maternal serum screen positive for trisomy 21 [O28.5] 2023 Yes    Multigravida of advanced maternal age in second trimester [O09.522] 2019 Yes        Significant Diagnostic Studies: Labs: All labs within the past 24 hours have been reviewed      Immunizations       Date Immunization Status Dose Route/Site Given by    23 170 MMR Incomplete 0.5 mL Subcutaneous/     23 1707 Tdap Incomplete 0.5 mL Intramuscular/             Delivery:    Episiotomy: None   Lacerations:     Repair suture:     Repair # of packets:     Blood loss (ml):       Birth information:  YOB: 2023   Time of birth: 1:38 PM   Sex: male   Delivery type: , Low Transverse   Gestational Age: 21w0d     Measurements    Weight: 440 g  Weight (lbs): 15.5 oz  Length:          Delivery Clinician: Delivery Providers    Delivering clinician: Tiffanie Alvarado MD   Provider Role    Abimael Kay, Erin Ca RN      Ema Joshi, Rad Yao, NNPoncho Bautista MD McDermott, Stephanie, RN              Additional  information:  Forceps:    Vacuum:    Breech:    Observed anomalies      Living?:     Apgars    Living status:  Demise  Apgar Component Scores:  1 min.:  5 min.:  10 min.:  15 min.:  20 min.:    Skin color:  0  0  0  0  0    Heart rate:  2  1  1  1  1    Reflex irritability:  0  0  0  0  0    Muscle tone:  0  0  0  0  0    Respiratory effort:  1  1  1  0  0    Total:  3  2  2  1  1    Apgars assigned by: ,VALERIE AL         Placenta: Delivered:       appearance  Pending Diagnostic Studies:       None            Discharged Condition: good    Disposition: Home or Self Care    Follow Up:   Follow-up Information       Abimael Hendesron MD Follow up in 1 week(s).    Specialties: Obstetrics and Gynecology, Obstetrics and Gynecology  Why: for dressing removal, postop follow-up  Contact information:  [address]  [address] [place], [place] [id number] [phone]                           Patient Instructions:   No discharge procedures on file.  Medications:  Current Discharge Medication List        START taking these medications    Details   ibuprofen (ADVIL,MOTRIN) 600 MG tablet Take 1 tablet (600 mg total) by mouth every 6 (six) hours.  Qty: 40 tablet, Refills: 1      oxyCODONE-acetaminophen (PERCOCET) 5-325 mg per tablet Take 1 tablet by mouth every 4 (four) hours as needed for Pain.  Qty: 12 tablet, Refills: 0    Comments: Quantity prescribed more than 7 day supply? Yes, quantity medically necessary      sertraline (ZOLOFT) 25 MG tablet Take 1 tablet (25 mg total) by mouth once daily.  Qty: 30 tablet, Refills: 11      zolpidem (AMBIEN) 5 MG Tab Take 1 tablet (5 mg total) by mouth nightly as needed (insomnia).  Qty: 12 tablet, Refills: 0           CONTINUE these medications which have NOT CHANGED    Details   PNV,calcium 72-iron-folic acid (PRENATAL VITAMIN PLUS LOW IRON) 27 mg  iron- 1 mg Tab Take one tablet daily.  Prescribe prenatal covered by insurance  Qty: 90 tablet, Refills: 11      urea (CARMOL) 40 % Crea Apply topically.           STOP taking these medications       metroNIDAZOLE (FLAGYL) 500 MG tablet Comments:   Reason for Stopping:         ondansetron (ZOFRAN) 4 MG tablet Comments:   Reason for Stopping:               Abimael Henderson MD  Obstetrics  [facility] - [place] - Labor & Delivery

## 2023-12-29 NOTE — PROGRESS NOTES
PCT to patients room, patient states she would like to speak to writing nurse and Ms Laura, .  called and informed of patients request.

## 2023-12-29 NOTE — PLAN OF CARE
ADE followed up with patient at the bedside.  Patient on the phone with  home.  Decided that she would like to have [facility]  Home at [address] [place] [address] handle final arrangements for her baby.  ADE updated charge nurse, Elin, who notified the House Supervisor.  Patient to sign a release form allowing [facility]  Home to pickup remains.

## 2023-12-29 NOTE — PROGRESS NOTES
Gentamicin consult follow-up:    Patient reviewed, renal function stable, no new levels, continue current therapy; Next levels due: trough due 12/30/2023 at 1700

## 2023-12-29 NOTE — CONSULTS
Patient requesting resources for cremation. SW provided patient [facility] contact numbers for [facility] ([phone]) and [facility] (Kamila [phone]).  Patient asking  to followup.  SW advised patient that parents are encouraged to call to make arrangements.  SW agreed to leave a message or provide her contact information to someone at [facility] if an answer is received.    Addendum:  Voice message left at [phone]-[facility] with mother's name and contact number. Advised of mother's need for assistance with cremation costs.

## 2023-12-29 NOTE — PLAN OF CARE
SW f/u with patient to advise that the death certificate may take awhile .  Patient has spoken with Ms. Shree at [facility] who stated that she sent a message to the doctor asking him to sign the Death Certificate.      Patient stated that the arrangements to have cremation paid for through [employer] have been completed.  Cost of service paid for by the agency.  Patient stated that the  home will assist her if she doesn't have means to transport remains home after the cremation.

## 2023-12-30 NOTE — PROGRESS NOTES
Patient called for wheelchair to be discharged to friend. Patient stated she may drive herself home because she does not want to leave her car at the hospital. Instructed patient against driving self post csection and post receiving narcotics today. Patient also encouraged not to drive by MICHELLE Mccoy. Patient verbalized that she was not going to drive self. Nurse transported patient to car and patient was driven by friend to home.

## 2024-01-04 ENCOUNTER — TELEPHONE (OUTPATIENT)
Dept: OBSTETRICS AND GYNECOLOGY | Facility: CLINIC | Age: 44
End: 2024-01-04
Payer: MEDICAID

## 2024-01-04 ENCOUNTER — POSTPARTUM VISIT (OUTPATIENT)
Dept: OBSTETRICS AND GYNECOLOGY | Facility: CLINIC | Age: 44
End: 2024-01-04
Payer: MEDICAID

## 2024-01-04 VITALS
HEIGHT: 66 IN | WEIGHT: 178.56 LBS | BODY MASS INDEX: 28.7 KG/M2 | SYSTOLIC BLOOD PRESSURE: 110 MMHG | DIASTOLIC BLOOD PRESSURE: 72 MMHG

## 2024-01-04 DIAGNOSIS — Z09 POSTOP CHECK: Primary | ICD-10-CM

## 2024-01-04 PROCEDURE — 99213 OFFICE O/P EST LOW 20 MIN: CPT | Mod: PBBFAC | Performed by: OBSTETRICS & GYNECOLOGY

## 2024-01-04 PROCEDURE — 99213 OFFICE O/P EST LOW 20 MIN: CPT | Mod: S$PBB,,, | Performed by: OBSTETRICS & GYNECOLOGY

## 2024-01-04 PROCEDURE — 99999 PR PBB SHADOW E&M-EST. PATIENT-LVL III: CPT | Mod: PBBFAC,,, | Performed by: OBSTETRICS & GYNECOLOGY

## 2024-01-04 PROCEDURE — 1111F DSCHRG MED/CURRENT MED MERGE: CPT | Mod: CPTII,,, | Performed by: OBSTETRICS & GYNECOLOGY

## 2024-01-04 RX ORDER — AMITRIPTYLINE HYDROCHLORIDE 25 MG/1
25 TABLET, FILM COATED ORAL NIGHTLY PRN
Qty: 30 TABLET | Refills: 4 | Status: SHIPPED | OUTPATIENT
Start: 2024-01-04 | End: 2025-01-03

## 2024-01-04 RX ORDER — SERTRALINE HYDROCHLORIDE 50 MG/1
50 TABLET, FILM COATED ORAL DAILY
Qty: 30 TABLET | Refills: 2 | Status: SHIPPED | OUTPATIENT
Start: 2024-01-04 | End: 2025-01-03

## 2024-01-04 NOTE — PROGRESS NOTES
Subjective:      Patient ID: Gilberto Bueno is a 43 y.o. female.    Chief Complaint:  Postpartum Follow-up (1 wk pp for RCS on 2023)      History of Present Illness  HPI  Ms Bueno is a 43 years old, status post emergency primary low transverse  section on 2023 for cord prolapse at 21 weeks.  Infant did not survive.  She comes in today for an exam and followup.  No fever or chills.  No nausea or vomiting.  No diarrhea or constipation.  No abdominal or pelvic pain.  Minimal incisional pain  She has not resumed normal intercourse.  She is having signs and symptoms of significant depression.  Has been on sertraline since the last two days of hospitalization, from 2023  Denies suicidal or homicidal ideation  Needs Ambien to sleep.  Still trying to arrange for       GYN & OB History  Patient's last menstrual period was 2023 (exact date).   Date of Last Pap: 2023    OB History    Para Term  AB Living   8 7 5 2 1 6   SAB IAB Ectopic Multiple Live Births   1     0 7      # Outcome Date GA Lbr Zeus/2nd Weight Sex Delivery Anes PTL Lv   8  23 21w0d  0.44 kg (15.5 oz) M CS-LTranv Gen N ND      Complications: Cord Prolapse   7 SAB 23           6 Term 19 39w0d / 00:30 3.485 kg (7 lb 10.9 oz) M Vag-Vacuum EPI N    5 Term 11/24/10   3.24 kg (7 lb 2.3 oz) F Vag-Spont  N CHRIS   4  07 36w0d  2.807 kg (6 lb 3 oz) F Vag-Spont  Y CHRIS      Birth Comments: PPROM at 33 wks but she stayed in hospital until 36 and delivered then following spontaneous labor      Complications:  premature rupture of membranes (PPROM) with onset of labor after 24 hours of rupture in first trimester, antepartum   3 Term 05   3.997 kg (8 lb 13 oz) M Vag-Spont  N CHRIS   2 Term 01   4.99 kg (11 lb) M Vag-Spont  N CHRIS   1 Term 99   3.459 kg (7 lb 10 oz) M Vag-Spont  N CHRIS     Past Medical History:   Diagnosis Date    History of chlamydia  2019    History of gonorrhea 2016       Past Surgical History:   Procedure Laterality Date     SECTION N/A 2023    Procedure:  SECTION;  Surgeon: Abimael Henderson MD;  Location: [facility] L&D OR;  Service: OB/GYN;  Laterality: N/A;       Family History   Problem Relation Age of Onset    Diabetes Maternal Grandmother     Hypertension Cousin         Preeclampsia    Venous thrombosis Neg Hx        Social History     Socioeconomic History    Marital status: Single   Tobacco Use    Smoking status: Never    Smokeless tobacco: Never   Substance and Sexual Activity    Alcohol use: Not Currently     Comment: occ    Drug use: No    Sexual activity: Yes     Partners: Male   Social History Narrative    Together for a long time    Getting  2016    He owns his own business    She is doing home health     Social Determinants of Health     Financial Resource Strain: Patient Declined (2023)    Overall Financial Resource Strain (CARDIA)     Difficulty of Paying Living Expenses: Patient declined   Food Insecurity: Patient Declined (2023)    Hunger Vital Sign     Worried About Running Out of Food in the Last Year: Patient declined     Ran Out of Food in the Last Year: Patient declined   Transportation Needs: Patient Declined (2023)    PRAPARE - Transportation     Lack of Transportation (Medical): Patient declined     Lack of Transportation (Non-Medical): Patient declined   Physical Activity: Insufficiently Active (2023)    Exercise Vital Sign     Days of Exercise per Week: 7 days     Minutes of Exercise per Session: 10 min   Stress: Stress Concern Present (2023)    [employer] - Occupational Stress Questionnaire     Feeling of Stress : Rather much   Social Connections: Unknown (2023)    Social Connection and Isolation Panel [NHANES]     Frequency of Communication with Friends and Family: More than three times a week     Frequency of Social Gatherings  with Friends and Family: More than three times a week     Active Member of Clubs or Organizations: Patient declined     Attends Club or Organization Meetings: Never     Marital Status: Patient declined   Housing Stability: Unknown (12/7/2023)    Housing Stability Vital Sign     Unable to Pay for Housing in the Last Year: No     Unstable Housing in the Last Year: No       Current Outpatient Medications   Medication Sig Dispense Refill    ibuprofen (ADVIL,MOTRIN) 600 MG tablet Take 1 tablet (600 mg total) by mouth every 6 (six) hours. 40 tablet 1    oxyCODONE-acetaminophen (PERCOCET) 5-325 mg per tablet Take 1 tablet by mouth every 4 (four) hours as needed for Pain. 12 tablet 0    PNV,calcium 72-iron-folic acid (PRENATAL VITAMIN PLUS LOW IRON) 27 mg iron- 1 mg Tab Take one tablet daily.  Prescribe prenatal covered by insurance 90 tablet 11    sertraline (ZOLOFT) 25 MG tablet Take 1 tablet (25 mg total) by mouth once daily. 30 tablet 11    urea (CARMOL) 40 % Crea Apply topically.      zolpidem (AMBIEN) 5 MG Tab Take 1 tablet (5 mg total) by mouth nightly as needed (insomnia). 12 tablet 0     No current facility-administered medications for this visit.       Review of patient's allergies indicates:  No Known Allergies    Review of Systems  Review of Systems   Constitutional:  Positive for fatigue. Negative for activity change, appetite change, chills, fever and unexpected weight change.   HENT:  Negative for mouth sores.    Respiratory:  Negative for cough, shortness of breath and wheezing.    Cardiovascular:  Negative for chest pain and palpitations.   Gastrointestinal:  Positive for abdominal pain. Negative for bloating, blood in stool, constipation, nausea and vomiting.        Incisional pain   Endocrine: Negative for diabetes and hot flashes.   Genitourinary:  Positive for vaginal bleeding and vaginal discharge. Negative for dysmenorrhea, dyspareunia, dysuria, frequency, hematuria, menorrhagia, menstrual problem,  pelvic pain, urgency, vaginal pain, urinary incontinence, postcoital bleeding and vaginal odor.   Musculoskeletal:  Negative for back pain and myalgias.   Integumentary:  Negative for rash, breast mass and nipple discharge.   Neurological:  Negative for seizures and headaches.   Psychiatric/Behavioral:  Positive for depression and sleep disturbance. The patient is nervous/anxious.    Breast: Negative for mass, mastodynia and nipple discharge         Objective:     Physical Exam:   Constitutional: She appears well-developed and well-nourished. No distress.    HENT:   Head: Normocephalic and atraumatic.    Eyes: EOM are normal.     Cardiovascular:  Normal rate.             Pulmonary/Chest: Effort normal. No respiratory distress.        Abdominal: Soft. She exhibits no distension. There is no abdominal tenderness. There is no rebound and no guarding.   Pfannenstiel incision in AquaCel dressing.  No evidence of active bleeding.  Dressing removed.  Incision is clean, dry, intact.  Healing well without any evidence of infection.               Musculoskeletal: Normal range of motion.       Neurological: She is alert.    Skin: Skin is warm and dry.    Psychiatric: She has a normal mood and affect.   Depressed with crying spells.         Assessment:     1. Postop check    2. Postpartum depression, postpartum condition             Plan:     I have discussed with the patient regarding her condition.  She is recovering well physically from her surgery    We discussed the events surrounding her hospitalization and surgery  We helped her getting the baby released to the  home    We discussed depression and management  Would increase sertraline to 50 mg daily.  Add Elavil 25 mg at night    Back in 2 weeks.

## 2024-01-04 NOTE — TELEPHONE ENCOUNTER
Called and scheduled an appt for pt to come in today. jtn    ----- Message from Gogo Chin MA sent at 1/3/2024  3:39 PM CST -----  Regarding: FW: self .[phone]    ----- Message -----  From: Yessenia Marte  Sent: 1/3/2024   2:30 PM CST  To: Santiago PETERSON Staff  Subject: self .[phone]                                 .Type: Patient Call Back    Who called: self     What is the request in detail: Pt is requesting to speak with Alisha in regards to her appt for suture removal     Can the clinic reply by [facility]? Call back     Would the patient rather a call back or a response via [facility]?  Call back     Best call back number: .[phone]

## 2024-01-05 ENCOUNTER — PATIENT MESSAGE (OUTPATIENT)
Dept: OBSTETRICS AND GYNECOLOGY | Facility: CLINIC | Age: 44
End: 2024-01-05
Payer: MEDICAID

## 2024-01-18 ENCOUNTER — POSTPARTUM VISIT (OUTPATIENT)
Dept: OBSTETRICS AND GYNECOLOGY | Facility: CLINIC | Age: 44
End: 2024-01-18
Payer: MEDICAID

## 2024-01-18 VITALS
SYSTOLIC BLOOD PRESSURE: 116 MMHG | WEIGHT: 173.5 LBS | DIASTOLIC BLOOD PRESSURE: 70 MMHG | HEIGHT: 66 IN | BODY MASS INDEX: 27.88 KG/M2

## 2024-01-18 PROCEDURE — 99213 OFFICE O/P EST LOW 20 MIN: CPT | Mod: PBBFAC | Performed by: OBSTETRICS & GYNECOLOGY

## 2024-01-18 PROCEDURE — 1111F DSCHRG MED/CURRENT MED MERGE: CPT | Mod: CPTII,,, | Performed by: OBSTETRICS & GYNECOLOGY

## 2024-01-18 PROCEDURE — 99999 PR PBB SHADOW E&M-EST. PATIENT-LVL III: CPT | Mod: PBBFAC,,, | Performed by: OBSTETRICS & GYNECOLOGY

## 2024-01-18 PROCEDURE — 99213 OFFICE O/P EST LOW 20 MIN: CPT | Mod: S$PBB,,, | Performed by: OBSTETRICS & GYNECOLOGY

## 2024-01-18 NOTE — PROGRESS NOTES
Subjective:      Patient ID: Gilberto Bueno is a 43 y.o. female.    Chief Complaint:  Postpartum Follow-up (PP depression and follow up Medication)      History of Present Illness  HPI  Ms Bueno is a 43 years old, status post emergency primary low transverse  section on 2023 for cord prolapse at 21 weeks.  Infant did not survive.  Seen on 2024 for postop check, complaining of depression.  Placed on sertraline and elavil   She comes in today for an exam and followup.  No fever or chills.  No nausea or vomiting.  No diarrhea or constipation.  No abdominal or pelvic pain.  Minimal incisional pain  She has not resumed normal intercourse.  She is having signs and symptoms of significant depression.   Still depressed  Denies suicidal or homicidal ideation  Incision is still a little tender      GYN & OB History  No LMP recorded.   Date of Last Pap: 2023    OB History    Para Term  AB Living   8 7 5 2 1 6   SAB IAB Ectopic Multiple Live Births   1     0 7      # Outcome Date GA Lbr Zeus/2nd Weight Sex Delivery Anes PTL Lv   8  23 21w0d  0.44 kg (15.5 oz) M CS-LTranv Gen N ND      Complications: Cord Prolapse   7 SAB 23           6 Term 19 39w0d / 00:30 3.485 kg (7 lb 10.9 oz) M Vag-Vacuum EPI N    5 Term 11/24/10   3.24 kg (7 lb 2.3 oz) F Vag-Spont  N CHRIS   4  07 36w0d  2.807 kg (6 lb 3 oz) F Vag-Spont  Y CHRIS      Birth Comments: PPROM at 33 wks but she stayed in hospital until 36 and delivered then following spontaneous labor      Complications:  premature rupture of membranes (PPROM) with onset of labor after 24 hours of rupture in first trimester, antepartum   3 Term 05   3.997 kg (8 lb 13 oz) M Vag-Spont  N CHRIS   2 Term 01   4.99 kg (11 lb) M Vag-Spont  N CHRIS   1 Term 99   3.459 kg (7 lb 10 oz) M Vag-Spont  N CHRIS     Past Medical History:   Diagnosis Date    History of chlamydia 2019    History of gonorrhea         Past Surgical History:   Procedure Laterality Date     SECTION N/A 2023    Procedure:  SECTION;  Surgeon: Abimael Henderson MD;  Location: [facility] L&D OR;  Service: OB/GYN;  Laterality: N/A;       Family History   Problem Relation Age of Onset    Diabetes Maternal Grandmother     Hypertension Cousin         Preeclampsia    Venous thrombosis Neg Hx        Social History     Socioeconomic History    Marital status: Single   Tobacco Use    Smoking status: Never    Smokeless tobacco: Never   Substance and Sexual Activity    Alcohol use: Not Currently     Comment: occ    Drug use: No    Sexual activity: Yes     Partners: Male   Social History Narrative    Together for a long time    Getting  2016    He owns his own business    She is doing home health     Social Determinants of Health     Financial Resource Strain: Patient Declined (2023)    Overall Financial Resource Strain (CARDIA)     Difficulty of Paying Living Expenses: Patient declined   Food Insecurity: Patient Declined (2023)    Hunger Vital Sign     Worried About Running Out of Food in the Last Year: Patient declined     Ran Out of Food in the Last Year: Patient declined   Transportation Needs: Patient Declined (2023)    PRAPARE - Transportation     Lack of Transportation (Medical): Patient declined     Lack of Transportation (Non-Medical): Patient declined   Physical Activity: Insufficiently Active (2023)    Exercise Vital Sign     Days of Exercise per Week: 7 days     Minutes of Exercise per Session: 10 min   Stress: Stress Concern Present (2023)    [place] [employer] - Occupational Stress Questionnaire     Feeling of Stress : Rather much   Social Connections: Unknown (2023)    Social Connection and Isolation Panel [NHANES]     Frequency of Communication with Friends and Family: More than three times a week     Frequency of Social Gatherings with Friends and Family: More  than three times a week     Active Member of Clubs or Organizations: Patient declined     Attends Club or Organization Meetings: Never     Marital Status: Patient declined   Housing Stability: Unknown (12/7/2023)    Housing Stability Vital Sign     Unable to Pay for Housing in the Last Year: No     Unstable Housing in the Last Year: No       Current Outpatient Medications   Medication Sig Dispense Refill    amitriptyline (ELAVIL) 25 MG tablet Take 1 tablet (25 mg total) by mouth nightly as needed for Insomnia. 30 tablet 4    ibuprofen (ADVIL,MOTRIN) 600 MG tablet Take 1 tablet (600 mg total) by mouth every 6 (six) hours. 40 tablet 1    oxyCODONE-acetaminophen (PERCOCET) 5-325 mg per tablet Take 1 tablet by mouth every 4 (four) hours as needed for Pain. 12 tablet 0    PNV,calcium 72-iron-folic acid (PRENATAL VITAMIN PLUS LOW IRON) 27 mg iron- 1 mg Tab Take one tablet daily.  Prescribe prenatal covered by insurance 90 tablet 11    sertraline (ZOLOFT) 50 MG tablet Take 1 tablet (50 mg total) by mouth once daily. 30 tablet 2    urea (CARMOL) 40 % Crea Apply topically.      zolpidem (AMBIEN) 5 MG Tab Take 1 tablet (5 mg total) by mouth nightly as needed (insomnia). 12 tablet 0     No current facility-administered medications for this visit.       Review of patient's allergies indicates:  No Known Allergies    Review of Systems  Review of Systems   Constitutional:  Positive for fatigue. Negative for activity change, appetite change, chills, fever and unexpected weight change.   HENT:  Negative for mouth sores.    Respiratory:  Negative for cough, shortness of breath and wheezing.    Cardiovascular:  Negative for chest pain and palpitations.   Gastrointestinal:  Positive for abdominal pain. Negative for bloating, blood in stool, constipation, nausea and vomiting.        Incisional pain   Endocrine: Negative for diabetes and hot flashes.   Genitourinary:  Positive for vaginal bleeding and vaginal discharge. Negative for  dysmenorrhea, dyspareunia, dysuria, frequency, hematuria, menorrhagia, menstrual problem, pelvic pain, urgency, vaginal pain, urinary incontinence, postcoital bleeding and vaginal odor.   Musculoskeletal:  Negative for back pain and myalgias.   Integumentary:  Negative for rash, breast mass and nipple discharge.   Neurological:  Negative for seizures and headaches.   Psychiatric/Behavioral:  Positive for depression and sleep disturbance. The patient is nervous/anxious.    Breast: Negative for mass, mastodynia and nipple discharge         Objective:     Physical Exam:   Constitutional: She appears well-developed and well-nourished. No distress.    HENT:   Head: Normocephalic and atraumatic.    Eyes: EOM are normal.     Cardiovascular:  Normal rate.             Pulmonary/Chest: Effort normal. No respiratory distress.        Abdominal: Soft. She exhibits no distension. There is no abdominal tenderness. There is no rebound and no guarding.   Pfannenstiel incision in AquaCel dressing.  No evidence of active bleeding.  Dressing removed.  Incision is clean, dry, intact.  Healing well without any evidence of infection.               Musculoskeletal: Normal range of motion.       Neurological: She is alert.    Skin: Skin is warm and dry.    Psychiatric: She has a normal mood and affect.   Depressed with crying spells.         Assessment:     1. Postpartum depression, postpartum condition             Plan:     I have discussed with the patient regarding her condition.  She is recovering well physically from her surgery    We again discussed depression and management  Continue with sertraline and Elavil at current dosing  Refer to Psychiatry/Social Works  Back in 2 weeks.

## 2024-04-04 NOTE — NURSING
"Pt laying in bed quietly under covers. Pt denies pain. Appears tearful. Assessment attempted, pt refused x2. "just wants to be alone" Pt states will call when she is ready for doppler and vitals.   " decreased ability to use arms for pushing/pulling/decreased ability to use legs for bridging/pushing/impaired ability to control trunk for mobility

## 2024-06-11 ENCOUNTER — PATIENT MESSAGE (OUTPATIENT)
Dept: OBSTETRICS AND GYNECOLOGY | Facility: CLINIC | Age: 44
End: 2024-06-11
Payer: MEDICAID

## 2024-06-19 RX ORDER — SERTRALINE HYDROCHLORIDE 50 MG/1
50 TABLET, FILM COATED ORAL
Qty: 90 TABLET | Refills: 0 | Status: SHIPPED | OUTPATIENT
Start: 2024-06-19

## 2024-06-19 NOTE — TELEPHONE ENCOUNTER
Refill Routing Note   Medication(s) are not appropriate for processing by [facility] for the following reason(s):        New or recently adjusted medication    [facility] action(s):  Defer        Medication Therapy Plan: Dose was increased in January to 50mg, she started filling this rx just 3mths ago, not a full 90 days to establish trend      Appointments  past 12m or future 3m with PCP    Date Provider   Last Visit   11/20/2023 Abimael Henderson MD   Next Visit   Visit date not found Abimael Henderson MD   ED visits in past 90 days: 0        Note composed:10:10 AM 06/19/2024

## 2024-09-20 RX ORDER — SERTRALINE HYDROCHLORIDE 50 MG/1
50 TABLET, FILM COATED ORAL
Qty: 90 TABLET | Refills: 1 | Status: SHIPPED | OUTPATIENT
Start: 2024-09-20

## 2024-10-07 NOTE — ED NOTES
"Patient identifiers for Gilberto Bueno 43 y.o. female checked and correct.  Chief Complaint   Patient presents with    Suicidal     Pt. Was fighting with boyfriend. Pt. Told her boyfriend that she was going to take "a bunch of pills and kill myself."      Past Medical History:   Diagnosis Date    History of chlamydia 2019    History of gonorrhea 2016     Allergies reported: Review of patient's allergies indicates:  No Known Allergies    Pt states she was in an argument with her boyfriend and said, "I'm just gonna take a bunch of pills and kill myself". 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 Pt denies SI and having a plan, pt states she was just trying to upset her boyfriend.     LOC: Patient is awake, alert, and aware of environment with an appropriate affect. Patient is oriented x 4 and speaking appropriately.  APPEARANCE: Patient resting comfortably and in no acute distress. Patient is clean and well groomed, patient's clothing is properly fastened.  HEENT: WDL  SKIN: The skin is warm and dry. Patient has normal skin turgor and moist mucus membranes.   MUSKULOSKELETAL: Patient is moving all extremities well, no obvious deformities noted. Pulses intact.   RESPIRATORY: Airway is open and patent. Respirations are spontaneous and non-labored with normal effort and rate.  CARDIAC: Patient has a normal rate and rhythm. 72 on cardiac monitor. No peripheral edema noted.   ABDOMEN: No distention noted. Soft and non-tender upon palpation.  NEUROLOGICAL: pupils 3mm, PERRL. Facial expression is symmetrical. Hand grasps are equal bilaterally. Normal sensation in all extremities when touched with finger.          "
Bed: 28  Expected date:   Expected time:   Means of arrival:   Comments:  SI  
Dr. Lacey at bedside  
Dr. Macias at bedside  
POCT hCG negative  
Patient belongings placed in [facility] locker:  1x black shirt  1x pair black pants  2x red socks  1x multicolor head cap  2x black sandals  
Patient valuables placed in envelope and sealed in front of patient and security. Given to charge nurse Rommel to be placed in safe   4x metallic earrings  1x phone  1x metallic necklace  $120 cash (6x $20s, 5x $1s)  
Psychiatry at bedside  
Pt's mother at bedside  
Pts mother requesting updates by cell phone at [phone]  
Quality 47: Advance Care Plan: Advance care planning not documented, reason not otherwise specified.
Quality 226: Preventive Care And Screening: Tobacco Use: Screening And Cessation Intervention: Patient screened for tobacco use, is a smoker AND received Cessation Counseling within measurement period or in the six months prior to the measurement period
Quality 130: Documentation Of Current Medications In The Medical Record: Current Medications Documented
Quality 431: Preventive Care And Screening: Unhealthy Alcohol Use - Screening: Patient not identified as an unhealthy alcohol user when screened for unhealthy alcohol use using a systematic screening method
Detail Level: Simple

## 2024-12-16 ENCOUNTER — HOSPITAL ENCOUNTER (EMERGENCY)
Facility: HOSPITAL | Age: 44
Discharge: HOME | End: 2024-12-16
Attending: EMERGENCY MEDICINE
Payer: MEDICAID

## 2024-12-16 VITALS
BODY MASS INDEX: 27.8 KG/M2 | RESPIRATION RATE: 16 BRPM | SYSTOLIC BLOOD PRESSURE: 130 MMHG | HEIGHT: 66 IN | TEMPERATURE: 99 F | OXYGEN SATURATION: 97 % | WEIGHT: 173 LBS | HEART RATE: 93 BPM | DIASTOLIC BLOOD PRESSURE: 59 MMHG

## 2024-12-16 DIAGNOSIS — H10.9 BACTERIAL CONJUNCTIVITIS OF RIGHT EYE: Primary | ICD-10-CM

## 2024-12-16 LAB
B-HCG UR QL: NEGATIVE
CTP QC/QA: YES

## 2024-12-16 PROCEDURE — 99283 EMERGENCY DEPT VISIT LOW MDM: CPT | Mod: 25,ER

## 2024-12-16 PROCEDURE — 81025 URINE PREGNANCY TEST: CPT | Mod: ER

## 2024-12-16 PROCEDURE — 81025 URINE PREGNANCY TEST: CPT | Mod: ER | Performed by: EMERGENCY MEDICINE

## 2024-12-16 RX ORDER — ERYTHROMYCIN 5 MG/G
OINTMENT OPHTHALMIC
Qty: 3.5 G | Refills: 0 | Status: SHIPPED | OUTPATIENT
Start: 2024-12-16

## 2024-12-16 NOTE — Clinical Note
"Gilberto Starks" Myles was seen and treated in our emergency department on 12/16/2024.  She may return to work on 12/18/2024.       If you have any questions or concerns, please don't hesitate to call.      Micki Gutiérrez, DO"

## 2024-12-16 NOTE — ED PROVIDER NOTES
Encounter Date: 2024    SCRIBE #1 NOTE: I, Nora Babcock, am scribing for, and in the presence of,  Micki Gutiérrez DO. I have scribed the following portions of the note - Other sections scribed: HPI,ROS,PE,MDM.       History     Chief Complaint   Patient presents with    Conjunctivitis     Pt reports R eye redness, irritation and drainage x 1 day      Gilberto Bueno is a 44 y.o. female with no PMHx, who presents to the ED for chief complaint of right eye irritation onset 2 day. Reports associated eye redness and eye drainage. Reports LMP was 2024. Attempted treatment with eye drops without relief. No other alleviating or exacerbating factors. Denies fever, chills, or any other associated symptoms.    The history is provided by the patient. No  was used.     Review of patient's allergies indicates:  No Known Allergies  Past Medical History:   Diagnosis Date    History of chlamydia     History of gonorrhea      Past Surgical History:   Procedure Laterality Date     SECTION N/A 2023    Procedure:  SECTION;  Surgeon: Abimael Henderson MD;  Location: [facility] L&D OR;  Service: OB/GYN;  Laterality: N/A;     Family History   Problem Relation Name Age of Onset    Diabetes Maternal Grandmother      Hypertension Cousin          Preeclampsia    Venous thrombosis Neg Hx       Social History     Tobacco Use    Smoking status: Never    Smokeless tobacco: Never   Substance Use Topics    Alcohol use: Not Currently     Comment: occ    Drug use: No     Review of Systems   Constitutional:  Negative for chills and fever.   HENT:  Negative for rhinorrhea and sore throat.    Eyes:  Positive for discharge, redness and itching.   Respiratory:  Negative for shortness of breath.    Cardiovascular:  Negative for chest pain and leg swelling.   Gastrointestinal:  Negative for abdominal pain, diarrhea, nausea and vomiting.   Musculoskeletal:  Negative for back pain.   Skin:  Negative for  rash.   Neurological:  Negative for syncope and headaches.   All other systems reviewed and are negative.      Physical Exam     Initial Vitals [12/16/24 1249]   BP Pulse Resp Temp SpO2   (!) 130/59 93 16 98.7 °F (37.1 °C) 97 %      MAP       --         Physical Exam    Nursing note and vitals reviewed.  Constitutional: She appears well-developed and well-nourished.   HENT:   Head: Normocephalic and atraumatic.   Right Ear: External ear normal.   Left Ear: External ear normal.   Eyes: Conjunctivae and EOM are normal. Pupils are equal, round, and reactive to light. Right eye exhibits no discharge. Left eye exhibits no discharge.   Injected conjunctiva to right eye.   Neck: Neck supple.   Normal range of motion.  Cardiovascular:  Normal rate, regular rhythm, normal heart sounds and intact distal pulses.     Exam reveals no gallop and no friction rub.       No murmur heard.  Pulmonary/Chest: Breath sounds normal. No respiratory distress. She has no wheezes. She has no rhonchi. She has no rales. She exhibits no tenderness.   Abdominal: Abdomen is soft. Bowel sounds are normal. She exhibits no distension and no mass. There is no abdominal tenderness.   No CVA tenderness There is no rebound and no guarding.   Musculoskeletal:         General: No tenderness or edema. Normal range of motion.      Cervical back: Normal range of motion and neck supple.     Neurological: She is alert and oriented to person, place, and time.   Skin: Skin is warm and dry.   Psychiatric: She has a normal mood and affect.         ED Course   Procedures  Labs Reviewed   POCT URINE PREGNANCY       Result Value    POC Preg Test, Ur Negative       Acceptable Yes            Imaging Results    None          Medications - No data to display  Medical Decision Making  Amount and/or Complexity of Data Reviewed  Labs: ordered. Decision-making details documented in ED Course.    Risk  Prescription drug management.      Patient gave consent to  have physical exam performed.         Scribe Attestation:   Scribe #1: I performed the above scribed service and the documentation accurately describes the services I performed. I attest to the accuracy of the note.              Medical Decision Making:    This is an evaluation of a 44 y.o. female that presents to the Emergency Department for   Chief Complaint   Patient presents with    Conjunctivitis     Pt reports R eye redness, irritation and drainage x 1 day        Independent historian: patient    The patient is a non-toxic and well appearing patient. On physical exam, patient appears well hydrated with moist mucus membranes. Breath sounds are clear and equal bilaterally with no adventitious breath sounds, tachypnea or respiratory distress. Regular rate and rhythm. No murmurs. Abdomen soft and non tender. Patient is tolerating PO without difficulty. Physical exam otherwise as above.     I have reviewed vital signs and nursing notes.   Vital Signs Are Reassuring.     Based on the patient's symptoms, I am considering and evaluating for the following differential diagnoses: viral conjunctivitis, conjunctivitis, bacterial conjunctivitis, and pregnancy.    ED Course:Treatment in the ED included Physical Exam and medications given in ED  Medications - No data to display.   Patient reports feeling better after treatment in the ER.   Vital signs reviewed  Nurse's notes reviewed  External Data/Documents Reviewed: Previous medical records and vital signs reviewed, see HPI and Physical exam.   Labs: ordered and reviewed.  Pregnancy test negative.      Risk  Diagnosis or treatment significantly limited by the following social determinants of health: Body mass index is 27.92 kg/m².     In shared decision making with the patient, we discussed treatment, prescriptions, labs, and imaging results.    Discharge home with   ED Prescriptions       Medication Sig Dispense Start Date End Date Auth. Provider    erythromycin (ROMYCIN)  ophthalmic ointment Place a 1/2 inch ribbon of ointment into the lower eyelid. 3.5 g 12/16/2024 -- Micki Gutiérrez DO          Fill and take prescriptions as directed.  Return to the ED if symptoms worsen or do not resolve.   Answered questions and discussed discharge plan.    Follow up with PCP/specialist in 1 day    The following labs and imaging were reviewed:      Admission on 12/16/2024, Discharged on 12/16/2024   Component Date Value Ref Range Status    POC Preg Test, Ur 12/16/2024 Negative  Negative Final     Acceptable 12/16/2024 Yes   Final        Imaging Results    None                  I, Dr. Micki Gutiérrez, personally performed the services described in this documentation. This document was produced by a scribe under my direction and in my presence. All medical record entries made by the scribe were at my direction and in my presence.  I have reviewed the chart and agree that the record reflects my personal performance and is accurate and complete. Micki Gutiérrez DO.     12/17/2024 4:15 PM        Clinical Impression:  Final diagnoses:  [H10.9] Bacterial conjunctivitis of right eye (Primary)          ED Disposition Condition    Discharge Stable          ED Prescriptions       Medication Sig Dispense Start Date End Date Auth. Provider    erythromycin (ROMYCIN) ophthalmic ointment Place a 1/2 inch ribbon of ointment into the lower eyelid. 3.5 g 12/16/2024 -- Micki Gutiérrez DO          Follow-up Information       Follow up With Specialties Details Why Contact Info    Liss Wise MD Family Medicine Schedule an appointment as soon as possible for a visit in 1 day  [address] [place] [address]  [address]  [place] [address]  [phone]      Gio Solis MD Ophthalmology Schedule an appointment as soon as possible for a visit  For further evaluation of your eyes and vision [address] [facility]  [place] [address]  [phone]               Micki Gutiérrez DO  12/17/24 8066

## 2025-01-29 NOTE — PROGRESS NOTES
----- Message from Ton sent at 1/29/2025 11:50 AM CST -----  Contact: [phone]  Type:  Needs Medical Advice    Who Called: EMIR WEBER [[id number]]  Symptoms (please be specific): need to speak to the nurse about an medication  insulin glargine U-100, Lantus, (LANTUS SOLOSTAR U-100 INSULIN) 100 unit/mL (3 ml InPn pen  How long has patient had these symptoms:    Pharmacy name and phone #:    Would the patient rather a call back or a response via [facility]? Call back  Best Call Back Number:  [phone]  Additional Information: [id number]          [facility] [address] [place] [address] [address] [id number] [facility] [address]  Phone: [phone] Fax: [phone]   OB here for GBS, HIV testing and signing consents today. Pt with pelvic pain and pressure. jtn
